# Patient Record
Sex: MALE | Race: WHITE | NOT HISPANIC OR LATINO
[De-identification: names, ages, dates, MRNs, and addresses within clinical notes are randomized per-mention and may not be internally consistent; named-entity substitution may affect disease eponyms.]

---

## 2017-06-22 ENCOUNTER — APPOINTMENT (OUTPATIENT)
Dept: SURGERY | Facility: CLINIC | Age: 72
End: 2017-06-22

## 2017-06-22 VITALS
SYSTOLIC BLOOD PRESSURE: 129 MMHG | HEART RATE: 69 BPM | DIASTOLIC BLOOD PRESSURE: 79 MMHG | BODY MASS INDEX: 26.55 KG/M2 | WEIGHT: 218 LBS | HEIGHT: 76 IN

## 2017-06-22 DIAGNOSIS — Z86.39 PERSONAL HISTORY OF OTHER ENDOCRINE, NUTRITIONAL AND METABOLIC DISEASE: ICD-10-CM

## 2017-06-22 DIAGNOSIS — Z86.79 PERSONAL HISTORY OF OTHER DISEASES OF THE CIRCULATORY SYSTEM: ICD-10-CM

## 2017-06-22 DIAGNOSIS — Z78.9 OTHER SPECIFIED HEALTH STATUS: ICD-10-CM

## 2017-06-22 DIAGNOSIS — Z80.1 FAMILY HISTORY OF MALIGNANT NEOPLASM OF TRACHEA, BRONCHUS AND LUNG: ICD-10-CM

## 2017-06-22 DIAGNOSIS — Z80.8 FAMILY HISTORY OF MALIGNANT NEOPLASM OF OTHER ORGANS OR SYSTEMS: ICD-10-CM

## 2017-08-15 ENCOUNTER — OUTPATIENT (OUTPATIENT)
Dept: OUTPATIENT SERVICES | Facility: HOSPITAL | Age: 72
LOS: 1 days | End: 2017-08-15
Payer: MEDICARE

## 2017-08-15 VITALS
DIASTOLIC BLOOD PRESSURE: 88 MMHG | OXYGEN SATURATION: 98 % | HEART RATE: 62 BPM | WEIGHT: 218.04 LBS | SYSTOLIC BLOOD PRESSURE: 138 MMHG | TEMPERATURE: 98 F | RESPIRATION RATE: 16 BRPM | HEIGHT: 76 IN

## 2017-08-15 DIAGNOSIS — C73 MALIGNANT NEOPLASM OF THYROID GLAND: ICD-10-CM

## 2017-08-15 DIAGNOSIS — Z98.890 OTHER SPECIFIED POSTPROCEDURAL STATES: Chronic | ICD-10-CM

## 2017-08-15 DIAGNOSIS — T41.45XA ADVERSE EFFECT OF UNSPECIFIED ANESTHETIC, INITIAL ENCOUNTER: ICD-10-CM

## 2017-08-15 DIAGNOSIS — I10 ESSENTIAL (PRIMARY) HYPERTENSION: ICD-10-CM

## 2017-08-15 DIAGNOSIS — J38.2 NODULES OF VOCAL CORDS: Chronic | ICD-10-CM

## 2017-08-15 PROCEDURE — 93010 ELECTROCARDIOGRAM REPORT: CPT

## 2017-08-15 NOTE — H&P PST ADULT - NEGATIVE CARDIOVASCULAR SYMPTOMS
no paroxysmal nocturnal dyspnea/no dyspnea on exertion/no peripheral edema/no claudication/no chest pain/no palpitations/no orthopnea

## 2017-08-15 NOTE — H&P PST ADULT - PROBLEM SELECTOR PLAN 1
scheduled left thyroid lobectomy, paratracheal node dissection on 8/21/17.   preop instruction, gi prophylaxis & surgical soap given  pt verbalized understanding

## 2017-08-15 NOTE — H&P PST ADULT - HISTORY OF PRESENT ILLNESS
73y/o male presents for preop eval for scheduled left thyroid lobectomy, paratracheal node dissection on 8/21/17.  Pt with h/o thyroid nodule dx approximately one year ago.  Recent imaging show slight increase in size, biopsy positive for malignancy.

## 2017-08-15 NOTE — H&P PST ADULT - PMH
Dyslipidemia    HTN (hypertension)    Malignant neoplasm of thyroid gland    Melanoma  nose & left cheek  2yrs ago

## 2017-08-18 RX ORDER — SODIUM CHLORIDE 9 MG/ML
3 INJECTION INTRAMUSCULAR; INTRAVENOUS; SUBCUTANEOUS ONCE
Qty: 0 | Refills: 0 | Status: DISCONTINUED | OUTPATIENT
Start: 2017-08-21 | End: 2017-09-05

## 2017-08-18 RX ORDER — SODIUM CHLORIDE 9 MG/ML
1000 INJECTION, SOLUTION INTRAVENOUS
Qty: 0 | Refills: 0 | Status: DISCONTINUED | OUTPATIENT
Start: 2017-08-21 | End: 2017-09-05

## 2017-08-21 ENCOUNTER — TRANSCRIPTION ENCOUNTER (OUTPATIENT)
Age: 72
End: 2017-08-21

## 2017-08-21 ENCOUNTER — RESULT REVIEW (OUTPATIENT)
Age: 72
End: 2017-08-21

## 2017-08-21 ENCOUNTER — OUTPATIENT (OUTPATIENT)
Dept: OUTPATIENT SERVICES | Facility: HOSPITAL | Age: 72
LOS: 1 days | Discharge: HOME | End: 2017-08-21
Payer: MEDICARE

## 2017-08-21 ENCOUNTER — APPOINTMENT (OUTPATIENT)
Dept: SURGERY | Facility: HOSPITAL | Age: 72
End: 2017-08-21

## 2017-08-21 VITALS
RESPIRATION RATE: 18 BRPM | HEART RATE: 74 BPM | TEMPERATURE: 98 F | SYSTOLIC BLOOD PRESSURE: 137 MMHG | WEIGHT: 218.04 LBS | OXYGEN SATURATION: 98 % | DIASTOLIC BLOOD PRESSURE: 89 MMHG | HEIGHT: 76 IN

## 2017-08-21 VITALS
RESPIRATION RATE: 16 BRPM | SYSTOLIC BLOOD PRESSURE: 153 MMHG | DIASTOLIC BLOOD PRESSURE: 84 MMHG | OXYGEN SATURATION: 95 % | HEART RATE: 72 BPM | TEMPERATURE: 98 F

## 2017-08-21 DIAGNOSIS — C73 MALIGNANT NEOPLASM OF THYROID GLAND: ICD-10-CM

## 2017-08-21 DIAGNOSIS — Z98.890 OTHER SPECIFIED POSTPROCEDURAL STATES: Chronic | ICD-10-CM

## 2017-08-21 DIAGNOSIS — J38.2 NODULES OF VOCAL CORDS: Chronic | ICD-10-CM

## 2017-08-21 PROCEDURE — 13132 CMPLX RPR F/C/C/M/N/AX/G/H/F: CPT | Mod: 59

## 2017-08-21 PROCEDURE — 88305 TISSUE EXAM BY PATHOLOGIST: CPT | Mod: 26

## 2017-08-21 PROCEDURE — 88307 TISSUE EXAM BY PATHOLOGIST: CPT | Mod: 26

## 2017-08-21 PROCEDURE — 60252 REMOVAL OF THYROID: CPT

## 2017-08-21 RX ORDER — ACETAMINOPHEN 500 MG
2 TABLET ORAL
Qty: 0 | Refills: 0 | DISCHARGE
Start: 2017-08-21

## 2017-08-21 RX ORDER — OXYCODONE AND ACETAMINOPHEN 5; 325 MG/1; MG/1
1 TABLET ORAL EVERY 4 HOURS
Qty: 0 | Refills: 0 | Status: DISCONTINUED | OUTPATIENT
Start: 2017-08-21 | End: 2017-08-21

## 2017-08-21 RX ORDER — ONDANSETRON 8 MG/1
4 TABLET, FILM COATED ORAL ONCE
Qty: 0 | Refills: 0 | Status: DISCONTINUED | OUTPATIENT
Start: 2017-08-21 | End: 2017-09-05

## 2017-08-21 RX ORDER — OXYCODONE HYDROCHLORIDE 5 MG/1
5 TABLET ORAL EVERY 4 HOURS
Qty: 0 | Refills: 0 | Status: DISCONTINUED | OUTPATIENT
Start: 2017-08-21 | End: 2017-08-21

## 2017-08-21 RX ORDER — ACETAMINOPHEN 500 MG
650 TABLET ORAL EVERY 6 HOURS
Qty: 0 | Refills: 0 | Status: DISCONTINUED | OUTPATIENT
Start: 2017-08-21 | End: 2017-09-05

## 2017-08-21 RX ORDER — METOCLOPRAMIDE HCL 10 MG
10 TABLET ORAL ONCE
Qty: 0 | Refills: 0 | Status: DISCONTINUED | OUTPATIENT
Start: 2017-08-21 | End: 2017-09-05

## 2017-08-21 RX ORDER — FENTANYL CITRATE 50 UG/ML
50 INJECTION INTRAVENOUS
Qty: 0 | Refills: 0 | Status: DISCONTINUED | OUTPATIENT
Start: 2017-08-21 | End: 2017-08-21

## 2017-08-21 RX ADMIN — SODIUM CHLORIDE 30 MILLILITER(S): 9 INJECTION, SOLUTION INTRAVENOUS at 10:53

## 2017-08-21 NOTE — ASU DISCHARGE PLAN (ADULT/PEDIATRIC). - NOTIFY
Unable to Urinate/Persistent Nausea and Vomiting/Swelling that continues/Numbness, tingling/Bleeding that does not stop

## 2017-08-21 NOTE — ASU DISCHARGE PLAN (ADULT/PEDIATRIC). - MEDICATION SUMMARY - MEDICATIONS TO TAKE
I will START or STAY ON the medications listed below when I get home from the hospital:    acetaminophen 325 mg oral tablet  -- 2 tab(s) by mouth every 6 hours, As needed, Moderate Pain (4 - 6)  -- Indication: For Malignant neoplasm of thyroid gland    Avapro 150 mg oral tablet  -- 1 tab(s) by mouth once a day  -- Indication: For Malignant neoplasm of thyroid gland    Lipitor 20 mg oral tablet  -- 1 tab(s) by mouth once a day  -- Indication: For Malignant neoplasm of thyroid gland

## 2017-08-21 NOTE — ASU DISCHARGE PLAN (ADULT/PEDIATRIC). - NURSING INSTRUCTIONS
Notify MD of any bleeding, swelling, drainage, or redness at incision site. Notify MD of any fever, chills, or inability to tolerate diet.     Cool and warm liquids that are not irritating to the throat should be given for the first day or two. Avoid hot liquids. Avoid citrus juices and milk. Advance at your own pace starting with soft foods and advancing to a regular diet. Avoid rough and scratchy foods and foods that are difficult to chew for approximately 5 days.    No tylenol until 630pm.

## 2017-08-22 ENCOUNTER — APPOINTMENT (OUTPATIENT)
Dept: SURGERY | Facility: CLINIC | Age: 72
End: 2017-08-22
Payer: MEDICARE

## 2017-08-22 PROCEDURE — 99024 POSTOP FOLLOW-UP VISIT: CPT

## 2017-08-24 LAB — SURGICAL PATHOLOGY STUDY: SIGNIFICANT CHANGE UP

## 2017-09-28 ENCOUNTER — APPOINTMENT (OUTPATIENT)
Dept: SURGERY | Facility: CLINIC | Age: 72
End: 2017-09-28
Payer: MEDICARE

## 2017-09-28 PROCEDURE — 99024 POSTOP FOLLOW-UP VISIT: CPT

## 2017-09-28 PROCEDURE — 36415 COLL VENOUS BLD VENIPUNCTURE: CPT

## 2017-09-29 LAB
T3 SERPL-MCNC: 114 NG/DL
T4 FREE SERPL-MCNC: 1.1 NG/DL
THYROGLOB AB SERPL-ACNC: 39.8 IU/ML
THYROPEROXIDASE AB SERPL IA-ACNC: <10 IU/ML
TSH SERPL-ACNC: 9.17 UIU/ML

## 2017-10-10 ENCOUNTER — OTHER (OUTPATIENT)
Age: 72
End: 2017-10-10

## 2018-02-27 ENCOUNTER — APPOINTMENT (OUTPATIENT)
Dept: SURGERY | Facility: CLINIC | Age: 73
End: 2018-02-27
Payer: MEDICARE

## 2018-02-27 PROCEDURE — 99213 OFFICE O/P EST LOW 20 MIN: CPT

## 2018-08-21 ENCOUNTER — APPOINTMENT (OUTPATIENT)
Dept: SURGERY | Facility: CLINIC | Age: 73
End: 2018-08-21
Payer: MEDICARE

## 2018-08-21 PROCEDURE — 99213 OFFICE O/P EST LOW 20 MIN: CPT

## 2018-08-22 PROBLEM — C73 MALIGNANT NEOPLASM OF THYROID GLAND: Chronic | Status: ACTIVE | Noted: 2017-08-15

## 2018-08-22 PROBLEM — E78.5 HYPERLIPIDEMIA, UNSPECIFIED: Chronic | Status: ACTIVE | Noted: 2017-08-15

## 2018-08-22 PROBLEM — C43.9 MALIGNANT MELANOMA OF SKIN, UNSPECIFIED: Chronic | Status: ACTIVE | Noted: 2017-08-15

## 2018-08-22 PROBLEM — I10 ESSENTIAL (PRIMARY) HYPERTENSION: Chronic | Status: ACTIVE | Noted: 2017-08-15

## 2019-02-19 NOTE — ASU PREOP CHECKLIST - HAIR REMOVAL
What Type Of Note Output Would You Prefer (Optional)?: Standard Output How Severe Is Your Skin Lesion?: mild Has Your Skin Lesion Been Treated?: not been treated Is This A New Presentation, Or A Follow-Up?: Skin Lesions hair removal not indicated

## 2019-03-12 ENCOUNTER — APPOINTMENT (OUTPATIENT)
Dept: SURGERY | Facility: CLINIC | Age: 74
End: 2019-03-12
Payer: MEDICARE

## 2019-03-12 PROCEDURE — 99213 OFFICE O/P EST LOW 20 MIN: CPT

## 2019-03-12 NOTE — HISTORY OF PRESENT ILLNESS
[de-identified] : 1 1/2 years  s/p thyroid lobectomy for malignancy. feels well on no Synthroid;   denies dysphagia, hoarseness or new lesions. no changes medically since last visit.  last sonogram [place].  TFT's reportedly stable.  working to lower Hgb A1c

## 2019-03-12 NOTE — PHYSICAL EXAM
[de-identified] : well healed scar [de-identified] : no palpable thyroid nodules [Laryngoscopy Performed] : laryngoscopy was performed, see procedure section for findings [Midline] : located in midline position [Normal] : orientation to person, place, and time: normal

## 2019-09-17 ENCOUNTER — APPOINTMENT (OUTPATIENT)
Dept: SURGERY | Facility: CLINIC | Age: 74
End: 2019-09-17

## 2020-06-16 ENCOUNTER — APPOINTMENT (OUTPATIENT)
Dept: GASTROENTEROLOGY | Facility: CLINIC | Age: 75
End: 2020-06-16
Payer: MEDICARE

## 2020-06-16 VITALS
WEIGHT: 197 LBS | TEMPERATURE: 97.7 F | OXYGEN SATURATION: 97 % | HEIGHT: 76 IN | SYSTOLIC BLOOD PRESSURE: 120 MMHG | BODY MASS INDEX: 23.99 KG/M2 | DIASTOLIC BLOOD PRESSURE: 70 MMHG | HEART RATE: 76 BPM

## 2020-06-16 DIAGNOSIS — Z12.11 ENCOUNTER FOR SCREENING FOR MALIGNANT NEOPLASM OF COLON: ICD-10-CM

## 2020-06-16 PROCEDURE — 99203 OFFICE O/P NEW LOW 30 MIN: CPT

## 2020-06-16 NOTE — HISTORY OF PRESENT ILLNESS
[FreeTextEntry1] : Patient came to the office today to arrange for colonoscopy. Has been more than 5 years since last examination. Patient looks and feels well and offers no complaints he denies rectal bleeding nausea vomiting fever chills etc. He has no cardiac or pulmonary complaints.

## 2020-06-16 NOTE — ASSESSMENT
[FreeTextEntry1] : Impression\par \par Patient came to the office today to arrange for colonoscopy. The risks benefits alternatives and limitations are discussed. Further suggestions will follow up with complete.

## 2020-07-02 DIAGNOSIS — Z01.818 ENCOUNTER FOR OTHER PREPROCEDURAL EXAMINATION: ICD-10-CM

## 2020-07-07 ENCOUNTER — APPOINTMENT (OUTPATIENT)
Dept: DISASTER EMERGENCY | Facility: CLINIC | Age: 75
End: 2020-07-07

## 2020-07-08 LAB — SARS-COV-2 N GENE NPH QL NAA+PROBE: NOT DETECTED

## 2020-07-09 ENCOUNTER — APPOINTMENT (OUTPATIENT)
Dept: GASTROENTEROLOGY | Facility: AMBULATORY MEDICAL SERVICES | Age: 75
End: 2020-07-09
Payer: MEDICARE

## 2020-07-09 PROCEDURE — 45380 COLONOSCOPY AND BIOPSY: CPT

## 2020-07-21 ENCOUNTER — APPOINTMENT (OUTPATIENT)
Dept: GASTROENTEROLOGY | Facility: CLINIC | Age: 75
End: 2020-07-21
Payer: MEDICARE

## 2020-07-21 VITALS
DIASTOLIC BLOOD PRESSURE: 68 MMHG | BODY MASS INDEX: 22.89 KG/M2 | SYSTOLIC BLOOD PRESSURE: 140 MMHG | TEMPERATURE: 98 F | HEIGHT: 76 IN | WEIGHT: 188 LBS | OXYGEN SATURATION: 98 % | HEART RATE: 85 BPM

## 2020-07-21 DIAGNOSIS — E11.29 TYPE 2 DIABETES MELLITUS WITH OTHER DIABETIC KIDNEY COMPLICATION: ICD-10-CM

## 2020-07-21 DIAGNOSIS — E11.59 TYPE 2 DIABETES MELLITUS WITH OTHER CIRCULATORY COMPLICATIONS: ICD-10-CM

## 2020-07-21 DIAGNOSIS — Z86.39 PERSONAL HISTORY OF OTHER ENDOCRINE, NUTRITIONAL AND METABOLIC DISEASE: ICD-10-CM

## 2020-07-21 DIAGNOSIS — Z00.00 ENCOUNTER FOR GENERAL ADULT MEDICAL EXAMINATION W/OUT ABNORMAL FINDINGS: ICD-10-CM

## 2020-07-21 PROCEDURE — 99214 OFFICE O/P EST MOD 30 MIN: CPT

## 2020-07-21 NOTE — HISTORY OF PRESENT ILLNESS
[FreeTextEntry1] : Patient came to the office today for followup after recent colonoscopy. Around the time of his colonoscopy patient starts developing irregular bowel movements with several soft formed movements per day. This is on usual for him. Patient had what looked like mild focal diverticulitis during a colonoscopy but no other serious pathology. Patient had a tiny polyp removed during the procedure. There was no evidence of colitis. The patient states that he has been having 3-4 soft mushy formed movements daily. There has been no fever chills melena bright red blood per rectum vomiting nausea or loss of appetite. Patient generally feels well. Patient has been taking metformin based diabetic medication which he has recently discontinued. He does admit to eating larger amounts of green vegetables and milk products.

## 2020-07-21 NOTE — ASSESSMENT
[FreeTextEntry1] : Impression and plan\par Patient came to the office today with change in bowel habits. I suspect it is on the basis of dietary items including salad and milk however I will obtain stool studies follow path by CT scanning given patient's recent diverticulitis et cetera. Patient was given a modified diet for the next 5 days to include meats and starches predominant. Patient will call back after stools the symptoms are submitted and I will advise accordingly about CT scanning.

## 2020-07-22 LAB — GI PCR PANEL, STOOL: NORMAL

## 2020-07-23 ENCOUNTER — APPOINTMENT (OUTPATIENT)
Dept: CT IMAGING | Facility: CLINIC | Age: 75
End: 2020-07-23
Payer: MEDICARE

## 2020-07-23 ENCOUNTER — OUTPATIENT (OUTPATIENT)
Dept: OUTPATIENT SERVICES | Facility: HOSPITAL | Age: 75
LOS: 1 days | End: 2020-07-23
Payer: MEDICARE

## 2020-07-23 DIAGNOSIS — Z12.11 ENCOUNTER FOR SCREENING FOR MALIGNANT NEOPLASM OF COLON: ICD-10-CM

## 2020-07-23 DIAGNOSIS — Z98.890 OTHER SPECIFIED POSTPROCEDURAL STATES: Chronic | ICD-10-CM

## 2020-07-23 DIAGNOSIS — J38.2 NODULES OF VOCAL CORDS: Chronic | ICD-10-CM

## 2020-07-23 PROCEDURE — 74177 CT ABD & PELVIS W/CONTRAST: CPT | Mod: 26

## 2020-07-23 PROCEDURE — 74177 CT ABD & PELVIS W/CONTRAST: CPT

## 2020-07-23 PROCEDURE — 82565 ASSAY OF CREATININE: CPT

## 2020-07-24 DIAGNOSIS — R93.5 ABNORMAL FINDINGS ON DIAGNOSTIC IMAGING OF OTHER ABDOMINAL REGIONS, INCLUDING RETROPERITONEUM: ICD-10-CM

## 2020-07-27 LAB
C DIFF TOXIN B QL PCR REFLEX: NORMAL
GDH ANTIGEN: NOT DETECTED
TOXIN A AND B: NOT DETECTED

## 2020-12-23 PROBLEM — Z12.11 ENCOUNTER FOR SCREENING COLONOSCOPY: Status: RESOLVED | Noted: 2020-06-16 | Resolved: 2020-12-23

## 2021-01-01 ENCOUNTER — APPOINTMENT (OUTPATIENT)
Dept: INFUSION THERAPY | Facility: HOSPITAL | Age: 76
End: 2021-01-01

## 2021-01-01 ENCOUNTER — NON-APPOINTMENT (OUTPATIENT)
Age: 76
End: 2021-01-01

## 2021-01-01 ENCOUNTER — LABORATORY RESULT (OUTPATIENT)
Age: 76
End: 2021-01-01

## 2021-01-01 ENCOUNTER — RESULT REVIEW (OUTPATIENT)
Age: 76
End: 2021-01-01

## 2021-01-01 ENCOUNTER — APPOINTMENT (OUTPATIENT)
Dept: HEMATOLOGY ONCOLOGY | Facility: CLINIC | Age: 76
End: 2021-01-01

## 2021-01-01 ENCOUNTER — APPOINTMENT (OUTPATIENT)
Dept: HEMATOLOGY ONCOLOGY | Facility: CLINIC | Age: 76
End: 2021-01-01
Payer: MEDICARE

## 2021-01-01 ENCOUNTER — TRANSCRIPTION ENCOUNTER (OUTPATIENT)
Age: 76
End: 2021-01-01

## 2021-01-01 ENCOUNTER — OUTPATIENT (OUTPATIENT)
Dept: OUTPATIENT SERVICES | Facility: HOSPITAL | Age: 76
LOS: 1 days | Discharge: HOME | End: 2021-01-01

## 2021-01-01 ENCOUNTER — OUTPATIENT (OUTPATIENT)
Dept: OUTPATIENT SERVICES | Facility: HOSPITAL | Age: 76
LOS: 1 days | Discharge: HOME | End: 2021-01-01
Payer: MEDICARE

## 2021-01-01 ENCOUNTER — APPOINTMENT (OUTPATIENT)
Dept: CT IMAGING | Facility: IMAGING CENTER | Age: 76
End: 2021-01-01
Payer: MEDICARE

## 2021-01-01 ENCOUNTER — APPOINTMENT (OUTPATIENT)
Dept: INFUSION THERAPY | Facility: HOSPITAL | Age: 76
End: 2021-01-01
Payer: MEDICARE

## 2021-01-01 ENCOUNTER — OUTPATIENT (OUTPATIENT)
Dept: OUTPATIENT SERVICES | Facility: HOSPITAL | Age: 76
LOS: 1 days | End: 2021-01-01
Payer: MEDICARE

## 2021-01-01 ENCOUNTER — APPOINTMENT (OUTPATIENT)
Dept: MULTI SPECIALTY CLINIC | Facility: CLINIC | Age: 76
End: 2021-01-01
Payer: MEDICARE

## 2021-01-01 VITALS
WEIGHT: 156.53 LBS | BODY MASS INDEX: 18.86 KG/M2 | SYSTOLIC BLOOD PRESSURE: 120 MMHG | OXYGEN SATURATION: 100 % | HEART RATE: 80 BPM | RESPIRATION RATE: 16 BRPM | DIASTOLIC BLOOD PRESSURE: 77 MMHG | TEMPERATURE: 97.1 F | HEIGHT: 76.38 IN

## 2021-01-01 DIAGNOSIS — C25.9 MALIGNANT NEOPLASM OF PANCREAS, UNSPECIFIED: ICD-10-CM

## 2021-01-01 DIAGNOSIS — J38.2 NODULES OF VOCAL CORDS: Chronic | ICD-10-CM

## 2021-01-01 DIAGNOSIS — R11.2 NAUSEA WITH VOMITING, UNSPECIFIED: ICD-10-CM

## 2021-01-01 DIAGNOSIS — Z85.820 PERSONAL HISTORY OF MALIGNANT MELANOMA OF SKIN: ICD-10-CM

## 2021-01-01 DIAGNOSIS — Z98.890 OTHER SPECIFIED POSTPROCEDURAL STATES: Chronic | ICD-10-CM

## 2021-01-01 DIAGNOSIS — Z80.3 FAMILY HISTORY OF MALIGNANT NEOPLASM OF BREAST: ICD-10-CM

## 2021-01-01 DIAGNOSIS — Z51.11 ENCOUNTER FOR ANTINEOPLASTIC CHEMOTHERAPY: ICD-10-CM

## 2021-01-01 DIAGNOSIS — Z51.89 ENCOUNTER FOR OTHER SPECIFIED AFTERCARE: ICD-10-CM

## 2021-01-01 DIAGNOSIS — Z85.850 PERSONAL HISTORY OF MALIGNANT NEOPLASM OF THYROID: ICD-10-CM

## 2021-01-01 DIAGNOSIS — Z87.891 PERSONAL HISTORY OF NICOTINE DEPENDENCE: ICD-10-CM

## 2021-01-01 LAB
ALBUMIN SERPL ELPH-MCNC: 4.1 G/DL
ALBUMIN SERPL ELPH-MCNC: 4.2 G/DL
ALBUMIN SERPL ELPH-MCNC: 4.3 G/DL
ALBUMIN SERPL ELPH-MCNC: 4.3 G/DL
ALP BLD-CCNC: 118 U/L
ALP BLD-CCNC: 133 U/L
ALP BLD-CCNC: 148 U/L
ALP BLD-CCNC: 175 U/L
ALT SERPL-CCNC: 26 U/L
ALT SERPL-CCNC: 29 U/L
ALT SERPL-CCNC: 44 U/L
ALT SERPL-CCNC: 46 U/L
ANION GAP SERPL CALC-SCNC: 11 MMOL/L
ANION GAP SERPL CALC-SCNC: 13 MMOL/L
ANION GAP SERPL CALC-SCNC: 14 MMOL/L
ANION GAP SERPL CALC-SCNC: 14 MMOL/L
AST SERPL-CCNC: 22 U/L
AST SERPL-CCNC: 24 U/L
AST SERPL-CCNC: 27 U/L
AST SERPL-CCNC: 40 U/L
BASOPHILS # BLD AUTO: 0.02 K/UL
BASOPHILS # BLD AUTO: 0.02 K/UL — SIGNIFICANT CHANGE UP (ref 0–0.2)
BASOPHILS # BLD AUTO: 0.03 K/UL
BASOPHILS # BLD AUTO: 0.03 K/UL — SIGNIFICANT CHANGE UP (ref 0–0.2)
BASOPHILS # BLD AUTO: 0.03 K/UL — SIGNIFICANT CHANGE UP (ref 0–0.2)
BASOPHILS NFR BLD AUTO: 0.4 % — SIGNIFICANT CHANGE UP (ref 0–2)
BASOPHILS NFR BLD AUTO: 0.5 %
BASOPHILS NFR BLD AUTO: 0.5 %
BASOPHILS NFR BLD AUTO: 0.7 % — SIGNIFICANT CHANGE UP (ref 0–2)
BASOPHILS NFR BLD AUTO: 0.8 %
BASOPHILS NFR BLD AUTO: 0.9 % — SIGNIFICANT CHANGE UP (ref 0–2)
BASOPHILS NFR BLD AUTO: 2.8 %
BILIRUB SERPL-MCNC: 0.2 MG/DL
BILIRUB SERPL-MCNC: 0.3 MG/DL
BILIRUB SERPL-MCNC: 0.4 MG/DL
BILIRUB SERPL-MCNC: 0.4 MG/DL
BUN SERPL-MCNC: 18 MG/DL
BUN SERPL-MCNC: 19 MG/DL
BUN SERPL-MCNC: 22 MG/DL
BUN SERPL-MCNC: 22 MG/DL
CALCIUM SERPL-MCNC: 9.3 MG/DL
CANCER AG19-9 SERPL-ACNC: 259 U/ML
CANCER AG19-9 SERPL-ACNC: 343 U/ML
CANCER AG19-9 SERPL-ACNC: 589 U/ML
CEA SERPL-MCNC: 4.5 NG/ML
CHLORIDE SERPL-SCNC: 102 MMOL/L
CHLORIDE SERPL-SCNC: 104 MMOL/L
CHLORIDE SERPL-SCNC: 106 MMOL/L
CHLORIDE SERPL-SCNC: 106 MMOL/L
CO2 SERPL-SCNC: 21 MMOL/L
CO2 SERPL-SCNC: 21 MMOL/L
CO2 SERPL-SCNC: 22 MMOL/L
CO2 SERPL-SCNC: 22 MMOL/L
CREAT SERPL-MCNC: 0.95 MG/DL
CREAT SERPL-MCNC: 1 MG/DL
CREAT SERPL-MCNC: 1.02 MG/DL
CREAT SERPL-MCNC: 1.13 MG/DL
EOSINOPHIL # BLD AUTO: 0.02 K/UL
EOSINOPHIL # BLD AUTO: 0.02 K/UL — SIGNIFICANT CHANGE UP (ref 0–0.5)
EOSINOPHIL # BLD AUTO: 0.05 K/UL
EOSINOPHIL # BLD AUTO: 0.06 K/UL — SIGNIFICANT CHANGE UP (ref 0–0.5)
EOSINOPHIL # BLD AUTO: 0.06 K/UL — SIGNIFICANT CHANGE UP (ref 0–0.5)
EOSINOPHIL # BLD AUTO: 0.12 K/UL
EOSINOPHIL # BLD AUTO: 0.15 K/UL
EOSINOPHIL NFR BLD AUTO: 0.8 % — SIGNIFICANT CHANGE UP (ref 0–6)
EOSINOPHIL NFR BLD AUTO: 0.9 % — SIGNIFICANT CHANGE UP (ref 0–6)
EOSINOPHIL NFR BLD AUTO: 1.3 %
EOSINOPHIL NFR BLD AUTO: 1.5 % — SIGNIFICANT CHANGE UP (ref 0–6)
EOSINOPHIL NFR BLD AUTO: 2 %
EOSINOPHIL NFR BLD AUTO: 2.5 %
EOSINOPHIL NFR BLD AUTO: 2.8 %
GLUCOSE SERPL-MCNC: 152 MG/DL
GLUCOSE SERPL-MCNC: 176 MG/DL
GLUCOSE SERPL-MCNC: 178 MG/DL
GLUCOSE SERPL-MCNC: 181 MG/DL
HAV IGM SER QL: NONREACTIVE
HBV CORE IGG+IGM SER QL: NONREACTIVE
HBV CORE IGM SER QL: NONREACTIVE
HBV SURFACE AG SER QL: NONREACTIVE
HCT VFR BLD CALC: 29.3 % — LOW (ref 39–50)
HCT VFR BLD CALC: 30.6 %
HCT VFR BLD CALC: 32.4 %
HCT VFR BLD CALC: 32.7 %
HCT VFR BLD CALC: 32.9 %
HCT VFR BLD CALC: 33 % — LOW (ref 39–50)
HCT VFR BLD CALC: 34.7 % — LOW (ref 39–50)
HCV AB SER QL: NONREACTIVE
HCV S/CO RATIO: 0.11 S/CO
HGB BLD-MCNC: 10.3 G/DL
HGB BLD-MCNC: 10.4 G/DL
HGB BLD-MCNC: 10.4 G/DL
HGB BLD-MCNC: 10.9 G/DL — LOW (ref 13–17)
HGB BLD-MCNC: 11.2 G/DL — LOW (ref 13–17)
HGB BLD-MCNC: 9.6 G/DL
HGB BLD-MCNC: 9.6 G/DL — LOW (ref 13–17)
IMM GRANULOCYTES NFR BLD AUTO: 0.5 %
IMM GRANULOCYTES NFR BLD AUTO: 0.5 %
IMM GRANULOCYTES NFR BLD AUTO: 0.5 % — SIGNIFICANT CHANGE UP (ref 0–1.5)
IMM GRANULOCYTES NFR BLD AUTO: 0.5 % — SIGNIFICANT CHANGE UP (ref 0–1.5)
IMM GRANULOCYTES NFR BLD AUTO: 0.8 % — SIGNIFICANT CHANGE UP (ref 0–1.5)
IMM GRANULOCYTES NFR BLD AUTO: 1.3 %
IMM GRANULOCYTES NFR BLD AUTO: 1.4 %
LYMPHOCYTES # BLD AUTO: 0.24 K/UL
LYMPHOCYTES # BLD AUTO: 0.26 K/UL — LOW (ref 1–3.3)
LYMPHOCYTES # BLD AUTO: 0.35 K/UL — LOW (ref 1–3.3)
LYMPHOCYTES # BLD AUTO: 0.41 K/UL — LOW (ref 1–3.3)
LYMPHOCYTES # BLD AUTO: 0.43 K/UL
LYMPHOCYTES # BLD AUTO: 0.5 K/UL
LYMPHOCYTES # BLD AUTO: 0.57 K/UL
LYMPHOCYTES # BLD AUTO: 12.3 % — LOW (ref 13–44)
LYMPHOCYTES # BLD AUTO: 5.7 % — LOW (ref 13–44)
LYMPHOCYTES # BLD AUTO: 8.7 % — LOW (ref 13–44)
LYMPHOCYTES NFR BLD AUTO: 11 %
LYMPHOCYTES NFR BLD AUTO: 33.8 %
LYMPHOCYTES NFR BLD AUTO: 8.4 %
LYMPHOCYTES NFR BLD AUTO: 9.5 %
MAN DIFF?: NORMAL
MCHC RBC-ENTMCNC: 28.7 PG
MCHC RBC-ENTMCNC: 29.4 PG
MCHC RBC-ENTMCNC: 29.5 PG
MCHC RBC-ENTMCNC: 29.5 PG — SIGNIFICANT CHANGE UP (ref 27–34)
MCHC RBC-ENTMCNC: 29.6 PG — SIGNIFICANT CHANGE UP (ref 27–34)
MCHC RBC-ENTMCNC: 29.7 PG — SIGNIFICANT CHANGE UP (ref 27–34)
MCHC RBC-ENTMCNC: 29.9 PG
MCHC RBC-ENTMCNC: 31.3 GM/DL
MCHC RBC-ENTMCNC: 31.4 GM/DL
MCHC RBC-ENTMCNC: 31.8 GM/DL
MCHC RBC-ENTMCNC: 32.1 GM/DL
MCHC RBC-ENTMCNC: 32.3 G/DL — SIGNIFICANT CHANGE UP (ref 32–36)
MCHC RBC-ENTMCNC: 32.8 G/DL — SIGNIFICANT CHANGE UP (ref 32–36)
MCHC RBC-ENTMCNC: 33 G/DL — SIGNIFICANT CHANGE UP (ref 32–36)
MCV RBC AUTO: 89.4 FL — SIGNIFICANT CHANGE UP (ref 80–100)
MCV RBC AUTO: 90.3 FL
MCV RBC AUTO: 90.7 FL — SIGNIFICANT CHANGE UP (ref 80–100)
MCV RBC AUTO: 91.6 FL — SIGNIFICANT CHANGE UP (ref 80–100)
MCV RBC AUTO: 92 FL
MCV RBC AUTO: 93.6 FL
MCV RBC AUTO: 95.4 FL
MONOCYTES # BLD AUTO: 0.05 K/UL
MONOCYTES # BLD AUTO: 0.13 K/UL — SIGNIFICANT CHANGE UP (ref 0–0.9)
MONOCYTES # BLD AUTO: 0.43 K/UL — SIGNIFICANT CHANGE UP (ref 0–0.9)
MONOCYTES # BLD AUTO: 0.57 K/UL — SIGNIFICANT CHANGE UP (ref 0–0.9)
MONOCYTES # BLD AUTO: 0.69 K/UL
MONOCYTES # BLD AUTO: 0.85 K/UL
MONOCYTES # BLD AUTO: 0.93 K/UL
MONOCYTES NFR BLD AUTO: 10.7 % — SIGNIFICANT CHANGE UP (ref 2–14)
MONOCYTES NFR BLD AUTO: 14.3 %
MONOCYTES NFR BLD AUTO: 15.6 %
MONOCYTES NFR BLD AUTO: 17.6 %
MONOCYTES NFR BLD AUTO: 6.2 % — SIGNIFICANT CHANGE UP (ref 2–14)
MONOCYTES NFR BLD AUTO: 7 %
MONOCYTES NFR BLD AUTO: 8 % — SIGNIFICANT CHANGE UP (ref 2–14)
NEUTROPHILS # BLD AUTO: 0.37 K/UL
NEUTROPHILS # BLD AUTO: 1.67 K/UL — LOW (ref 1.8–7.4)
NEUTROPHILS # BLD AUTO: 2.67 K/UL
NEUTROPHILS # BLD AUTO: 3.14 K/UL — SIGNIFICANT CHANGE UP (ref 1.8–7.4)
NEUTROPHILS # BLD AUTO: 4.29 K/UL
NEUTROPHILS # BLD AUTO: 4.38 K/UL
NEUTROPHILS # BLD AUTO: 6.01 K/UL — SIGNIFICANT CHANGE UP (ref 1.8–7.4)
NEUTROPHILS NFR BLD AUTO: 52.2 %
NEUTROPHILS NFR BLD AUTO: 68 %
NEUTROPHILS NFR BLD AUTO: 71.9 %
NEUTROPHILS NFR BLD AUTO: 73.8 %
NEUTROPHILS NFR BLD AUTO: 77.9 % — HIGH (ref 43–77)
NEUTROPHILS NFR BLD AUTO: 79.2 % — HIGH (ref 43–77)
NEUTROPHILS NFR BLD AUTO: 84.3 % — HIGH (ref 43–77)
NRBC # BLD: 0 /100 WBCS — SIGNIFICANT CHANGE UP (ref 0–0)
PLATELET # BLD AUTO: 133 K/UL
PLATELET # BLD AUTO: 133 K/UL — LOW (ref 150–400)
PLATELET # BLD AUTO: 184 K/UL
PLATELET # BLD AUTO: 196 K/UL — SIGNIFICANT CHANGE UP (ref 150–400)
PLATELET # BLD AUTO: 198 K/UL
PLATELET # BLD AUTO: 204 K/UL — SIGNIFICANT CHANGE UP (ref 150–400)
PLATELET # BLD AUTO: 351 K/UL
POTASSIUM SERPL-SCNC: 4.5 MMOL/L
POTASSIUM SERPL-SCNC: 4.6 MMOL/L
POTASSIUM SERPL-SCNC: 4.6 MMOL/L
POTASSIUM SERPL-SCNC: 5 MMOL/L
PROT SERPL-MCNC: 6.3 G/DL
PROT SERPL-MCNC: 6.5 G/DL
PROT SERPL-MCNC: 6.6 G/DL
PROT SERPL-MCNC: 6.7 G/DL
RBC # BLD: 3.23 M/UL — LOW (ref 4.2–5.8)
RBC # BLD: 3.27 M/UL
RBC # BLD: 3.45 M/UL
RBC # BLD: 3.52 M/UL
RBC # BLD: 3.62 M/UL
RBC # BLD: 3.69 M/UL — LOW (ref 4.2–5.8)
RBC # BLD: 3.79 M/UL — LOW (ref 4.2–5.8)
RBC # FLD: 12.9 %
RBC # FLD: 13.1 % — SIGNIFICANT CHANGE UP (ref 10.3–14.5)
RBC # FLD: 13.2 % — SIGNIFICANT CHANGE UP (ref 10.3–14.5)
RBC # FLD: 13.4 % — SIGNIFICANT CHANGE UP (ref 10.3–14.5)
RBC # FLD: 14.1 %
RBC # FLD: 14.9 %
RBC # FLD: 15.4 %
SODIUM SERPL-SCNC: 137 MMOL/L
SODIUM SERPL-SCNC: 139 MMOL/L
SODIUM SERPL-SCNC: 140 MMOL/L
SODIUM SERPL-SCNC: 140 MMOL/L
SURGICAL PATHOLOGY STUDY: SIGNIFICANT CHANGE UP
WBC # BLD: 2.11 K/UL — LOW (ref 3.8–10.5)
WBC # BLD: 4.03 K/UL — SIGNIFICANT CHANGE UP (ref 3.8–10.5)
WBC # BLD: 7.14 K/UL — SIGNIFICANT CHANGE UP (ref 3.8–10.5)
WBC # FLD AUTO: 0.71 K/UL
WBC # FLD AUTO: 2.11 K/UL — LOW (ref 3.8–10.5)
WBC # FLD AUTO: 3.92 K/UL
WBC # FLD AUTO: 4.03 K/UL — SIGNIFICANT CHANGE UP (ref 3.8–10.5)
WBC # FLD AUTO: 5.94 K/UL
WBC # FLD AUTO: 5.97 K/UL
WBC # FLD AUTO: 7.14 K/UL — SIGNIFICANT CHANGE UP (ref 3.8–10.5)

## 2021-01-01 PROCEDURE — 82565 ASSAY OF CREATININE: CPT

## 2021-01-01 PROCEDURE — 99214 OFFICE O/P EST MOD 30 MIN: CPT

## 2021-01-01 PROCEDURE — 99214 OFFICE O/P EST MOD 30 MIN: CPT | Mod: 95

## 2021-01-01 PROCEDURE — G1004: CPT

## 2021-01-01 PROCEDURE — 74177 CT ABD & PELVIS W/CONTRAST: CPT | Mod: 26,ME

## 2021-01-01 PROCEDURE — 71260 CT THORAX DX C+: CPT | Mod: ME

## 2021-01-01 PROCEDURE — 71260 CT THORAX DX C+: CPT | Mod: 26,ME

## 2021-01-01 PROCEDURE — 74177 CT ABD & PELVIS W/CONTRAST: CPT | Mod: ME

## 2021-01-01 PROCEDURE — 93010 ELECTROCARDIOGRAM REPORT: CPT

## 2021-01-01 PROCEDURE — 99205 OFFICE O/P NEW HI 60 MIN: CPT

## 2021-01-01 PROCEDURE — 99215 OFFICE O/P EST HI 40 MIN: CPT | Mod: 95

## 2021-01-01 RX ORDER — METFORMIN HYDROCHLORIDE 1000 MG/1
1000 TABLET, COATED ORAL TWICE DAILY
Refills: 0 | Status: ACTIVE | COMMUNITY

## 2021-01-01 RX ORDER — DICLOFENAC SODIUM 75 MG/1
75 TABLET, DELAYED RELEASE ORAL
Qty: 40 | Refills: 0 | Status: DISCONTINUED | COMMUNITY
Start: 2017-05-13 | End: 2021-01-01

## 2021-01-01 RX ORDER — LEVOFLOXACIN 500 MG/1
500 TABLET, FILM COATED ORAL
Qty: 10 | Refills: 0 | Status: DISCONTINUED | COMMUNITY
Start: 2017-03-04 | End: 2021-01-01

## 2021-01-01 RX ORDER — IRBESARTAN 150 MG/1
150 TABLET, FILM COATED ORAL
Refills: 0 | Status: DISCONTINUED | COMMUNITY
End: 2021-01-01

## 2021-01-01 RX ORDER — ONDANSETRON 8 MG/1
8 TABLET ORAL
Qty: 30 | Refills: 3 | Status: ACTIVE | COMMUNITY
Start: 2021-01-01 | End: 1900-01-01

## 2021-01-01 RX ORDER — PANCRELIPASE 36000; 180000; 114000 [USP'U]/1; [USP'U]/1; [USP'U]/1
36000-114000 CAPSULE, DELAYED RELEASE PELLETS ORAL
Qty: 1000 | Refills: 3 | Status: ACTIVE | COMMUNITY
Start: 1900-01-01 | End: 1900-01-01

## 2021-01-01 RX ORDER — ATORVASTATIN CALCIUM 80 MG/1
1 TABLET, FILM COATED ORAL
Qty: 0 | Refills: 0 | DISCHARGE

## 2021-01-01 RX ORDER — ATORVASTATIN CALCIUM 40 MG/1
40 TABLET, FILM COATED ORAL
Qty: 90 | Refills: 3 | Status: ACTIVE | COMMUNITY

## 2021-01-01 RX ORDER — SODIUM SULFATE, POTASSIUM SULFATE, MAGNESIUM SULFATE 17.5; 3.13; 1.6 G/ML; G/ML; G/ML
17.5-3.13-1.6 SOLUTION, CONCENTRATE ORAL
Qty: 1 | Refills: 0 | Status: DISCONTINUED | COMMUNITY
Start: 2020-06-16 | End: 2021-01-01

## 2021-01-01 RX ORDER — IRBESARTAN 75 MG/1
1 TABLET ORAL
Qty: 0 | Refills: 0 | DISCHARGE

## 2021-01-01 RX ORDER — LIDOCAINE AND PRILOCAINE 25; 25 MG/G; MG/G
2.5-2.5 CREAM TOPICAL
Qty: 1 | Refills: 3 | Status: ACTIVE | COMMUNITY
Start: 2021-01-01 | End: 1900-01-01

## 2021-01-01 RX ORDER — EMPAGLIFLOZIN, METFORMIN HYDROCHLORIDE 5; 1000 MG/1; MG/1
5-1000 TABLET, EXTENDED RELEASE ORAL
Refills: 0 | Status: DISCONTINUED | COMMUNITY
End: 2021-01-01

## 2021-01-01 RX ORDER — ATORVASTATIN CALCIUM 20 MG/1
20 TABLET, FILM COATED ORAL
Qty: 90 | Refills: 0 | Status: DISCONTINUED | COMMUNITY
Start: 2017-08-17 | End: 2021-01-01

## 2021-10-25 PROBLEM — Z80.3 FAMILY HISTORY OF MALIGNANT NEOPLASM OF BREAST: Status: ACTIVE | Noted: 2021-01-01

## 2021-10-26 PROBLEM — Z85.850 HISTORY OF MALIGNANT NEOPLASM OF THYROID: Status: RESOLVED | Noted: 2021-01-01 | Resolved: 2021-01-01

## 2021-10-26 PROBLEM — Z85.820 HISTORY OF MALIGNANT MELANOMA: Status: RESOLVED | Noted: 2021-01-01 | Resolved: 2021-01-01

## 2021-10-26 PROBLEM — Z87.891 FORMER SMOKER: Status: ACTIVE | Noted: 2021-01-01

## 2021-10-29 NOTE — END OF VISIT
[] : Resident [FreeTextEntry3] : 77 y/o man with suspected metastatic relapse of pancreatic cancer, [facility] path pending to confirm, plan to start gemcitabine + nab-paclitaxel. YESENIA germline mutation, data have not been as predictive as BRCA/PALB2 for suggesting response to platinum nevertheless will revisit platinum on failure of this standard 2L regimen.

## 2021-10-29 NOTE — REVIEW OF SYSTEMS
[Negative] : Constitutional [Fever] : no fever [Chills] : no chills [Night Sweats] : no night sweats [Fatigue] : no fatigue [Recent Change In Weight] : ~T no recent weight change [Vision Problems] : no vision problems [Dysphagia] : no dysphagia [Hoarseness] : no hoarseness [Shortness Of Breath] : no shortness of breath [Wheezing] : no wheezing [Cough] : no cough [SOB on Exertion] : no shortness of breath during exertion [Abdominal Pain] : no abdominal pain [Vomiting] : no vomiting [Constipation] : no constipation [Diarrhea] : no diarrhea [Dysuria] : no dysuria [Incontinence] : no incontinence [Dizziness] : no dizziness [Difficulty Walking] : no difficulty walking [Suicidal] : not suicidal [Depression] : no depression [Easy Bleeding] : no tendency for easy bleeding [Swollen Glands] : no swollen glands

## 2021-10-29 NOTE — REASON FOR VISIT
[Initial Consultation] : an initial consultation [FreeTextEntry2] : metastatic pancreatic adenocarcinoma

## 2021-10-29 NOTE — HISTORY OF PRESENT ILLNESS
[AJCC Stage: ____] : AJCC Stage: [unfilled] [de-identified] : Mr. 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 \par \par Chronological History of Cancer:\par Jul 2020  initially presented with abdominal discomfort, diarrhea, weight loss, new onset of DM\par 07/23/20  CT A/P suspected pancreas lesion\par 07/24/20  MRI Ab showed 1.2 cm mass at pancreas head. \par 08/03/20  underwent upfront surgery with Whipple procedure [facility] by Dr Calderon. pT2 pN2 LVI+ PNI+, margin negative\par 09/15/20 C1 adjuvant FOLFIRINOX\par 02/16/21 C12 (last cycle) FOLFIRINOX\par 03/03/21 CT CAP showing concern for relapse with mesenteric LN prominence. \par 03/20/21 began long-course RT with capecitabine, completed 5/6/21;\par 06/21/21 CT C/A/ again showed mesenteric lymphadenopathy, slightly increased LN prominence;  20\par Sep 2021  146;  repeat  2 weeks later 10/5/21 248\par 09/28/21 PET scan at [facility] (pending image loading) reported as "s/p whipple without suspicious activity in the surgical bed; hypermetatbolic RLQ lymph node/peritoneal implant most likely metastatic disease; interval improvement of small bowel distention and edema reflecting improvement of post radiation enteritis". 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 His daughter is also positive for YESENIA gene mutation, but his son does not have the mutation.  \par \par He admits that he has a good performance status, has been active, and enjoys playing golf 2-3/week. He is eating well with essentially stable weight (fluctuates). He is currently on medical marijuana BID and Creon (3 caps TID with meals, 1 with snacks); those help for appetite, and he has no further steatorrhea. He reports having mild intermittent abdominal pain/cramping lasting 30 minutes in Aug 2021, then symptoms went away 2-3 weeks later. He admits good energy level and good quality of sleeping. Mild residual finger numbness after previous FOLFIRINOX treatment has been improved.\par \par \par  [de-identified] : surgical path reported moderately differentiated PDAC, 2.5 cm, involving head of pancreas and invading pancreatic duct, CBD, muscularis propria of duodenum and peripancreatic adipose tissue. Lymphovascular and Perineural invasion present. Margins negative. 6 of 13 LNs + for metastatic carcinoma. Staging was described as pT2 pN2.  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

## 2021-10-29 NOTE — ASSESSMENT
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 PET 09/28/21 reported hypermetatbolic RLQ lymph node/peritoneal implant most likely metastatic disease s/p CT guided biopsy by IR at [facility] on 10/25/21.  trending up since 9/2021. Pt presents for second opinion of further treatment for suspected metastatic pancreatic adenocarcinoma. 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 Pt and his wife reported that recently pt was consulted at Health system who recommended a phase I trial with gemcitabine+nab-paclitxel + TTX-030 (CD39 antibody), and [facility] recommended FOLFIRINOX. Case was discussed today at [facility]'s pancreatic multidisciplinary clinic 10/26 where it was discussed there is no role of surgical intervention or radiation therapy in view of suspected metastasis to abdominal LN.\par \par We had an in depth discussion about the options available for therapy. He ultimately decided to pursue gemcitabine+nab-paclitaxel (GnP) since he wishes to continue with a standard option and his cancer has not yet been exposed to this therapy. I also suggested alternatives: olaparib, extrapolating from BRCA/PALB2 data although while noting that YESENIA mutations appear less predictive based on limited data (KnowYourTumor/BeyondBRCA studies), but he did not want to do this at this time because he felt this was not a standard option and did not want to lose ground on his tumor; and FOLFIRINOX, noting that I cannot explicitly declare failure to this regimen since his relapse appeared to become more prominent after stopping this combination, and that his tumor may well be more sensitive to platinum drugs extrapolating from favorable response to platinum drugs from BRCA mutations, although more recent data suggest that YESENIA mutations are prognostic rather than predictive (PMID 93970081).  Given the discussion, I will start him on GnP, and if/when progresses we can decide whether to restart a platinum and perhaps if he does not relapse to that switch to PARP maintenance following the BRCA paradigm.\par \par Plan:\par - labs today: CBC, CMP, Hepatitis panel and HepB core antibody; , CEA (Ordered by KEVON on 10/26/21)\par -EKG (ordered by KEVON10/26/21)\par - DAK consented for GnP today 10/26/21\par -Plan to start chemo next Tue. 11/02/21; pt has Mediport in R chest. \par --[place] 1000mg/m2; Nap-Pacli 125mg/m2 D1 D8 D15 of monthly cycle\par -next in-person clinical visit  with EMERSON 11/9/21\par -Jess will f/u LN biopsy pathology result (biopsy at [facility] on 10/25/21) and request Foundation testing on that.\par -PET image/report need be loaded to [employer] system.   \par \par Austin Simpson PGY4\par

## 2021-11-05 NOTE — ASSESSMENT
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 PET 09/28/21 reported hypermetatbolic RLQ lymph node/peritoneal implant most likely metastatic disease s/p CT guided biopsy by IR at [facility] on 10/25/21.  trending up since 9/2021. Pt presents for second opinion of further treatment for suspected metastatic pancreatic adenocarcinoma. 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 Pt and his wife reported that recently pt was consulted at [facility] who recommended a phase I trial with gemcitabine+nab-paclitxel + TTX-030 (CD39 antibody), and [facility] recommended FOLFIRINOX. Case was discussed today at [facility] pancreatic multidisciplinary clinic 10/26 where it was discussed there is no role of surgical intervention or radiation therapy in view of suspected metastasis to abdominal LN.\par \par We had an in depth discussion about the options available for therapy. He ultimately decided to pursue gemcitabine+nab-paclitaxel (GnP) since he wishes to continue with a standard option and his cancer has not yet been exposed to this therapy. I also suggested alternatives: olaparib, extrapolating from BRCA/PALB2 data although while noting that YESENIA mutations appear less predictive based on limited data (KnowYourTumor/BeyondBRCA studies), but he did not want to do this at this time because he felt this was not a standard option and did not want to lose ground on his tumor; and FOLFIRINOX, noting that I cannot explicitly declare failure to this regimen since his relapse appeared to become more prominent after stopping this combination, and that his tumor may well be more sensitive to platinum drugs extrapolating from favorable response to platinum drugs from BRCA mutations, although more recent data suggest that YESENIA mutations are prognostic rather than predictive (PMID [id number]). Given the discussion, I will start him on GnP, and if/when progresses we can decide whether to restart a platinum and perhaps if he does not relapse to that switch to PARP maintenance following the BRCA paradigm.\par \par Plan:\par - labs today: CBC, CMP, Hepatitis panel and HepB core antibody; , CEA (Ordered by KEVON on 10/26/21)\par -EKG (ordered by KEVON10/26/21)\par - DAK consented for GnP today 10/26/21\par -Plan to start chemo next Tue. 11/02/21; pt has Mediport in R chest. \par --[place] 1000mg/m2; Nap-Pacli 125mg/m2 D1 D8 D15 of monthly cycle\par -next in-person clinical visit with EMERSON 11/9/21\par -Jess will f/u LN biopsy pathology result (biopsy at [facility] on 10/25/21) and request Foundation testing on that.\par -PET image/report need be loaded to [employer] system. \par \par Austin Simpson PGY4\par \par I personally discussed this patient with the Resident at the time of the visit. I agree with the assessment and plan as written, unless noted below. \par 75 y/o man with suspected metastatic relapse of pancreatic cancer, [facility] path pending to confirm, plan to start gemcitabine + nab-paclitaxel. YESENIA germline mutation, data have not been as predictive as BRCA/PALB2 for suggesting response to platinum nevertheless will revisit platinum on failure of this standard 2L regimen. \par Attesting Faculty: See Attending Electronic Signature Below \par  \par Electronically signed by : LETY OAKLEY MD; Oct 29 2021 11:27AM EST (Author)\par \par

## 2021-11-05 NOTE — HISTORY OF PRESENT ILLNESS
[Abdominal Pain] : abdominal pain [New/worsening Diabetes] : new/worsening diabetes [After Surgery] : after surgery [Fully active, able to carry on all pre-disease performance without restriction] : Status 0 - Fully active, able to carry on all pre-disease performance without restriction [Pancreatic ductal adenocarcinoma] : Pancreatic ductal adenocarcinoma [Recurrence of Disease] : recurrence of disease [Lymph Nodes] : lymph nodes [Other: ____] : [unfilled] [Chemotherapy] : chemotherapy [Adjuvant recommendations] : adjuvant recommendations [de-identified] : This is a non-billable note*\par \par \par Mr. ALOK VAUGHN is a 76 year old male who presents for evaluation in our Pancreas Multidisciplinary Clinic for second opinion, pancreatic cancer. He initially presented with abdominal discomfort, diarrhea, weight loss, and new onset diabetes 7/2020.  \par CT A/P 7/23/20 suspicious for pancreas lesion.  MRI 7/24/20 notable for 1.2 cm head of pancreas mass. He underwent upfront surgery with Whipple procedure on 8/3/20 at [facility] by Dr Calderon. Surgical path noting PDAC, moderately differentiated, 2.5 cm, involving head of pancreas and invading pancreatic duct, CBD, muscularis propria of duodenum and peripancreatic adipose tissue. Lymphovascular and Perineural invasion present. Margins negative. 6 of 13 LNs + for metastatic carcinoma. pT2 pN2. \par He completed 12 cycles of adjuvant FOLFIRINOX 9/15/20 - 2/16/21. He received chemoradiation with Capecitabine from 3/20/21- 5/6/21. \par PMH: DM2, thyroid cancer, melanoma, HTN, HLD.  PSH: Whipple, Thyroidotomy. Former smoker (15 pack years). \par Family hx:  Mother lung ca, Father thyroid ca,  Sister breast ca \par Pt reports YESENIA gene + \par He has a good performance status, active/ plays golf. He is eating with essentially stable weight (fluctuates). He is currently on Creon 3 caps TID with meals, 1 with snacks and states it helps/ no further steatorrhea.  He reports mild intermittent abdominal pain/cramping lasting 30 minutes and then goes away. \par \par He presents for a 2nd opinion and to see if eligible for any clinical trials.  CT C/A/P on 6/21/21 noting hazy opacity and stranding in the region of the SMA and mesenteric root, similar to prior CT in 3/3/21; also subtle slightly increased lymph node prominence along the right root of mesentery measuring up to 1 cm. Multiple small nodular opacities in the b/l lungs are stable.  And mild increased dilation of jejunal loops. \par Of note, he is planned for IR bx at [facility] on 10/25/21. \par \par Ca 19-9 trends:  11/11/20- 24    3/5/21- 28 6/25/21- 20 9/21/21- 146   10/5/21- 248 [TextBox_4] : 8/2020 [TextBox_6] : pancreas mass  [TextBox_23] : 24 [TextBox_39] : OUTSIDE PATH* [TextBox_41] : adenocarcinoma  [TextBox_43] : 8/3/21 [] : no [Tolerated well] : tolerated well [Nutrition] : Nutrition [Social Work] : Social Work [TextBox_5] : 10/26/21 [TextBox_38] : 248 [FreeTextEntry5] : 10/26/21 [TextBox_74] : CT reviewed today 6/21/21 [FreeTextEntry6] : Bx at [facility] on 10/25/21\par Find our of YESENIA mutation is somatic or germline

## 2021-11-10 NOTE — HISTORY OF PRESENT ILLNESS
[de-identified] : 77 y/o with metastatic relapse of pancreatic ductal adenocarcinoma, YESENIA-germline mutant, from what was originally a localized pT2N2 LVI+/PNI+ pancreatic head adenocarcinoma resected Aug 2020; PMHx left thyroid papillary CA s/p left thyroidectomy/isthmusectomy/paratracheal node dissection in 2017, and melanoma, presenting for second opinion of further treatment including clinical trials in Pancreas Multidisciplinary Clinic. \par \par Chronological History of Cancer:\par Jul 2020  initially presented with abdominal discomfort, diarrhea, weight loss, new onset of DM\par 07/23/20  CT A/P suspected pancreas lesion\par 07/24/20  MRI Ab showed 1.2 cm mass at pancreas head. \par 08/03/20  underwent upfront surgery with Whipple procedure [facility] by Dr Calderon. pT2 pN2 LVI+ PNI+, margin negative\par 09/15/20 C1 adjuvant FOLFIRINOX\par 02/16/21 C12 (last cycle) FOLFIRINOX\par 03/03/21 CT CAP showing concern for relapse with mesenteric LN prominence. \par 03/20/21 began long-course RT with capecitabine, completed 5/6/21;\par 06/21/21 CT C/A/ again showed mesenteric lymphadenopathy, slightly increased LN prominence;  20\par Sep 2021  146;  repeat  2 weeks later 10/5/21 248\par 09/28/21 PET scan at [facility] (pending image loading) reported as "s/p whipple without suspicious activity in the surgical bed; hypermetatbolic RLQ lymph node/peritoneal implant most likely metastatic disease; interval improvement of small bowel distention and edema reflecting improvement of post radiation enteritis". 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 His daughter is also positive for YESENIA gene mutation, but his son does not have the mutation.  \par \par Today 11/9, he endorses:\par - intermittent abdominal cramping, improved in recent days\par - stool normal in caliber, consistency, frequency\par - no pain\par - weight stable, if anything up 1-2 lbs\par - energy level still good, would play golf if it weren't so cold out he says\par - asking for Creon Rx [de-identified] : surgical path reported moderately differentiated PDAC, 2.5 cm, involving head of pancreas and invading pancreatic duct, CBD, muscularis propria of duodenum and peripancreatic adipose tissue. Lymphovascular and Perineural invasion present. Margins negative. 6 of 13 LNs + for metastatic carcinoma. Staging was described as pT2 pN2.  [de-identified] : Ca 19-9: 11/11/20- 24 3/5/21- 28 6/25/21- 20 9/21/21- 146 10/5/21- 248.  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

## 2021-11-10 NOTE — REVIEW OF SYSTEMS
[Fever] : no fever [Chills] : no chills [Night Sweats] : no night sweats [Fatigue] : no fatigue [Recent Change In Weight] : ~T no recent weight change [Vision Problems] : no vision problems [Dysphagia] : no dysphagia [Hoarseness] : no hoarseness [Shortness Of Breath] : no shortness of breath [Wheezing] : no wheezing [Cough] : no cough [SOB on Exertion] : no shortness of breath during exertion [Abdominal Pain] : no abdominal pain [Vomiting] : no vomiting [Constipation] : no constipation [Diarrhea] : no diarrhea [Dysuria] : no dysuria [Incontinence] : no incontinence [Dizziness] : no dizziness [Difficulty Walking] : no difficulty walking [Suicidal] : not suicidal [Depression] : no depression [Easy Bleeding] : no tendency for easy bleeding [Swollen Glands] : no swollen glands

## 2021-11-10 NOTE — ASSESSMENT
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 \par \par He completed 12 cycles of FOLFIRINOX (09/2020-02/2021) and long course RT with capecitabine (3/2021-5/2021) and now has biopsy proven relapse in the peritoneum, not a substantial amount of burden, but we decided to start treatment to prevent complications that could arise from untreated peritoneal carcinomatosis.\par \par We had an in depth discussion about the options available for 2L therapy.  Alternatives include resuming FOLFIRINOX, noting that I cannot explicitly declare failure to this regimen since his relapse appeared to become more prominent after stopping this combination, and that his tumor may well be more sensitive to platinum drugs extrapolating from favorable response to platinum drugs from BRCA mutations. More recent data suggest that YESENIA mutations are prognostic rather than predictive (PMID 31543222), which may suggest that his tumor is more likely to respond to any treatment, not platinum specifically, as is the case for the PARP inhibitors. Another option is to use olaparib, extrapolating from BRCA/PALB2 data although while noting that YESENIA mutations appear less predictive based on limited data (KnowYourTumor/BeyondBRCA studies). Another option would be ipi+nivo off trial, as this is currently being studied in a trial context through [facility].\par \par He ultimately decided to pursue gemcitabine+nab-paclitaxel (GnP) on the basis that he wishes to continue with a standard option and his cancer has not yet been exposed to this therapy. He is tolerating the chemotherapy well without side effects although his neurophils are borderline low, which perhaps is somewhat expected given his already heavy burden of chemotherapy in the past. I suspect we may need to change to every other week dosing for him, which has been shown to be about as effective and less toxic than 3-weeks on dosing (PMIDC 70916358). \par  \par Plan:\par - continue GnP, today 11/9 is C1D8\par - may require dose or frequency reduction given borderline low ANC\par - Creon Rx refilled today 11/10 , monthly supply\par - Dronabinol for appetite stimuation he is taking, continue\par - Foundation testing is underway on the [facility] biopsy sample (Jess sent)\par - he will get bloodwork done via [facility] next Monday\par - will RTC by telehealth next week next Tuesday AM\par \par - GnP Regimen\par --[place] 1000mg/m2; Nap-Pacli 125mg/m2 D1 D8 D15 of monthly cycle\par -next in-person clinical visit  with EMERSON 11/9/21\par \par Waqas Cary MD PhD\par Medical Oncology Attending

## 2021-11-15 NOTE — REVIEW OF SYSTEMS
[Fever] : no fever [Chills] : no chills [Night Sweats] : no night sweats [Fatigue] : no fatigue [Recent Change In Weight] : ~T no recent weight change [Vision Problems] : no vision problems [Dysphagia] : no dysphagia [Hoarseness] : no hoarseness [Shortness Of Breath] : no shortness of breath [Wheezing] : no wheezing [Cough] : no cough [SOB on Exertion] : no shortness of breath during exertion [Abdominal Pain] : no abdominal pain [Vomiting] : no vomiting [Constipation] : no constipation [Diarrhea] : no diarrhea [Dysuria] : no dysuria [Incontinence] : no incontinence [Dizziness] : no dizziness [Difficulty Walking] : no difficulty walking [Suicidal] : not suicidal [Depression] : no depression [Easy Bleeding] : no tendency for easy bleeding [Swollen Glands] : no swollen glands [Negative] : Constitutional

## 2021-11-15 NOTE — ASSESSMENT
[FreeTextEntry1] : 77 y/o with metastatic relapse of pancreatic ductal adenocarcinoma, YESENIA-germline mutant, from what was originally a localized pT2N2 LVI+/PNI+ pancreatic head adenocarcinoma resected Aug 2020; PMHx left thyroid papillary CA s/p left thyroidectomy/isthmusectomy/paratracheal node dissection in 2017, and melanoma, presenting for second opinion of further treatment including clinical trials in [facility]. \par \par He completed 12 cycles of FOLFIRINOX (09/2020-02/2021) and long course RT with capecitabine (3/2021-5/2021) and now has biopsy proven relapse in the peritoneum, not a substantial amount of burden, but we decided to start treatment to prevent complications that could arise from untreated peritoneal carcinomatosis.\par \par We had an in depth discussion about the options available for 2L therapy.  Alternatives include resuming FOLFIRINOX, noting that I cannot explicitly declare failure to this regimen since his relapse appeared to become more prominent after stopping this combination, and that his tumor may well be more sensitive to platinum drugs extrapolating from favorable response to platinum drugs from BRCA mutations. More recent data suggest that YESENIA mutations are prognostic rather than predictive (PMID 24909800), which may suggest that his tumor is more likely to respond to any treatment, not platinum specifically, as is the case for the PARP inhibitors. Another option is to use olaparib, extrapolating from BRCA/PALB2 data although while noting that YESENIA mutations appear less predictive based on limited data (KnowYourTumor/BeyondBRCA studies). Another option would be ipi+nivo off trial, as this is currently being studied in a trial context through [facility].\par \par He ultimately decided to pursue gemcitabine+nab-paclitaxel (GnP) on the basis that he wishes to continue with a standard option and his cancer has not yet been exposed to this therapy. He is tolerating the chemotherapy well without side effects although his neurophils are borderline low, which perhaps is somewhat expected given his already heavy burden of chemotherapy in the past. I suspect we may need to change to every other week dosing for him, which has been shown to be about as effective and less toxic than 3-weeks on dosing (PMIDC [id number]). \par  \par Plan:\par - continue GnP, today 11/9 is C1D8\par - may require dose or frequency reduction given borderline low ANC\par - Creon Rx refilled today 11/10 , monthly supply\par - Dronabinol for appetite stimuation he is taking, continue\par - Foundation testing is underway on the [facility] biopsy sample (Jess sent)\par - he will get bloodwork done via [facility] next Monday\par - will RTC by telehealth next week next Tuesday AM\par \par - GnP Regimen\par --[place] 1000mg/m2; Nap-Pacli 125mg/m2 D1 D8 D15 of monthly cycle\par -next in-person clinical visit  with EMERSON 11/9/21\par \par Waqas Cary MD PhD\par Medical Oncology Attending

## 2021-11-15 NOTE — HISTORY OF PRESENT ILLNESS
[AJCC Stage: ____] : AJCC Stage: [unfilled] [de-identified] : 77 y/o with metastatic relapse of pancreatic ductal adenocarcinoma, YESENIA-germline mutant, from what was originally a localized pT2N2 LVI+/PNI+ pancreatic head adenocarcinoma resected Aug 2020; PMHx left thyroid papillary CA s/p left thyroidectomy/isthmusectomy/paratracheal node dissection in 2017, and melanoma, presenting for second opinion of further treatment including clinical trials in [facility]. \par \par Chronological History of Cancer:\par Jul 2020  initially presented with abdominal discomfort, diarrhea, weight loss, new onset of DM\par 07/23/20  CT A/P suspected pancreas lesion\par 07/24/20  MRI Ab showed 1.2 cm mass at pancreas head. \par 08/03/20  underwent upfront surgery with Whipple procedure [facility] by Dr Calderon. pT2 pN2 LVI+ PNI+, margin negative\par 09/15/20 C1 adjuvant FOLFIRINOX\par 02/16/21 C12 (last cycle) FOLFIRINOX\par 03/03/21 CT CAP showing concern for relapse with mesenteric LN prominence. \par 03/20/21 began long-course RT with capecitabine, completed 5/6/21;\par 06/21/21 CT C/A/ again showed mesenteric lymphadenopathy, slightly increased LN prominence;  20\par Sep 2021  146;  repeat  2 weeks later 10/5/21 248\par 09/28/21 PET scan at [facility] (pending image loading) reported as "s/p whipple without suspicious activity in the surgical bed; hypermetatbolic RLQ lymph node/peritoneal implant most likely metastatic disease; interval improvement of small bowel distention and edema reflecting improvement of post radiation enteritis". \par 10/25/21 RLQ peritoneal implant soft tissue mass biopsy by IR at [facility], path returned as adenocarcinoma\par 11/02/21 C1D1 GnP (gemcitabine + nab-paclitaxel)\par 11/09/21 C1D8 GnP\par \par To be noted, pt reports family hx: Mother lung CA, Father thyroid CA, Sister breast CA; Pt also reports YESENIA gene + (heterozygous pathogenic c.1027_1030del (p.[id number] lefs*2), [facility] 1/20/21). His daughter is also positive for YESENIA gene mutation, but his son does not have the mutation.  \par \par Today 11/9, he endorses:\par - intermittent abdominal cramping, improved in recent days\par - stool normal in caliber, consistency, frequency\par - no pain\par - weight stable, if anything up 1-2 lbs\par - energy level still good, would play golf if it weren't so cold out he says\par - asking for Creon Rx [de-identified] : surgical path reported moderately differentiated PDAC, 2.5 cm, involving head of pancreas and invading pancreatic duct, CBD, muscularis propria of duodenum and peripancreatic adipose tissue. Lymphovascular and Perineural invasion present. Margins negative. 6 of 13 LNs + for metastatic carcinoma. Staging was described as pT2 pN2.  [de-identified] : Ca 19-9: 11/11/20- 24 3/5/21- 28 6/25/21- 20 9/21/21- 146 10/5/21- 248.  [de-identified] : germline YESENIA gene + (heterozygous pathogenic c.1027_1030del (p.[id number] lefs*2), [facility] 1/20/21\par pMMR:\par [facility]: ordered 10/28/21

## 2021-11-15 NOTE — HISTORY OF PRESENT ILLNESS
[AJCC Stage: ____] : AJCC Stage: [unfilled] [de-identified] : 77 y/o with metastatic relapse of pancreatic ductal adenocarcinoma, YESENIA-germline mutant, from what was originally a localized pT2N2 LVI+/PNI+ pancreatic head adenocarcinoma resected Aug 2020; PMHx left thyroid papillary CA s/p left thyroidectomy/isthmusectomy/paratracheal node dissection in 2017, and melanoma, presenting for second opinion of further treatment including clinical trials in [facility]. \par \par Chronological History of Cancer:\par Jul 2020  initially presented with abdominal discomfort, diarrhea, weight loss, new onset of DM\par 07/23/20  CT A/P suspected pancreas lesion\par 07/24/20  MRI Ab showed 1.2 cm mass at pancreas head. \par 08/03/20  underwent upfront surgery with Whipple procedure [facility] by Dr Calderon. pT2 pN2 LVI+ PNI+, margin negative\par 09/15/20 C1 adjuvant FOLFIRINOX\par 02/16/21 C12 (last cycle) FOLFIRINOX\par 03/03/21 CT CAP showing concern for relapse with mesenteric LN prominence. \par 03/20/21 began long-course RT with capecitabine, completed 5/6/21;\par 06/21/21 CT C/A/ again showed mesenteric lymphadenopathy, slightly increased LN prominence;  20\par Sep 2021  146;  repeat  2 weeks later 10/5/21 248\par 09/28/21 PET scan at [facility] (pending image loading) reported as "s/p whipple without suspicious activity in the surgical bed; hypermetatbolic RLQ lymph node/peritoneal implant most likely metastatic disease; interval improvement of small bowel distention and edema reflecting improvement of post radiation enteritis". \par 10/25/21 RLQ peritoneal implant soft tissue mass biopsy by IR at [facility], path returned as adenocarcinoma\par 11/02/21 C1D1 GnP (gemcitabine + nab-paclitaxel)\par 11/09/21 C1D8 GnP\par \par To be noted, pt reports family hx: Mother lung CA, Father thyroid CA, Sister breast CA; Pt also reports YESENIA gene + (heterozygous pathogenic c.1027_1030del (p.[id number] lefs*2), [facility] 1/20/21). His daughter is also positive for YESENIA gene mutation, but his son does not have the mutation.  \par \par Today 11/9, he endorses:\par - intermittent abdominal cramping, improved in recent days\par - stool normal in caliber, consistency, frequency\par - no pain\par - weight stable, if anything up 1-2 lbs\par - energy level still good, would play golf if it weren't so cold out he says\par - asking for Creon Rx [de-identified] : surgical path reported moderately differentiated PDAC, 2.5 cm, involving head of pancreas and invading pancreatic duct, CBD, muscularis propria of duodenum and peripancreatic adipose tissue. Lymphovascular and Perineural invasion present. Margins negative. 6 of 13 LNs + for metastatic carcinoma. Staging was described as pT2 pN2.  [de-identified] : Ca 19-9: 11/11/20- 24 3/5/21- 28 6/25/21- 20 9/21/21- 146 10/5/21- 248.  [de-identified] : germline YESENIA gene + (heterozygous pathogenic c.1027_1030del (p.[id number] lefs*2), [facility] 1/20/21\par pMMR:\par [facility]: ordered 10/28/21

## 2021-11-15 NOTE — ASSESSMENT
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 \par \par He completed 12 cycles of FOLFIRINOX (09/2020-02/2021) and long course RT with capecitabine (3/2021-5/2021) and now has biopsy proven relapse in the peritoneum, not a substantial amount of burden, but we decided to start treatment to prevent complications that could arise from untreated peritoneal carcinomatosis.\par \par We had an in depth discussion about the options available for 2L therapy.  Alternatives include resuming FOLFIRINOX, noting that I cannot explicitly declare failure to this regimen since his relapse appeared to become more prominent after stopping this combination, and that his tumor may well be more sensitive to platinum drugs extrapolating from favorable response to platinum drugs from BRCA mutations. More recent data suggest that YESENIA mutations are prognostic rather than predictive (PMID [id number]), which may suggest that his tumor is more likely to respond to any treatment, not platinum specifically, as is the case for the PARP inhibitors. Another option is to use olaparib, extrapolating from BRCA/PALB2 data although while noting that YESENIA mutations appear less predictive based on limited data (KnowYourTumor/BeyondBRCA studies). Another option would be ipi+nivo off trial, as this is currently being studied in a trial context through [facility].\par \par He ultimately decided to pursue gemcitabine+nab-paclitaxel (GnP) on the basis that he wishes to continue with a standard option and his cancer has not yet been exposed to this therapy. He is tolerating the chemotherapy well without side effects although his neurophils are borderline low, which perhaps is somewhat expected given his already heavy burden of chemotherapy in the past. I suspect we may need to change to every other week dosing for him, which has been shown to be about as effective and less toxic than 3-weeks on dosing (PMIDC [id number]). \par  \par Plan:\par - continue GnP, today 11/9 is C1D8\par - may require dose or frequency reduction given borderline low ANC\par - Creon Rx refilled today 11/10 , monthly supply\par - Dronabinol for appetite stimuation he is taking, continue\par - Foundation testing is underway on the [facility] biopsy sample (Jess sent)\par - he will get bloodwork done via [facility] next Monday\par - will RTC by telehealth next week next Tuesday AM\par \par - GnP Regimen\par --[place] 1000mg/m2; Nap-Pacli 125mg/m2 D1 D8 D15 of monthly cycle\par -next in-person clinical visit  with EMERSON 11/9/21\par \par Waqas Cary MD PhD\par Medical Oncology Attending

## 2021-11-17 NOTE — END OF VISIT
[] : Fellow [FreeTextEntry3] : 77 y/o man w metastatic pancreatic cancer will postpone GnP due to neutropenia today and transition to qow dosing\par \par I personally have spent a total of 45 minutes of time on the date of this encounter reviewing test results, documenting findings, coordinating care, and directly consulting with the patient and/or designated family member.\par \par

## 2021-11-17 NOTE — REASON FOR VISIT
[Follow-Up Visit] : a follow-up [Home] : at home, [unfilled] , at the time of the visit. [Spouse] : spouse [Verbal consent obtained from patient] : the patient, [unfilled] [FreeTextEntry2] : Metastatic pancreatic adenocarcinoma

## 2021-11-17 NOTE — HISTORY OF PRESENT ILLNESS
[AJCC Stage: ____] : AJCC Stage: [unfilled] [de-identified] : 77 y/o with metastatic relapse of pancreatic ductal adenocarcinoma, YESENIA-germline mutant, from what was originally a localized pT2N2 LVI+/PNI+ pancreatic head adenocarcinoma resected Aug 2020; PMHx left thyroid papillary CA s/p left thyroidectomy/isthmusectomy/paratracheal node dissection in 2017, and melanoma, presenting for second opinion of further treatment including clinical trials in [facility]. \par \par Chronological History of Cancer:\par Jul 2020  initially presented with abdominal discomfort, diarrhea, weight loss, new onset of DM\par 07/23/20  CT A/P suspected pancreas lesion\par 07/24/20  MRI Ab showed 1.2 cm mass at pancreas head. \par 08/03/20  underwent upfront surgery with Whipple procedure [facility] by Dr Calderon. pT2 pN2 LVI+ PNI+, margin negative\par 09/15/20 C1 adjuvant FOLFIRINOX\par 02/16/21 C12 (last cycle) FOLFIRINOX\par 03/03/21 CT CAP showing concern for relapse with mesenteric LN prominence. \par 03/20/21 began long-course RT with capecitabine, completed 5/6/21;\par 06/21/21 CT C/A/ again showed mesenteric lymphadenopathy, slightly increased LN prominence;  20\par Sep 2021  146;  repeat  2 weeks later 10/5/21 248\par 09/28/21 PET scan at [facility] (pending image loading) reported as "s/p whipple without suspicious activity in the surgical bed; hypermetatbolic RLQ lymph node/peritoneal implant most likely metastatic disease; interval improvement of small bowel distention and edema reflecting improvement of post radiation enteritis". \par 10/25/21 RLQ peritoneal implant soft tissue mass biopsy by IR at [facility], path returned as adenocarcinoma\par 11/02/21 C1D1 GnP (gemcitabine + nab-paclitaxel)\par 11/09/21 C1D8 GnP\par 11/16/21 planned but cancelled C1D15 GnP due to neuropenia\par \par To be noted, pt reports family hx: Mother lung CA, Father thyroid CA, Sister breast CA; Pt also reports YESENIA gene + (heterozygous pathogenic c.1027_1030del (p.[id number] lefs*2), [facility] 1/20/21). His daughter is also positive for YESENIA gene mutation, but his son does not have the mutation.  \par  [de-identified] : surgical path reported moderately differentiated PDAC, 2.5 cm, involving head of pancreas and invading pancreatic duct, CBD, muscularis propria of duodenum and peripancreatic adipose tissue. Lymphovascular and Perineural invasion present. Margins negative. 6 of 13 LNs + for metastatic carcinoma. Staging was described as pT2 pN2.  [de-identified] : Ca 19-9: 11/11/20- 24 3/5/21- 28 6/25/21- 20 9/21/21- 146 10/5/21- 248.  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

## 2021-11-17 NOTE — REVIEW OF SYSTEMS
[Negative] : Allergic/Immunologic [Fever] : no fever [Chills] : no chills [Night Sweats] : no night sweats [Fatigue] : no fatigue [Recent Change In Weight] : ~T no recent weight change [Vision Problems] : no vision problems [Dysphagia] : no dysphagia [Hoarseness] : no hoarseness [Shortness Of Breath] : no shortness of breath [Wheezing] : no wheezing [Cough] : no cough [SOB on Exertion] : no shortness of breath during exertion [Abdominal Pain] : no abdominal pain [Vomiting] : no vomiting [Constipation] : no constipation [Diarrhea] : no diarrhea [Dysuria] : no dysuria [Incontinence] : no incontinence [Dizziness] : no dizziness [Difficulty Walking] : no difficulty walking [Suicidal] : not suicidal [Depression] : no depression [Easy Bleeding] : no tendency for easy bleeding [Swollen Glands] : no swollen glands

## 2021-11-17 NOTE — ASSESSMENT
[FreeTextEntry1] : 77 y/o with metastatic relapse of pancreatic ductal adenocarcinoma, YESENIA-germline mutant, from what was originally a localized pT2N2 LVI+/PNI+ pancreatic head adenocarcinoma resected Aug 2020; PMHx left thyroid papillary CA s/p left thyroidectomy/isthmusectomy/paratracheal node dissection in 2017, and melanoma, presenting for second opinion of further treatment including clinical trials in [facility]. \par \par He completed 12 cycles of FOLFIRINOX (09/2020-02/2021) and long course RT with capecitabine (3/2021-5/2021) and now has biopsy proven relapse in the peritoneum, not a substantial amount of burden, but we decided to start treatment to prevent complications that could arise from untreated peritoneal carcinomatosis.\par \par We had an in depth discussion about the options available for 2L therapy.  Alternatives include resuming FOLFIRINOX, noting that I cannot explicitly declare failure to this regimen since his relapse appeared to become more prominent after stopping this combination, and that his tumor may well be more sensitive to platinum drugs extrapolating from favorable response to platinum drugs from BRCA mutations. More recent data suggest that YESENIA mutations are prognostic rather than predictive (PMID [id number]), which may suggest that his tumor is more likely to respond to any treatment, not platinum specifically, as is the case for the PARP inhibitors. Another option is to use olaparib, extrapolating from BRCA/PALB2 data although while noting that YESENIA mutations appear less predictive based on limited data (KnowYourTumor/BeyondBRCA studies). Another option would be ipi+nivo off trial, as this is currently being studied in a trial context through [facility].\par \par He ultimately decided to pursue gemcitabine+nab-paclitaxel (GnP) on the basis that he wishes to continue with a standard option and his cancer has not yet been exposed to this therapy. Unfortunately, after just two days of chemo (C1D1 and D8), patient became severly neutropenic. Decision has been made to wait for count recovery and then change to every other week dosing for him, which has been shown to be about as effective and less toxic than 3-weeks on dosing (PMIDC [id number]). \par  \par Plan:\par - Hold chemo 11/16 given neutropenia\par - Foundation testing is underway on the [facility] biopsy sample (Jess sent)- needs to be followed (not yet reported as of 11/17)\par - He will get CBC done via [facility] next Monday (CBC sent by [facility])\par - will RTC next week- appointment followed by chemo. CMP and CA 19-9 to be drawn through chest port prior to chemo. \par \par - GnP Regimen\par --[place] 1000mg/m2; Nap-Pacli 125mg/m2 every other week starting 11/23 (has previously received C1D8 GnP 11/09/21 but shifting schedule as patient with profound myelosuppression). \par -next in-person clinical visit  with EMERSON 11/23/21 prior to chemo \par \par Case d/w Dr. Cary\par \par Kai Crystal, PGY 5\par \par \par \par

## 2021-11-23 NOTE — HISTORY OF PRESENT ILLNESS
[AJCC Stage: ____] : AJCC Stage: [unfilled] [de-identified] : 75 y/o with metastatic relapse of pancreatic ductal adenocarcinoma, YESENIA-germline mutant, from what was originally a localized pT2N2 LVI+/PNI+ pancreatic head adenocarcinoma resected Aug 2020; PMHx left thyroid papillary CA s/p left thyroidectomy/isthmusectomy/paratracheal node dissection in 2017, and melanoma, presenting for second opinion of further treatment including clinical trials in [facility]. \par \par Chronological History of Cancer:\par Jul 2020  initially presented with abdominal discomfort, diarrhea, weight loss, new onset of DM\par 07/23/20  CT A/P suspected pancreas lesion\par 07/24/20  MRI Ab showed 1.2 cm mass at pancreas head. \par 08/03/20  underwent upfront surgery with Whipple procedure [facility] by Dr Calderon. pT2 pN2 LVI+ PNI+, margin negative\par 09/15/20 C1 adjuvant FOLFIRINOX\par 02/16/21 C12 (last cycle) FOLFIRINOX\par 03/03/21 CT CAP showing concern for relapse with mesenteric LN prominence. \par 03/20/21 began long-course RT with capecitabine, completed 5/6/21;\par 06/21/21 CT C/A/ again showed mesenteric lymphadenopathy, slightly increased LN prominence;  20\par Sep 2021  146;  repeat  2 weeks later 10/5/21 248\par 09/28/21 PET scan at [facility] (pending image loading) reported as "s/p whipple without suspicious activity in the surgical bed; hypermetatbolic RLQ lymph node/peritoneal implant most likely metastatic disease; interval improvement of small bowel distention and edema reflecting improvement of post radiation enteritis". \par 10/25/21 RLQ peritoneal implant soft tissue mass biopsy by IR at 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 His daughter is also positive for YESENIA gene mutation, but his son does not have the mutation.  \par  [de-identified] : surgical path reported moderately differentiated PDAC, 2.5 cm, involving head of pancreas and invading pancreatic duct, CBD, muscularis propria of duodenum and peripancreatic adipose tissue. Lymphovascular and Perineural invasion present. Margins negative. 6 of 13 LNs + for metastatic carcinoma. Staging was described as pT2 pN2.  [de-identified] : Ca 19-9: 11/11/20- 24 3/5/21- 28 6/25/21- 20 9/21/21- 146 10/5/21- 248.  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

## 2021-11-23 NOTE — ASSESSMENT
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 \par \par He completed 12 cycles of FOLFIRINOX (09/2020-02/2021) and long course RT with capecitabine (3/2021-5/2021) and now has biopsy proven relapse in the peritoneum, not a substantial amount of burden, but we decided to start treatment to prevent complications that could arise from untreated peritoneal carcinomatosis.\par \par We had an in depth discussion about the options available for 2L therapy.  Alternatives include resuming FOLFIRINOX, noting that I cannot explicitly declare failure to this regimen since his relapse appeared to become more prominent after stopping this combination, and that his tumor may well be more sensitive to platinum drugs extrapolating from favorable response to platinum drugs from BRCA mutations. More recent data suggest that YESENIA mutations are prognostic rather than predictive (PMID 53818101), which may suggest that his tumor is more likely to respond to any treatment, not platinum specifically, as is the case for the PARP inhibitors. Another option is to use olaparib, extrapolating from BRCA/PALB2 data although while noting that YESENIA mutations appear less predictive based on limited data (KnowYourTumor/BeyondBRCA studies). Another option would be ipi+nivo off trial, as this is currently being studied in a trial context through [facility].\par \par He ultimately decided to pursue gemcitabine+nab-paclitaxel (GnP) on the basis that he wishes to continue with a standard option and his cancer has not yet been exposed to this therapy. Unfortunately, after just two days of chemo (C1D1 and D8), patient became severly neutropenic. Decision has been made to wait for count recovery and then change to every other week dosing for him, which has been shown to be about as effective and less toxic than 3-weeks on dosing (PMIDC 19569958). \par  \par Plan:\par - ANC 2.4k (WBC 3.4) but OK to treat, will add Onpro moving forward\par - did not receive Creon Rx so resent this to [facility] and to [facility] and will ensure he gets it through one of those routes\par - plan to rescan after 2-3 months of therapy\par - RTC 2 weeks for next visit and infusion\par \par - GnP Regimen\par --[place] 1000mg/m2; Nap-Pacli 125mg/m2 every other week starting 11/23 (has previously received C1D8 GnP 11/09/21 but shifting schedule as patient with profound myelosuppression). \par \par Waqas Cary MD PhD\par Medical Oncology Attending\par \par I personally have spent a total of 30 minutes of time on the date of this encounter reviewing test results, documenting findings, coordinating care, and directly consulting with the patient and/or designated family member.\par \par \par \par

## 2021-12-07 NOTE — REVIEW OF SYSTEMS
[Fever] : no fever [Chills] : no chills [Night Sweats] : no night sweats [Fatigue] : no fatigue [Recent Change In Weight] : ~T no recent weight change [Vision Problems] : no vision problems [Dysphagia] : no dysphagia [Hoarseness] : no hoarseness [Shortness Of Breath] : no shortness of breath [Wheezing] : no wheezing [Cough] : no cough [SOB on Exertion] : no shortness of breath during exertion [Abdominal Pain] : no abdominal pain [Vomiting] : no vomiting [Constipation] : no constipation [Diarrhea] : no diarrhea [Dysuria] : no dysuria [Incontinence] : no incontinence [Dizziness] : no dizziness [Difficulty Walking] : no difficulty walking [Suicidal] : not suicidal [Depression] : no depression [Easy Bleeding] : no tendency for easy bleeding [Swollen Glands] : no swollen glands [Negative] : Allergic/Immunologic

## 2021-12-07 NOTE — REASON FOR VISIT
[Follow-Up Visit] : a follow-up [FreeTextEntry2] : Metastatic pancreatic adenocarcinoma  [Home] : at home, [unfilled] , at the time of the visit. [Spouse] : spouse [Verbal consent obtained from patient] : the patient, [unfilled]

## 2021-12-07 NOTE — ASSESSMENT
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 \par \par He completed 12 cycles of FOLFIRINOX (09/2020-02/2021) and long course RT with capecitabine (3/2021-5/2021) and now has biopsy proven relapse in the peritoneum, not a substantial amount of burden, but we decided to start treatment to prevent complications that could arise from untreated peritoneal carcinomatosis.\par \par We had an in depth discussion about the options available for 2L therapy.  Alternatives include resuming FOLFIRINOX, noting that I cannot explicitly declare failure to this regimen since his relapse appeared to become more prominent after stopping this combination, and that his tumor may well be more sensitive to platinum drugs extrapolating from favorable response to platinum drugs from BRCA mutations. More recent data suggest that YESENIA mutations are prognostic rather than predictive (PMID 80365871), which may suggest that his tumor is more likely to respond to any treatment, not platinum specifically, as is the case for the PARP inhibitors. Another option is to use olaparib, extrapolating from BRCA/PALB2 data although while noting that YESENIA mutations appear less predictive based on limited data (KnowYourTumor/BeyondBRCA studies). Another option would be ipi+nivo off trial, as this is currently being studied in a trial context through [facility].\par \par He ultimately decided to pursue gemcitabine+nab-paclitaxel (GnP) on the basis that he wishes to continue with a standard option and his cancer has not yet been exposed to this therapy. Unfortunately, after just two days of chemo (C1D1 and D8), patient became severly neutropenic. Decision has been made to wait for count recovery and then change to every other week dosing for him, which has been shown to be about as effective and less toxic than 3-weeks on dosing (PMIDC [id number]). \par \par He has continued on every other week gemcitabine + nab-paclitaxel and is tolerating this therapy very well. Besides alopecia, he has very little in terms of side effects, and the every other week dosing is more tolerable to his bone marrow. Will continue therapy.\par  \par Plan:\par - continues on Creon\par - asking for refill of dronabinol to [facility]; I Rx'ed this today\par - plan to rescan after 2-3 months of therapy\par - RTC 2 weeks for next visit and infusion\par \par - GnP Regimen\par --[place] 1000mg/m2; Nap-Pacli 125mg/m2 every other week starting 11/23 (has previously received C1D8 GnP 11/09/21 but shifting schedule as patient with profound myelosuppression). \par \par Waqas Cary MD PhD\par Medical Oncology Attending\par \par I personally have spent a total of 30 minutes of time on the date of this encounter reviewing test results, documenting findings, coordinating care, and directly consulting with the patient and/or designated family member.

## 2021-12-07 NOTE — HISTORY OF PRESENT ILLNESS
[AJCC Stage: ____] : AJCC Stage: [unfilled] [de-identified] : 77 y/o with metastatic relapse of pancreatic ductal adenocarcinoma, YESENIA-germline mutant, from what was originally a localized pT2N2 LVI+/PNI+ pancreatic head adenocarcinoma resected Aug 2020; PMHx left thyroid papillary CA s/p left thyroidectomy/isthmusectomy/paratracheal node dissection in 2017, and melanoma, presenting for follow-up with medical oncology\par \par Chronological History of Cancer:\par Jul 2020  initially presented with abdominal discomfort, diarrhea, weight loss, new onset of DM\par 07/23/20  CT A/P suspected pancreas lesion\par 07/24/20  MRI Ab showed 1.2 cm mass at pancreas head. \par 08/03/20  underwent upfront surgery with Whipple procedure [facility] by Dr Calderon. pT2 pN2 LVI+ PNI+, margin negative\par 09/15/20 C1 adjuvant FOLFIRINOX\par 02/16/21 C12 (last cycle) FOLFIRINOX\par 03/03/21 CT CAP showing concern for relapse with mesenteric LN prominence. \par 03/20/21 began long-course RT with capecitabine, completed 5/6/21;\par 06/21/21 CT C/A/ again showed mesenteric lymphadenopathy, slightly increased LN prominence;  20\par Sep 2021  146;  repeat  2 weeks later 10/5/21 248\par 09/28/21 PET scan at [facility] (pending image loading) reported as "s/p whipple without suspicious activity in the surgical bed; hypermetatbolic RLQ lymph node/peritoneal implant most likely metastatic disease; interval improvement of small bowel distention and edema reflecting improvement of post radiation enteritis". \par 10/25/21 RLQ peritoneal implant soft tissue mass biopsy by IR at 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 His daughter is also positive for YESENIA gene mutation, but his son does not have the mutation.  \par  [de-identified] : surgical path reported moderately differentiated PDAC, 2.5 cm, involving head of pancreas and invading pancreatic duct, CBD, muscularis propria of duodenum and peripancreatic adipose tissue. Lymphovascular and Perineural invasion present. Margins negative. 6 of 13 LNs + for metastatic carcinoma. Staging was described as pT2 pN2.  [de-identified] : Ca 19-9: 11/11/20- 24 3/5/21- 28 6/25/21- 20 9/21/21- 146 10/5/21- 248.  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

## 2021-12-21 NOTE — HISTORY OF PRESENT ILLNESS
[AJCC Stage: ____] : AJCC Stage: [unfilled] [de-identified] : 77 y/o with metastatic relapse of pancreatic ductal adenocarcinoma, YESENIA-germline mutant, from what was originally a localized pT2N2 LVI+/PNI+ pancreatic head adenocarcinoma resected Aug 2020; PMHx left thyroid papillary CA s/p left thyroidectomy/isthmusectomy/paratracheal node dissection in 2017, and melanoma, presenting for follow-up with medical oncology\par \par Chronological History of Cancer:\par Jul 2020  initially presented with abdominal discomfort, diarrhea, weight loss, new onset of DM\par 07/23/20  CT A/P suspected pancreas lesion\par 07/24/20  MRI Ab showed 1.2 cm mass at pancreas head. \par 08/03/20  underwent upfront surgery with Whipple procedure [facility] by Dr Calderno. pT2 pN2 LVI+ PNI+, margin negative\par 09/15/20 C1 adjuvant FOLFIRINOX\par 02/16/21 C12 (last cycle) FOLFIRINOX\par 03/03/21 CT CAP showing concern for relapse with mesenteric LN prominence. \par 03/20/21 began long-course RT with capecitabine, completed 5/6/21;\par 06/21/21 CT C/A/ again showed mesenteric lymphadenopathy, slightly increased LN prominence;  20\par Sep 2021  146;  repeat  2 weeks later 10/5/21 248\par 09/28/21 PET scan at [facility] (pending image loading) reported as "s/p whipple without suspicious activity in the surgical bed; hypermetatbolic RLQ lymph node/peritoneal implant most likely metastatic disease; interval improvement of small bowel distention and edema reflecting improvement of post radiation enteritis". 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 His daughter is also positive for YESENIA gene mutation, but his son does not have the mutation.  \par  [de-identified] : surgical path reported moderately differentiated PDAC, 2.5 cm, involving head of pancreas and invading pancreatic duct, CBD, muscularis propria of duodenum and peripancreatic adipose tissue. Lymphovascular and Perineural invasion present. Margins negative. 6 of 13 LNs + for metastatic carcinoma. Staging was described as pT2 pN2.  [de-identified] : Ca 19-9: 11/11/20- 24 3/5/21- 28 6/25/21- 20 9/21/21- 146 10/5/21- 248.  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

## 2021-12-21 NOTE — PHYSICAL EXAM
[Fully active, able to carry on all pre-disease performance without restriction] : Status 0 - Fully active, able to carry on all pre-disease performance without restriction [Normal] : affect appropriate [de-identified] : No visible inc wob [de-identified] : No visible rashes or jaundice seen over video

## 2021-12-21 NOTE — ASSESSMENT
[FreeTextEntry1] : 77 y/o with metastatic relapse of pancreatic ductal adenocarcinoma, YESENIA-germline mutant, from what was originally a localized pT2N2 LVI+/PNI+ pancreatic head adenocarcinoma resected Aug 2020; PMHx left thyroid papillary CA s/p left thyroidectomy/isthmusectomy/paratracheal node dissection in 2017, and melanoma, presenting for second opinion of further treatment including clinical trials in [facility]. \par \par He completed 12 cycles of FOLFIRINOX (09/2020-02/2021) and long course RT with capecitabine (3/2021-5/2021) and now has biopsy proven relapse in the peritoneum, not a substantial amount of burden, but we decided to start treatment to prevent complications that could arise from untreated peritoneal carcinomatosis.\par \par We had an in depth discussion about the options available for 2L therapy.  Alternatives include resuming FOLFIRINOX, noting that I cannot explicitly declare failure to this regimen since his relapse appeared to become more prominent after stopping this combination, and that his tumor may well be more sensitive to platinum drugs extrapolating from favorable response to platinum drugs from BRCA mutations. More recent data suggest that YESENIA mutations are prognostic rather than predictive (PMID [id number]), which may suggest that his tumor is more likely to respond to any treatment, not platinum specifically, as is the case for the PARP inhibitors. Another option is to use olaparib, extrapolating from BRCA/PALB2 data although while noting that YESENIA mutations appear less predictive based on limited data (KnowYourTumor/BeyondBRCA studies). Another option would be ipi+nivo off trial, as this is currently being studied in a trial context through [facility].\par \par He ultimately decided to pursue gemcitabine+nab-paclitaxel (GnP) on the basis that he wishes to continue with a standard option and his cancer has not yet been exposed to this therapy. Unfortunately, after just two days of chemo (C1D1 and D8), patient became severly neutropenic. Decision has been made to wait for count recovery and then change to every other week dosing for him, which has been shown to be about as effective and less toxic than 3-weeks on dosing (PMIDC [id number]). \par \par He has continued on every other week gemcitabine + nab-paclitaxel and is tolerating this therapy very well. Besides alopecia, he has very little in terms of side effects, and the every other week dosing is more tolerable to his bone marrow. Will continue therapy.\par  \par Plan:\par - continues on Creon\par -Plan for C3D1 GnP today, 12/21/21\par - Will order restaging scans today (SP ordered CT TAP)\par - RTC 2 weeks for next visit and infusion and scan review\par \par - GnP Regimen\par --[place] 1000mg/m2; Nap-Pacli 125mg/m2 every other week starting 11/23 (has previously received C1D8 GnP 11/09/21 but shifting schedule as patient with profound myelosuppression). \par \par Case discussed with Dr. Cary\par \par Renetta Cunningham MD\par Heme/Onc Fellow, PGY-5

## 2021-12-21 NOTE — END OF VISIT
[] : Fellow [FreeTextEntry3] : I personally have spent a total of 30 minutes of time on the date of this encounter reviewing test results, documenting findings, coordinating care, and directly consulting with the patient and/or designated family member.\par I was present with the trainee during the key portions of the history and exam. I agree with the findings and plan as documented above.\par

## 2022-01-01 ENCOUNTER — RESULT REVIEW (OUTPATIENT)
Age: 77
End: 2022-01-01

## 2022-01-01 ENCOUNTER — APPOINTMENT (OUTPATIENT)
Dept: INFUSION THERAPY | Facility: HOSPITAL | Age: 77
End: 2022-01-01

## 2022-01-01 ENCOUNTER — APPOINTMENT (OUTPATIENT)
Dept: CT IMAGING | Facility: CLINIC | Age: 77
End: 2022-01-01

## 2022-01-01 ENCOUNTER — APPOINTMENT (OUTPATIENT)
Dept: HEMATOLOGY ONCOLOGY | Facility: CLINIC | Age: 77
End: 2022-01-01

## 2022-01-01 ENCOUNTER — OUTPATIENT (OUTPATIENT)
Dept: OUTPATIENT SERVICES | Facility: HOSPITAL | Age: 77
LOS: 1 days | End: 2022-01-01

## 2022-01-01 ENCOUNTER — APPOINTMENT (OUTPATIENT)
Dept: RADIOLOGY | Facility: CLINIC | Age: 77
End: 2022-01-01
Payer: MEDICARE

## 2022-01-01 ENCOUNTER — APPOINTMENT (OUTPATIENT)
Dept: HEMATOLOGY ONCOLOGY | Facility: CLINIC | Age: 77
End: 2022-01-01
Payer: MEDICARE

## 2022-01-01 ENCOUNTER — NON-APPOINTMENT (OUTPATIENT)
Age: 77
End: 2022-01-01

## 2022-01-01 ENCOUNTER — OUTPATIENT (OUTPATIENT)
Dept: OUTPATIENT SERVICES | Facility: HOSPITAL | Age: 77
LOS: 1 days | Discharge: HOME | End: 2022-01-01

## 2022-01-01 ENCOUNTER — TRANSCRIPTION ENCOUNTER (OUTPATIENT)
Age: 77
End: 2022-01-01

## 2022-01-01 ENCOUNTER — INPATIENT (INPATIENT)
Facility: HOSPITAL | Age: 77
LOS: 18 days | Discharge: HOME HEALTH | End: 2022-06-11
Attending: SPECIALIST | Admitting: SPECIALIST
Payer: MEDICARE

## 2022-01-01 ENCOUNTER — EMERGENCY (EMERGENCY)
Facility: HOSPITAL | Age: 77
LOS: 1 days | Discharge: HOME | End: 2022-01-01
Attending: STUDENT IN AN ORGANIZED HEALTH CARE EDUCATION/TRAINING PROGRAM | Admitting: STUDENT IN AN ORGANIZED HEALTH CARE EDUCATION/TRAINING PROGRAM

## 2022-01-01 ENCOUNTER — APPOINTMENT (OUTPATIENT)
Dept: SURGICAL ONCOLOGY | Facility: CLINIC | Age: 77
End: 2022-01-01

## 2022-01-01 ENCOUNTER — INPATIENT (INPATIENT)
Facility: HOSPITAL | Age: 77
LOS: 1 days | Discharge: HOME | End: 2022-06-26
Attending: SPECIALIST | Admitting: SPECIALIST
Payer: MEDICARE

## 2022-01-01 ENCOUNTER — APPOINTMENT (OUTPATIENT)
Dept: SURGICAL ONCOLOGY | Facility: CLINIC | Age: 77
End: 2022-01-01
Payer: MEDICARE

## 2022-01-01 ENCOUNTER — APPOINTMENT (OUTPATIENT)
Dept: CARDIOLOGY | Facility: CLINIC | Age: 77
End: 2022-01-01

## 2022-01-01 ENCOUNTER — APPOINTMENT (OUTPATIENT)
Dept: SURGICAL ONCOLOGY | Facility: HOSPITAL | Age: 77
End: 2022-01-01

## 2022-01-01 ENCOUNTER — MED ADMIN CHARGE (OUTPATIENT)
Age: 77
End: 2022-01-01

## 2022-01-01 ENCOUNTER — INPATIENT (INPATIENT)
Facility: HOSPITAL | Age: 77
LOS: 1 days | Discharge: HOME | DRG: 951 | End: 2022-08-31
Attending: HOSPITALIST | Admitting: INTERNAL MEDICINE
Payer: MEDICARE

## 2022-01-01 ENCOUNTER — INPATIENT (INPATIENT)
Facility: HOSPITAL | Age: 77
LOS: 1 days | Discharge: HOME | End: 2022-08-18
Attending: STUDENT IN AN ORGANIZED HEALTH CARE EDUCATION/TRAINING PROGRAM | Admitting: STUDENT IN AN ORGANIZED HEALTH CARE EDUCATION/TRAINING PROGRAM

## 2022-01-01 VITALS
WEIGHT: 143 LBS | HEART RATE: 108 BPM | HEIGHT: 76 IN | BODY MASS INDEX: 17.41 KG/M2 | DIASTOLIC BLOOD PRESSURE: 73 MMHG | SYSTOLIC BLOOD PRESSURE: 110 MMHG | OXYGEN SATURATION: 99 %

## 2022-01-01 VITALS
SYSTOLIC BLOOD PRESSURE: 102 MMHG | TEMPERATURE: 97 F | DIASTOLIC BLOOD PRESSURE: 67 MMHG | HEART RATE: 64 BPM | RESPIRATION RATE: 18 BRPM | OXYGEN SATURATION: 92 %

## 2022-01-01 VITALS
BODY MASS INDEX: 16.72 KG/M2 | OXYGEN SATURATION: 98 % | RESPIRATION RATE: 16 BRPM | WEIGHT: 137.35 LBS | HEART RATE: 96 BPM | TEMPERATURE: 97.5 F | DIASTOLIC BLOOD PRESSURE: 69 MMHG | SYSTOLIC BLOOD PRESSURE: 100 MMHG

## 2022-01-01 VITALS
TEMPERATURE: 97 F | OXYGEN SATURATION: 100 % | RESPIRATION RATE: 17 BRPM | SYSTOLIC BLOOD PRESSURE: 113 MMHG | DIASTOLIC BLOOD PRESSURE: 64 MMHG | HEART RATE: 79 BPM

## 2022-01-01 VITALS
OXYGEN SATURATION: 100 % | SYSTOLIC BLOOD PRESSURE: 127 MMHG | RESPIRATION RATE: 16 BRPM | HEART RATE: 85 BPM | DIASTOLIC BLOOD PRESSURE: 75 MMHG

## 2022-01-01 VITALS
RESPIRATION RATE: 16 BRPM | HEART RATE: 108 BPM | DIASTOLIC BLOOD PRESSURE: 57 MMHG | OXYGEN SATURATION: 96 % | SYSTOLIC BLOOD PRESSURE: 75 MMHG | TEMPERATURE: 97.9 F

## 2022-01-01 VITALS
OXYGEN SATURATION: 98 % | DIASTOLIC BLOOD PRESSURE: 50 MMHG | TEMPERATURE: 97 F | SYSTOLIC BLOOD PRESSURE: 113 MMHG | HEART RATE: 90 BPM | RESPIRATION RATE: 16 BRPM | HEIGHT: 76 IN

## 2022-01-01 VITALS
RESPIRATION RATE: 18 BRPM | HEART RATE: 90 BPM | TEMPERATURE: 98 F | HEIGHT: 76 IN | SYSTOLIC BLOOD PRESSURE: 128 MMHG | DIASTOLIC BLOOD PRESSURE: 74 MMHG | OXYGEN SATURATION: 98 %

## 2022-01-01 VITALS
OXYGEN SATURATION: 94 % | SYSTOLIC BLOOD PRESSURE: 95 MMHG | RESPIRATION RATE: 16 BRPM | WEIGHT: 169 LBS | HEART RATE: 101 BPM | DIASTOLIC BLOOD PRESSURE: 62 MMHG | HEIGHT: 76 IN | BODY MASS INDEX: 20.58 KG/M2 | TEMPERATURE: 97.6 F

## 2022-01-01 VITALS
OXYGEN SATURATION: 99 % | RESPIRATION RATE: 16 BRPM | DIASTOLIC BLOOD PRESSURE: 72 MMHG | HEIGHT: 76 IN | TEMPERATURE: 97.9 F | HEART RATE: 104 BPM | SYSTOLIC BLOOD PRESSURE: 103 MMHG

## 2022-01-01 VITALS
RESPIRATION RATE: 16 BRPM | TEMPERATURE: 97.7 F | DIASTOLIC BLOOD PRESSURE: 80 MMHG | BODY MASS INDEX: 20.41 KG/M2 | HEART RATE: 79 BPM | SYSTOLIC BLOOD PRESSURE: 165 MMHG | OXYGEN SATURATION: 100 % | WEIGHT: 169.32 LBS

## 2022-01-01 VITALS
TEMPERATURE: 97 F | SYSTOLIC BLOOD PRESSURE: 100 MMHG | OXYGEN SATURATION: 99 % | HEART RATE: 90 BPM | DIASTOLIC BLOOD PRESSURE: 67 MMHG | WEIGHT: 136.89 LBS | HEIGHT: 76 IN | RESPIRATION RATE: 16 BRPM | BODY MASS INDEX: 16.67 KG/M2

## 2022-01-01 VITALS
HEART RATE: 80 BPM | BODY MASS INDEX: 19.68 KG/M2 | WEIGHT: 163.32 LBS | DIASTOLIC BLOOD PRESSURE: 77 MMHG | TEMPERATURE: 97.6 F | OXYGEN SATURATION: 99 % | SYSTOLIC BLOOD PRESSURE: 130 MMHG | RESPIRATION RATE: 16 BRPM

## 2022-01-01 VITALS
OXYGEN SATURATION: 100 % | RESPIRATION RATE: 26 BRPM | DIASTOLIC BLOOD PRESSURE: 74 MMHG | HEART RATE: 97 BPM | SYSTOLIC BLOOD PRESSURE: 106 MMHG | TEMPERATURE: 98 F

## 2022-01-01 VITALS
TEMPERATURE: 98 F | DIASTOLIC BLOOD PRESSURE: 59 MMHG | HEART RATE: 92 BPM | HEIGHT: 76 IN | OXYGEN SATURATION: 100 % | SYSTOLIC BLOOD PRESSURE: 106 MMHG | RESPIRATION RATE: 18 BRPM

## 2022-01-01 VITALS
BODY MASS INDEX: 17.17 KG/M2 | OXYGEN SATURATION: 98 % | HEART RATE: 96 BPM | DIASTOLIC BLOOD PRESSURE: 75 MMHG | HEIGHT: 76 IN | RESPIRATION RATE: 16 BRPM | WEIGHT: 141 LBS | SYSTOLIC BLOOD PRESSURE: 116 MMHG

## 2022-01-01 VITALS
OXYGEN SATURATION: 100 % | DIASTOLIC BLOOD PRESSURE: 86 MMHG | RESPIRATION RATE: 18 BRPM | HEART RATE: 85 BPM | SYSTOLIC BLOOD PRESSURE: 129 MMHG

## 2022-01-01 VITALS
DIASTOLIC BLOOD PRESSURE: 59 MMHG | RESPIRATION RATE: 18 BRPM | OXYGEN SATURATION: 93 % | TEMPERATURE: 98 F | HEART RATE: 81 BPM | WEIGHT: 130.07 LBS | SYSTOLIC BLOOD PRESSURE: 80 MMHG

## 2022-01-01 VITALS
OXYGEN SATURATION: 98 % | WEIGHT: 142.2 LBS | RESPIRATION RATE: 16 BRPM | TEMPERATURE: 96.9 F | SYSTOLIC BLOOD PRESSURE: 103 MMHG | HEIGHT: 76.02 IN | HEART RATE: 98 BPM | DIASTOLIC BLOOD PRESSURE: 71 MMHG | BODY MASS INDEX: 17.32 KG/M2

## 2022-01-01 VITALS
OXYGEN SATURATION: 100 % | SYSTOLIC BLOOD PRESSURE: 134 MMHG | DIASTOLIC BLOOD PRESSURE: 70 MMHG | HEART RATE: 84 BPM | RESPIRATION RATE: 18 BRPM | TEMPERATURE: 99 F

## 2022-01-01 DIAGNOSIS — Z51.5 ENCOUNTER FOR PALLIATIVE CARE: ICD-10-CM

## 2022-01-01 DIAGNOSIS — I48.20 CHRONIC ATRIAL FIBRILLATION, UNSPECIFIED: ICD-10-CM

## 2022-01-01 DIAGNOSIS — C25.9 MALIGNANT NEOPLASM OF PANCREAS, UNSPECIFIED: ICD-10-CM

## 2022-01-01 DIAGNOSIS — R09.89 OTHER SPECIFIED SYMPTOMS AND SIGNS INVOLVING THE CIRCULATORY AND RESPIRATORY SYSTEMS: ICD-10-CM

## 2022-01-01 DIAGNOSIS — Z71.89 OTHER SPECIFIED COUNSELING: ICD-10-CM

## 2022-01-01 DIAGNOSIS — K56.609 UNSPECIFIED INTESTINAL OBSTRUCTION, UNSPECIFIED AS TO PARTIAL VERSUS COMPLETE OBSTRUCTION: ICD-10-CM

## 2022-01-01 DIAGNOSIS — Z98.890 OTHER SPECIFIED POSTPROCEDURAL STATES: Chronic | ICD-10-CM

## 2022-01-01 DIAGNOSIS — Z90.410 ACQUIRED TOTAL ABSENCE OF PANCREAS: Chronic | ICD-10-CM

## 2022-01-01 DIAGNOSIS — J38.2 NODULES OF VOCAL CORDS: Chronic | ICD-10-CM

## 2022-01-01 DIAGNOSIS — K21.9 GASTRO-ESOPHAGEAL REFLUX DISEASE WITHOUT ESOPHAGITIS: ICD-10-CM

## 2022-01-01 DIAGNOSIS — R63.4 ABNORMAL WEIGHT LOSS: ICD-10-CM

## 2022-01-01 DIAGNOSIS — Z51.11 ENCOUNTER FOR ANTINEOPLASTIC CHEMOTHERAPY: ICD-10-CM

## 2022-01-01 DIAGNOSIS — R10.9 UNSPECIFIED ABDOMINAL PAIN: ICD-10-CM

## 2022-01-01 DIAGNOSIS — R63.8 OTHER SYMPTOMS AND SIGNS CONCERNING FOOD AND FLUID INTAKE: ICD-10-CM

## 2022-01-01 DIAGNOSIS — E78.5 HYPERLIPIDEMIA, UNSPECIFIED: ICD-10-CM

## 2022-01-01 DIAGNOSIS — I48.92 UNSPECIFIED ATRIAL FLUTTER: ICD-10-CM

## 2022-01-01 DIAGNOSIS — Z51.89 ENCOUNTER FOR OTHER SPECIFIED AFTERCARE: ICD-10-CM

## 2022-01-01 DIAGNOSIS — I48.0 PAROXYSMAL ATRIAL FIBRILLATION: ICD-10-CM

## 2022-01-01 DIAGNOSIS — R63.0 ANOREXIA: ICD-10-CM

## 2022-01-01 DIAGNOSIS — D64.9 ANEMIA, UNSPECIFIED: ICD-10-CM

## 2022-01-01 DIAGNOSIS — I26.99 OTHER PULMONARY EMBOLISM W/OUT ACUTE COR PULMONALE: ICD-10-CM

## 2022-01-01 DIAGNOSIS — R14.0 ABDOMINAL DISTENSION (GASEOUS): ICD-10-CM

## 2022-01-01 DIAGNOSIS — K66.8 OTHER SPECIFIED DISORDERS OF PERITONEUM: ICD-10-CM

## 2022-01-01 DIAGNOSIS — R06.00 DYSPNEA, UNSPECIFIED: ICD-10-CM

## 2022-01-01 DIAGNOSIS — G89.3 NEOPLASM RELATED PAIN (ACUTE) (CHRONIC): ICD-10-CM

## 2022-01-01 DIAGNOSIS — E43 UNSPECIFIED SEVERE PROTEIN-CALORIE MALNUTRITION: ICD-10-CM

## 2022-01-01 DIAGNOSIS — R18.0 MALIGNANT ASCITES: ICD-10-CM

## 2022-01-01 DIAGNOSIS — R05.8 OTHER SPECIFIED COUGH: ICD-10-CM

## 2022-01-01 DIAGNOSIS — R11.2 NAUSEA WITH VOMITING, UNSPECIFIED: ICD-10-CM

## 2022-01-01 DIAGNOSIS — E89.0 POSTPROCEDURAL HYPOTHYROIDISM: Chronic | ICD-10-CM

## 2022-01-01 DIAGNOSIS — Z29.9 ENCOUNTER FOR PROPHYLACTIC MEASURES, UNSPECIFIED: ICD-10-CM

## 2022-01-01 DIAGNOSIS — E86.0 DEHYDRATION: ICD-10-CM

## 2022-01-01 DIAGNOSIS — E11.9 TYPE 2 DIABETES MELLITUS WITHOUT COMPLICATIONS: ICD-10-CM

## 2022-01-01 DIAGNOSIS — K21.9 GASTRO-ESOPHAGEAL REFLUX DISEASE W/OUT ESOPHAGITIS: ICD-10-CM

## 2022-01-01 DIAGNOSIS — C73 MALIGNANT NEOPLASM OF THYROID GLAND: ICD-10-CM

## 2022-01-01 DIAGNOSIS — G47.9 SLEEP DISORDER, UNSPECIFIED: ICD-10-CM

## 2022-01-01 DIAGNOSIS — R53.83 OTHER FATIGUE: ICD-10-CM

## 2022-01-01 DIAGNOSIS — I26.99 OTHER PULMONARY EMBOLISM WITHOUT ACUTE COR PULMONALE: ICD-10-CM

## 2022-01-01 LAB
A1C WITH ESTIMATED AVERAGE GLUCOSE RESULT: 5.8 % — HIGH (ref 4–5.6)
ACANTHOCYTES BLD QL SMEAR: SIGNIFICANT CHANGE UP
ALBUMIN FLD-MCNC: 1.5 G/DL — SIGNIFICANT CHANGE UP
ALBUMIN SERPL ELPH-MCNC: 3 G/DL — LOW (ref 3.3–5)
ALBUMIN SERPL ELPH-MCNC: 3.1 G/DL — LOW (ref 3.3–5)
ALBUMIN SERPL ELPH-MCNC: 3.2 G/DL — LOW (ref 3.3–5)
ALBUMIN SERPL ELPH-MCNC: 3.3 G/DL — SIGNIFICANT CHANGE UP (ref 3.3–5)
ALBUMIN SERPL ELPH-MCNC: 3.4 G/DL — SIGNIFICANT CHANGE UP (ref 3.3–5)
ALBUMIN SERPL ELPH-MCNC: 3.5 G/DL — SIGNIFICANT CHANGE UP (ref 3.3–5)
ALBUMIN SERPL ELPH-MCNC: 3.6 G/DL — SIGNIFICANT CHANGE UP (ref 3.3–5)
ALBUMIN SERPL ELPH-MCNC: 3.7 G/DL — SIGNIFICANT CHANGE UP (ref 3.3–5)
ALBUMIN SERPL ELPH-MCNC: 3.9 G/DL — SIGNIFICANT CHANGE UP (ref 3.3–5)
ALBUMIN SERPL ELPH-MCNC: 4 G/DL — SIGNIFICANT CHANGE UP (ref 3.3–5)
ALBUMIN SERPL ELPH-MCNC: 4.3 G/DL — SIGNIFICANT CHANGE UP (ref 3.3–5)
ALP SERPL-CCNC: 114 U/L — SIGNIFICANT CHANGE UP (ref 40–120)
ALP SERPL-CCNC: 131 U/L — HIGH (ref 40–120)
ALP SERPL-CCNC: 137 U/L — HIGH (ref 40–120)
ALP SERPL-CCNC: 138 U/L — HIGH (ref 40–120)
ALP SERPL-CCNC: 141 U/L — HIGH (ref 40–120)
ALP SERPL-CCNC: 147 U/L — HIGH (ref 40–120)
ALP SERPL-CCNC: 153 U/L — HIGH (ref 40–120)
ALP SERPL-CCNC: 155 U/L — HIGH (ref 40–120)
ALP SERPL-CCNC: 168 U/L — HIGH (ref 40–120)
ALP SERPL-CCNC: 172 U/L — HIGH (ref 40–120)
ALP SERPL-CCNC: 172 U/L — HIGH (ref 40–120)
ALP SERPL-CCNC: 181 U/L — HIGH (ref 40–120)
ALP SERPL-CCNC: 181 U/L — HIGH (ref 40–120)
ALP SERPL-CCNC: 185 U/L — HIGH (ref 40–120)
ALP SERPL-CCNC: 187 U/L — HIGH (ref 40–120)
ALP SERPL-CCNC: 350 U/L — HIGH (ref 40–120)
ALP SERPL-CCNC: 93 U/L — SIGNIFICANT CHANGE UP (ref 40–120)
ALP SERPL-CCNC: 98 U/L — SIGNIFICANT CHANGE UP (ref 40–120)
ALT FLD-CCNC: 12 U/L — SIGNIFICANT CHANGE UP (ref 4–41)
ALT FLD-CCNC: 13 U/L — SIGNIFICANT CHANGE UP (ref 4–41)
ALT FLD-CCNC: 14 U/L — SIGNIFICANT CHANGE UP (ref 10–45)
ALT FLD-CCNC: 15 U/L — SIGNIFICANT CHANGE UP (ref 4–41)
ALT FLD-CCNC: 15 U/L — SIGNIFICANT CHANGE UP (ref 4–41)
ALT FLD-CCNC: 16 U/L — SIGNIFICANT CHANGE UP (ref 4–41)
ALT FLD-CCNC: 16 U/L — SIGNIFICANT CHANGE UP (ref 4–41)
ALT FLD-CCNC: 17 U/L — SIGNIFICANT CHANGE UP (ref 10–45)
ALT FLD-CCNC: 18 U/L — SIGNIFICANT CHANGE UP (ref 10–45)
ALT FLD-CCNC: 18 U/L — SIGNIFICANT CHANGE UP (ref 10–45)
ALT FLD-CCNC: 18 U/L — SIGNIFICANT CHANGE UP (ref 4–41)
ALT FLD-CCNC: 20 U/L — SIGNIFICANT CHANGE UP (ref 4–41)
ALT FLD-CCNC: 22 U/L — SIGNIFICANT CHANGE UP (ref 10–45)
ALT FLD-CCNC: 22 U/L — SIGNIFICANT CHANGE UP (ref 4–41)
ALT FLD-CCNC: 23 U/L — SIGNIFICANT CHANGE UP (ref 4–41)
ALT FLD-CCNC: 27 U/L — SIGNIFICANT CHANGE UP (ref 10–45)
ALT FLD-CCNC: 33 U/L — SIGNIFICANT CHANGE UP (ref 10–45)
ALT FLD-CCNC: 35 U/L — SIGNIFICANT CHANGE UP (ref 4–41)
ANION GAP SERPL CALC-SCNC: 10 MMOL/L — SIGNIFICANT CHANGE UP (ref 7–14)
ANION GAP SERPL CALC-SCNC: 11 MMOL/L — SIGNIFICANT CHANGE UP (ref 7–14)
ANION GAP SERPL CALC-SCNC: 11 MMOL/L — SIGNIFICANT CHANGE UP (ref 7–14)
ANION GAP SERPL CALC-SCNC: 12 MMOL/L — SIGNIFICANT CHANGE UP (ref 7–14)
ANION GAP SERPL CALC-SCNC: 13 MMOL/L — SIGNIFICANT CHANGE UP (ref 5–17)
ANION GAP SERPL CALC-SCNC: 13 MMOL/L — SIGNIFICANT CHANGE UP (ref 7–14)
ANION GAP SERPL CALC-SCNC: 13 MMOL/L — SIGNIFICANT CHANGE UP (ref 7–14)
ANION GAP SERPL CALC-SCNC: 14 MMOL/L — SIGNIFICANT CHANGE UP (ref 5–17)
ANION GAP SERPL CALC-SCNC: 14 MMOL/L — SIGNIFICANT CHANGE UP (ref 7–14)
ANION GAP SERPL CALC-SCNC: 15 MMOL/L — SIGNIFICANT CHANGE UP (ref 5–17)
ANION GAP SERPL CALC-SCNC: 17 MMOL/L — SIGNIFICANT CHANGE UP (ref 5–17)
ANION GAP SERPL CALC-SCNC: 5 MMOL/L — LOW (ref 7–14)
ANION GAP SERPL CALC-SCNC: 7 MMOL/L — SIGNIFICANT CHANGE UP (ref 7–14)
ANION GAP SERPL CALC-SCNC: 8 MMOL/L — SIGNIFICANT CHANGE UP (ref 7–14)
ANION GAP SERPL CALC-SCNC: 8 MMOL/L — SIGNIFICANT CHANGE UP (ref 7–14)
ANION GAP SERPL CALC-SCNC: 9 MMOL/L — SIGNIFICANT CHANGE UP (ref 7–14)
ANION GAP SERPL CALC-SCNC: SIGNIFICANT CHANGE UP MMOL/L (ref 7–14)
ANISOCYTOSIS BLD QL: SIGNIFICANT CHANGE UP
ANISOCYTOSIS BLD QL: SLIGHT — SIGNIFICANT CHANGE UP
ANISOCYTOSIS BLD QL: SLIGHT — SIGNIFICANT CHANGE UP
APTT BLD: 31 SEC — SIGNIFICANT CHANGE UP (ref 27–36.3)
APTT BLD: 31.7 SEC — SIGNIFICANT CHANGE UP (ref 27–36.3)
APTT BLD: 33.3 SEC — SIGNIFICANT CHANGE UP (ref 27–36.3)
APTT BLD: 36.4 SEC — HIGH (ref 27–36.3)
AST SERPL-CCNC: 13 U/L — SIGNIFICANT CHANGE UP (ref 4–40)
AST SERPL-CCNC: 13 U/L — SIGNIFICANT CHANGE UP (ref 4–40)
AST SERPL-CCNC: 14 U/L — SIGNIFICANT CHANGE UP (ref 4–40)
AST SERPL-CCNC: 17 U/L — SIGNIFICANT CHANGE UP (ref 4–40)
AST SERPL-CCNC: 18 U/L — SIGNIFICANT CHANGE UP (ref 10–40)
AST SERPL-CCNC: 18 U/L — SIGNIFICANT CHANGE UP (ref 4–40)
AST SERPL-CCNC: 19 U/L — SIGNIFICANT CHANGE UP (ref 10–40)
AST SERPL-CCNC: 20 U/L — SIGNIFICANT CHANGE UP (ref 10–40)
AST SERPL-CCNC: 20 U/L — SIGNIFICANT CHANGE UP (ref 10–40)
AST SERPL-CCNC: 20 U/L — SIGNIFICANT CHANGE UP (ref 4–40)
AST SERPL-CCNC: 21 U/L — SIGNIFICANT CHANGE UP (ref 10–40)
AST SERPL-CCNC: 21 U/L — SIGNIFICANT CHANGE UP (ref 4–40)
AST SERPL-CCNC: 21 U/L — SIGNIFICANT CHANGE UP (ref 4–40)
AST SERPL-CCNC: 22 U/L — SIGNIFICANT CHANGE UP (ref 10–40)
AST SERPL-CCNC: 22 U/L — SIGNIFICANT CHANGE UP (ref 4–40)
AST SERPL-CCNC: 23 U/L — SIGNIFICANT CHANGE UP (ref 10–40)
AST SERPL-CCNC: 30 U/L — SIGNIFICANT CHANGE UP (ref 4–40)
AST SERPL-CCNC: 38 U/L — SIGNIFICANT CHANGE UP (ref 4–40)
B PERT IGG+IGM PNL SER: ABNORMAL
BASE EXCESS BLDV CALC-SCNC: -1.6 MMOL/L — SIGNIFICANT CHANGE UP (ref -2–3)
BASOPHILS # BLD AUTO: 0 K/UL — SIGNIFICANT CHANGE UP (ref 0–0.2)
BASOPHILS # BLD AUTO: 0 K/UL — SIGNIFICANT CHANGE UP (ref 0–0.2)
BASOPHILS # BLD AUTO: 0.01 K/UL — SIGNIFICANT CHANGE UP (ref 0–0.2)
BASOPHILS # BLD AUTO: 0.02 K/UL — SIGNIFICANT CHANGE UP (ref 0–0.2)
BASOPHILS # BLD AUTO: 0.03 K/UL — SIGNIFICANT CHANGE UP (ref 0–0.2)
BASOPHILS # BLD AUTO: 0.04 K/UL — SIGNIFICANT CHANGE UP (ref 0–0.2)
BASOPHILS # BLD AUTO: 0.05 K/UL — SIGNIFICANT CHANGE UP (ref 0–0.2)
BASOPHILS # BLD AUTO: 0.06 K/UL — SIGNIFICANT CHANGE UP (ref 0–0.2)
BASOPHILS # BLD AUTO: 0.06 K/UL — SIGNIFICANT CHANGE UP (ref 0–0.2)
BASOPHILS # BLD AUTO: 0.07 K/UL — SIGNIFICANT CHANGE UP (ref 0–0.2)
BASOPHILS # BLD AUTO: 0.07 K/UL — SIGNIFICANT CHANGE UP (ref 0–0.2)
BASOPHILS NFR BLD AUTO: 0 % — SIGNIFICANT CHANGE UP (ref 0–2)
BASOPHILS NFR BLD AUTO: 0 % — SIGNIFICANT CHANGE UP (ref 0–2)
BASOPHILS NFR BLD AUTO: 0.3 % — SIGNIFICANT CHANGE UP (ref 0–2)
BASOPHILS NFR BLD AUTO: 0.4 % — SIGNIFICANT CHANGE UP (ref 0–2)
BASOPHILS NFR BLD AUTO: 0.5 % — SIGNIFICANT CHANGE UP (ref 0–2)
BASOPHILS NFR BLD AUTO: 0.6 % — SIGNIFICANT CHANGE UP (ref 0–2)
BASOPHILS NFR BLD AUTO: 0.7 % — SIGNIFICANT CHANGE UP (ref 0–2)
BASOPHILS NFR BLD AUTO: 0.8 % — SIGNIFICANT CHANGE UP (ref 0–2)
BASOPHILS NFR BLD AUTO: 0.9 % — SIGNIFICANT CHANGE UP (ref 0–2)
BASOPHILS NFR BLD AUTO: 1.2 % — SIGNIFICANT CHANGE UP (ref 0–2)
BASOPHILS NFR BLD AUTO: 1.3 % — SIGNIFICANT CHANGE UP (ref 0–2)
BASOPHILS NFR BLD AUTO: 1.7 % — SIGNIFICANT CHANGE UP (ref 0–2)
BILIRUB SERPL-MCNC: 0.2 MG/DL — SIGNIFICANT CHANGE UP (ref 0.2–1.2)
BILIRUB SERPL-MCNC: 0.3 MG/DL — SIGNIFICANT CHANGE UP (ref 0.2–1.2)
BLD GP AB SCN SERPL QL: NEGATIVE — SIGNIFICANT CHANGE UP
BLOOD GAS VENOUS COMPREHENSIVE RESULT: SIGNIFICANT CHANGE UP
BLOOD GAS VENOUS COMPREHENSIVE RESULT: SIGNIFICANT CHANGE UP
BUN SERPL-MCNC: 14 MG/DL — SIGNIFICANT CHANGE UP (ref 7–23)
BUN SERPL-MCNC: 14 MG/DL — SIGNIFICANT CHANGE UP (ref 7–23)
BUN SERPL-MCNC: 16 MG/DL — SIGNIFICANT CHANGE UP (ref 7–23)
BUN SERPL-MCNC: 18 MG/DL — SIGNIFICANT CHANGE UP (ref 7–23)
BUN SERPL-MCNC: 18 MG/DL — SIGNIFICANT CHANGE UP (ref 7–23)
BUN SERPL-MCNC: 19 MG/DL — SIGNIFICANT CHANGE UP (ref 7–23)
BUN SERPL-MCNC: 20 MG/DL — SIGNIFICANT CHANGE UP (ref 7–23)
BUN SERPL-MCNC: 21 MG/DL — SIGNIFICANT CHANGE UP (ref 7–23)
BUN SERPL-MCNC: 21 MG/DL — SIGNIFICANT CHANGE UP (ref 7–23)
BUN SERPL-MCNC: 22 MG/DL — SIGNIFICANT CHANGE UP (ref 7–23)
BUN SERPL-MCNC: 23 MG/DL — SIGNIFICANT CHANGE UP (ref 7–23)
BUN SERPL-MCNC: 24 MG/DL — HIGH (ref 7–23)
BUN SERPL-MCNC: 24 MG/DL — HIGH (ref 7–23)
BUN SERPL-MCNC: 30 MG/DL — HIGH (ref 7–23)
BUN SERPL-MCNC: 31 MG/DL — HIGH (ref 7–23)
BUN SERPL-MCNC: 32 MG/DL — HIGH (ref 7–23)
BUN SERPL-MCNC: 34 MG/DL — HIGH (ref 7–23)
BUN SERPL-MCNC: 38 MG/DL — HIGH (ref 7–23)
BUN SERPL-MCNC: 38 MG/DL — HIGH (ref 7–23)
BUN SERPL-MCNC: 44 MG/DL — HIGH (ref 7–23)
BUN SERPL-MCNC: 46 MG/DL — HIGH (ref 7–23)
BUN SERPL-MCNC: 47 MG/DL — HIGH (ref 7–23)
BUN SERPL-MCNC: 47 MG/DL — HIGH (ref 7–23)
BUN SERPL-MCNC: 49 MG/DL — HIGH (ref 7–23)
BUN SERPL-MCNC: 50 MG/DL — HIGH (ref 7–23)
BUN SERPL-MCNC: 50 MG/DL — HIGH (ref 7–23)
BUN SERPL-MCNC: 59 MG/DL — HIGH (ref 7–23)
BUN SERPL-MCNC: 8 MG/DL — SIGNIFICANT CHANGE UP (ref 7–23)
CA-I BLD-SCNC: 1.04 MMOL/L — LOW (ref 1.15–1.29)
CA-I BLD-SCNC: 1.05 MMOL/L — LOW (ref 1.15–1.29)
CA-I BLD-SCNC: 1.11 MMOL/L — LOW (ref 1.15–1.29)
CA-I BLD-SCNC: 1.13 MMOL/L — LOW (ref 1.15–1.29)
CA-I BLD-SCNC: 1.18 MMOL/L — SIGNIFICANT CHANGE UP (ref 1.15–1.29)
CA-I BLD-SCNC: 1.19 MMOL/L — SIGNIFICANT CHANGE UP (ref 1.15–1.29)
CA-I BLD-SCNC: 1.19 MMOL/L — SIGNIFICANT CHANGE UP (ref 1.15–1.29)
CA-I BLD-SCNC: 1.23 MMOL/L — SIGNIFICANT CHANGE UP (ref 1.15–1.29)
CALCIUM SERPL-MCNC: 7.3 MG/DL — LOW (ref 8.4–10.5)
CALCIUM SERPL-MCNC: 7.5 MG/DL — LOW (ref 8.4–10.5)
CALCIUM SERPL-MCNC: 7.6 MG/DL — LOW (ref 8.4–10.5)
CALCIUM SERPL-MCNC: 7.7 MG/DL — LOW (ref 8.4–10.5)
CALCIUM SERPL-MCNC: 7.9 MG/DL — LOW (ref 8.4–10.5)
CALCIUM SERPL-MCNC: 7.9 MG/DL — LOW (ref 8.4–10.5)
CALCIUM SERPL-MCNC: 8 MG/DL — LOW (ref 8.4–10.5)
CALCIUM SERPL-MCNC: 8.1 MG/DL — LOW (ref 8.4–10.5)
CALCIUM SERPL-MCNC: 8.2 MG/DL — LOW (ref 8.4–10.5)
CALCIUM SERPL-MCNC: 8.2 MG/DL — LOW (ref 8.4–10.5)
CALCIUM SERPL-MCNC: 8.3 MG/DL — LOW (ref 8.4–10.5)
CALCIUM SERPL-MCNC: 8.3 MG/DL — LOW (ref 8.4–10.5)
CALCIUM SERPL-MCNC: 8.4 MG/DL — SIGNIFICANT CHANGE UP (ref 8.4–10.5)
CALCIUM SERPL-MCNC: 8.5 MG/DL — SIGNIFICANT CHANGE UP (ref 8.4–10.5)
CALCIUM SERPL-MCNC: 8.6 MG/DL — SIGNIFICANT CHANGE UP (ref 8.4–10.5)
CALCIUM SERPL-MCNC: 8.7 MG/DL — SIGNIFICANT CHANGE UP (ref 8.4–10.5)
CALCIUM SERPL-MCNC: 8.8 MG/DL — SIGNIFICANT CHANGE UP (ref 8.4–10.5)
CALCIUM SERPL-MCNC: 8.9 MG/DL — SIGNIFICANT CHANGE UP (ref 8.4–10.5)
CALCIUM SERPL-MCNC: 9.1 MG/DL — SIGNIFICANT CHANGE UP (ref 8.4–10.5)
CALCIUM SERPL-MCNC: 9.2 MG/DL — SIGNIFICANT CHANGE UP (ref 8.4–10.5)
CALCIUM SERPL-MCNC: 9.3 MG/DL — SIGNIFICANT CHANGE UP (ref 8.4–10.5)
CANCER AG19-9 SERPL-ACNC: 129 U/ML — HIGH
CANCER AG19-9 SERPL-ACNC: 144 U/ML — HIGH
CANCER AG19-9 SERPL-ACNC: 2359 U/ML — HIGH
CANCER AG19-9 SERPL-ACNC: 245 U/ML — HIGH
CANCER AG19-9 SERPL-ACNC: 7152 U/ML — HIGH
CANCER AG19-9 SERPL-ACNC: ABNORMAL U/ML
CANCER AG19-9 SERPL-ACNC: HIGH U/ML
CANCER AG19-9 SERPL-ACNC: HIGH U/ML
CEA SERPL-MCNC: 105 NG/ML — HIGH (ref 0–3.8)
CEA SERPL-MCNC: 129 NG/ML
CEA SERPL-MCNC: 22.2 NG/ML — HIGH (ref 0–3.8)
CEA SERPL-MCNC: 3.4 NG/ML — SIGNIFICANT CHANGE UP (ref 1–3.8)
CEA SERPL-MCNC: 65.3 NG/ML — HIGH (ref 0–3.8)
CEA SERPL-MCNC: 85.3 NG/ML — HIGH (ref 0–3.8)
CHLORIDE BLDV-SCNC: 102 MMOL/L — SIGNIFICANT CHANGE UP (ref 96–108)
CHLORIDE SERPL-SCNC: 100 MMOL/L — SIGNIFICANT CHANGE UP (ref 98–107)
CHLORIDE SERPL-SCNC: 101 MMOL/L — SIGNIFICANT CHANGE UP (ref 96–108)
CHLORIDE SERPL-SCNC: 101 MMOL/L — SIGNIFICANT CHANGE UP (ref 98–107)
CHLORIDE SERPL-SCNC: 102 MMOL/L — SIGNIFICANT CHANGE UP (ref 96–108)
CHLORIDE SERPL-SCNC: 103 MMOL/L — SIGNIFICANT CHANGE UP (ref 96–108)
CHLORIDE SERPL-SCNC: 103 MMOL/L — SIGNIFICANT CHANGE UP (ref 96–108)
CHLORIDE SERPL-SCNC: 103 MMOL/L — SIGNIFICANT CHANGE UP (ref 98–107)
CHLORIDE SERPL-SCNC: 103 MMOL/L — SIGNIFICANT CHANGE UP (ref 98–107)
CHLORIDE SERPL-SCNC: 104 MMOL/L — SIGNIFICANT CHANGE UP (ref 96–108)
CHLORIDE SERPL-SCNC: 104 MMOL/L — SIGNIFICANT CHANGE UP (ref 98–107)
CHLORIDE SERPL-SCNC: 104 MMOL/L — SIGNIFICANT CHANGE UP (ref 98–107)
CHLORIDE SERPL-SCNC: 105 MMOL/L — SIGNIFICANT CHANGE UP (ref 98–107)
CHLORIDE SERPL-SCNC: 106 MMOL/L — SIGNIFICANT CHANGE UP (ref 98–107)
CHLORIDE SERPL-SCNC: 107 MMOL/L — SIGNIFICANT CHANGE UP (ref 96–108)
CHLORIDE SERPL-SCNC: 108 MMOL/L — HIGH (ref 98–107)
CHLORIDE SERPL-SCNC: 110 MMOL/L — HIGH (ref 98–107)
CHLORIDE SERPL-SCNC: 92 MMOL/L — LOW (ref 98–107)
CHLORIDE SERPL-SCNC: 94 MMOL/L — LOW (ref 98–107)
CHLORIDE SERPL-SCNC: 95 MMOL/L — LOW (ref 98–107)
CHLORIDE SERPL-SCNC: 95 MMOL/L — LOW (ref 98–107)
CHLORIDE SERPL-SCNC: 96 MMOL/L — LOW (ref 98–107)
CHLORIDE SERPL-SCNC: 97 MMOL/L — LOW (ref 98–107)
CHLORIDE SERPL-SCNC: 98 MMOL/L — SIGNIFICANT CHANGE UP (ref 96–108)
CHLORIDE SERPL-SCNC: 98 MMOL/L — SIGNIFICANT CHANGE UP (ref 98–107)
CHLORIDE SERPL-SCNC: 99 MMOL/L — SIGNIFICANT CHANGE UP (ref 98–107)
CHLORIDE SERPL-SCNC: 99 MMOL/L — SIGNIFICANT CHANGE UP (ref 98–107)
CHLORIDE SERPL-SCNC: SIGNIFICANT CHANGE UP MMOL/L (ref 98–107)
CHLORIDE SERPL-SCNC: SIGNIFICANT CHANGE UP MMOL/L (ref 98–107)
CO2 BLDV-SCNC: 25.7 MMOL/L — SIGNIFICANT CHANGE UP (ref 22–26)
CO2 SERPL-SCNC: 19 MMOL/L — LOW (ref 22–31)
CO2 SERPL-SCNC: 20 MMOL/L — LOW (ref 22–31)
CO2 SERPL-SCNC: 20 MMOL/L — LOW (ref 22–31)
CO2 SERPL-SCNC: 21 MMOL/L — LOW (ref 22–31)
CO2 SERPL-SCNC: 22 MMOL/L — SIGNIFICANT CHANGE UP (ref 22–31)
CO2 SERPL-SCNC: 23 MMOL/L — SIGNIFICANT CHANGE UP (ref 22–31)
CO2 SERPL-SCNC: 24 MMOL/L — SIGNIFICANT CHANGE UP (ref 22–31)
CO2 SERPL-SCNC: 27 MMOL/L — SIGNIFICANT CHANGE UP (ref 22–31)
CO2 SERPL-SCNC: 28 MMOL/L — SIGNIFICANT CHANGE UP (ref 22–31)
CO2 SERPL-SCNC: 30 MMOL/L — SIGNIFICANT CHANGE UP (ref 22–31)
CO2 SERPL-SCNC: 31 MMOL/L — SIGNIFICANT CHANGE UP (ref 22–31)
CO2 SERPL-SCNC: 32 MMOL/L — HIGH (ref 22–31)
CO2 SERPL-SCNC: 32 MMOL/L — HIGH (ref 22–31)
CO2 SERPL-SCNC: 34 MMOL/L — HIGH (ref 22–31)
CO2 SERPL-SCNC: 36 MMOL/L — HIGH (ref 22–31)
COLOR FLD: YELLOW
CREAT SERPL-MCNC: 0.91 MG/DL — SIGNIFICANT CHANGE UP (ref 0.5–1.3)
CREAT SERPL-MCNC: 0.91 MG/DL — SIGNIFICANT CHANGE UP (ref 0.5–1.3)
CREAT SERPL-MCNC: 0.93 MG/DL — SIGNIFICANT CHANGE UP (ref 0.5–1.3)
CREAT SERPL-MCNC: 0.94 MG/DL — SIGNIFICANT CHANGE UP (ref 0.5–1.3)
CREAT SERPL-MCNC: 0.95 MG/DL — SIGNIFICANT CHANGE UP (ref 0.5–1.3)
CREAT SERPL-MCNC: 0.95 MG/DL — SIGNIFICANT CHANGE UP (ref 0.5–1.3)
CREAT SERPL-MCNC: 0.98 MG/DL — SIGNIFICANT CHANGE UP (ref 0.5–1.3)
CREAT SERPL-MCNC: 0.99 MG/DL — SIGNIFICANT CHANGE UP (ref 0.5–1.3)
CREAT SERPL-MCNC: 1 MG/DL — SIGNIFICANT CHANGE UP (ref 0.5–1.3)
CREAT SERPL-MCNC: 1.01 MG/DL — SIGNIFICANT CHANGE UP (ref 0.5–1.3)
CREAT SERPL-MCNC: 1.03 MG/DL — SIGNIFICANT CHANGE UP (ref 0.5–1.3)
CREAT SERPL-MCNC: 1.04 MG/DL — SIGNIFICANT CHANGE UP (ref 0.5–1.3)
CREAT SERPL-MCNC: 1.05 MG/DL — SIGNIFICANT CHANGE UP (ref 0.5–1.3)
CREAT SERPL-MCNC: 1.06 MG/DL — SIGNIFICANT CHANGE UP (ref 0.5–1.3)
CREAT SERPL-MCNC: 1.07 MG/DL — SIGNIFICANT CHANGE UP (ref 0.5–1.3)
CREAT SERPL-MCNC: 1.08 MG/DL — SIGNIFICANT CHANGE UP (ref 0.5–1.3)
CREAT SERPL-MCNC: 1.08 MG/DL — SIGNIFICANT CHANGE UP (ref 0.5–1.3)
CREAT SERPL-MCNC: 1.09 MG/DL — SIGNIFICANT CHANGE UP (ref 0.5–1.3)
CREAT SERPL-MCNC: 1.09 MG/DL — SIGNIFICANT CHANGE UP (ref 0.5–1.3)
CREAT SERPL-MCNC: 1.1 MG/DL — SIGNIFICANT CHANGE UP (ref 0.5–1.3)
CREAT SERPL-MCNC: 1.1 MG/DL — SIGNIFICANT CHANGE UP (ref 0.5–1.3)
CREAT SERPL-MCNC: 1.11 MG/DL — SIGNIFICANT CHANGE UP (ref 0.5–1.3)
CREAT SERPL-MCNC: 1.12 MG/DL — SIGNIFICANT CHANGE UP (ref 0.5–1.3)
CREAT SERPL-MCNC: 1.17 MG/DL — SIGNIFICANT CHANGE UP (ref 0.5–1.3)
CREAT SERPL-MCNC: 1.21 MG/DL — SIGNIFICANT CHANGE UP (ref 0.5–1.3)
CREAT SERPL-MCNC: 1.22 MG/DL — SIGNIFICANT CHANGE UP (ref 0.5–1.3)
CREAT SERPL-MCNC: 1.23 MG/DL — SIGNIFICANT CHANGE UP (ref 0.5–1.3)
CREAT SERPL-MCNC: 1.24 MG/DL — SIGNIFICANT CHANGE UP (ref 0.5–1.3)
CREAT SERPL-MCNC: 1.29 MG/DL — SIGNIFICANT CHANGE UP (ref 0.5–1.3)
CREAT SERPL-MCNC: 1.31 MG/DL — HIGH (ref 0.5–1.3)
CREAT SERPL-MCNC: 1.32 MG/DL — HIGH (ref 0.5–1.3)
CREAT SERPL-MCNC: 1.35 MG/DL — HIGH (ref 0.5–1.3)
CREAT SERPL-MCNC: 1.43 MG/DL — HIGH (ref 0.5–1.3)
CULTURE RESULTS: SIGNIFICANT CHANGE UP
DACRYOCYTES BLD QL SMEAR: SLIGHT — SIGNIFICANT CHANGE UP
EGFR: 51 ML/MIN/1.73M2 — LOW
EGFR: 55 ML/MIN/1.73M2 — LOW
EGFR: 57 ML/MIN/1.73M2 — LOW
EGFR: 57 ML/MIN/1.73M2 — LOW
EGFR: 58 ML/MIN/1.73M2 — LOW
EGFR: 61 ML/MIN/1.73M2 — SIGNIFICANT CHANGE UP
EGFR: 62 ML/MIN/1.73M2 — SIGNIFICANT CHANGE UP
EGFR: 62 ML/MIN/1.73M2 — SIGNIFICANT CHANGE UP
EGFR: 63 ML/MIN/1.73M2 — SIGNIFICANT CHANGE UP
EGFR: 65 ML/MIN/1.73M2 — SIGNIFICANT CHANGE UP
EGFR: 69 ML/MIN/1.73M2 — SIGNIFICANT CHANGE UP
EGFR: 70 ML/MIN/1.73M2 — SIGNIFICANT CHANGE UP
EGFR: 71 ML/MIN/1.73M2 — SIGNIFICANT CHANGE UP
EGFR: 71 ML/MIN/1.73M2 — SIGNIFICANT CHANGE UP
EGFR: 72 ML/MIN/1.73M2 — SIGNIFICANT CHANGE UP
EGFR: 73 ML/MIN/1.73M2 — SIGNIFICANT CHANGE UP
EGFR: 74 ML/MIN/1.73M2 — SIGNIFICANT CHANGE UP
EGFR: 75 ML/MIN/1.73M2 — SIGNIFICANT CHANGE UP
EGFR: 78 ML/MIN/1.73M2 — SIGNIFICANT CHANGE UP
EGFR: 78 ML/MIN/1.73M2 — SIGNIFICANT CHANGE UP
EGFR: 80 ML/MIN/1.73M2 — SIGNIFICANT CHANGE UP
EGFR: 80 ML/MIN/1.73M2 — SIGNIFICANT CHANGE UP
EGFR: 83 ML/MIN/1.73M2 — SIGNIFICANT CHANGE UP
EGFR: 83 ML/MIN/1.73M2 — SIGNIFICANT CHANGE UP
EGFR: 85 ML/MIN/1.73M2 — SIGNIFICANT CHANGE UP
EGFR: 88 ML/MIN/1.73M2 — SIGNIFICANT CHANGE UP
EGFR: 88 ML/MIN/1.73M2 — SIGNIFICANT CHANGE UP
ELLIPTOCYTES BLD QL SMEAR: SLIGHT — SIGNIFICANT CHANGE UP
ELLIPTOCYTES BLD QL SMEAR: SLIGHT — SIGNIFICANT CHANGE UP
EOSINOPHIL # BLD AUTO: 0 K/UL — SIGNIFICANT CHANGE UP (ref 0–0.5)
EOSINOPHIL # BLD AUTO: 0.01 K/UL — SIGNIFICANT CHANGE UP (ref 0–0.5)
EOSINOPHIL # BLD AUTO: 0.02 K/UL — SIGNIFICANT CHANGE UP (ref 0–0.5)
EOSINOPHIL # BLD AUTO: 0.02 K/UL — SIGNIFICANT CHANGE UP (ref 0–0.5)
EOSINOPHIL # BLD AUTO: 0.03 K/UL — SIGNIFICANT CHANGE UP (ref 0–0.5)
EOSINOPHIL # BLD AUTO: 0.05 K/UL — SIGNIFICANT CHANGE UP (ref 0–0.5)
EOSINOPHIL # BLD AUTO: 0.06 K/UL — SIGNIFICANT CHANGE UP (ref 0–0.5)
EOSINOPHIL # BLD AUTO: 0.06 K/UL — SIGNIFICANT CHANGE UP (ref 0–0.5)
EOSINOPHIL # BLD AUTO: 0.07 K/UL — SIGNIFICANT CHANGE UP (ref 0–0.5)
EOSINOPHIL # BLD AUTO: 0.08 K/UL — SIGNIFICANT CHANGE UP (ref 0–0.5)
EOSINOPHIL # BLD AUTO: 0.09 K/UL — SIGNIFICANT CHANGE UP (ref 0–0.5)
EOSINOPHIL # BLD AUTO: 0.15 K/UL — SIGNIFICANT CHANGE UP (ref 0–0.5)
EOSINOPHIL # BLD AUTO: 0.16 K/UL — SIGNIFICANT CHANGE UP (ref 0–0.5)
EOSINOPHIL # BLD AUTO: 0.18 K/UL — SIGNIFICANT CHANGE UP (ref 0–0.5)
EOSINOPHIL # BLD AUTO: 0.24 K/UL — SIGNIFICANT CHANGE UP (ref 0–0.5)
EOSINOPHIL # BLD AUTO: 0.4 K/UL — SIGNIFICANT CHANGE UP (ref 0–0.5)
EOSINOPHIL # BLD AUTO: 0.49 K/UL — SIGNIFICANT CHANGE UP (ref 0–0.5)
EOSINOPHIL # FLD: 1 % — SIGNIFICANT CHANGE UP
EOSINOPHIL NFR BLD AUTO: 0 % — SIGNIFICANT CHANGE UP (ref 0–6)
EOSINOPHIL NFR BLD AUTO: 0.2 % — SIGNIFICANT CHANGE UP (ref 0–6)
EOSINOPHIL NFR BLD AUTO: 0.4 % — SIGNIFICANT CHANGE UP (ref 0–6)
EOSINOPHIL NFR BLD AUTO: 0.6 % — SIGNIFICANT CHANGE UP (ref 0–6)
EOSINOPHIL NFR BLD AUTO: 0.7 % — SIGNIFICANT CHANGE UP (ref 0–6)
EOSINOPHIL NFR BLD AUTO: 0.8 % — SIGNIFICANT CHANGE UP (ref 0–6)
EOSINOPHIL NFR BLD AUTO: 1.1 % — SIGNIFICANT CHANGE UP (ref 0–6)
EOSINOPHIL NFR BLD AUTO: 1.2 % — SIGNIFICANT CHANGE UP (ref 0–6)
EOSINOPHIL NFR BLD AUTO: 1.2 % — SIGNIFICANT CHANGE UP (ref 0–6)
EOSINOPHIL NFR BLD AUTO: 1.4 % — SIGNIFICANT CHANGE UP (ref 0–6)
EOSINOPHIL NFR BLD AUTO: 1.5 % — SIGNIFICANT CHANGE UP (ref 0–6)
EOSINOPHIL NFR BLD AUTO: 1.6 % — SIGNIFICANT CHANGE UP (ref 0–6)
EOSINOPHIL NFR BLD AUTO: 1.6 % — SIGNIFICANT CHANGE UP (ref 0–6)
EOSINOPHIL NFR BLD AUTO: 2.1 % — SIGNIFICANT CHANGE UP (ref 0–6)
EOSINOPHIL NFR BLD AUTO: 3.6 % — SIGNIFICANT CHANGE UP (ref 0–6)
EOSINOPHIL NFR BLD AUTO: 6.7 % — HIGH (ref 0–6)
EOSINOPHIL NFR BLD AUTO: 7.1 % — HIGH (ref 0–6)
EOSINOPHIL NFR BLD AUTO: 8 % — HIGH (ref 0–6)
EOSINOPHIL NFR BLD AUTO: 9.1 % — HIGH (ref 0–6)
ESTIMATED AVERAGE GLUCOSE: 120 — SIGNIFICANT CHANGE UP
FLUAV AG NPH QL: SIGNIFICANT CHANGE UP
FLUBV AG NPH QL: SIGNIFICANT CHANGE UP
FLUID INTAKE SUBSTANCE CLASS: SIGNIFICANT CHANGE UP
GAS PNL BLDA: SIGNIFICANT CHANGE UP
GAS PNL BLDV: 133 MMOL/L — LOW (ref 136–145)
GLUCOSE BLDC GLUCOMTR-MCNC: 102 MG/DL — HIGH (ref 70–99)
GLUCOSE BLDC GLUCOMTR-MCNC: 109 MG/DL — HIGH (ref 70–99)
GLUCOSE BLDC GLUCOMTR-MCNC: 115 MG/DL — HIGH (ref 70–99)
GLUCOSE BLDC GLUCOMTR-MCNC: 116 MG/DL — HIGH (ref 70–99)
GLUCOSE BLDC GLUCOMTR-MCNC: 117 MG/DL — HIGH (ref 70–99)
GLUCOSE BLDC GLUCOMTR-MCNC: 120 MG/DL — HIGH (ref 70–99)
GLUCOSE BLDC GLUCOMTR-MCNC: 123 MG/DL — HIGH (ref 70–99)
GLUCOSE BLDC GLUCOMTR-MCNC: 129 MG/DL — HIGH (ref 70–99)
GLUCOSE BLDC GLUCOMTR-MCNC: 130 MG/DL — HIGH (ref 70–99)
GLUCOSE BLDC GLUCOMTR-MCNC: 132 MG/DL — HIGH (ref 70–99)
GLUCOSE BLDC GLUCOMTR-MCNC: 132 MG/DL — HIGH (ref 70–99)
GLUCOSE BLDC GLUCOMTR-MCNC: 134 MG/DL — HIGH (ref 70–99)
GLUCOSE BLDC GLUCOMTR-MCNC: 134 MG/DL — HIGH (ref 70–99)
GLUCOSE BLDC GLUCOMTR-MCNC: 136 MG/DL — HIGH (ref 70–99)
GLUCOSE BLDC GLUCOMTR-MCNC: 137 MG/DL — HIGH (ref 70–99)
GLUCOSE BLDC GLUCOMTR-MCNC: 138 MG/DL — HIGH (ref 70–99)
GLUCOSE BLDC GLUCOMTR-MCNC: 139 MG/DL — HIGH (ref 70–99)
GLUCOSE BLDC GLUCOMTR-MCNC: 140 MG/DL — HIGH (ref 70–99)
GLUCOSE BLDC GLUCOMTR-MCNC: 142 MG/DL — HIGH (ref 70–99)
GLUCOSE BLDC GLUCOMTR-MCNC: 142 MG/DL — HIGH (ref 70–99)
GLUCOSE BLDC GLUCOMTR-MCNC: 143 MG/DL — HIGH (ref 70–99)
GLUCOSE BLDC GLUCOMTR-MCNC: 144 MG/DL — HIGH (ref 70–99)
GLUCOSE BLDC GLUCOMTR-MCNC: 146 MG/DL — HIGH (ref 70–99)
GLUCOSE BLDC GLUCOMTR-MCNC: 147 MG/DL — HIGH (ref 70–99)
GLUCOSE BLDC GLUCOMTR-MCNC: 148 MG/DL — HIGH (ref 70–99)
GLUCOSE BLDC GLUCOMTR-MCNC: 149 MG/DL — HIGH (ref 70–99)
GLUCOSE BLDC GLUCOMTR-MCNC: 149 MG/DL — HIGH (ref 70–99)
GLUCOSE BLDC GLUCOMTR-MCNC: 150 MG/DL — HIGH (ref 70–99)
GLUCOSE BLDC GLUCOMTR-MCNC: 151 MG/DL — HIGH (ref 70–99)
GLUCOSE BLDC GLUCOMTR-MCNC: 151 MG/DL — HIGH (ref 70–99)
GLUCOSE BLDC GLUCOMTR-MCNC: 152 MG/DL — HIGH (ref 70–99)
GLUCOSE BLDC GLUCOMTR-MCNC: 152 MG/DL — HIGH (ref 70–99)
GLUCOSE BLDC GLUCOMTR-MCNC: 153 MG/DL — HIGH (ref 70–99)
GLUCOSE BLDC GLUCOMTR-MCNC: 157 MG/DL — HIGH (ref 70–99)
GLUCOSE BLDC GLUCOMTR-MCNC: 160 MG/DL — HIGH (ref 70–99)
GLUCOSE BLDC GLUCOMTR-MCNC: 163 MG/DL — HIGH (ref 70–99)
GLUCOSE BLDC GLUCOMTR-MCNC: 164 MG/DL — HIGH (ref 70–99)
GLUCOSE BLDC GLUCOMTR-MCNC: 165 MG/DL — HIGH (ref 70–99)
GLUCOSE BLDC GLUCOMTR-MCNC: 166 MG/DL — HIGH (ref 70–99)
GLUCOSE BLDC GLUCOMTR-MCNC: 167 MG/DL — HIGH (ref 70–99)
GLUCOSE BLDC GLUCOMTR-MCNC: 168 MG/DL — HIGH (ref 70–99)
GLUCOSE BLDC GLUCOMTR-MCNC: 168 MG/DL — HIGH (ref 70–99)
GLUCOSE BLDC GLUCOMTR-MCNC: 169 MG/DL — HIGH (ref 70–99)
GLUCOSE BLDC GLUCOMTR-MCNC: 170 MG/DL — HIGH (ref 70–99)
GLUCOSE BLDC GLUCOMTR-MCNC: 170 MG/DL — HIGH (ref 70–99)
GLUCOSE BLDC GLUCOMTR-MCNC: 172 MG/DL — HIGH (ref 70–99)
GLUCOSE BLDC GLUCOMTR-MCNC: 173 MG/DL — HIGH (ref 70–99)
GLUCOSE BLDC GLUCOMTR-MCNC: 173 MG/DL — HIGH (ref 70–99)
GLUCOSE BLDC GLUCOMTR-MCNC: 174 MG/DL — HIGH (ref 70–99)
GLUCOSE BLDC GLUCOMTR-MCNC: 175 MG/DL — HIGH (ref 70–99)
GLUCOSE BLDC GLUCOMTR-MCNC: 177 MG/DL — HIGH (ref 70–99)
GLUCOSE BLDC GLUCOMTR-MCNC: 179 MG/DL — HIGH (ref 70–99)
GLUCOSE BLDC GLUCOMTR-MCNC: 179 MG/DL — HIGH (ref 70–99)
GLUCOSE BLDC GLUCOMTR-MCNC: 180 MG/DL — HIGH (ref 70–99)
GLUCOSE BLDC GLUCOMTR-MCNC: 180 MG/DL — HIGH (ref 70–99)
GLUCOSE BLDC GLUCOMTR-MCNC: 183 MG/DL — HIGH (ref 70–99)
GLUCOSE BLDC GLUCOMTR-MCNC: 185 MG/DL — HIGH (ref 70–99)
GLUCOSE BLDC GLUCOMTR-MCNC: 187 MG/DL — HIGH (ref 70–99)
GLUCOSE BLDC GLUCOMTR-MCNC: 187 MG/DL — HIGH (ref 70–99)
GLUCOSE BLDC GLUCOMTR-MCNC: 190 MG/DL — HIGH (ref 70–99)
GLUCOSE BLDC GLUCOMTR-MCNC: 190 MG/DL — HIGH (ref 70–99)
GLUCOSE BLDC GLUCOMTR-MCNC: 191 MG/DL — HIGH (ref 70–99)
GLUCOSE BLDC GLUCOMTR-MCNC: 200 MG/DL — HIGH (ref 70–99)
GLUCOSE BLDC GLUCOMTR-MCNC: 201 MG/DL — HIGH (ref 70–99)
GLUCOSE BLDC GLUCOMTR-MCNC: 208 MG/DL — HIGH (ref 70–99)
GLUCOSE BLDC GLUCOMTR-MCNC: 209 MG/DL — HIGH (ref 70–99)
GLUCOSE BLDC GLUCOMTR-MCNC: 217 MG/DL — HIGH (ref 70–99)
GLUCOSE BLDC GLUCOMTR-MCNC: 230 MG/DL — HIGH (ref 70–99)
GLUCOSE BLDC GLUCOMTR-MCNC: 231 MG/DL — HIGH (ref 70–99)
GLUCOSE BLDC GLUCOMTR-MCNC: 235 MG/DL — HIGH (ref 70–99)
GLUCOSE BLDC GLUCOMTR-MCNC: 237 MG/DL — HIGH (ref 70–99)
GLUCOSE BLDC GLUCOMTR-MCNC: 239 MG/DL — HIGH (ref 70–99)
GLUCOSE BLDC GLUCOMTR-MCNC: 249 MG/DL — HIGH (ref 70–99)
GLUCOSE BLDC GLUCOMTR-MCNC: 259 MG/DL — HIGH (ref 70–99)
GLUCOSE BLDC GLUCOMTR-MCNC: 304 MG/DL — HIGH (ref 70–99)
GLUCOSE BLDC GLUCOMTR-MCNC: 92 MG/DL — SIGNIFICANT CHANGE UP (ref 70–99)
GLUCOSE BLDC GLUCOMTR-MCNC: 93 MG/DL — SIGNIFICANT CHANGE UP (ref 70–99)
GLUCOSE BLDC GLUCOMTR-MCNC: 94 MG/DL — SIGNIFICANT CHANGE UP (ref 70–99)
GLUCOSE BLDV-MCNC: 138 MG/DL — HIGH (ref 70–99)
GLUCOSE FLD-MCNC: 143 MG/DL — SIGNIFICANT CHANGE UP
GLUCOSE SERPL-MCNC: 106 MG/DL — HIGH (ref 70–99)
GLUCOSE SERPL-MCNC: 107 MG/DL — HIGH (ref 70–99)
GLUCOSE SERPL-MCNC: 109 MG/DL — HIGH (ref 70–99)
GLUCOSE SERPL-MCNC: 109 MG/DL — HIGH (ref 70–99)
GLUCOSE SERPL-MCNC: 120 MG/DL — HIGH (ref 70–99)
GLUCOSE SERPL-MCNC: 123 MG/DL — HIGH (ref 70–99)
GLUCOSE SERPL-MCNC: 128 MG/DL — HIGH (ref 70–99)
GLUCOSE SERPL-MCNC: 130 MG/DL — HIGH (ref 70–99)
GLUCOSE SERPL-MCNC: 131 MG/DL — HIGH (ref 70–99)
GLUCOSE SERPL-MCNC: 131 MG/DL — HIGH (ref 70–99)
GLUCOSE SERPL-MCNC: 135 MG/DL — HIGH (ref 70–99)
GLUCOSE SERPL-MCNC: 136 MG/DL — HIGH (ref 70–99)
GLUCOSE SERPL-MCNC: 137 MG/DL — HIGH (ref 70–99)
GLUCOSE SERPL-MCNC: 137 MG/DL — HIGH (ref 70–99)
GLUCOSE SERPL-MCNC: 141 MG/DL — HIGH (ref 70–99)
GLUCOSE SERPL-MCNC: 142 MG/DL — HIGH (ref 70–99)
GLUCOSE SERPL-MCNC: 149 MG/DL — HIGH (ref 70–99)
GLUCOSE SERPL-MCNC: 152 MG/DL — HIGH (ref 70–99)
GLUCOSE SERPL-MCNC: 161 MG/DL — HIGH (ref 70–99)
GLUCOSE SERPL-MCNC: 161 MG/DL — HIGH (ref 70–99)
GLUCOSE SERPL-MCNC: 162 MG/DL — HIGH (ref 70–99)
GLUCOSE SERPL-MCNC: 165 MG/DL — HIGH (ref 70–99)
GLUCOSE SERPL-MCNC: 171 MG/DL — HIGH (ref 70–99)
GLUCOSE SERPL-MCNC: 172 MG/DL — HIGH (ref 70–99)
GLUCOSE SERPL-MCNC: 177 MG/DL — HIGH (ref 70–99)
GLUCOSE SERPL-MCNC: 178 MG/DL — HIGH (ref 70–99)
GLUCOSE SERPL-MCNC: 211 MG/DL — HIGH (ref 70–99)
GLUCOSE SERPL-MCNC: 251 MG/DL — HIGH (ref 70–99)
GLUCOSE SERPL-MCNC: 92 MG/DL — SIGNIFICANT CHANGE UP (ref 70–99)
GLUCOSE SERPL-MCNC: SIGNIFICANT CHANGE UP MG/DL (ref 70–99)
GRAM STN FLD: SIGNIFICANT CHANGE UP
HCO3 BLDV-SCNC: 24 MMOL/L — SIGNIFICANT CHANGE UP (ref 22–29)
HCT VFR BLD CALC: 22.3 % — LOW (ref 39–50)
HCT VFR BLD CALC: 23.1 % — LOW (ref 39–50)
HCT VFR BLD CALC: 23.3 % — LOW (ref 39–50)
HCT VFR BLD CALC: 24 % — LOW (ref 39–50)
HCT VFR BLD CALC: 24 % — LOW (ref 39–50)
HCT VFR BLD CALC: 24.5 % — LOW (ref 39–50)
HCT VFR BLD CALC: 24.9 % — LOW (ref 39–50)
HCT VFR BLD CALC: 25.5 % — LOW (ref 39–50)
HCT VFR BLD CALC: 25.7 % — LOW (ref 39–50)
HCT VFR BLD CALC: 25.7 % — LOW (ref 39–50)
HCT VFR BLD CALC: 25.8 % — LOW (ref 39–50)
HCT VFR BLD CALC: 26.2 % — LOW (ref 39–50)
HCT VFR BLD CALC: 26.3 % — LOW (ref 39–50)
HCT VFR BLD CALC: 26.5 % — LOW (ref 39–50)
HCT VFR BLD CALC: 26.8 % — LOW (ref 39–50)
HCT VFR BLD CALC: 27 % — LOW (ref 39–50)
HCT VFR BLD CALC: 27.2 % — LOW (ref 39–50)
HCT VFR BLD CALC: 27.3 % — LOW (ref 39–50)
HCT VFR BLD CALC: 27.6 % — LOW (ref 39–50)
HCT VFR BLD CALC: 27.7 % — LOW (ref 39–50)
HCT VFR BLD CALC: 27.8 % — LOW (ref 39–50)
HCT VFR BLD CALC: 28 % — LOW (ref 39–50)
HCT VFR BLD CALC: 28.1 % — LOW (ref 39–50)
HCT VFR BLD CALC: 28.2 % — LOW (ref 39–50)
HCT VFR BLD CALC: 28.9 % — LOW (ref 39–50)
HCT VFR BLD CALC: 29.2 % — LOW (ref 39–50)
HCT VFR BLD CALC: 29.2 % — LOW (ref 39–50)
HCT VFR BLD CALC: 29.4 % — LOW (ref 39–50)
HCT VFR BLD CALC: 29.5 % — LOW (ref 39–50)
HCT VFR BLD CALC: 30.1 % — LOW (ref 39–50)
HCT VFR BLD CALC: 30.4 % — LOW (ref 39–50)
HCT VFR BLD CALC: 30.4 % — LOW (ref 39–50)
HCT VFR BLD CALC: 30.5 % — LOW (ref 39–50)
HCT VFR BLD CALC: 31 % — LOW (ref 39–50)
HCT VFR BLD CALC: 31 % — LOW (ref 39–50)
HCT VFR BLD CALC: 31.6 % — LOW (ref 39–50)
HCT VFR BLD CALC: 32.8 % — LOW (ref 39–50)
HCT VFR BLD CALC: 34.1 % — LOW (ref 39–50)
HCT VFR BLDA CALC: 30 % — LOW (ref 39–51)
HCV AB S/CO SERPL IA: 0.09 S/CO — SIGNIFICANT CHANGE UP (ref 0–0.99)
HCV AB SERPL-IMP: SIGNIFICANT CHANGE UP
HGB BLD CALC-MCNC: 10 G/DL — LOW (ref 13–17)
HGB BLD-MCNC: 10.1 G/DL — LOW (ref 13–17)
HGB BLD-MCNC: 10.6 G/DL — LOW (ref 13–17)
HGB BLD-MCNC: 7 G/DL — CRITICAL LOW (ref 13–17)
HGB BLD-MCNC: 7.3 G/DL — LOW (ref 13–17)
HGB BLD-MCNC: 7.4 G/DL — LOW (ref 13–17)
HGB BLD-MCNC: 7.5 G/DL — LOW (ref 13–17)
HGB BLD-MCNC: 7.5 G/DL — LOW (ref 13–17)
HGB BLD-MCNC: 7.6 G/DL — LOW (ref 13–17)
HGB BLD-MCNC: 7.8 G/DL — LOW (ref 13–17)
HGB BLD-MCNC: 7.9 G/DL — LOW (ref 13–17)
HGB BLD-MCNC: 7.9 G/DL — LOW (ref 13–17)
HGB BLD-MCNC: 8 G/DL — LOW (ref 13–17)
HGB BLD-MCNC: 8 G/DL — LOW (ref 13–17)
HGB BLD-MCNC: 8.1 G/DL — LOW (ref 13–17)
HGB BLD-MCNC: 8.2 G/DL — LOW (ref 13–17)
HGB BLD-MCNC: 8.4 G/DL — LOW (ref 13–17)
HGB BLD-MCNC: 8.5 G/DL — LOW (ref 13–17)
HGB BLD-MCNC: 8.6 G/DL — LOW (ref 13–17)
HGB BLD-MCNC: 8.8 G/DL — LOW (ref 13–17)
HGB BLD-MCNC: 8.9 G/DL — LOW (ref 13–17)
HGB BLD-MCNC: 9 G/DL — LOW (ref 13–17)
HGB BLD-MCNC: 9 G/DL — LOW (ref 13–17)
HGB BLD-MCNC: 9.1 G/DL — LOW (ref 13–17)
HGB BLD-MCNC: 9.2 G/DL — LOW (ref 13–17)
HGB BLD-MCNC: 9.4 G/DL — LOW (ref 13–17)
HGB BLD-MCNC: 9.5 G/DL — LOW (ref 13–17)
HGB BLD-MCNC: 9.6 G/DL — LOW (ref 13–17)
HGB BLD-MCNC: 9.7 G/DL — LOW (ref 13–17)
HGB BLD-MCNC: 9.7 G/DL — LOW (ref 13–17)
HGB BLD-MCNC: 9.8 G/DL — LOW (ref 13–17)
HGB BLD-MCNC: 9.9 G/DL — LOW (ref 13–17)
HGB BLD-MCNC: 9.9 G/DL — LOW (ref 13–17)
HYPOCHROMIA BLD QL: SIGNIFICANT CHANGE UP
IANC: 1.55 K/UL — LOW (ref 1.8–7.4)
IANC: 1.84 K/UL — SIGNIFICANT CHANGE UP (ref 1.8–7.4)
IANC: 1.91 K/UL — SIGNIFICANT CHANGE UP (ref 1.8–7.4)
IANC: 14.46 K/UL — HIGH (ref 1.8–7.4)
IANC: 2.36 K/UL — SIGNIFICANT CHANGE UP (ref 1.8–7.4)
IANC: 3.16 K/UL — SIGNIFICANT CHANGE UP (ref 1.8–7.4)
IANC: 3.82 K/UL — SIGNIFICANT CHANGE UP (ref 1.8–7.4)
IANC: 3.89 K/UL — SIGNIFICANT CHANGE UP (ref 1.8–7.4)
IANC: 4.72 K/UL — SIGNIFICANT CHANGE UP (ref 1.8–7.4)
IANC: 5.12 K/UL — SIGNIFICANT CHANGE UP (ref 1.8–7.4)
IANC: 5.6 K/UL — SIGNIFICANT CHANGE UP (ref 1.8–7.4)
IANC: 6.86 K/UL — SIGNIFICANT CHANGE UP (ref 1.8–7.4)
IANC: 7.2 K/UL — SIGNIFICANT CHANGE UP (ref 1.8–7.4)
IANC: 9.88 K/UL — HIGH (ref 1.8–7.4)
IMM GRANULOCYTES NFR BLD AUTO: 0.2 % — SIGNIFICANT CHANGE UP (ref 0–1.5)
IMM GRANULOCYTES NFR BLD AUTO: 0.3 % — SIGNIFICANT CHANGE UP (ref 0–1.5)
IMM GRANULOCYTES NFR BLD AUTO: 0.4 % — SIGNIFICANT CHANGE UP (ref 0–1.5)
IMM GRANULOCYTES NFR BLD AUTO: 0.5 % — SIGNIFICANT CHANGE UP (ref 0–1.5)
IMM GRANULOCYTES NFR BLD AUTO: 0.5 % — SIGNIFICANT CHANGE UP (ref 0–1.5)
IMM GRANULOCYTES NFR BLD AUTO: 1.1 % — SIGNIFICANT CHANGE UP (ref 0–1.5)
IMM GRANULOCYTES NFR BLD AUTO: 1.1 % — SIGNIFICANT CHANGE UP (ref 0–1.5)
IMM GRANULOCYTES NFR BLD AUTO: 1.7 % — HIGH (ref 0–1.5)
INFLUENZA A RESULT: NOT DETECTED
INFLUENZA B RESULT: NOT DETECTED
INR BLD: 1.04 RATIO — SIGNIFICANT CHANGE UP (ref 0.88–1.16)
INR BLD: 1.12 RATIO — SIGNIFICANT CHANGE UP (ref 0.88–1.16)
INR BLD: 1.52 RATIO — HIGH (ref 0.88–1.16)
INR BLD: 1.54 RATIO — HIGH (ref 0.88–1.16)
LACTATE BLDV-MCNC: 1 MMOL/L — SIGNIFICANT CHANGE UP (ref 0.5–2)
LACTATE BLDV-MCNC: 5 MMOL/L — CRITICAL HIGH (ref 0.5–2)
LACTATE SERPL-SCNC: 3.2 MMOL/L — HIGH (ref 0.5–2)
LACTATE SERPL-SCNC: 4.3 MMOL/L — CRITICAL HIGH (ref 0.5–2)
LDH SERPL L TO P-CCNC: 68 U/L — SIGNIFICANT CHANGE UP
LIDOCAIN IGE QN: 5 U/L — LOW (ref 7–60)
LIDOCAIN IGE QN: 5 U/L — LOW (ref 7–60)
LYMPHOCYTES # BLD AUTO: 0.16 K/UL — LOW (ref 1–3.3)
LYMPHOCYTES # BLD AUTO: 0.19 K/UL — LOW (ref 1–3.3)
LYMPHOCYTES # BLD AUTO: 0.26 K/UL — LOW (ref 1–3.3)
LYMPHOCYTES # BLD AUTO: 0.26 K/UL — LOW (ref 1–3.3)
LYMPHOCYTES # BLD AUTO: 0.27 K/UL — LOW (ref 1–3.3)
LYMPHOCYTES # BLD AUTO: 0.29 K/UL — LOW (ref 1–3.3)
LYMPHOCYTES # BLD AUTO: 0.29 K/UL — LOW (ref 1–3.3)
LYMPHOCYTES # BLD AUTO: 0.3 K/UL — LOW (ref 1–3.3)
LYMPHOCYTES # BLD AUTO: 0.33 K/UL — LOW (ref 1–3.3)
LYMPHOCYTES # BLD AUTO: 0.34 K/UL — LOW (ref 1–3.3)
LYMPHOCYTES # BLD AUTO: 0.34 K/UL — LOW (ref 1–3.3)
LYMPHOCYTES # BLD AUTO: 0.36 K/UL — LOW (ref 1–3.3)
LYMPHOCYTES # BLD AUTO: 0.36 K/UL — LOW (ref 1–3.3)
LYMPHOCYTES # BLD AUTO: 0.39 K/UL — LOW (ref 1–3.3)
LYMPHOCYTES # BLD AUTO: 0.41 K/UL — LOW (ref 1–3.3)
LYMPHOCYTES # BLD AUTO: 0.43 K/UL — LOW (ref 1–3.3)
LYMPHOCYTES # BLD AUTO: 0.45 K/UL — LOW (ref 1–3.3)
LYMPHOCYTES # BLD AUTO: 0.46 K/UL — LOW (ref 1–3.3)
LYMPHOCYTES # BLD AUTO: 0.54 K/UL — LOW (ref 1–3.3)
LYMPHOCYTES # BLD AUTO: 0.54 K/UL — LOW (ref 1–3.3)
LYMPHOCYTES # BLD AUTO: 0.55 K/UL — LOW (ref 1–3.3)
LYMPHOCYTES # BLD AUTO: 10.1 % — LOW (ref 13–44)
LYMPHOCYTES # BLD AUTO: 12.1 % — LOW (ref 13–44)
LYMPHOCYTES # BLD AUTO: 16 % — SIGNIFICANT CHANGE UP (ref 13–44)
LYMPHOCYTES # BLD AUTO: 16.8 % — SIGNIFICANT CHANGE UP (ref 13–44)
LYMPHOCYTES # BLD AUTO: 2.3 % — LOW (ref 13–44)
LYMPHOCYTES # BLD AUTO: 2.5 % — LOW (ref 13–44)
LYMPHOCYTES # BLD AUTO: 3.5 % — LOW (ref 13–44)
LYMPHOCYTES # BLD AUTO: 3.7 % — LOW (ref 13–44)
LYMPHOCYTES # BLD AUTO: 4.1 % — LOW (ref 13–44)
LYMPHOCYTES # BLD AUTO: 4.2 % — LOW (ref 13–44)
LYMPHOCYTES # BLD AUTO: 5.2 % — LOW (ref 13–44)
LYMPHOCYTES # BLD AUTO: 5.7 % — LOW (ref 13–44)
LYMPHOCYTES # BLD AUTO: 6 % — LOW (ref 13–44)
LYMPHOCYTES # BLD AUTO: 6.3 % — LOW (ref 13–44)
LYMPHOCYTES # BLD AUTO: 7.1 % — LOW (ref 13–44)
LYMPHOCYTES # BLD AUTO: 7.2 % — LOW (ref 13–44)
LYMPHOCYTES # BLD AUTO: 7.9 % — LOW (ref 13–44)
LYMPHOCYTES # BLD AUTO: 8.2 % — LOW (ref 13–44)
LYMPHOCYTES # BLD AUTO: 8.2 % — LOW (ref 13–44)
LYMPHOCYTES # BLD AUTO: 8.8 % — LOW (ref 13–44)
LYMPHOCYTES # BLD AUTO: 9 % — LOW (ref 13–44)
LYMPHOCYTES # BLD AUTO: 9.6 % — LOW (ref 13–44)
LYMPHOCYTES # BLD AUTO: 9.9 % — LOW (ref 13–44)
LYMPHOCYTES # FLD: 28 % — SIGNIFICANT CHANGE UP
MACROCYTES BLD QL: SLIGHT — SIGNIFICANT CHANGE UP
MAGNESIUM SERPL-MCNC: 1.4 MG/DL — LOW (ref 1.6–2.6)
MAGNESIUM SERPL-MCNC: 1.5 MG/DL — LOW (ref 1.6–2.6)
MAGNESIUM SERPL-MCNC: 1.6 MG/DL — SIGNIFICANT CHANGE UP (ref 1.6–2.6)
MAGNESIUM SERPL-MCNC: 1.6 MG/DL — SIGNIFICANT CHANGE UP (ref 1.6–2.6)
MAGNESIUM SERPL-MCNC: 1.7 MG/DL — SIGNIFICANT CHANGE UP (ref 1.6–2.6)
MAGNESIUM SERPL-MCNC: 1.8 MG/DL — SIGNIFICANT CHANGE UP (ref 1.6–2.6)
MAGNESIUM SERPL-MCNC: 1.8 MG/DL — SIGNIFICANT CHANGE UP (ref 1.6–2.6)
MAGNESIUM SERPL-MCNC: 1.9 MG/DL — SIGNIFICANT CHANGE UP (ref 1.6–2.6)
MAGNESIUM SERPL-MCNC: 2 MG/DL — SIGNIFICANT CHANGE UP (ref 1.6–2.6)
MAGNESIUM SERPL-MCNC: 2 MG/DL — SIGNIFICANT CHANGE UP (ref 1.6–2.6)
MAGNESIUM SERPL-MCNC: 2.1 MG/DL — SIGNIFICANT CHANGE UP (ref 1.6–2.6)
MAGNESIUM SERPL-MCNC: 2.3 MG/DL — SIGNIFICANT CHANGE UP (ref 1.6–2.6)
MAGNESIUM SERPL-MCNC: 2.4 MG/DL — SIGNIFICANT CHANGE UP (ref 1.6–2.6)
MAGNESIUM SERPL-MCNC: 2.6 MG/DL — SIGNIFICANT CHANGE UP (ref 1.6–2.6)
MAGNESIUM SERPL-MCNC: 3 MG/DL — HIGH (ref 1.6–2.6)
MAGNESIUM SERPL-MCNC: 3.1 MG/DL — HIGH (ref 1.6–2.6)
MAGNESIUM SERPL-MCNC: 3.1 MG/DL — HIGH (ref 1.6–2.6)
MCHC RBC-ENTMCNC: 26.4 PG — LOW (ref 27–34)
MCHC RBC-ENTMCNC: 26.7 PG — LOW (ref 27–34)
MCHC RBC-ENTMCNC: 27 PG — SIGNIFICANT CHANGE UP (ref 27–34)
MCHC RBC-ENTMCNC: 27.5 PG — SIGNIFICANT CHANGE UP (ref 27–34)
MCHC RBC-ENTMCNC: 27.6 PG — SIGNIFICANT CHANGE UP (ref 27–34)
MCHC RBC-ENTMCNC: 27.6 PG — SIGNIFICANT CHANGE UP (ref 27–34)
MCHC RBC-ENTMCNC: 27.9 GM/DL — LOW (ref 32–36)
MCHC RBC-ENTMCNC: 28 PG — SIGNIFICANT CHANGE UP (ref 27–34)
MCHC RBC-ENTMCNC: 28.3 PG — SIGNIFICANT CHANGE UP (ref 27–34)
MCHC RBC-ENTMCNC: 28.4 PG — SIGNIFICANT CHANGE UP (ref 27–34)
MCHC RBC-ENTMCNC: 28.5 PG — SIGNIFICANT CHANGE UP (ref 27–34)
MCHC RBC-ENTMCNC: 28.6 PG — SIGNIFICANT CHANGE UP (ref 27–34)
MCHC RBC-ENTMCNC: 28.7 PG — SIGNIFICANT CHANGE UP (ref 27–34)
MCHC RBC-ENTMCNC: 28.8 GM/DL — LOW (ref 32–36)
MCHC RBC-ENTMCNC: 28.9 GM/DL — LOW (ref 32–36)
MCHC RBC-ENTMCNC: 28.9 PG — SIGNIFICANT CHANGE UP (ref 27–34)
MCHC RBC-ENTMCNC: 29.1 PG — SIGNIFICANT CHANGE UP (ref 27–34)
MCHC RBC-ENTMCNC: 29.2 PG — SIGNIFICANT CHANGE UP (ref 27–34)
MCHC RBC-ENTMCNC: 29.3 PG — SIGNIFICANT CHANGE UP (ref 27–34)
MCHC RBC-ENTMCNC: 29.3 PG — SIGNIFICANT CHANGE UP (ref 27–34)
MCHC RBC-ENTMCNC: 29.4 PG — SIGNIFICANT CHANGE UP (ref 27–34)
MCHC RBC-ENTMCNC: 29.4 PG — SIGNIFICANT CHANGE UP (ref 27–34)
MCHC RBC-ENTMCNC: 29.5 PG — SIGNIFICANT CHANGE UP (ref 27–34)
MCHC RBC-ENTMCNC: 29.6 PG — SIGNIFICANT CHANGE UP (ref 27–34)
MCHC RBC-ENTMCNC: 29.6 PG — SIGNIFICANT CHANGE UP (ref 27–34)
MCHC RBC-ENTMCNC: 29.9 PG — SIGNIFICANT CHANGE UP (ref 27–34)
MCHC RBC-ENTMCNC: 29.9 PG — SIGNIFICANT CHANGE UP (ref 27–34)
MCHC RBC-ENTMCNC: 30 PG — SIGNIFICANT CHANGE UP (ref 27–34)
MCHC RBC-ENTMCNC: 30.1 PG — SIGNIFICANT CHANGE UP (ref 27–34)
MCHC RBC-ENTMCNC: 30.1 PG — SIGNIFICANT CHANGE UP (ref 27–34)
MCHC RBC-ENTMCNC: 30.2 G/DL — LOW (ref 32–36)
MCHC RBC-ENTMCNC: 30.2 PG — SIGNIFICANT CHANGE UP (ref 27–34)
MCHC RBC-ENTMCNC: 30.3 PG — SIGNIFICANT CHANGE UP (ref 27–34)
MCHC RBC-ENTMCNC: 30.5 PG — SIGNIFICANT CHANGE UP (ref 27–34)
MCHC RBC-ENTMCNC: 30.6 GM/DL — LOW (ref 32–36)
MCHC RBC-ENTMCNC: 30.6 GM/DL — LOW (ref 32–36)
MCHC RBC-ENTMCNC: 31 GM/DL — LOW (ref 32–36)
MCHC RBC-ENTMCNC: 31.1 GM/DL — LOW (ref 32–36)
MCHC RBC-ENTMCNC: 31.3 GM/DL — LOW (ref 32–36)
MCHC RBC-ENTMCNC: 31.4 GM/DL — LOW (ref 32–36)
MCHC RBC-ENTMCNC: 31.5 G/DL — LOW (ref 32–36)
MCHC RBC-ENTMCNC: 31.5 GM/DL — LOW (ref 32–36)
MCHC RBC-ENTMCNC: 31.6 GM/DL — LOW (ref 32–36)
MCHC RBC-ENTMCNC: 31.7 GM/DL — LOW (ref 32–36)
MCHC RBC-ENTMCNC: 31.8 G/DL — LOW (ref 32–36)
MCHC RBC-ENTMCNC: 31.9 G/DL — LOW (ref 32–36)
MCHC RBC-ENTMCNC: 31.9 G/DL — LOW (ref 32–36)
MCHC RBC-ENTMCNC: 31.9 GM/DL — LOW (ref 32–36)
MCHC RBC-ENTMCNC: 32 GM/DL — SIGNIFICANT CHANGE UP (ref 32–36)
MCHC RBC-ENTMCNC: 32.1 G/DL — SIGNIFICANT CHANGE UP (ref 32–36)
MCHC RBC-ENTMCNC: 32.1 GM/DL — SIGNIFICANT CHANGE UP (ref 32–36)
MCHC RBC-ENTMCNC: 32.1 GM/DL — SIGNIFICANT CHANGE UP (ref 32–36)
MCHC RBC-ENTMCNC: 32.2 GM/DL — SIGNIFICANT CHANGE UP (ref 32–36)
MCHC RBC-ENTMCNC: 32.4 G/DL — SIGNIFICANT CHANGE UP (ref 32–36)
MCHC RBC-ENTMCNC: 32.5 G/DL — SIGNIFICANT CHANGE UP (ref 32–36)
MCHC RBC-ENTMCNC: 32.5 GM/DL — SIGNIFICANT CHANGE UP (ref 32–36)
MCHC RBC-ENTMCNC: 32.6 GM/DL — SIGNIFICANT CHANGE UP (ref 32–36)
MCHC RBC-ENTMCNC: 32.6 GM/DL — SIGNIFICANT CHANGE UP (ref 32–36)
MCHC RBC-ENTMCNC: 33 G/DL — SIGNIFICANT CHANGE UP (ref 32–36)
MCHC RBC-ENTMCNC: 33 GM/DL — SIGNIFICANT CHANGE UP (ref 32–36)
MCV RBC AUTO: 101.1 FL — HIGH (ref 80–100)
MCV RBC AUTO: 101.6 FL — HIGH (ref 80–100)
MCV RBC AUTO: 107.7 FL — HIGH (ref 80–100)
MCV RBC AUTO: 83.6 FL — SIGNIFICANT CHANGE UP (ref 80–100)
MCV RBC AUTO: 84.2 FL — SIGNIFICANT CHANGE UP (ref 80–100)
MCV RBC AUTO: 84.4 FL — SIGNIFICANT CHANGE UP (ref 80–100)
MCV RBC AUTO: 85.1 FL — SIGNIFICANT CHANGE UP (ref 80–100)
MCV RBC AUTO: 85.6 FL — SIGNIFICANT CHANGE UP (ref 80–100)
MCV RBC AUTO: 86.3 FL — SIGNIFICANT CHANGE UP (ref 80–100)
MCV RBC AUTO: 86.3 FL — SIGNIFICANT CHANGE UP (ref 80–100)
MCV RBC AUTO: 88 FL — SIGNIFICANT CHANGE UP (ref 80–100)
MCV RBC AUTO: 88.4 FL — SIGNIFICANT CHANGE UP (ref 80–100)
MCV RBC AUTO: 88.9 FL — SIGNIFICANT CHANGE UP (ref 80–100)
MCV RBC AUTO: 88.9 FL — SIGNIFICANT CHANGE UP (ref 80–100)
MCV RBC AUTO: 89.1 FL — SIGNIFICANT CHANGE UP (ref 80–100)
MCV RBC AUTO: 89.5 FL — SIGNIFICANT CHANGE UP (ref 80–100)
MCV RBC AUTO: 90.6 FL — SIGNIFICANT CHANGE UP (ref 80–100)
MCV RBC AUTO: 90.9 FL — SIGNIFICANT CHANGE UP (ref 80–100)
MCV RBC AUTO: 91.1 FL — SIGNIFICANT CHANGE UP (ref 80–100)
MCV RBC AUTO: 92.1 FL — SIGNIFICANT CHANGE UP (ref 80–100)
MCV RBC AUTO: 92.3 FL — SIGNIFICANT CHANGE UP (ref 80–100)
MCV RBC AUTO: 92.5 FL — SIGNIFICANT CHANGE UP (ref 80–100)
MCV RBC AUTO: 92.7 FL — SIGNIFICANT CHANGE UP (ref 80–100)
MCV RBC AUTO: 93.2 FL — SIGNIFICANT CHANGE UP (ref 80–100)
MCV RBC AUTO: 93.3 FL — SIGNIFICANT CHANGE UP (ref 80–100)
MCV RBC AUTO: 93.7 FL — SIGNIFICANT CHANGE UP (ref 80–100)
MCV RBC AUTO: 93.9 FL — SIGNIFICANT CHANGE UP (ref 80–100)
MCV RBC AUTO: 94.2 FL — SIGNIFICANT CHANGE UP (ref 80–100)
MCV RBC AUTO: 94.4 FL — SIGNIFICANT CHANGE UP (ref 80–100)
MCV RBC AUTO: 94.5 FL — SIGNIFICANT CHANGE UP (ref 80–100)
MCV RBC AUTO: 94.6 FL — SIGNIFICANT CHANGE UP (ref 80–100)
MCV RBC AUTO: 95 FL — SIGNIFICANT CHANGE UP (ref 80–100)
MCV RBC AUTO: 95.6 FL — SIGNIFICANT CHANGE UP (ref 80–100)
MCV RBC AUTO: 95.7 FL — SIGNIFICANT CHANGE UP (ref 80–100)
MCV RBC AUTO: 95.9 FL — SIGNIFICANT CHANGE UP (ref 80–100)
MCV RBC AUTO: 96.8 FL — SIGNIFICANT CHANGE UP (ref 80–100)
MESOTHL CELL # FLD: 2 % — SIGNIFICANT CHANGE UP
MONOCYTES # BLD AUTO: 0.19 K/UL — SIGNIFICANT CHANGE UP (ref 0–0.9)
MONOCYTES # BLD AUTO: 0.22 K/UL — SIGNIFICANT CHANGE UP (ref 0–0.9)
MONOCYTES # BLD AUTO: 0.29 K/UL — SIGNIFICANT CHANGE UP (ref 0–0.9)
MONOCYTES # BLD AUTO: 0.33 K/UL — SIGNIFICANT CHANGE UP (ref 0–0.9)
MONOCYTES # BLD AUTO: 0.39 K/UL — SIGNIFICANT CHANGE UP (ref 0–0.9)
MONOCYTES # BLD AUTO: 0.39 K/UL — SIGNIFICANT CHANGE UP (ref 0–0.9)
MONOCYTES # BLD AUTO: 0.41 K/UL — SIGNIFICANT CHANGE UP (ref 0–0.9)
MONOCYTES # BLD AUTO: 0.44 K/UL — SIGNIFICANT CHANGE UP (ref 0–0.9)
MONOCYTES # BLD AUTO: 0.46 K/UL — SIGNIFICANT CHANGE UP (ref 0–0.9)
MONOCYTES # BLD AUTO: 0.48 K/UL — SIGNIFICANT CHANGE UP (ref 0–0.9)
MONOCYTES # BLD AUTO: 0.52 K/UL — SIGNIFICANT CHANGE UP (ref 0–0.9)
MONOCYTES # BLD AUTO: 0.54 K/UL — SIGNIFICANT CHANGE UP (ref 0–0.9)
MONOCYTES # BLD AUTO: 0.56 K/UL — SIGNIFICANT CHANGE UP (ref 0–0.9)
MONOCYTES # BLD AUTO: 0.67 K/UL — SIGNIFICANT CHANGE UP (ref 0–0.9)
MONOCYTES # BLD AUTO: 0.75 K/UL — SIGNIFICANT CHANGE UP (ref 0–0.9)
MONOCYTES # BLD AUTO: 0.76 K/UL — SIGNIFICANT CHANGE UP (ref 0–0.9)
MONOCYTES # BLD AUTO: 0.78 K/UL — SIGNIFICANT CHANGE UP (ref 0–0.9)
MONOCYTES # BLD AUTO: 0.81 K/UL — SIGNIFICANT CHANGE UP (ref 0–0.9)
MONOCYTES # BLD AUTO: 0.84 K/UL — SIGNIFICANT CHANGE UP (ref 0–0.9)
MONOCYTES # BLD AUTO: 0.89 K/UL — SIGNIFICANT CHANGE UP (ref 0–0.9)
MONOCYTES # BLD AUTO: 1.01 K/UL — HIGH (ref 0–0.9)
MONOCYTES # BLD AUTO: 1.14 K/UL — HIGH (ref 0–0.9)
MONOCYTES # BLD AUTO: 1.33 K/UL — HIGH (ref 0–0.9)
MONOCYTES NFR BLD AUTO: 10 % — SIGNIFICANT CHANGE UP (ref 2–14)
MONOCYTES NFR BLD AUTO: 10.8 % — SIGNIFICANT CHANGE UP (ref 2–14)
MONOCYTES NFR BLD AUTO: 11.6 % — SIGNIFICANT CHANGE UP (ref 2–14)
MONOCYTES NFR BLD AUTO: 11.7 % — SIGNIFICANT CHANGE UP (ref 2–14)
MONOCYTES NFR BLD AUTO: 12 % — SIGNIFICANT CHANGE UP (ref 2–14)
MONOCYTES NFR BLD AUTO: 12.1 % — SIGNIFICANT CHANGE UP (ref 2–14)
MONOCYTES NFR BLD AUTO: 12.6 % — SIGNIFICANT CHANGE UP (ref 2–14)
MONOCYTES NFR BLD AUTO: 12.7 % — SIGNIFICANT CHANGE UP (ref 2–14)
MONOCYTES NFR BLD AUTO: 12.9 % — SIGNIFICANT CHANGE UP (ref 2–14)
MONOCYTES NFR BLD AUTO: 13.4 % — SIGNIFICANT CHANGE UP (ref 2–14)
MONOCYTES NFR BLD AUTO: 14.5 % — HIGH (ref 2–14)
MONOCYTES NFR BLD AUTO: 14.8 % — HIGH (ref 2–14)
MONOCYTES NFR BLD AUTO: 23 % — HIGH (ref 2–14)
MONOCYTES NFR BLD AUTO: 5.3 % — SIGNIFICANT CHANGE UP (ref 2–14)
MONOCYTES NFR BLD AUTO: 6.4 % — SIGNIFICANT CHANGE UP (ref 2–14)
MONOCYTES NFR BLD AUTO: 7 % — SIGNIFICANT CHANGE UP (ref 2–14)
MONOCYTES NFR BLD AUTO: 8.2 % — SIGNIFICANT CHANGE UP (ref 2–14)
MONOCYTES NFR BLD AUTO: 8.7 % — SIGNIFICANT CHANGE UP (ref 2–14)
MONOCYTES NFR BLD AUTO: 9.5 % — SIGNIFICANT CHANGE UP (ref 2–14)
MONOCYTES NFR BLD AUTO: 9.7 % — SIGNIFICANT CHANGE UP (ref 2–14)
MONOS+MACROS # FLD: 61 % — SIGNIFICANT CHANGE UP
MRSA PCR RESULT.: SIGNIFICANT CHANGE UP
MRSA PCR RESULT.: SIGNIFICANT CHANGE UP
NEUTROPHILS # BLD AUTO: 1.49 K/UL — LOW (ref 1.8–7.4)
NEUTROPHILS # BLD AUTO: 1.53 K/UL — LOW (ref 1.8–7.4)
NEUTROPHILS # BLD AUTO: 1.55 K/UL — LOW (ref 1.8–7.4)
NEUTROPHILS # BLD AUTO: 1.91 K/UL — SIGNIFICANT CHANGE UP (ref 1.8–7.4)
NEUTROPHILS # BLD AUTO: 11.16 K/UL — HIGH (ref 1.8–7.4)
NEUTROPHILS # BLD AUTO: 13.8 K/UL — HIGH (ref 1.8–7.4)
NEUTROPHILS # BLD AUTO: 14.46 K/UL — HIGH (ref 1.8–7.4)
NEUTROPHILS # BLD AUTO: 2.09 K/UL — SIGNIFICANT CHANGE UP (ref 1.8–7.4)
NEUTROPHILS # BLD AUTO: 2.36 K/UL — SIGNIFICANT CHANGE UP (ref 1.8–7.4)
NEUTROPHILS # BLD AUTO: 2.46 K/UL — SIGNIFICANT CHANGE UP (ref 1.8–7.4)
NEUTROPHILS # BLD AUTO: 3.15 K/UL — SIGNIFICANT CHANGE UP (ref 1.8–7.4)
NEUTROPHILS # BLD AUTO: 3.16 K/UL — SIGNIFICANT CHANGE UP (ref 1.8–7.4)
NEUTROPHILS # BLD AUTO: 3.27 K/UL — SIGNIFICANT CHANGE UP (ref 1.8–7.4)
NEUTROPHILS # BLD AUTO: 3.6 K/UL — SIGNIFICANT CHANGE UP (ref 1.8–7.4)
NEUTROPHILS # BLD AUTO: 3.68 K/UL — SIGNIFICANT CHANGE UP (ref 1.8–7.4)
NEUTROPHILS # BLD AUTO: 3.82 K/UL — SIGNIFICANT CHANGE UP (ref 1.8–7.4)
NEUTROPHILS # BLD AUTO: 3.89 K/UL — SIGNIFICANT CHANGE UP (ref 1.8–7.4)
NEUTROPHILS # BLD AUTO: 4.72 K/UL — SIGNIFICANT CHANGE UP (ref 1.8–7.4)
NEUTROPHILS # BLD AUTO: 5.12 K/UL — SIGNIFICANT CHANGE UP (ref 1.8–7.4)
NEUTROPHILS # BLD AUTO: 5.6 K/UL — SIGNIFICANT CHANGE UP (ref 1.8–7.4)
NEUTROPHILS # BLD AUTO: 6.86 K/UL — SIGNIFICANT CHANGE UP (ref 1.8–7.4)
NEUTROPHILS # BLD AUTO: 7.2 K/UL — SIGNIFICANT CHANGE UP (ref 1.8–7.4)
NEUTROPHILS # BLD AUTO: 9.88 K/UL — HIGH (ref 1.8–7.4)
NEUTROPHILS NFR BLD AUTO: 61 % — SIGNIFICANT CHANGE UP (ref 43–77)
NEUTROPHILS NFR BLD AUTO: 66.3 % — SIGNIFICANT CHANGE UP (ref 43–77)
NEUTROPHILS NFR BLD AUTO: 66.4 % — SIGNIFICANT CHANGE UP (ref 43–77)
NEUTROPHILS NFR BLD AUTO: 68.7 % — SIGNIFICANT CHANGE UP (ref 43–77)
NEUTROPHILS NFR BLD AUTO: 75.1 % — SIGNIFICANT CHANGE UP (ref 43–77)
NEUTROPHILS NFR BLD AUTO: 76.2 % — SIGNIFICANT CHANGE UP (ref 43–77)
NEUTROPHILS NFR BLD AUTO: 76.7 % — SIGNIFICANT CHANGE UP (ref 43–77)
NEUTROPHILS NFR BLD AUTO: 76.8 % — SIGNIFICANT CHANGE UP (ref 43–77)
NEUTROPHILS NFR BLD AUTO: 76.8 % — SIGNIFICANT CHANGE UP (ref 43–77)
NEUTROPHILS NFR BLD AUTO: 77.8 % — HIGH (ref 43–77)
NEUTROPHILS NFR BLD AUTO: 78 % — HIGH (ref 43–77)
NEUTROPHILS NFR BLD AUTO: 78.7 % — HIGH (ref 43–77)
NEUTROPHILS NFR BLD AUTO: 78.9 % — HIGH (ref 43–77)
NEUTROPHILS NFR BLD AUTO: 79.6 % — HIGH (ref 43–77)
NEUTROPHILS NFR BLD AUTO: 79.7 % — HIGH (ref 43–77)
NEUTROPHILS NFR BLD AUTO: 80.1 % — HIGH (ref 43–77)
NEUTROPHILS NFR BLD AUTO: 80.1 % — HIGH (ref 43–77)
NEUTROPHILS NFR BLD AUTO: 81.7 % — HIGH (ref 43–77)
NEUTROPHILS NFR BLD AUTO: 84.2 % — HIGH (ref 43–77)
NEUTROPHILS NFR BLD AUTO: 84.9 % — HIGH (ref 43–77)
NEUTROPHILS NFR BLD AUTO: 87.6 % — HIGH (ref 43–77)
NEUTROPHILS NFR BLD AUTO: 88.4 % — HIGH (ref 43–77)
NEUTROPHILS NFR BLD AUTO: 91.3 % — HIGH (ref 43–77)
NEUTROPHILS-BODY FLUID: 8 % — SIGNIFICANT CHANGE UP
NRBC # BLD: 0 /100 WBCS — SIGNIFICANT CHANGE UP
NRBC # BLD: 0 /100 WBCS — SIGNIFICANT CHANGE UP (ref 0–0)
NRBC # BLD: 0 /100 — SIGNIFICANT CHANGE UP (ref 0–0)
NRBC # BLD: 1 /100 — HIGH (ref 0–0)
NRBC # BLD: SIGNIFICANT CHANGE UP /100 WBCS (ref 0–0)
NRBC # FLD: 0 K/UL — SIGNIFICANT CHANGE UP
NRBC # FLD: 0 K/UL — SIGNIFICANT CHANGE UP (ref 0–0)
NT-PROBNP SERPL-SCNC: 2465 PG/ML — HIGH
NT-PROBNP SERPL-SCNC: 887 PG/ML — HIGH
OVALOCYTES BLD QL SMEAR: SLIGHT — SIGNIFICANT CHANGE UP
PCO2 BLDV: 45 MMHG — SIGNIFICANT CHANGE UP (ref 42–55)
PH BLDV: 7.34 — SIGNIFICANT CHANGE UP (ref 7.32–7.43)
PHOSPHATE SERPL-MCNC: 2.4 MG/DL — LOW (ref 2.5–4.5)
PHOSPHATE SERPL-MCNC: 2.4 MG/DL — LOW (ref 2.5–4.5)
PHOSPHATE SERPL-MCNC: 2.5 MG/DL — SIGNIFICANT CHANGE UP (ref 2.5–4.5)
PHOSPHATE SERPL-MCNC: 2.6 MG/DL — SIGNIFICANT CHANGE UP (ref 2.5–4.5)
PHOSPHATE SERPL-MCNC: 2.8 MG/DL — SIGNIFICANT CHANGE UP (ref 2.5–4.5)
PHOSPHATE SERPL-MCNC: 3 MG/DL — SIGNIFICANT CHANGE UP (ref 2.5–4.5)
PHOSPHATE SERPL-MCNC: 3.1 MG/DL — SIGNIFICANT CHANGE UP (ref 2.5–4.5)
PHOSPHATE SERPL-MCNC: 3.2 MG/DL — SIGNIFICANT CHANGE UP (ref 2.5–4.5)
PHOSPHATE SERPL-MCNC: 3.2 MG/DL — SIGNIFICANT CHANGE UP (ref 2.5–4.5)
PHOSPHATE SERPL-MCNC: 3.3 MG/DL — SIGNIFICANT CHANGE UP (ref 2.5–4.5)
PHOSPHATE SERPL-MCNC: 3.6 MG/DL — SIGNIFICANT CHANGE UP (ref 2.5–4.5)
PHOSPHATE SERPL-MCNC: 3.8 MG/DL — SIGNIFICANT CHANGE UP (ref 2.5–4.5)
PHOSPHATE SERPL-MCNC: 4 MG/DL — SIGNIFICANT CHANGE UP (ref 2.5–4.5)
PHOSPHATE SERPL-MCNC: 4 MG/DL — SIGNIFICANT CHANGE UP (ref 2.5–4.5)
PHOSPHATE SERPL-MCNC: 4.2 MG/DL — SIGNIFICANT CHANGE UP (ref 2.5–4.5)
PHOSPHATE SERPL-MCNC: 4.3 MG/DL — SIGNIFICANT CHANGE UP (ref 2.5–4.5)
PHOSPHATE SERPL-MCNC: 4.4 MG/DL — SIGNIFICANT CHANGE UP (ref 2.5–4.5)
PHOSPHATE SERPL-MCNC: 4.5 MG/DL — SIGNIFICANT CHANGE UP (ref 2.5–4.5)
PHOSPHATE SERPL-MCNC: 8 MG/DL — HIGH (ref 2.5–4.5)
PLAT MORPH BLD: NORMAL — SIGNIFICANT CHANGE UP
PLATELET # BLD AUTO: 138 K/UL — LOW (ref 150–400)
PLATELET # BLD AUTO: 169 K/UL — SIGNIFICANT CHANGE UP (ref 150–400)
PLATELET # BLD AUTO: 186 K/UL — SIGNIFICANT CHANGE UP (ref 150–400)
PLATELET # BLD AUTO: 191 K/UL — SIGNIFICANT CHANGE UP (ref 150–400)
PLATELET # BLD AUTO: 192 K/UL — SIGNIFICANT CHANGE UP (ref 150–400)
PLATELET # BLD AUTO: 197 K/UL — SIGNIFICANT CHANGE UP (ref 150–400)
PLATELET # BLD AUTO: 202 K/UL — SIGNIFICANT CHANGE UP (ref 150–400)
PLATELET # BLD AUTO: 203 K/UL — SIGNIFICANT CHANGE UP (ref 150–400)
PLATELET # BLD AUTO: 219 K/UL — SIGNIFICANT CHANGE UP (ref 150–400)
PLATELET # BLD AUTO: 219 K/UL — SIGNIFICANT CHANGE UP (ref 150–400)
PLATELET # BLD AUTO: 222 K/UL — SIGNIFICANT CHANGE UP (ref 150–400)
PLATELET # BLD AUTO: 230 K/UL — SIGNIFICANT CHANGE UP (ref 150–400)
PLATELET # BLD AUTO: 231 K/UL — SIGNIFICANT CHANGE UP (ref 150–400)
PLATELET # BLD AUTO: 232 K/UL — SIGNIFICANT CHANGE UP (ref 150–400)
PLATELET # BLD AUTO: 234 K/UL — SIGNIFICANT CHANGE UP (ref 150–400)
PLATELET # BLD AUTO: 236 K/UL — SIGNIFICANT CHANGE UP (ref 150–400)
PLATELET # BLD AUTO: 254 K/UL — SIGNIFICANT CHANGE UP (ref 150–400)
PLATELET # BLD AUTO: 257 K/UL — SIGNIFICANT CHANGE UP (ref 150–400)
PLATELET # BLD AUTO: 259 K/UL — SIGNIFICANT CHANGE UP (ref 150–400)
PLATELET # BLD AUTO: 262 K/UL — SIGNIFICANT CHANGE UP (ref 150–400)
PLATELET # BLD AUTO: 264 K/UL — SIGNIFICANT CHANGE UP (ref 150–400)
PLATELET # BLD AUTO: 279 K/UL — SIGNIFICANT CHANGE UP (ref 150–400)
PLATELET # BLD AUTO: 280 K/UL — SIGNIFICANT CHANGE UP (ref 150–400)
PLATELET # BLD AUTO: 281 K/UL — SIGNIFICANT CHANGE UP (ref 150–400)
PLATELET # BLD AUTO: 284 K/UL — SIGNIFICANT CHANGE UP (ref 150–400)
PLATELET # BLD AUTO: 291 K/UL — SIGNIFICANT CHANGE UP (ref 150–400)
PLATELET # BLD AUTO: 292 K/UL — SIGNIFICANT CHANGE UP (ref 150–400)
PLATELET # BLD AUTO: 304 K/UL — SIGNIFICANT CHANGE UP (ref 150–400)
PLATELET # BLD AUTO: 304 K/UL — SIGNIFICANT CHANGE UP (ref 150–400)
PLATELET # BLD AUTO: 311 K/UL — SIGNIFICANT CHANGE UP (ref 150–400)
PLATELET # BLD AUTO: 351 K/UL — SIGNIFICANT CHANGE UP (ref 150–400)
PLATELET # BLD AUTO: 354 K/UL — SIGNIFICANT CHANGE UP (ref 150–400)
PLATELET # BLD AUTO: 363 K/UL — SIGNIFICANT CHANGE UP (ref 150–400)
PLATELET # BLD AUTO: 369 K/UL — SIGNIFICANT CHANGE UP (ref 150–400)
PLATELET # BLD AUTO: 374 K/UL — SIGNIFICANT CHANGE UP (ref 150–400)
PLATELET # BLD AUTO: 384 K/UL — SIGNIFICANT CHANGE UP (ref 150–400)
PLATELET # BLD AUTO: 409 K/UL — HIGH (ref 150–400)
PLATELET # BLD AUTO: 426 K/UL — HIGH (ref 150–400)
PLATELET COUNT - ESTIMATE: NORMAL — SIGNIFICANT CHANGE UP
PO2 BLDV: 38 MMHG — SIGNIFICANT CHANGE UP
POIKILOCYTOSIS BLD QL AUTO: SIGNIFICANT CHANGE UP
POIKILOCYTOSIS BLD QL AUTO: SIGNIFICANT CHANGE UP
POIKILOCYTOSIS BLD QL AUTO: SLIGHT — SIGNIFICANT CHANGE UP
POLYCHROMASIA BLD QL SMEAR: SLIGHT — SIGNIFICANT CHANGE UP
POLYCHROMASIA BLD QL SMEAR: SLIGHT — SIGNIFICANT CHANGE UP
POTASSIUM BLDV-SCNC: 4.4 MMOL/L — SIGNIFICANT CHANGE UP (ref 3.5–5.1)
POTASSIUM SERPL-MCNC: 3.3 MMOL/L — LOW (ref 3.5–5.3)
POTASSIUM SERPL-MCNC: 3.5 MMOL/L — SIGNIFICANT CHANGE UP (ref 3.5–5.3)
POTASSIUM SERPL-MCNC: 3.6 MMOL/L — SIGNIFICANT CHANGE UP (ref 3.5–5.3)
POTASSIUM SERPL-MCNC: 3.7 MMOL/L — SIGNIFICANT CHANGE UP (ref 3.5–5.3)
POTASSIUM SERPL-MCNC: 3.7 MMOL/L — SIGNIFICANT CHANGE UP (ref 3.5–5.3)
POTASSIUM SERPL-MCNC: 3.8 MMOL/L — SIGNIFICANT CHANGE UP (ref 3.5–5.3)
POTASSIUM SERPL-MCNC: 3.9 MMOL/L — SIGNIFICANT CHANGE UP (ref 3.5–5.3)
POTASSIUM SERPL-MCNC: 4 MMOL/L — SIGNIFICANT CHANGE UP (ref 3.5–5.3)
POTASSIUM SERPL-MCNC: 4.1 MMOL/L — SIGNIFICANT CHANGE UP (ref 3.5–5.3)
POTASSIUM SERPL-MCNC: 4.2 MMOL/L — SIGNIFICANT CHANGE UP (ref 3.5–5.3)
POTASSIUM SERPL-MCNC: 4.2 MMOL/L — SIGNIFICANT CHANGE UP (ref 3.5–5.3)
POTASSIUM SERPL-MCNC: 4.3 MMOL/L — SIGNIFICANT CHANGE UP (ref 3.5–5.3)
POTASSIUM SERPL-MCNC: 4.3 MMOL/L — SIGNIFICANT CHANGE UP (ref 3.5–5.3)
POTASSIUM SERPL-MCNC: 4.4 MMOL/L — SIGNIFICANT CHANGE UP (ref 3.5–5.3)
POTASSIUM SERPL-MCNC: 4.4 MMOL/L — SIGNIFICANT CHANGE UP (ref 3.5–5.3)
POTASSIUM SERPL-MCNC: 4.5 MMOL/L — SIGNIFICANT CHANGE UP (ref 3.5–5.3)
POTASSIUM SERPL-MCNC: 4.6 MMOL/L — SIGNIFICANT CHANGE UP (ref 3.5–5.3)
POTASSIUM SERPL-MCNC: 4.7 MMOL/L — SIGNIFICANT CHANGE UP (ref 3.5–5.3)
POTASSIUM SERPL-MCNC: 4.9 MMOL/L — SIGNIFICANT CHANGE UP (ref 3.5–5.3)
POTASSIUM SERPL-MCNC: 7.7 MMOL/L — CRITICAL HIGH (ref 3.5–5.3)
POTASSIUM SERPL-MCNC: SIGNIFICANT CHANGE UP MMOL/L (ref 3.5–5.3)
POTASSIUM SERPL-MCNC: SIGNIFICANT CHANGE UP MMOL/L (ref 3.5–5.3)
POTASSIUM SERPL-SCNC: 3.3 MMOL/L — LOW (ref 3.5–5.3)
POTASSIUM SERPL-SCNC: 3.5 MMOL/L — SIGNIFICANT CHANGE UP (ref 3.5–5.3)
POTASSIUM SERPL-SCNC: 3.6 MMOL/L — SIGNIFICANT CHANGE UP (ref 3.5–5.3)
POTASSIUM SERPL-SCNC: 3.7 MMOL/L — SIGNIFICANT CHANGE UP (ref 3.5–5.3)
POTASSIUM SERPL-SCNC: 3.7 MMOL/L — SIGNIFICANT CHANGE UP (ref 3.5–5.3)
POTASSIUM SERPL-SCNC: 3.8 MMOL/L — SIGNIFICANT CHANGE UP (ref 3.5–5.3)
POTASSIUM SERPL-SCNC: 3.9 MMOL/L — SIGNIFICANT CHANGE UP (ref 3.5–5.3)
POTASSIUM SERPL-SCNC: 4 MMOL/L — SIGNIFICANT CHANGE UP (ref 3.5–5.3)
POTASSIUM SERPL-SCNC: 4.1 MMOL/L — SIGNIFICANT CHANGE UP (ref 3.5–5.3)
POTASSIUM SERPL-SCNC: 4.2 MMOL/L — SIGNIFICANT CHANGE UP (ref 3.5–5.3)
POTASSIUM SERPL-SCNC: 4.2 MMOL/L — SIGNIFICANT CHANGE UP (ref 3.5–5.3)
POTASSIUM SERPL-SCNC: 4.3 MMOL/L — SIGNIFICANT CHANGE UP (ref 3.5–5.3)
POTASSIUM SERPL-SCNC: 4.3 MMOL/L — SIGNIFICANT CHANGE UP (ref 3.5–5.3)
POTASSIUM SERPL-SCNC: 4.4 MMOL/L — SIGNIFICANT CHANGE UP (ref 3.5–5.3)
POTASSIUM SERPL-SCNC: 4.4 MMOL/L — SIGNIFICANT CHANGE UP (ref 3.5–5.3)
POTASSIUM SERPL-SCNC: 4.5 MMOL/L — SIGNIFICANT CHANGE UP (ref 3.5–5.3)
POTASSIUM SERPL-SCNC: 4.6 MMOL/L — SIGNIFICANT CHANGE UP (ref 3.5–5.3)
POTASSIUM SERPL-SCNC: 4.7 MMOL/L — SIGNIFICANT CHANGE UP (ref 3.5–5.3)
POTASSIUM SERPL-SCNC: 4.9 MMOL/L — SIGNIFICANT CHANGE UP (ref 3.5–5.3)
POTASSIUM SERPL-SCNC: 7.7 MMOL/L — CRITICAL HIGH (ref 3.5–5.3)
POTASSIUM SERPL-SCNC: SIGNIFICANT CHANGE UP MMOL/L (ref 3.5–5.3)
POTASSIUM SERPL-SCNC: SIGNIFICANT CHANGE UP MMOL/L (ref 3.5–5.3)
PREALB SERPL-MCNC: 11 MG/DL — LOW (ref 20–40)
PROT FLD-MCNC: 2.6 G/DL — SIGNIFICANT CHANGE UP
PROT SERPL-MCNC: 5 G/DL — LOW (ref 6–8.3)
PROT SERPL-MCNC: 5.1 G/DL — LOW (ref 6–8.3)
PROT SERPL-MCNC: 5.3 G/DL — LOW (ref 6–8.3)
PROT SERPL-MCNC: 5.4 G/DL — LOW (ref 6–8.3)
PROT SERPL-MCNC: 5.6 G/DL — LOW (ref 6–8.3)
PROT SERPL-MCNC: 5.8 G/DL — LOW (ref 6–8.3)
PROT SERPL-MCNC: 5.9 G/DL — LOW (ref 6–8.3)
PROT SERPL-MCNC: 6 G/DL — SIGNIFICANT CHANGE UP (ref 6–8.3)
PROT SERPL-MCNC: 6.1 G/DL — SIGNIFICANT CHANGE UP (ref 6–8.3)
PROT SERPL-MCNC: 6.2 G/DL — SIGNIFICANT CHANGE UP (ref 6–8.3)
PROT SERPL-MCNC: 6.2 G/DL — SIGNIFICANT CHANGE UP (ref 6–8.3)
PROT SERPL-MCNC: 6.3 G/DL — SIGNIFICANT CHANGE UP (ref 6–8.3)
PROT SERPL-MCNC: 6.5 G/DL — SIGNIFICANT CHANGE UP (ref 6–8.3)
PROT SERPL-MCNC: 6.6 G/DL — SIGNIFICANT CHANGE UP (ref 6–8.3)
PROTHROM AB SERPL-ACNC: 12.1 SEC — SIGNIFICANT CHANGE UP (ref 10.5–13.4)
PROTHROM AB SERPL-ACNC: 13 SEC — SIGNIFICANT CHANGE UP (ref 10.5–13.4)
PROTHROM AB SERPL-ACNC: 17.7 SEC — HIGH (ref 10.5–13.4)
PROTHROM AB SERPL-ACNC: 18 SEC — HIGH (ref 10.5–13.4)
RBC # BLD: 2.38 M/UL — LOW (ref 4.2–5.8)
RBC # BLD: 2.48 M/UL — LOW (ref 4.2–5.8)
RBC # BLD: 2.53 M/UL — LOW (ref 4.2–5.8)
RBC # BLD: 2.53 M/UL — LOW (ref 4.2–5.8)
RBC # BLD: 2.59 M/UL — LOW (ref 4.2–5.8)
RBC # BLD: 2.68 M/UL — LOW (ref 4.2–5.8)
RBC # BLD: 2.7 M/UL — LOW (ref 4.2–5.8)
RBC # BLD: 2.7 M/UL — LOW (ref 4.2–5.8)
RBC # BLD: 2.73 M/UL — LOW (ref 4.2–5.8)
RBC # BLD: 2.73 M/UL — LOW (ref 4.2–5.8)
RBC # BLD: 2.76 M/UL — LOW (ref 4.2–5.8)
RBC # BLD: 2.83 M/UL — LOW (ref 4.2–5.8)
RBC # BLD: 2.84 M/UL — LOW (ref 4.2–5.8)
RBC # BLD: 2.87 M/UL — LOW (ref 4.2–5.8)
RBC # BLD: 2.92 M/UL — LOW (ref 4.2–5.8)
RBC # BLD: 2.96 M/UL — LOW (ref 4.2–5.8)
RBC # BLD: 3.03 M/UL — LOW (ref 4.2–5.8)
RBC # BLD: 3.05 M/UL — LOW (ref 4.2–5.8)
RBC # BLD: 3.08 M/UL — LOW (ref 4.2–5.8)
RBC # BLD: 3.08 M/UL — LOW (ref 4.2–5.8)
RBC # BLD: 3.13 M/UL — LOW (ref 4.2–5.8)
RBC # BLD: 3.14 M/UL — LOW (ref 4.2–5.8)
RBC # BLD: 3.15 M/UL — LOW (ref 4.2–5.8)
RBC # BLD: 3.18 M/UL — LOW (ref 4.2–5.8)
RBC # BLD: 3.2 M/UL — LOW (ref 4.2–5.8)
RBC # BLD: 3.21 M/UL — LOW (ref 4.2–5.8)
RBC # BLD: 3.23 M/UL — LOW (ref 4.2–5.8)
RBC # BLD: 3.23 M/UL — LOW (ref 4.2–5.8)
RBC # BLD: 3.29 M/UL — LOW (ref 4.2–5.8)
RBC # BLD: 3.34 M/UL — LOW (ref 4.2–5.8)
RBC # BLD: 3.38 M/UL — LOW (ref 4.2–5.8)
RBC # BLD: 3.42 M/UL — LOW (ref 4.2–5.8)
RBC # BLD: 3.42 M/UL — LOW (ref 4.2–5.8)
RBC # BLD: 3.59 M/UL — LOW (ref 4.2–5.8)
RBC # BLD: 3.71 M/UL — LOW (ref 4.2–5.8)
RBC # FLD: 13.2 % — SIGNIFICANT CHANGE UP (ref 10.3–14.5)
RBC # FLD: 13.3 % — SIGNIFICANT CHANGE UP (ref 10.3–14.5)
RBC # FLD: 13.4 % — SIGNIFICANT CHANGE UP (ref 10.3–14.5)
RBC # FLD: 13.5 % — SIGNIFICANT CHANGE UP (ref 10.3–14.5)
RBC # FLD: 13.7 % — SIGNIFICANT CHANGE UP (ref 10.3–14.5)
RBC # FLD: 13.8 % — SIGNIFICANT CHANGE UP (ref 10.3–14.5)
RBC # FLD: 14 % — SIGNIFICANT CHANGE UP (ref 10.3–14.5)
RBC # FLD: 14 % — SIGNIFICANT CHANGE UP (ref 10.3–14.5)
RBC # FLD: 14.2 % — SIGNIFICANT CHANGE UP (ref 10.3–14.5)
RBC # FLD: 14.2 % — SIGNIFICANT CHANGE UP (ref 10.3–14.5)
RBC # FLD: 14.3 % — SIGNIFICANT CHANGE UP (ref 10.3–14.5)
RBC # FLD: 14.3 % — SIGNIFICANT CHANGE UP (ref 10.3–14.5)
RBC # FLD: 14.6 % — HIGH (ref 10.3–14.5)
RBC # FLD: 14.6 % — HIGH (ref 10.3–14.5)
RBC # FLD: 14.7 % — HIGH (ref 10.3–14.5)
RBC # FLD: 14.8 % — HIGH (ref 10.3–14.5)
RBC # FLD: 14.8 % — HIGH (ref 10.3–14.5)
RBC # FLD: 15.9 % — HIGH (ref 10.3–14.5)
RBC # FLD: 16.4 % — HIGH (ref 10.3–14.5)
RBC # FLD: 17.2 % — HIGH (ref 10.3–14.5)
RBC BLD AUTO: ABNORMAL
RCV VOL RI: <2000 CELLS/UL — HIGH (ref 0–5)
RESP SYN VIRUS RESULT: NOT DETECTED
RH IG SCN BLD-IMP: POSITIVE — SIGNIFICANT CHANGE UP
RSV RNA NPH QL NAA+NON-PROBE: SIGNIFICANT CHANGE UP
S AUREUS DNA NOSE QL NAA+PROBE: SIGNIFICANT CHANGE UP
S AUREUS DNA NOSE QL NAA+PROBE: SIGNIFICANT CHANGE UP
SAO2 % BLDV: 57 % — SIGNIFICANT CHANGE UP
SARS-COV-2 RESULT: NOT DETECTED
SARS-COV-2 RNA SPEC QL NAA+PROBE: DETECTED
SARS-COV-2 RNA SPEC QL NAA+PROBE: SIGNIFICANT CHANGE UP
SCHISTOCYTES BLD QL AUTO: SLIGHT — SIGNIFICANT CHANGE UP
SODIUM SERPL-SCNC: 129 MMOL/L — LOW (ref 135–145)
SODIUM SERPL-SCNC: 132 MMOL/L — LOW (ref 135–145)
SODIUM SERPL-SCNC: 134 MMOL/L — LOW (ref 135–145)
SODIUM SERPL-SCNC: 135 MMOL/L — SIGNIFICANT CHANGE UP (ref 135–145)
SODIUM SERPL-SCNC: 136 MMOL/L — SIGNIFICANT CHANGE UP (ref 135–145)
SODIUM SERPL-SCNC: 137 MMOL/L — SIGNIFICANT CHANGE UP (ref 135–145)
SODIUM SERPL-SCNC: 138 MMOL/L — SIGNIFICANT CHANGE UP (ref 135–145)
SODIUM SERPL-SCNC: 139 MMOL/L — SIGNIFICANT CHANGE UP (ref 135–145)
SODIUM SERPL-SCNC: 139 MMOL/L — SIGNIFICANT CHANGE UP (ref 135–145)
SODIUM SERPL-SCNC: 140 MMOL/L — SIGNIFICANT CHANGE UP (ref 135–145)
SODIUM SERPL-SCNC: 140 MMOL/L — SIGNIFICANT CHANGE UP (ref 135–145)
SODIUM SERPL-SCNC: 141 MMOL/L — SIGNIFICANT CHANGE UP (ref 135–145)
SODIUM SERPL-SCNC: 141 MMOL/L — SIGNIFICANT CHANGE UP (ref 135–145)
SODIUM SERPL-SCNC: 143 MMOL/L — SIGNIFICANT CHANGE UP (ref 135–145)
SODIUM SERPL-SCNC: 144 MMOL/L — SIGNIFICANT CHANGE UP (ref 135–145)
SODIUM SERPL-SCNC: SIGNIFICANT CHANGE UP MMOL/L (ref 135–145)
SODIUM SERPL-SCNC: SIGNIFICANT CHANGE UP MMOL/L (ref 135–145)
SPECIMEN SOURCE: SIGNIFICANT CHANGE UP
SURGICAL PATHOLOGY STUDY: SIGNIFICANT CHANGE UP
T4 FREE SERPL-MCNC: 1.1 NG/DL — SIGNIFICANT CHANGE UP (ref 0.9–1.8)
T4 FREE+ TSH PNL SERPL: 12.2 UIU/ML — HIGH (ref 0.27–4.2)
TOTAL NUCLEATED CELL COUNT, BODY FLUID: 139 CELLS/UL — SIGNIFICANT CHANGE UP (ref 0–5)
TRIGL SERPL-MCNC: 99 MG/DL — SIGNIFICANT CHANGE UP
TROPONIN T, HIGH SENSITIVITY RESULT: 24 NG/L — SIGNIFICANT CHANGE UP
TUBE TYPE: SIGNIFICANT CHANGE UP
WBC # BLD: 1.95 K/UL — LOW (ref 3.8–10.5)
WBC # BLD: 11.17 K/UL — HIGH (ref 3.8–10.5)
WBC # BLD: 12.18 K/UL — HIGH (ref 3.8–10.5)
WBC # BLD: 13.15 K/UL — HIGH (ref 3.8–10.5)
WBC # BLD: 15.74 K/UL — HIGH (ref 3.8–10.5)
WBC # BLD: 15.83 K/UL — HIGH (ref 3.8–10.5)
WBC # BLD: 2.3 K/UL — LOW (ref 3.8–10.5)
WBC # BLD: 2.42 K/UL — LOW (ref 3.8–10.5)
WBC # BLD: 2.44 K/UL — LOW (ref 3.8–10.5)
WBC # BLD: 2.51 K/UL — LOW (ref 3.8–10.5)
WBC # BLD: 2.72 K/UL — LOW (ref 3.8–10.5)
WBC # BLD: 2.81 K/UL — LOW (ref 3.8–10.5)
WBC # BLD: 3.28 K/UL — LOW (ref 3.8–10.5)
WBC # BLD: 3.56 K/UL — LOW (ref 3.8–10.5)
WBC # BLD: 4.01 K/UL — SIGNIFICANT CHANGE UP (ref 3.8–10.5)
WBC # BLD: 4.11 K/UL — SIGNIFICANT CHANGE UP (ref 3.8–10.5)
WBC # BLD: 4.15 K/UL — SIGNIFICANT CHANGE UP (ref 3.8–10.5)
WBC # BLD: 4.58 K/UL — SIGNIFICANT CHANGE UP (ref 3.8–10.5)
WBC # BLD: 4.6 K/UL — SIGNIFICANT CHANGE UP (ref 3.8–10.5)
WBC # BLD: 4.62 K/UL — SIGNIFICANT CHANGE UP (ref 3.8–10.5)
WBC # BLD: 4.95 K/UL — SIGNIFICANT CHANGE UP (ref 3.8–10.5)
WBC # BLD: 4.98 K/UL — SIGNIFICANT CHANGE UP (ref 3.8–10.5)
WBC # BLD: 5 K/UL — SIGNIFICANT CHANGE UP (ref 3.8–10.5)
WBC # BLD: 5.36 K/UL — SIGNIFICANT CHANGE UP (ref 3.8–10.5)
WBC # BLD: 5.72 K/UL — SIGNIFICANT CHANGE UP (ref 3.8–10.5)
WBC # BLD: 5.82 K/UL — SIGNIFICANT CHANGE UP (ref 3.8–10.5)
WBC # BLD: 5.93 K/UL — SIGNIFICANT CHANGE UP (ref 3.8–10.5)
WBC # BLD: 5.96 K/UL — SIGNIFICANT CHANGE UP (ref 3.8–10.5)
WBC # BLD: 6.32 K/UL — SIGNIFICANT CHANGE UP (ref 3.8–10.5)
WBC # BLD: 6.5 K/UL — SIGNIFICANT CHANGE UP (ref 3.8–10.5)
WBC # BLD: 6.63 K/UL — SIGNIFICANT CHANGE UP (ref 3.8–10.5)
WBC # BLD: 6.82 K/UL — SIGNIFICANT CHANGE UP (ref 3.8–10.5)
WBC # BLD: 6.97 K/UL — SIGNIFICANT CHANGE UP (ref 3.8–10.5)
WBC # BLD: 6.99 K/UL — SIGNIFICANT CHANGE UP (ref 3.8–10.5)
WBC # BLD: 8.14 K/UL — SIGNIFICANT CHANGE UP (ref 3.8–10.5)
WBC # BLD: 8.32 K/UL — SIGNIFICANT CHANGE UP (ref 3.8–10.5)
WBC # BLD: 8.62 K/UL — SIGNIFICANT CHANGE UP (ref 3.8–10.5)
WBC # BLD: 8.99 K/UL — SIGNIFICANT CHANGE UP (ref 3.8–10.5)
WBC # FLD AUTO: 1.95 K/UL — LOW (ref 3.8–10.5)
WBC # FLD AUTO: 11.17 K/UL — HIGH (ref 3.8–10.5)
WBC # FLD AUTO: 12.18 K/UL — HIGH (ref 3.8–10.5)
WBC # FLD AUTO: 13.15 K/UL — HIGH (ref 3.8–10.5)
WBC # FLD AUTO: 15.74 K/UL — HIGH (ref 3.8–10.5)
WBC # FLD AUTO: 15.83 K/UL — HIGH (ref 3.8–10.5)
WBC # FLD AUTO: 2.3 K/UL — LOW (ref 3.8–10.5)
WBC # FLD AUTO: 2.42 K/UL — LOW (ref 3.8–10.5)
WBC # FLD AUTO: 2.44 K/UL — LOW (ref 3.8–10.5)
WBC # FLD AUTO: 2.51 K/UL — LOW (ref 3.8–10.5)
WBC # FLD AUTO: 2.72 K/UL — LOW (ref 3.8–10.5)
WBC # FLD AUTO: 2.81 K/UL — LOW (ref 3.8–10.5)
WBC # FLD AUTO: 3.28 K/UL — LOW (ref 3.8–10.5)
WBC # FLD AUTO: 3.56 K/UL — LOW (ref 3.8–10.5)
WBC # FLD AUTO: 4.01 K/UL — SIGNIFICANT CHANGE UP (ref 3.8–10.5)
WBC # FLD AUTO: 4.11 K/UL — SIGNIFICANT CHANGE UP (ref 3.8–10.5)
WBC # FLD AUTO: 4.15 K/UL — SIGNIFICANT CHANGE UP (ref 3.8–10.5)
WBC # FLD AUTO: 4.58 K/UL — SIGNIFICANT CHANGE UP (ref 3.8–10.5)
WBC # FLD AUTO: 4.6 K/UL — SIGNIFICANT CHANGE UP (ref 3.8–10.5)
WBC # FLD AUTO: 4.62 K/UL — SIGNIFICANT CHANGE UP (ref 3.8–10.5)
WBC # FLD AUTO: 4.95 K/UL — SIGNIFICANT CHANGE UP (ref 3.8–10.5)
WBC # FLD AUTO: 4.98 K/UL — SIGNIFICANT CHANGE UP (ref 3.8–10.5)
WBC # FLD AUTO: 5 K/UL — SIGNIFICANT CHANGE UP (ref 3.8–10.5)
WBC # FLD AUTO: 5.36 K/UL — SIGNIFICANT CHANGE UP (ref 3.8–10.5)
WBC # FLD AUTO: 5.72 K/UL — SIGNIFICANT CHANGE UP (ref 3.8–10.5)
WBC # FLD AUTO: 5.82 K/UL — SIGNIFICANT CHANGE UP (ref 3.8–10.5)
WBC # FLD AUTO: 5.93 K/UL — SIGNIFICANT CHANGE UP (ref 3.8–10.5)
WBC # FLD AUTO: 5.96 K/UL — SIGNIFICANT CHANGE UP (ref 3.8–10.5)
WBC # FLD AUTO: 6.32 K/UL — SIGNIFICANT CHANGE UP (ref 3.8–10.5)
WBC # FLD AUTO: 6.5 K/UL — SIGNIFICANT CHANGE UP (ref 3.8–10.5)
WBC # FLD AUTO: 6.63 K/UL — SIGNIFICANT CHANGE UP (ref 3.8–10.5)
WBC # FLD AUTO: 6.82 K/UL — SIGNIFICANT CHANGE UP (ref 3.8–10.5)
WBC # FLD AUTO: 6.97 K/UL — SIGNIFICANT CHANGE UP (ref 3.8–10.5)
WBC # FLD AUTO: 6.99 K/UL — SIGNIFICANT CHANGE UP (ref 3.8–10.5)
WBC # FLD AUTO: 8.14 K/UL — SIGNIFICANT CHANGE UP (ref 3.8–10.5)
WBC # FLD AUTO: 8.32 K/UL — SIGNIFICANT CHANGE UP (ref 3.8–10.5)
WBC # FLD AUTO: 8.62 K/UL — SIGNIFICANT CHANGE UP (ref 3.8–10.5)
WBC # FLD AUTO: 8.99 K/UL — SIGNIFICANT CHANGE UP (ref 3.8–10.5)

## 2022-01-01 PROCEDURE — 44120 REMOVAL OF SMALL INTESTINE: CPT

## 2022-01-01 PROCEDURE — 99213 OFFICE O/P EST LOW 20 MIN: CPT

## 2022-01-01 PROCEDURE — 99285 EMERGENCY DEPT VISIT HI MDM: CPT | Mod: 25

## 2022-01-01 PROCEDURE — 99215 OFFICE O/P EST HI 40 MIN: CPT

## 2022-01-01 PROCEDURE — 99223 1ST HOSP IP/OBS HIGH 75: CPT

## 2022-01-01 PROCEDURE — 99285 EMERGENCY DEPT VISIT HI MDM: CPT | Mod: CS,25,GC

## 2022-01-01 PROCEDURE — 71046 X-RAY EXAM CHEST 2 VIEWS: CPT | Mod: 26

## 2022-01-01 PROCEDURE — 93970 EXTREMITY STUDY: CPT | Mod: 26

## 2022-01-01 PROCEDURE — 99232 SBSQ HOSP IP/OBS MODERATE 35: CPT

## 2022-01-01 PROCEDURE — 96374 THER/PROPH/DIAG INJ IV PUSH: CPT

## 2022-01-01 PROCEDURE — 99233 SBSQ HOSP IP/OBS HIGH 50: CPT

## 2022-01-01 PROCEDURE — 96375 TX/PRO/DX INJ NEW DRUG ADDON: CPT

## 2022-01-01 PROCEDURE — 44050 REDUCE BOWEL OBSTRUCTION: CPT

## 2022-01-01 PROCEDURE — 99239 HOSP IP/OBS DSCHRG MGMT >30: CPT

## 2022-01-01 PROCEDURE — 99222 1ST HOSP IP/OBS MODERATE 55: CPT | Mod: GC

## 2022-01-01 PROCEDURE — 93010 ELECTROCARDIOGRAM REPORT: CPT

## 2022-01-01 PROCEDURE — 99442: CPT | Mod: 95

## 2022-01-01 PROCEDURE — 99024 POSTOP FOLLOW-UP VISIT: CPT

## 2022-01-01 PROCEDURE — 71045 X-RAY EXAM CHEST 1 VIEW: CPT | Mod: 26

## 2022-01-01 PROCEDURE — 74177 CT ABD & PELVIS W/CONTRAST: CPT | Mod: 26,MG

## 2022-01-01 PROCEDURE — 99232 SBSQ HOSP IP/OBS MODERATE 35: CPT | Mod: GC

## 2022-01-01 PROCEDURE — 88342 IMHCHEM/IMCYTCHM 1ST ANTB: CPT | Mod: 26

## 2022-01-01 PROCEDURE — 71260 CT THORAX DX C+: CPT

## 2022-01-01 PROCEDURE — 87635 SARS-COV-2 COVID-19 AMP PRB: CPT

## 2022-01-01 PROCEDURE — 71260 CT THORAX DX C+: CPT | Mod: 26

## 2022-01-01 PROCEDURE — 93000 ELECTROCARDIOGRAM COMPLETE: CPT

## 2022-01-01 PROCEDURE — 49083 ABD PARACENTESIS W/IMAGING: CPT | Mod: GC

## 2022-01-01 PROCEDURE — 99205 OFFICE O/P NEW HI 60 MIN: CPT

## 2022-01-01 PROCEDURE — 99214 OFFICE O/P EST MOD 30 MIN: CPT

## 2022-01-01 PROCEDURE — 44020 EXPLORE SMALL INTESTINE: CPT

## 2022-01-01 PROCEDURE — 74176 CT ABD & PELVIS W/O CONTRAST: CPT | Mod: 26

## 2022-01-01 PROCEDURE — 82962 GLUCOSE BLOOD TEST: CPT

## 2022-01-01 PROCEDURE — 99285 EMERGENCY DEPT VISIT HI MDM: CPT | Mod: GC

## 2022-01-01 PROCEDURE — 99222 1ST HOSP IP/OBS MODERATE 55: CPT | Mod: GC,25

## 2022-01-01 PROCEDURE — 74177 CT ABD & PELVIS W/CONTRAST: CPT

## 2022-01-01 PROCEDURE — G1004: CPT

## 2022-01-01 PROCEDURE — 74177 CT ABD & PELVIS W/CONTRAST: CPT | Mod: PD

## 2022-01-01 PROCEDURE — 43235 EGD DIAGNOSTIC BRUSH WASH: CPT | Mod: GC

## 2022-01-01 PROCEDURE — 99214 OFFICE O/P EST MOD 30 MIN: CPT | Mod: 95

## 2022-01-01 PROCEDURE — 99221 1ST HOSP IP/OBS SF/LOW 40: CPT

## 2022-01-01 PROCEDURE — 99358 PROLONG SERVICE W/O CONTACT: CPT | Mod: NC

## 2022-01-01 PROCEDURE — 99204 OFFICE O/P NEW MOD 45 MIN: CPT

## 2022-01-01 PROCEDURE — 74177 CT ABD & PELVIS W/CONTRAST: CPT | Mod: 26

## 2022-01-01 PROCEDURE — 49203: CPT

## 2022-01-01 PROCEDURE — ZZZZZ: CPT

## 2022-01-01 PROCEDURE — 36573 INSJ PICC RS&I 5 YR+: CPT

## 2022-01-01 PROCEDURE — 88305 TISSUE EXAM BY PATHOLOGIST: CPT | Mod: 26

## 2022-01-01 PROCEDURE — 99285 EMERGENCY DEPT VISIT HI MDM: CPT

## 2022-01-01 PROCEDURE — 43753 TX GASTRO INTUB W/ASP: CPT | Mod: GC

## 2022-01-01 PROCEDURE — 88341 IMHCHEM/IMCYTCHM EA ADD ANTB: CPT | Mod: 26

## 2022-01-01 PROCEDURE — 93306 TTE W/DOPPLER COMPLETE: CPT

## 2022-01-01 PROCEDURE — 99223 1ST HOSP IP/OBS HIGH 75: CPT | Mod: 25

## 2022-01-01 PROCEDURE — 99497 ADVNCD CARE PLAN 30 MIN: CPT | Mod: 25

## 2022-01-01 PROCEDURE — 71046 X-RAY EXAM CHEST 2 VIEWS: CPT

## 2022-01-01 PROCEDURE — 99497 ADVNCD CARE PLAN 30 MIN: CPT | Mod: GC,25

## 2022-01-01 PROCEDURE — 88307 TISSUE EXAM BY PATHOLOGIST: CPT | Mod: 26

## 2022-01-01 PROCEDURE — 99223 1ST HOSP IP/OBS HIGH 75: CPT | Mod: GC

## 2022-01-01 DEVICE — STAPLER COVIDIEN TA 90 BLUE: Type: IMPLANTABLE DEVICE | Status: FUNCTIONAL

## 2022-01-01 DEVICE — IMPLANTABLE DEVICE: Type: IMPLANTABLE DEVICE | Status: FUNCTIONAL

## 2022-01-01 DEVICE — DREAMWIRE .035IN ANGLD: Type: IMPLANTABLE DEVICE | Status: FUNCTIONAL

## 2022-01-01 DEVICE — HYDRA WIRE: Type: IMPLANTABLE DEVICE | Status: FUNCTIONAL

## 2022-01-01 DEVICE — STAPLER COVIDIEN TRI-STAPLE 60MM PURPLE RELOAD: Type: IMPLANTABLE DEVICE | Status: FUNCTIONAL

## 2022-01-01 DEVICE — STAPLER COVIDIEN GIA 80-3.0MM PURPLE: Type: IMPLANTABLE DEVICE | Status: FUNCTIONAL

## 2022-01-01 DEVICE — BLLN EXTRCTR PRO RX-S 15-18MM: Type: IMPLANTABLE DEVICE | Status: FUNCTIONAL

## 2022-01-01 RX ORDER — PANTOPRAZOLE SODIUM 20 MG/1
40 TABLET, DELAYED RELEASE ORAL DAILY
Refills: 0 | Status: DISCONTINUED | OUTPATIENT
Start: 2022-01-01 | End: 2022-01-01

## 2022-01-01 RX ORDER — OXYCODONE HYDROCHLORIDE 5 MG/1
5 TABLET ORAL EVERY 6 HOURS
Refills: 0 | Status: DISCONTINUED | OUTPATIENT
Start: 2022-01-01 | End: 2022-01-01

## 2022-01-01 RX ORDER — GLUCAGON INJECTION, SOLUTION 0.5 MG/.1ML
1 INJECTION, SOLUTION SUBCUTANEOUS ONCE
Refills: 0 | Status: DISCONTINUED | OUTPATIENT
Start: 2022-01-01 | End: 2022-01-01

## 2022-01-01 RX ORDER — SODIUM CHLORIDE 9 MG/ML
1000 INJECTION, SOLUTION INTRAVENOUS
Refills: 0 | Status: DISCONTINUED | OUTPATIENT
Start: 2022-01-01 | End: 2022-01-01

## 2022-01-01 RX ORDER — SUCRALFATE 1 G/1
1 TABLET ORAL 4 TIMES DAILY
Qty: 120 | Refills: 0 | Status: ACTIVE | COMMUNITY
Start: 2022-01-01 | End: 1900-01-01

## 2022-01-01 RX ORDER — SUCRALFATE 1 G/10ML
1 SUSPENSION ORAL 4 TIMES DAILY
Qty: 1200 | Refills: 0 | Status: DISCONTINUED | COMMUNITY
Start: 2022-01-01 | End: 2022-01-01

## 2022-01-01 RX ORDER — MORPHINE SULFATE 50 MG/1
4 CAPSULE, EXTENDED RELEASE ORAL ONCE
Refills: 0 | Status: DISCONTINUED | OUTPATIENT
Start: 2022-01-01 | End: 2022-01-01

## 2022-01-01 RX ORDER — ALBUMIN HUMAN 25 %
50 VIAL (ML) INTRAVENOUS ONCE
Refills: 0 | Status: COMPLETED | OUTPATIENT
Start: 2022-01-01 | End: 2022-01-01

## 2022-01-01 RX ORDER — FAMOTIDINE 10 MG/ML
1 INJECTION INTRAVENOUS
Qty: 0 | Refills: 0 | DISCHARGE

## 2022-01-01 RX ORDER — SODIUM CHLORIDE 9 MG/ML
750 INJECTION, SOLUTION INTRAVENOUS
Refills: 0 | Status: DISCONTINUED | OUTPATIENT
Start: 2022-01-01 | End: 2022-01-01

## 2022-01-01 RX ORDER — OXYCODONE HYDROCHLORIDE 5 MG/1
1 TABLET ORAL
Qty: 20 | Refills: 0
Start: 2022-01-01 | End: 2022-01-01

## 2022-01-01 RX ORDER — HYDROMORPHONE HYDROCHLORIDE 2 MG/ML
0.5 INJECTION INTRAMUSCULAR; INTRAVENOUS; SUBCUTANEOUS
Refills: 0 | Status: DISCONTINUED | OUTPATIENT
Start: 2022-01-01 | End: 2022-01-01

## 2022-01-01 RX ORDER — METFORMIN HYDROCHLORIDE 850 MG/1
1 TABLET ORAL
Qty: 0 | Refills: 0 | DISCHARGE

## 2022-01-01 RX ORDER — DEXAMETHASONE 0.5 MG/5ML
4 ELIXIR ORAL EVERY 12 HOURS
Refills: 0 | Status: DISCONTINUED | OUTPATIENT
Start: 2022-01-01 | End: 2022-01-01

## 2022-01-01 RX ORDER — ATORVASTATIN CALCIUM 80 MG/1
40 TABLET, FILM COATED ORAL AT BEDTIME
Refills: 0 | Status: DISCONTINUED | OUTPATIENT
Start: 2022-01-01 | End: 2022-01-01

## 2022-01-01 RX ORDER — PANTOPRAZOLE SODIUM 20 MG/1
40 TABLET, DELAYED RELEASE ORAL ONCE
Refills: 0 | Status: COMPLETED | OUTPATIENT
Start: 2022-01-01 | End: 2022-01-01

## 2022-01-01 RX ORDER — MORPHINE SULFATE 50 MG/1
2 CAPSULE, EXTENDED RELEASE ORAL
Qty: 0 | Refills: 0 | DISCHARGE

## 2022-01-01 RX ORDER — I.V. FAT EMULSION 20 G/100ML
10.4 EMULSION INTRAVENOUS
Qty: 25 | Refills: 0 | Status: DISCONTINUED | OUTPATIENT
Start: 2022-01-01 | End: 2022-01-01

## 2022-01-01 RX ORDER — OMEPRAZOLE 20 MG/1
20 CAPSULE, DELAYED RELEASE ORAL DAILY
Qty: 30 | Refills: 2 | Status: ACTIVE | COMMUNITY
Start: 2022-01-01 | End: 1900-01-01

## 2022-01-01 RX ORDER — FAMOTIDINE 20 MG/1
20 TABLET, FILM COATED ORAL TWICE DAILY
Qty: 60 | Refills: 2 | Status: ACTIVE | COMMUNITY
Start: 2022-01-01 | End: 1900-01-01

## 2022-01-01 RX ORDER — SODIUM,POTASSIUM PHOSPHATES 278-250MG
1 POWDER IN PACKET (EA) ORAL
Refills: 0 | Status: COMPLETED | OUTPATIENT
Start: 2022-01-01 | End: 2022-01-01

## 2022-01-01 RX ORDER — MORPHINE SULFATE 50 MG/1
2 CAPSULE, EXTENDED RELEASE ORAL
Refills: 0 | Status: DISCONTINUED | OUTPATIENT
Start: 2022-01-01 | End: 2022-01-01

## 2022-01-01 RX ORDER — SODIUM CHLORIDE 9 MG/ML
1000 INJECTION, SOLUTION INTRAVENOUS ONCE
Refills: 0 | Status: DISCONTINUED | OUTPATIENT
Start: 2022-01-01 | End: 2022-01-01

## 2022-01-01 RX ORDER — SODIUM CHLORIDE 9 MG/ML
1000 INJECTION, SOLUTION INTRAVENOUS ONCE
Refills: 0 | Status: COMPLETED | OUTPATIENT
Start: 2022-01-01 | End: 2022-01-01

## 2022-01-01 RX ORDER — I.V. FAT EMULSION 20 G/100ML
10.4 EMULSION INTRAVENOUS
Qty: 50 | Refills: 0 | Status: DISCONTINUED | OUTPATIENT
Start: 2022-01-01 | End: 2022-01-01

## 2022-01-01 RX ORDER — METOCLOPRAMIDE 10 MG/1
10 TABLET ORAL
Qty: 30 | Refills: 0 | Status: DISCONTINUED | COMMUNITY
Start: 2022-01-01

## 2022-01-01 RX ORDER — ACETAMINOPHEN 500 MG
1000 TABLET ORAL EVERY 6 HOURS
Refills: 0 | Status: COMPLETED | OUTPATIENT
Start: 2022-01-01 | End: 2022-01-01

## 2022-01-01 RX ORDER — POTASSIUM CHLORIDE 20 MEQ
10 PACKET (EA) ORAL
Refills: 0 | Status: COMPLETED | OUTPATIENT
Start: 2022-01-01 | End: 2022-01-01

## 2022-01-01 RX ORDER — MAGNESIUM SULFATE 500 MG/ML
2 VIAL (ML) INJECTION ONCE
Refills: 0 | Status: COMPLETED | OUTPATIENT
Start: 2022-01-01 | End: 2022-01-01

## 2022-01-01 RX ORDER — BACILLUS COAGULANS/INULIN 1B-250 MG
CAPSULE ORAL
Refills: 0 | Status: DISCONTINUED | COMMUNITY
End: 2022-01-01

## 2022-01-01 RX ORDER — ONDANSETRON 8 MG/1
4 TABLET, FILM COATED ORAL EVERY 8 HOURS
Refills: 0 | Status: DISCONTINUED | OUTPATIENT
Start: 2022-01-01 | End: 2022-01-01

## 2022-01-01 RX ORDER — LIPASE/PROTEASE/AMYLASE 16-48-48K
3 CAPSULE,DELAYED RELEASE (ENTERIC COATED) ORAL
Qty: 0 | Refills: 0 | DISCHARGE

## 2022-01-01 RX ORDER — PIPERACILLIN AND TAZOBACTAM 4; .5 G/20ML; G/20ML
3.38 INJECTION, POWDER, LYOPHILIZED, FOR SOLUTION INTRAVENOUS ONCE
Refills: 0 | Status: COMPLETED | OUTPATIENT
Start: 2022-01-01 | End: 2022-01-01

## 2022-01-01 RX ORDER — LIDOCAINE 40 MG/G
4 PATCH TOPICAL
Qty: 1 | Refills: 0 | Status: ACTIVE | COMMUNITY
Start: 2022-01-01 | End: 1900-01-01

## 2022-01-01 RX ORDER — ELECTROLYTES/DEXTROSE
SOLUTION, ORAL ORAL DAILY
Refills: 0 | Status: DISCONTINUED | COMMUNITY
End: 2022-01-01

## 2022-01-01 RX ORDER — DEXTROSE 50 % IN WATER 50 %
25 SYRINGE (ML) INTRAVENOUS ONCE
Refills: 0 | Status: DISCONTINUED | OUTPATIENT
Start: 2022-01-01 | End: 2022-01-01

## 2022-01-01 RX ORDER — HYDROMORPHONE HYDROCHLORIDE 2 MG/ML
30 INJECTION INTRAMUSCULAR; INTRAVENOUS; SUBCUTANEOUS
Refills: 0 | Status: DISCONTINUED | OUTPATIENT
Start: 2022-01-01 | End: 2022-01-01

## 2022-01-01 RX ORDER — LIPASE/PROTEASE/AMYLASE 16-48-48K
1 CAPSULE,DELAYED RELEASE (ENTERIC COATED) ORAL
Refills: 0 | Status: DISCONTINUED | OUTPATIENT
Start: 2022-01-01 | End: 2022-01-01

## 2022-01-01 RX ORDER — ELECTROLYTE SOLUTION,INJ
1 VIAL (ML) INTRAVENOUS
Refills: 0 | Status: DISCONTINUED | OUTPATIENT
Start: 2022-01-01 | End: 2022-01-01

## 2022-01-01 RX ORDER — CHLORHEXIDINE GLUCONATE 213 G/1000ML
1 SOLUTION TOPICAL DAILY
Refills: 0 | Status: DISCONTINUED | OUTPATIENT
Start: 2022-01-01 | End: 2022-01-01

## 2022-01-01 RX ORDER — NALOXONE HYDROCHLORIDE 4 MG/.1ML
0.1 SPRAY NASAL
Refills: 0 | Status: DISCONTINUED | OUTPATIENT
Start: 2022-01-01 | End: 2022-01-01

## 2022-01-01 RX ORDER — NALBUPHINE HYDROCHLORIDE 10 MG/ML
2.5 INJECTION, SOLUTION INTRAMUSCULAR; INTRAVENOUS; SUBCUTANEOUS EVERY 6 HOURS
Refills: 0 | Status: DISCONTINUED | OUTPATIENT
Start: 2022-01-01 | End: 2022-01-01

## 2022-01-01 RX ORDER — SUCRALFATE 1 G
1 TABLET ORAL
Qty: 0 | Refills: 0 | DISCHARGE

## 2022-01-01 RX ORDER — DEXTROSE MONOHYDRATE, SODIUM CHLORIDE, AND POTASSIUM CHLORIDE 50; .745; 4.5 G/1000ML; G/1000ML; G/1000ML
1000 INJECTION, SOLUTION INTRAVENOUS
Refills: 0 | Status: DISCONTINUED | OUTPATIENT
Start: 2022-01-01 | End: 2022-01-01

## 2022-01-01 RX ORDER — DRONABINOL 2.5 MG
1 CAPSULE ORAL
Qty: 0 | Refills: 0 | DISCHARGE

## 2022-01-01 RX ORDER — DEXTROSE 50 % IN WATER 50 %
15 SYRINGE (ML) INTRAVENOUS ONCE
Refills: 0 | Status: DISCONTINUED | OUTPATIENT
Start: 2022-01-01 | End: 2022-01-01

## 2022-01-01 RX ORDER — HYDROMORPHONE HYDROCHLORIDE 2 MG/ML
0.25 INJECTION INTRAMUSCULAR; INTRAVENOUS; SUBCUTANEOUS ONCE
Refills: 0 | Status: DISCONTINUED | OUTPATIENT
Start: 2022-01-01 | End: 2022-01-01

## 2022-01-01 RX ORDER — LIPASE/PROTEASE/AMYLASE 16-48-48K
3 CAPSULE,DELAYED RELEASE (ENTERIC COATED) ORAL ONCE
Refills: 0 | Status: DISCONTINUED | OUTPATIENT
Start: 2022-01-01 | End: 2022-01-01

## 2022-01-01 RX ORDER — MAGNESIUM SULFATE 500 MG/ML
1 VIAL (ML) INJECTION ONCE
Refills: 0 | Status: COMPLETED | OUTPATIENT
Start: 2022-01-01 | End: 2022-01-01

## 2022-01-01 RX ORDER — ACETAMINOPHEN 500 MG
2 TABLET ORAL
Qty: 0 | Refills: 0 | DISCHARGE
Start: 2022-01-01

## 2022-01-01 RX ORDER — SODIUM CHLORIDE 9 MG/ML
500 INJECTION, SOLUTION INTRAVENOUS ONCE
Refills: 0 | Status: COMPLETED | OUTPATIENT
Start: 2022-01-01 | End: 2022-01-01

## 2022-01-01 RX ORDER — GABAPENTIN 100 MG/1
100 CAPSULE ORAL
Qty: 30 | Refills: 0 | Status: ACTIVE | COMMUNITY
Start: 2022-01-01 | End: 1900-01-01

## 2022-01-01 RX ORDER — MORPHINE SULFATE 50 MG/1
1 CAPSULE, EXTENDED RELEASE ORAL
Refills: 0 | Status: DISCONTINUED | OUTPATIENT
Start: 2022-01-01 | End: 2022-01-01

## 2022-01-01 RX ORDER — ONDANSETRON 8 MG/1
4 TABLET, FILM COATED ORAL ONCE
Refills: 0 | Status: COMPLETED | OUTPATIENT
Start: 2022-01-01 | End: 2022-01-01

## 2022-01-01 RX ORDER — BILBERRY
10 CAPSULE ORAL
Refills: 0 | Status: DISCONTINUED | COMMUNITY
End: 2022-01-01

## 2022-01-01 RX ORDER — ACETAMINOPHEN 500 MG
650 TABLET ORAL EVERY 6 HOURS
Refills: 0 | Status: DISCONTINUED | OUTPATIENT
Start: 2022-01-01 | End: 2022-01-01

## 2022-01-01 RX ORDER — SODIUM CHLORIDE 9 MG/ML
1000 INJECTION INTRAMUSCULAR; INTRAVENOUS; SUBCUTANEOUS ONCE
Refills: 0 | Status: COMPLETED | OUTPATIENT
Start: 2022-01-01 | End: 2022-01-01

## 2022-01-01 RX ORDER — BENZOCAINE 10 %
1 GEL (GRAM) MUCOUS MEMBRANE ONCE
Refills: 0 | Status: COMPLETED | OUTPATIENT
Start: 2022-01-01 | End: 2022-01-01

## 2022-01-01 RX ORDER — SODIUM CHLORIDE 9 G/ML
0.9 INJECTION, SOLUTION INTRAVENOUS
Qty: 1 | Refills: 6 | Status: ACTIVE | COMMUNITY
Start: 2022-01-01 | End: 1900-01-01

## 2022-01-01 RX ORDER — APIXABAN 5 MG (74)
5 KIT ORAL
Qty: 70 | Refills: 0 | Status: ACTIVE | COMMUNITY
Start: 2022-01-01 | End: 1900-01-01

## 2022-01-01 RX ORDER — HYDROMORPHONE HYDROCHLORIDE 2 MG/ML
0.25 INJECTION INTRAMUSCULAR; INTRAVENOUS; SUBCUTANEOUS
Refills: 0 | Status: DISCONTINUED | OUTPATIENT
Start: 2022-01-01 | End: 2022-01-01

## 2022-01-01 RX ORDER — OXYCODONE 10 MG/1
10 TABLET ORAL EVERY 6 HOURS
Qty: 60 | Refills: 0 | Status: ACTIVE | COMMUNITY
Start: 2022-01-01 | End: 1900-01-01

## 2022-01-01 RX ORDER — LIPASE/PROTEASE/AMYLASE 16-48-48K
3 CAPSULE,DELAYED RELEASE (ENTERIC COATED) ORAL
Refills: 0 | Status: DISCONTINUED | OUTPATIENT
Start: 2022-01-01 | End: 2022-01-01

## 2022-01-01 RX ORDER — FAMOTIDINE 10 MG/ML
20 INJECTION INTRAVENOUS ONCE
Refills: 0 | Status: COMPLETED | OUTPATIENT
Start: 2022-01-01 | End: 2022-01-01

## 2022-01-01 RX ORDER — FAMOTIDINE 10 MG/ML
20 INJECTION INTRAVENOUS DAILY
Refills: 0 | Status: DISCONTINUED | OUTPATIENT
Start: 2022-01-01 | End: 2022-01-01

## 2022-01-01 RX ORDER — ACETAMINOPHEN 500 MG
1000 TABLET ORAL EVERY 6 HOURS
Refills: 0 | Status: DISCONTINUED | OUTPATIENT
Start: 2022-01-01 | End: 2022-01-01

## 2022-01-01 RX ORDER — ATORVASTATIN CALCIUM 80 MG/1
1 TABLET, FILM COATED ORAL
Qty: 0 | Refills: 0 | DISCHARGE

## 2022-01-01 RX ORDER — MORPHINE SULFATE 50 MG/1
2 CAPSULE, EXTENDED RELEASE ORAL ONCE
Refills: 0 | Status: DISCONTINUED | OUTPATIENT
Start: 2022-01-01 | End: 2022-01-01

## 2022-01-01 RX ORDER — DEXTROSE 50 % IN WATER 50 %
12.5 SYRINGE (ML) INTRAVENOUS ONCE
Refills: 0 | Status: DISCONTINUED | OUTPATIENT
Start: 2022-01-01 | End: 2022-01-01

## 2022-01-01 RX ORDER — OXYCODONE HYDROCHLORIDE 10 MG/1
10 TABLET, FILM COATED, EXTENDED RELEASE ORAL
Qty: 60 | Refills: 0 | Status: ACTIVE | COMMUNITY
Start: 2022-01-01 | End: 1900-01-01

## 2022-01-01 RX ORDER — LANOLIN ALCOHOL/MO/W.PET/CERES
3 CREAM (GRAM) TOPICAL AT BEDTIME
Refills: 0 | Status: DISCONTINUED | OUTPATIENT
Start: 2022-01-01 | End: 2022-01-01

## 2022-01-01 RX ORDER — MAGNESIUM SULFATE 500 MG/ML
2 VIAL (ML) INJECTION ONCE
Refills: 0 | Status: DISCONTINUED | OUTPATIENT
Start: 2022-01-01 | End: 2022-01-01

## 2022-01-01 RX ORDER — COVID-19 MOLECULAR TEST ASSAY
KIT MISCELLANEOUS
Qty: 8 | Refills: 0 | Status: DISCONTINUED | COMMUNITY
Start: 2022-01-01

## 2022-01-01 RX ORDER — METOCLOPRAMIDE HCL 10 MG
10 TABLET ORAL THREE TIMES A DAY
Refills: 0 | Status: DISCONTINUED | OUTPATIENT
Start: 2022-01-01 | End: 2022-01-01

## 2022-01-01 RX ORDER — SODIUM,POTASSIUM PHOSPHATES 278-250MG
2 POWDER IN PACKET (EA) ORAL ONCE
Refills: 0 | Status: COMPLETED | OUTPATIENT
Start: 2022-01-01 | End: 2022-01-01

## 2022-01-01 RX ORDER — INSULIN LISPRO 100/ML
VIAL (ML) SUBCUTANEOUS EVERY 6 HOURS
Refills: 0 | Status: DISCONTINUED | OUTPATIENT
Start: 2022-01-01 | End: 2022-01-01

## 2022-01-01 RX ORDER — LIDOCAINE 4 G/100G
20 CREAM TOPICAL ONCE
Refills: 0 | Status: COMPLETED | OUTPATIENT
Start: 2022-01-01 | End: 2022-01-01

## 2022-01-01 RX ORDER — APIXABAN 5 MG/1
5 TABLET, FILM COATED ORAL
Qty: 90 | Refills: 0 | Status: DISCONTINUED | COMMUNITY
Start: 2022-01-01 | End: 2022-01-01

## 2022-01-01 RX ORDER — LIPASE/PROTEASE/AMYLASE 16-48-48K
1 CAPSULE,DELAYED RELEASE (ENTERIC COATED) ORAL
Qty: 0 | Refills: 0 | DISCHARGE

## 2022-01-01 RX ORDER — APIXABAN 2.5 MG/1
5 TABLET, FILM COATED ORAL EVERY 12 HOURS
Refills: 0 | Status: DISCONTINUED | OUTPATIENT
Start: 2022-01-01 | End: 2022-01-01

## 2022-01-01 RX ORDER — METOCLOPRAMIDE HCL 10 MG
10 TABLET ORAL ONCE
Refills: 0 | Status: COMPLETED | OUTPATIENT
Start: 2022-01-01 | End: 2022-01-01

## 2022-01-01 RX ORDER — SODIUM CHLORIDE 9 MG/ML
500 INJECTION INTRAMUSCULAR; INTRAVENOUS; SUBCUTANEOUS ONCE
Refills: 0 | Status: COMPLETED | OUTPATIENT
Start: 2022-01-01 | End: 2022-01-01

## 2022-01-01 RX ORDER — APIXABAN 2.5 MG/1
1 TABLET, FILM COATED ORAL
Qty: 0 | Refills: 0 | DISCHARGE

## 2022-01-01 RX ORDER — FAMOTIDINE 10 MG/ML
20 INJECTION INTRAVENOUS ONCE
Refills: 0 | Status: DISCONTINUED | OUTPATIENT
Start: 2022-01-01 | End: 2022-01-01

## 2022-01-01 RX ORDER — PANTOPRAZOLE SODIUM 20 MG/1
40 TABLET, DELAYED RELEASE ORAL
Refills: 0 | Status: DISCONTINUED | OUTPATIENT
Start: 2022-01-01 | End: 2022-01-01

## 2022-01-01 RX ORDER — SODIUM CHLORIDE 9 MG/ML
750 INJECTION, SOLUTION INTRAVENOUS ONCE
Refills: 0 | Status: COMPLETED | OUTPATIENT
Start: 2022-01-01 | End: 2022-01-01

## 2022-01-01 RX ORDER — ENOXAPARIN SODIUM 100 MG/ML
40 INJECTION SUBCUTANEOUS EVERY 24 HOURS
Refills: 0 | Status: DISCONTINUED | OUTPATIENT
Start: 2022-01-01 | End: 2022-01-01

## 2022-01-01 RX ORDER — OLAPARIB 150 MG/1
150 TABLET, FILM COATED ORAL TWICE DAILY
Qty: 120 | Refills: 0 | Status: ACTIVE | COMMUNITY
Start: 2022-01-01 | End: 1900-01-01

## 2022-01-01 RX ORDER — PROCHLORPERAZINE MALEATE 10 MG/1
10 TABLET ORAL EVERY 6 HOURS
Qty: 56 | Refills: 3 | Status: ACTIVE | COMMUNITY
Start: 2021-01-01 | End: 1900-01-01

## 2022-01-01 RX ORDER — PANTOPRAZOLE 40 MG/1
40 TABLET, DELAYED RELEASE ORAL
Qty: 90 | Refills: 0 | Status: DISCONTINUED | COMMUNITY
Start: 2022-01-01

## 2022-01-01 RX ORDER — PERFLUTREN 6.52 MG/ML
6.52 INJECTION, SUSPENSION INTRAVENOUS
Qty: 2 | Refills: 0 | Status: COMPLETED | OUTPATIENT
Start: 2022-01-01

## 2022-01-01 RX ORDER — APIXABAN 5 MG/1
5 TABLET, FILM COATED ORAL
Qty: 90 | Refills: 0 | Status: ACTIVE | COMMUNITY
Start: 2022-01-01 | End: 1900-01-01

## 2022-01-01 RX ORDER — BENZOCAINE AND MENTHOL 5; 1 G/100ML; G/100ML
1 LIQUID ORAL THREE TIMES A DAY
Refills: 0 | Status: DISCONTINUED | OUTPATIENT
Start: 2022-01-01 | End: 2022-01-01

## 2022-01-01 RX ORDER — ASCORBIC ACID 500 MG
500 TABLET ORAL DAILY
Refills: 0 | Status: DISCONTINUED | COMMUNITY
End: 2022-01-01

## 2022-01-01 RX ORDER — DRONABINOL 2.5 MG
2.5 CAPSULE ORAL EVERY 12 HOURS
Refills: 0 | Status: DISCONTINUED | OUTPATIENT
Start: 2022-01-01 | End: 2022-01-01

## 2022-01-01 RX ORDER — ONDANSETRON 8 MG/1
4 TABLET, FILM COATED ORAL ONCE
Refills: 0 | Status: DISCONTINUED | OUTPATIENT
Start: 2022-01-01 | End: 2022-01-01

## 2022-01-01 RX ORDER — NALOXONE HYDROCHLORIDE 4 MG/.1ML
4 SPRAY NASAL AS DIRECTED
Qty: 1 | Refills: 0 | Status: ACTIVE | COMMUNITY
Start: 2022-01-01 | End: 1900-01-01

## 2022-01-01 RX ORDER — OXYCODONE HYDROCHLORIDE 5 MG/1
1 TABLET ORAL
Qty: 0 | Refills: 0 | DISCHARGE

## 2022-01-01 RX ORDER — PEMBROLIZUMAB 25 MG/ML
100 INJECTION, SOLUTION INTRAVENOUS
Qty: 1 | Refills: 18 | Status: ACTIVE | OUTPATIENT
Start: 2022-01-01

## 2022-01-01 RX ORDER — DRONABINOL 2.5 MG/1
2.5 CAPSULE ORAL
Qty: 60 | Refills: 0 | Status: ACTIVE | COMMUNITY
Start: 2021-01-01 | End: 1900-01-01

## 2022-01-01 RX ORDER — OXYCODONE HYDROCHLORIDE 5 MG/1
5 TABLET ORAL EVERY 4 HOURS
Refills: 0 | Status: DISCONTINUED | OUTPATIENT
Start: 2022-01-01 | End: 2022-01-01

## 2022-01-01 RX ORDER — HEPARIN SODIUM 5000 [USP'U]/ML
5000 INJECTION INTRAVENOUS; SUBCUTANEOUS EVERY 12 HOURS
Refills: 0 | Status: DISCONTINUED | OUTPATIENT
Start: 2022-01-01 | End: 2022-01-01

## 2022-01-01 RX ORDER — DIPHENHYDRAMINE HCL 50 MG
25 CAPSULE ORAL AT BEDTIME
Refills: 0 | Status: DISCONTINUED | OUTPATIENT
Start: 2022-01-01 | End: 2022-01-01

## 2022-01-01 RX ORDER — INSULIN LISPRO 100/ML
VIAL (ML) SUBCUTANEOUS
Refills: 0 | Status: DISCONTINUED | OUTPATIENT
Start: 2022-01-01 | End: 2022-01-01

## 2022-01-01 RX ORDER — ALBUMIN HUMAN 25 %
100 VIAL (ML) INTRAVENOUS ONCE
Refills: 0 | Status: COMPLETED | OUTPATIENT
Start: 2022-01-01 | End: 2022-01-01

## 2022-01-01 RX ORDER — POLYETHYLENE GLYCOL 3350 17 G/17G
17 POWDER, FOR SOLUTION ORAL DAILY
Refills: 0 | Status: DISCONTINUED | OUTPATIENT
Start: 2022-01-01 | End: 2022-01-01

## 2022-01-01 RX ORDER — POLYETHYLENE GLYCOL 3350 17 G/17G
17 POWDER, FOR SOLUTION ORAL
Qty: 0 | Refills: 0 | DISCHARGE
Start: 2022-01-01

## 2022-01-01 RX ADMIN — Medication 1: at 12:24

## 2022-01-01 RX ADMIN — MORPHINE SULFATE 2 MILLIGRAM(S): 50 CAPSULE, EXTENDED RELEASE ORAL at 01:42

## 2022-01-01 RX ADMIN — Medication 25 MILLIGRAM(S): at 22:51

## 2022-01-01 RX ADMIN — Medication 100 MILLIEQUIVALENT(S): at 16:00

## 2022-01-01 RX ADMIN — PANTOPRAZOLE SODIUM 40 MILLIGRAM(S): 20 TABLET, DELAYED RELEASE ORAL at 18:10

## 2022-01-01 RX ADMIN — CHLORHEXIDINE GLUCONATE 1 APPLICATION(S): 213 SOLUTION TOPICAL at 11:50

## 2022-01-01 RX ADMIN — Medication 1 EACH: at 18:05

## 2022-01-01 RX ADMIN — ONDANSETRON 4 MILLIGRAM(S): 8 TABLET, FILM COATED ORAL at 18:03

## 2022-01-01 RX ADMIN — CHLORHEXIDINE GLUCONATE 1 APPLICATION(S): 213 SOLUTION TOPICAL at 13:05

## 2022-01-01 RX ADMIN — OXYCODONE HYDROCHLORIDE 5 MILLIGRAM(S): 5 TABLET ORAL at 06:34

## 2022-01-01 RX ADMIN — Medication 25 MILLIGRAM(S): at 00:16

## 2022-01-01 RX ADMIN — CHLORHEXIDINE GLUCONATE 1 APPLICATION(S): 213 SOLUTION TOPICAL at 12:43

## 2022-01-01 RX ADMIN — Medication 400 MILLIGRAM(S): at 11:18

## 2022-01-01 RX ADMIN — Medication 50 MILLILITER(S): at 14:22

## 2022-01-01 RX ADMIN — Medication 1: at 12:42

## 2022-01-01 RX ADMIN — Medication 2: at 11:54

## 2022-01-01 RX ADMIN — Medication 1: at 23:14

## 2022-01-01 RX ADMIN — Medication 25 GRAM(S): at 10:16

## 2022-01-01 RX ADMIN — Medication 100 GRAM(S): at 14:27

## 2022-01-01 RX ADMIN — I.V. FAT EMULSION 10.4 ML/HR: 20 EMULSION INTRAVENOUS at 18:36

## 2022-01-01 RX ADMIN — I.V. FAT EMULSION 10.4 ML/HR: 20 EMULSION INTRAVENOUS at 18:26

## 2022-01-01 RX ADMIN — Medication 1: at 18:56

## 2022-01-01 RX ADMIN — Medication 1: at 13:32

## 2022-01-01 RX ADMIN — Medication 1 EACH: at 18:10

## 2022-01-01 RX ADMIN — Medication 10 MILLIGRAM(S): at 11:09

## 2022-01-01 RX ADMIN — MORPHINE SULFATE 2 MILLIGRAM(S): 50 CAPSULE, EXTENDED RELEASE ORAL at 01:12

## 2022-01-01 RX ADMIN — Medication 25 GRAM(S): at 09:04

## 2022-01-01 RX ADMIN — ENOXAPARIN SODIUM 40 MILLIGRAM(S): 100 INJECTION SUBCUTANEOUS at 05:01

## 2022-01-01 RX ADMIN — SODIUM CHLORIDE 1000 MILLILITER(S): 9 INJECTION INTRAMUSCULAR; INTRAVENOUS; SUBCUTANEOUS at 21:43

## 2022-01-01 RX ADMIN — SODIUM CHLORIDE 125 MILLILITER(S): 9 INJECTION, SOLUTION INTRAVENOUS at 02:27

## 2022-01-01 RX ADMIN — Medication 4 MILLIGRAM(S): at 06:53

## 2022-01-01 RX ADMIN — Medication 300 UNIT(S): at 00:03

## 2022-01-01 RX ADMIN — Medication 3 CAPSULE(S): at 06:58

## 2022-01-01 RX ADMIN — Medication 400 MILLIGRAM(S): at 16:15

## 2022-01-01 RX ADMIN — ENOXAPARIN SODIUM 40 MILLIGRAM(S): 100 INJECTION SUBCUTANEOUS at 06:52

## 2022-01-01 RX ADMIN — Medication 10 MILLIGRAM(S): at 23:21

## 2022-01-01 RX ADMIN — HEPARIN SODIUM 5000 UNIT(S): 5000 INJECTION INTRAVENOUS; SUBCUTANEOUS at 17:29

## 2022-01-01 RX ADMIN — ENOXAPARIN SODIUM 40 MILLIGRAM(S): 100 INJECTION SUBCUTANEOUS at 06:30

## 2022-01-01 RX ADMIN — Medication 3 CAPSULE(S): at 12:16

## 2022-01-01 RX ADMIN — Medication 400 MILLIGRAM(S): at 23:21

## 2022-01-01 RX ADMIN — Medication 42 EACH: at 22:57

## 2022-01-01 RX ADMIN — PANTOPRAZOLE SODIUM 40 MILLIGRAM(S): 20 TABLET, DELAYED RELEASE ORAL at 11:19

## 2022-01-01 RX ADMIN — HYDROMORPHONE HYDROCHLORIDE 0.5 MILLIGRAM(S): 2 INJECTION INTRAMUSCULAR; INTRAVENOUS; SUBCUTANEOUS at 12:55

## 2022-01-01 RX ADMIN — HYDROMORPHONE HYDROCHLORIDE 30 MILLILITER(S): 2 INJECTION INTRAMUSCULAR; INTRAVENOUS; SUBCUTANEOUS at 19:25

## 2022-01-01 RX ADMIN — CHLORHEXIDINE GLUCONATE 1 APPLICATION(S): 213 SOLUTION TOPICAL at 13:12

## 2022-01-01 RX ADMIN — CHLORHEXIDINE GLUCONATE 1 APPLICATION(S): 213 SOLUTION TOPICAL at 12:46

## 2022-01-01 RX ADMIN — Medication 1 EACH: at 17:23

## 2022-01-01 RX ADMIN — Medication 10 MILLIGRAM(S): at 22:39

## 2022-01-01 RX ADMIN — Medication 1 EACH: at 19:37

## 2022-01-01 RX ADMIN — Medication 2: at 06:26

## 2022-01-01 RX ADMIN — ONDANSETRON 4 MILLIGRAM(S): 8 TABLET, FILM COATED ORAL at 21:36

## 2022-01-01 RX ADMIN — Medication 25 MILLIGRAM(S): at 23:54

## 2022-01-01 RX ADMIN — Medication 100 MILLIEQUIVALENT(S): at 11:19

## 2022-01-01 RX ADMIN — CHLORHEXIDINE GLUCONATE 1 APPLICATION(S): 213 SOLUTION TOPICAL at 11:12

## 2022-01-01 RX ADMIN — SODIUM CHLORIDE 30 MILLILITER(S): 9 INJECTION, SOLUTION INTRAVENOUS at 18:10

## 2022-01-01 RX ADMIN — Medication 1 TABLET(S): at 06:58

## 2022-01-01 RX ADMIN — ENOXAPARIN SODIUM 40 MILLIGRAM(S): 100 INJECTION SUBCUTANEOUS at 06:22

## 2022-01-01 RX ADMIN — Medication 1000 MILLIGRAM(S): at 23:54

## 2022-01-01 RX ADMIN — ENOXAPARIN SODIUM 40 MILLIGRAM(S): 100 INJECTION SUBCUTANEOUS at 06:59

## 2022-01-01 RX ADMIN — Medication 10 MILLIGRAM(S): at 14:13

## 2022-01-01 RX ADMIN — SODIUM CHLORIDE 1000 MILLILITER(S): 9 INJECTION, SOLUTION INTRAVENOUS at 06:53

## 2022-01-01 RX ADMIN — FAMOTIDINE 20 MILLIGRAM(S): 10 INJECTION INTRAVENOUS at 13:41

## 2022-01-01 RX ADMIN — MORPHINE SULFATE 4 MILLIGRAM(S): 50 CAPSULE, EXTENDED RELEASE ORAL at 16:52

## 2022-01-01 RX ADMIN — SODIUM CHLORIDE 750 MILLILITER(S): 9 INJECTION, SOLUTION INTRAVENOUS at 18:47

## 2022-01-01 RX ADMIN — ENOXAPARIN SODIUM 40 MILLIGRAM(S): 100 INJECTION SUBCUTANEOUS at 05:04

## 2022-01-01 RX ADMIN — Medication 10 MILLIGRAM(S): at 22:17

## 2022-01-01 RX ADMIN — Medication 10 MILLIGRAM(S): at 05:03

## 2022-01-01 RX ADMIN — Medication 2: at 06:23

## 2022-01-01 RX ADMIN — Medication 1: at 00:18

## 2022-01-01 RX ADMIN — Medication 4 MILLIGRAM(S): at 17:29

## 2022-01-01 RX ADMIN — OXYCODONE HYDROCHLORIDE 5 MILLIGRAM(S): 5 TABLET ORAL at 04:03

## 2022-01-01 RX ADMIN — OXYCODONE HYDROCHLORIDE 5 MILLIGRAM(S): 5 TABLET ORAL at 09:47

## 2022-01-01 RX ADMIN — Medication 1 CAPSULE(S): at 09:55

## 2022-01-01 RX ADMIN — ONDANSETRON 4 MILLIGRAM(S): 8 TABLET, FILM COATED ORAL at 13:42

## 2022-01-01 RX ADMIN — Medication 2: at 12:47

## 2022-01-01 RX ADMIN — Medication 4 MILLIGRAM(S): at 06:19

## 2022-01-01 RX ADMIN — HYDROMORPHONE HYDROCHLORIDE 30 MILLILITER(S): 2 INJECTION INTRAMUSCULAR; INTRAVENOUS; SUBCUTANEOUS at 08:32

## 2022-01-01 RX ADMIN — HEPARIN SODIUM 5000 UNIT(S): 5000 INJECTION INTRAVENOUS; SUBCUTANEOUS at 06:22

## 2022-01-01 RX ADMIN — Medication 25 MILLIGRAM(S): at 22:57

## 2022-01-01 RX ADMIN — Medication 1: at 00:23

## 2022-01-01 RX ADMIN — Medication 1: at 06:03

## 2022-01-01 RX ADMIN — HYDROMORPHONE HYDROCHLORIDE 30 MILLILITER(S): 2 INJECTION INTRAMUSCULAR; INTRAVENOUS; SUBCUTANEOUS at 19:11

## 2022-01-01 RX ADMIN — Medication 1: at 00:11

## 2022-01-01 RX ADMIN — CHLORHEXIDINE GLUCONATE 1 APPLICATION(S): 213 SOLUTION TOPICAL at 12:50

## 2022-01-01 RX ADMIN — CHLORHEXIDINE GLUCONATE 1 APPLICATION(S): 213 SOLUTION TOPICAL at 16:38

## 2022-01-01 RX ADMIN — PANTOPRAZOLE SODIUM 40 MILLIGRAM(S): 20 TABLET, DELAYED RELEASE ORAL at 12:40

## 2022-01-01 RX ADMIN — MORPHINE SULFATE 2 MILLIGRAM(S): 50 CAPSULE, EXTENDED RELEASE ORAL at 08:34

## 2022-01-01 RX ADMIN — HYDROMORPHONE HYDROCHLORIDE 30 MILLILITER(S): 2 INJECTION INTRAMUSCULAR; INTRAVENOUS; SUBCUTANEOUS at 13:27

## 2022-01-01 RX ADMIN — I.V. FAT EMULSION 10.4 ML/HR: 20 EMULSION INTRAVENOUS at 18:37

## 2022-01-01 RX ADMIN — PANTOPRAZOLE SODIUM 40 MILLIGRAM(S): 20 TABLET, DELAYED RELEASE ORAL at 04:16

## 2022-01-01 RX ADMIN — MORPHINE SULFATE 4 MILLIGRAM(S): 50 CAPSULE, EXTENDED RELEASE ORAL at 16:22

## 2022-01-01 RX ADMIN — MORPHINE SULFATE 2 MILLIGRAM(S): 50 CAPSULE, EXTENDED RELEASE ORAL at 02:10

## 2022-01-01 RX ADMIN — CHLORHEXIDINE GLUCONATE 1 APPLICATION(S): 213 SOLUTION TOPICAL at 12:03

## 2022-01-01 RX ADMIN — Medication 1 EACH: at 18:26

## 2022-01-01 RX ADMIN — Medication 25 GRAM(S): at 05:43

## 2022-01-01 RX ADMIN — MORPHINE SULFATE 2 MILLIGRAM(S): 50 CAPSULE, EXTENDED RELEASE ORAL at 01:23

## 2022-01-01 RX ADMIN — Medication 25 MILLIGRAM(S): at 22:05

## 2022-01-01 RX ADMIN — Medication 1: at 13:10

## 2022-01-01 RX ADMIN — Medication 1: at 18:14

## 2022-01-01 RX ADMIN — Medication 1 EACH: at 18:14

## 2022-01-01 RX ADMIN — Medication 3 CAPSULE(S): at 13:08

## 2022-01-01 RX ADMIN — CHLORHEXIDINE GLUCONATE 1 APPLICATION(S): 213 SOLUTION TOPICAL at 12:24

## 2022-01-01 RX ADMIN — HYDROMORPHONE HYDROCHLORIDE 0.5 MILLIGRAM(S): 2 INJECTION INTRAMUSCULAR; INTRAVENOUS; SUBCUTANEOUS at 12:40

## 2022-01-01 RX ADMIN — OXYCODONE HYDROCHLORIDE 5 MILLIGRAM(S): 5 TABLET ORAL at 23:21

## 2022-01-01 RX ADMIN — HYDROMORPHONE HYDROCHLORIDE 0.5 MILLIGRAM(S): 2 INJECTION INTRAMUSCULAR; INTRAVENOUS; SUBCUTANEOUS at 13:35

## 2022-01-01 RX ADMIN — Medication 1 EACH: at 18:21

## 2022-01-01 RX ADMIN — Medication 25 GRAM(S): at 12:35

## 2022-01-01 RX ADMIN — OXYCODONE HYDROCHLORIDE 5 MILLIGRAM(S): 5 TABLET ORAL at 18:00

## 2022-01-01 RX ADMIN — HYDROMORPHONE HYDROCHLORIDE 30 MILLILITER(S): 2 INJECTION INTRAMUSCULAR; INTRAVENOUS; SUBCUTANEOUS at 16:59

## 2022-01-01 RX ADMIN — Medication 1000 MILLIGRAM(S): at 05:54

## 2022-01-01 RX ADMIN — ONDANSETRON 4 MILLIGRAM(S): 8 TABLET, FILM COATED ORAL at 01:22

## 2022-01-01 RX ADMIN — DEXTROSE MONOHYDRATE, SODIUM CHLORIDE, AND POTASSIUM CHLORIDE 125 MILLILITER(S): 50; .745; 4.5 INJECTION, SOLUTION INTRAVENOUS at 09:49

## 2022-01-01 RX ADMIN — HYDROMORPHONE HYDROCHLORIDE 30 MILLILITER(S): 2 INJECTION INTRAMUSCULAR; INTRAVENOUS; SUBCUTANEOUS at 17:54

## 2022-01-01 RX ADMIN — CHLORHEXIDINE GLUCONATE 1 APPLICATION(S): 213 SOLUTION TOPICAL at 11:20

## 2022-01-01 RX ADMIN — FAMOTIDINE 20 MILLIGRAM(S): 10 INJECTION INTRAVENOUS at 12:39

## 2022-01-01 RX ADMIN — Medication 50 MILLILITER(S): at 15:30

## 2022-01-01 RX ADMIN — OXYCODONE HYDROCHLORIDE 5 MILLIGRAM(S): 5 TABLET ORAL at 05:34

## 2022-01-01 RX ADMIN — Medication 1: at 05:31

## 2022-01-01 RX ADMIN — SODIUM CHLORIDE 100 MILLILITER(S): 9 INJECTION, SOLUTION INTRAVENOUS at 22:53

## 2022-01-01 RX ADMIN — SODIUM CHLORIDE 125 MILLILITER(S): 9 INJECTION, SOLUTION INTRAVENOUS at 16:04

## 2022-01-01 RX ADMIN — Medication 1 EACH: at 18:24

## 2022-01-01 RX ADMIN — Medication 1: at 01:25

## 2022-01-01 RX ADMIN — I.V. FAT EMULSION 10.4 ML/HR: 20 EMULSION INTRAVENOUS at 18:22

## 2022-01-01 RX ADMIN — Medication 400 MILLIGRAM(S): at 12:11

## 2022-01-01 RX ADMIN — ONDANSETRON 4 MILLIGRAM(S): 8 TABLET, FILM COATED ORAL at 16:12

## 2022-01-01 RX ADMIN — ENOXAPARIN SODIUM 40 MILLIGRAM(S): 100 INJECTION SUBCUTANEOUS at 12:35

## 2022-01-01 RX ADMIN — OXYCODONE HYDROCHLORIDE 5 MILLIGRAM(S): 5 TABLET ORAL at 10:47

## 2022-01-01 RX ADMIN — SODIUM CHLORIDE 1000 MILLILITER(S): 9 INJECTION, SOLUTION INTRAVENOUS at 19:16

## 2022-01-01 RX ADMIN — ENOXAPARIN SODIUM 40 MILLIGRAM(S): 100 INJECTION SUBCUTANEOUS at 06:28

## 2022-01-01 RX ADMIN — Medication 1000 MILLIGRAM(S): at 22:35

## 2022-01-01 RX ADMIN — Medication 10 MILLIGRAM(S): at 23:29

## 2022-01-01 RX ADMIN — Medication 2: at 12:12

## 2022-01-01 RX ADMIN — Medication 1: at 13:20

## 2022-01-01 RX ADMIN — Medication 2 TABLET(S): at 10:16

## 2022-01-01 RX ADMIN — Medication 1: at 00:35

## 2022-01-01 RX ADMIN — Medication 25 MILLIGRAM(S): at 22:15

## 2022-01-01 RX ADMIN — Medication 1: at 12:30

## 2022-01-01 RX ADMIN — Medication 10 MILLIGRAM(S): at 05:01

## 2022-01-01 RX ADMIN — Medication 1: at 05:43

## 2022-01-01 RX ADMIN — Medication 2: at 18:12

## 2022-01-01 RX ADMIN — I.V. FAT EMULSION 10.4 ML/HR: 20 EMULSION INTRAVENOUS at 18:16

## 2022-01-01 RX ADMIN — Medication 400 MILLIGRAM(S): at 12:41

## 2022-01-01 RX ADMIN — Medication 1: at 06:35

## 2022-01-01 RX ADMIN — DEXTROSE MONOHYDRATE, SODIUM CHLORIDE, AND POTASSIUM CHLORIDE 125 MILLILITER(S): 50; .745; 4.5 INJECTION, SOLUTION INTRAVENOUS at 04:30

## 2022-01-01 RX ADMIN — Medication 1: at 12:38

## 2022-01-01 RX ADMIN — Medication 2: at 06:22

## 2022-01-01 RX ADMIN — Medication 4: at 11:50

## 2022-01-01 RX ADMIN — Medication 1000 MILLIGRAM(S): at 04:43

## 2022-01-01 RX ADMIN — Medication 25 MILLIGRAM(S): at 00:59

## 2022-01-01 RX ADMIN — CHLORHEXIDINE GLUCONATE 1 APPLICATION(S): 213 SOLUTION TOPICAL at 22:04

## 2022-01-01 RX ADMIN — SODIUM CHLORIDE 1000 MILLILITER(S): 9 INJECTION, SOLUTION INTRAVENOUS at 04:48

## 2022-01-01 RX ADMIN — MORPHINE SULFATE 2 MILLIGRAM(S): 50 CAPSULE, EXTENDED RELEASE ORAL at 15:30

## 2022-01-01 RX ADMIN — ATORVASTATIN CALCIUM 40 MILLIGRAM(S): 80 TABLET, FILM COATED ORAL at 21:25

## 2022-01-01 RX ADMIN — Medication 25 GRAM(S): at 10:12

## 2022-01-01 RX ADMIN — SODIUM CHLORIDE 1000 MILLILITER(S): 9 INJECTION INTRAMUSCULAR; INTRAVENOUS; SUBCUTANEOUS at 17:18

## 2022-01-01 RX ADMIN — ENOXAPARIN SODIUM 40 MILLIGRAM(S): 100 INJECTION SUBCUTANEOUS at 06:48

## 2022-01-01 RX ADMIN — APIXABAN 5 MILLIGRAM(S): 2.5 TABLET, FILM COATED ORAL at 05:33

## 2022-01-01 RX ADMIN — Medication 1: at 00:36

## 2022-01-01 RX ADMIN — SODIUM CHLORIDE 1000 MILLILITER(S): 9 INJECTION, SOLUTION INTRAVENOUS at 11:18

## 2022-01-01 RX ADMIN — Medication 1: at 18:18

## 2022-01-01 RX ADMIN — I.V. FAT EMULSION 10.4 ML/HR: 20 EMULSION INTRAVENOUS at 18:52

## 2022-01-01 RX ADMIN — MORPHINE SULFATE 2 MILLIGRAM(S): 50 CAPSULE, EXTENDED RELEASE ORAL at 15:16

## 2022-01-01 RX ADMIN — Medication 10 MILLIGRAM(S): at 05:15

## 2022-01-01 RX ADMIN — Medication 1000 MILLIGRAM(S): at 16:31

## 2022-01-01 RX ADMIN — Medication 25 MILLIGRAM(S): at 01:11

## 2022-01-01 RX ADMIN — Medication 100 MILLIEQUIVALENT(S): at 14:00

## 2022-01-01 RX ADMIN — Medication 400 MILLIGRAM(S): at 04:08

## 2022-01-01 RX ADMIN — Medication 1: at 00:00

## 2022-01-01 RX ADMIN — Medication 1: at 05:37

## 2022-01-01 RX ADMIN — Medication 1: at 06:49

## 2022-01-01 RX ADMIN — Medication 1: at 06:53

## 2022-01-01 RX ADMIN — Medication 83 EACH: at 19:41

## 2022-01-01 RX ADMIN — SODIUM CHLORIDE 2000 MILLILITER(S): 9 INJECTION INTRAMUSCULAR; INTRAVENOUS; SUBCUTANEOUS at 13:42

## 2022-01-01 RX ADMIN — SODIUM CHLORIDE 1000 MILLILITER(S): 9 INJECTION, SOLUTION INTRAVENOUS at 18:22

## 2022-01-01 RX ADMIN — SODIUM CHLORIDE 100 MILLILITER(S): 9 INJECTION, SOLUTION INTRAVENOUS at 05:44

## 2022-01-01 RX ADMIN — Medication 10 MILLIGRAM(S): at 06:30

## 2022-01-01 RX ADMIN — Medication 1 CAPSULE(S): at 12:39

## 2022-01-01 RX ADMIN — Medication 1: at 13:04

## 2022-01-01 RX ADMIN — Medication 1 EACH: at 18:36

## 2022-01-01 RX ADMIN — SODIUM CHLORIDE 30 MILLILITER(S): 9 INJECTION, SOLUTION INTRAVENOUS at 14:57

## 2022-01-01 RX ADMIN — HYDROMORPHONE HYDROCHLORIDE 0.25 MILLIGRAM(S): 2 INJECTION INTRAMUSCULAR; INTRAVENOUS; SUBCUTANEOUS at 16:27

## 2022-01-01 RX ADMIN — HYDROMORPHONE HYDROCHLORIDE 0.25 MILLIGRAM(S): 2 INJECTION INTRAMUSCULAR; INTRAVENOUS; SUBCUTANEOUS at 21:28

## 2022-01-01 RX ADMIN — HYDROMORPHONE HYDROCHLORIDE 30 MILLILITER(S): 2 INJECTION INTRAMUSCULAR; INTRAVENOUS; SUBCUTANEOUS at 07:42

## 2022-01-01 RX ADMIN — Medication 3: at 23:10

## 2022-01-01 RX ADMIN — Medication 25 MILLIGRAM(S): at 23:14

## 2022-01-01 RX ADMIN — SODIUM CHLORIDE 1000 MILLILITER(S): 9 INJECTION, SOLUTION INTRAVENOUS at 12:30

## 2022-01-01 RX ADMIN — ENOXAPARIN SODIUM 40 MILLIGRAM(S): 100 INJECTION SUBCUTANEOUS at 05:50

## 2022-01-01 RX ADMIN — ONDANSETRON 4 MILLIGRAM(S): 8 TABLET, FILM COATED ORAL at 06:53

## 2022-01-01 RX ADMIN — Medication 1000 MILLIGRAM(S): at 13:35

## 2022-01-01 RX ADMIN — Medication 2.5 MILLIGRAM(S): at 07:21

## 2022-01-01 RX ADMIN — SODIUM CHLORIDE 30 MILLILITER(S): 9 INJECTION, SOLUTION INTRAVENOUS at 12:47

## 2022-01-01 RX ADMIN — Medication 1 EACH: at 17:10

## 2022-01-01 RX ADMIN — Medication 400 MILLIGRAM(S): at 18:11

## 2022-01-01 RX ADMIN — Medication 1 EACH: at 18:25

## 2022-01-01 RX ADMIN — HYDROMORPHONE HYDROCHLORIDE 30 MILLILITER(S): 2 INJECTION INTRAMUSCULAR; INTRAVENOUS; SUBCUTANEOUS at 19:32

## 2022-01-01 RX ADMIN — SODIUM CHLORIDE 500 MILLILITER(S): 9 INJECTION, SOLUTION INTRAVENOUS at 08:42

## 2022-01-01 RX ADMIN — Medication 25 MILLIGRAM(S): at 23:08

## 2022-01-01 RX ADMIN — Medication 25 MILLIGRAM(S): at 23:22

## 2022-01-01 RX ADMIN — Medication 1 CAPSULE(S): at 09:42

## 2022-01-01 RX ADMIN — Medication 1 EACH: at 17:38

## 2022-01-01 RX ADMIN — Medication 3 CAPSULE(S): at 08:43

## 2022-01-01 RX ADMIN — Medication 25 MILLIGRAM(S): at 23:16

## 2022-01-01 RX ADMIN — Medication 1 SPRAY(S): at 22:50

## 2022-01-01 RX ADMIN — Medication 25 MILLIGRAM(S): at 22:02

## 2022-01-01 RX ADMIN — CHLORHEXIDINE GLUCONATE 1 APPLICATION(S): 213 SOLUTION TOPICAL at 12:11

## 2022-01-01 RX ADMIN — Medication 25 GRAM(S): at 15:43

## 2022-01-01 RX ADMIN — Medication 1: at 23:30

## 2022-01-01 RX ADMIN — Medication 400 MILLIGRAM(S): at 05:16

## 2022-01-01 RX ADMIN — SODIUM CHLORIDE 1000 MILLILITER(S): 9 INJECTION INTRAMUSCULAR; INTRAVENOUS; SUBCUTANEOUS at 13:05

## 2022-01-01 RX ADMIN — OXYCODONE HYDROCHLORIDE 5 MILLIGRAM(S): 5 TABLET ORAL at 17:00

## 2022-01-01 RX ADMIN — ENOXAPARIN SODIUM 40 MILLIGRAM(S): 100 INJECTION SUBCUTANEOUS at 06:37

## 2022-01-01 RX ADMIN — MORPHINE SULFATE 2 MILLIGRAM(S): 50 CAPSULE, EXTENDED RELEASE ORAL at 06:20

## 2022-01-01 RX ADMIN — SODIUM CHLORIDE 1000 MILLILITER(S): 9 INJECTION INTRAMUSCULAR; INTRAVENOUS; SUBCUTANEOUS at 16:18

## 2022-01-01 RX ADMIN — PANTOPRAZOLE SODIUM 40 MILLIGRAM(S): 20 TABLET, DELAYED RELEASE ORAL at 11:37

## 2022-01-01 RX ADMIN — SODIUM CHLORIDE 125 MILLILITER(S): 9 INJECTION, SOLUTION INTRAVENOUS at 21:17

## 2022-01-01 RX ADMIN — Medication 400 MILLIGRAM(S): at 22:05

## 2022-01-01 RX ADMIN — PANTOPRAZOLE SODIUM 40 MILLIGRAM(S): 20 TABLET, DELAYED RELEASE ORAL at 12:41

## 2022-01-01 RX ADMIN — Medication 1: at 23:18

## 2022-01-01 RX ADMIN — Medication 2.5 MILLIGRAM(S): at 05:33

## 2022-01-01 RX ADMIN — HYDROMORPHONE HYDROCHLORIDE 0.5 MILLIGRAM(S): 2 INJECTION INTRAMUSCULAR; INTRAVENOUS; SUBCUTANEOUS at 13:19

## 2022-01-01 RX ADMIN — PIPERACILLIN AND TAZOBACTAM 200 GRAM(S): 4; .5 INJECTION, POWDER, LYOPHILIZED, FOR SOLUTION INTRAVENOUS at 20:57

## 2022-01-01 RX ADMIN — SODIUM CHLORIDE 1000 MILLILITER(S): 9 INJECTION, SOLUTION INTRAVENOUS at 15:08

## 2022-01-01 RX ADMIN — ENOXAPARIN SODIUM 40 MILLIGRAM(S): 100 INJECTION SUBCUTANEOUS at 05:40

## 2022-01-01 RX ADMIN — PANTOPRAZOLE SODIUM 40 MILLIGRAM(S): 20 TABLET, DELAYED RELEASE ORAL at 01:36

## 2022-01-01 RX ADMIN — Medication 1: at 18:02

## 2022-01-01 RX ADMIN — ENOXAPARIN SODIUM 40 MILLIGRAM(S): 100 INJECTION SUBCUTANEOUS at 06:36

## 2022-01-01 RX ADMIN — ENOXAPARIN SODIUM 40 MILLIGRAM(S): 100 INJECTION SUBCUTANEOUS at 11:56

## 2022-01-01 RX ADMIN — ENOXAPARIN SODIUM 40 MILLIGRAM(S): 100 INJECTION SUBCUTANEOUS at 06:46

## 2022-01-01 RX ADMIN — PANTOPRAZOLE SODIUM 40 MILLIGRAM(S): 20 TABLET, DELAYED RELEASE ORAL at 07:00

## 2022-01-01 RX ADMIN — Medication 1: at 18:24

## 2022-01-01 RX ADMIN — Medication 1: at 18:23

## 2022-01-01 RX ADMIN — DEXTROSE MONOHYDRATE, SODIUM CHLORIDE, AND POTASSIUM CHLORIDE 125 MILLILITER(S): 50; .745; 4.5 INJECTION, SOLUTION INTRAVENOUS at 17:00

## 2022-01-01 RX ADMIN — Medication 2: at 12:26

## 2022-01-01 RX ADMIN — OXYCODONE HYDROCHLORIDE 5 MILLIGRAM(S): 5 TABLET ORAL at 00:21

## 2022-01-01 RX ADMIN — HYDROMORPHONE HYDROCHLORIDE 30 MILLILITER(S): 2 INJECTION INTRAMUSCULAR; INTRAVENOUS; SUBCUTANEOUS at 07:13

## 2022-01-01 RX ADMIN — CHLORHEXIDINE GLUCONATE 1 APPLICATION(S): 213 SOLUTION TOPICAL at 13:31

## 2022-01-01 RX ADMIN — Medication 2.5 MILLIGRAM(S): at 17:00

## 2022-01-01 RX ADMIN — HYDROMORPHONE HYDROCHLORIDE 0.25 MILLIGRAM(S): 2 INJECTION INTRAMUSCULAR; INTRAVENOUS; SUBCUTANEOUS at 20:58

## 2022-01-01 RX ADMIN — ENOXAPARIN SODIUM 40 MILLIGRAM(S): 100 INJECTION SUBCUTANEOUS at 05:16

## 2022-01-01 RX ADMIN — Medication 25 GRAM(S): at 12:43

## 2022-01-01 RX ADMIN — MORPHINE SULFATE 4 MILLIGRAM(S): 50 CAPSULE, EXTENDED RELEASE ORAL at 21:27

## 2022-01-01 RX ADMIN — PANTOPRAZOLE SODIUM 40 MILLIGRAM(S): 20 TABLET, DELAYED RELEASE ORAL at 12:10

## 2022-01-01 RX ADMIN — FAMOTIDINE 20 MILLIGRAM(S): 10 INJECTION INTRAVENOUS at 15:58

## 2022-01-01 RX ADMIN — OXYCODONE HYDROCHLORIDE 5 MILLIGRAM(S): 5 TABLET ORAL at 03:03

## 2022-01-01 RX ADMIN — FAMOTIDINE 20 MILLIGRAM(S): 10 INJECTION INTRAVENOUS at 16:13

## 2022-01-01 RX ADMIN — Medication 2: at 18:44

## 2022-01-01 RX ADMIN — ENOXAPARIN SODIUM 40 MILLIGRAM(S): 100 INJECTION SUBCUTANEOUS at 06:03

## 2022-01-01 RX ADMIN — PANTOPRAZOLE SODIUM 40 MILLIGRAM(S): 20 TABLET, DELAYED RELEASE ORAL at 12:24

## 2022-01-01 RX ADMIN — Medication 2: at 18:23

## 2022-01-01 RX ADMIN — Medication 10 MILLIGRAM(S): at 14:38

## 2022-01-01 RX ADMIN — PERFLUTREN MG/ML: 6.52 INJECTION, SUSPENSION INTRAVENOUS at 00:00

## 2022-01-01 RX ADMIN — Medication 10 MILLIGRAM(S): at 13:40

## 2022-01-01 RX ADMIN — ENOXAPARIN SODIUM 40 MILLIGRAM(S): 100 INJECTION SUBCUTANEOUS at 04:30

## 2022-01-01 RX ADMIN — Medication 1: at 00:02

## 2022-01-01 RX ADMIN — Medication 1: at 06:26

## 2022-01-01 RX ADMIN — SODIUM CHLORIDE 1000 MILLILITER(S): 9 INJECTION, SOLUTION INTRAVENOUS at 06:44

## 2022-01-01 RX ADMIN — Medication 3 CAPSULE(S): at 18:13

## 2022-01-01 RX ADMIN — Medication 25 MILLIGRAM(S): at 22:35

## 2022-01-01 RX ADMIN — ENOXAPARIN SODIUM 40 MILLIGRAM(S): 100 INJECTION SUBCUTANEOUS at 05:26

## 2022-01-01 RX ADMIN — Medication 2: at 12:48

## 2022-01-01 RX ADMIN — ENOXAPARIN SODIUM 40 MILLIGRAM(S): 100 INJECTION SUBCUTANEOUS at 05:31

## 2022-01-01 RX ADMIN — MORPHINE SULFATE 2 MILLIGRAM(S): 50 CAPSULE, EXTENDED RELEASE ORAL at 06:53

## 2022-01-01 RX ADMIN — Medication 1 TABLET(S): at 18:22

## 2022-01-01 RX ADMIN — Medication 25 MILLIGRAM(S): at 22:30

## 2022-01-04 NOTE — HISTORY OF PRESENT ILLNESS
[AJCC Stage: ____] : AJCC Stage: [unfilled] [de-identified] : 75 y/o with metastatic relapse of pancreatic ductal adenocarcinoma, YESENIA-germline mutant, from what was originally a localized pT2N2 LVI+/PNI+ pancreatic head adenocarcinoma resected Aug 2020; PMHx left thyroid papillary CA s/p left thyroidectomy/isthmusectomy/paratracheal node dissection in 2017, and melanoma, presenting for follow-up with medical oncology\par \par Chronological History of Cancer:\par Jul 2020  initially presented with abdominal discomfort, diarrhea, weight loss, new onset of DM\par 07/23/20  CT A/P suspected pancreas lesion\par 07/24/20  MRI Ab showed 1.2 cm mass at pancreas head. \par 08/03/20  underwent upfront surgery with Whipple procedure [facility] by Dr Calderon. pT2 pN2 LVI+ PNI+, margin negative\par 09/15/20 C1 adjuvant FOLFIRINOX\par 02/16/21 C12 (last cycle) FOLFIRINOX\par 03/03/21 CT CAP showing concern for relapse with mesenteric LN prominence. \par 03/20/21 began long-course RT with capecitabine, completed 5/6/21;\par 06/21/21 CT C/A/ again showed mesenteric lymphadenopathy, slightly increased LN prominence;  20\par Sep 2021  146;  repeat  2 weeks later 10/5/21 248\par 09/28/21 PET scan at [facility] (pending image loading) reported as "s/p whipple without suspicious activity in the surgical bed; hypermetatbolic RLQ lymph node/peritoneal implant most likely metastatic disease; interval improvement of small bowel distention and edema reflecting improvement of post radiation enteritis". \par 10/25/21 RLQ peritoneal implant soft tissue mass biopsy by IR at [facility], path returned as adenocarcinoma\par 11/02/21 C1D1 GnP (gemcitabine + nab-paclitaxel)\par 11/09/21 C1D8 GnP\par 11/16/21 planned but cancelled C1D15 GnP due to neuropenia\par 11/23/21 C2D01 GnP (changed to every other week)\par 12/07/21 C2D15 GnP \par 12/21/21 C3D1 GnP\par 12/28/21 CT scan with likely stable disease (report is comparing with 6/2021 scan. Comparison should be made with the one with Sep or Oct. [facility] 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 His daughter is also positive for YESENIA gene mutation, but his son does not have the mutation.  \par  [de-identified] : surgical path reported moderately differentiated PDAC, 2.5 cm, involving head of pancreas and invading pancreatic duct, CBD, muscularis propria of duodenum and peripancreatic adipose tissue. Lymphovascular and Perineural invasion present. Margins negative. 6 of 13 LNs + for metastatic carcinoma. Staging was described as pT2 pN2.  [de-identified] : Ca 19-9: 11/11/20- 24 3/5/21- 28 6/25/21- 20 9/21/21- 146 10/5/21- 248.  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

## 2022-01-04 NOTE — PHYSICAL EXAM
[Fully active, able to carry on all pre-disease performance without restriction] : Status 0 - Fully active, able to carry on all pre-disease performance without restriction [Normal] : affect appropriate [de-identified] : No visible inc wob [de-identified] : No visible rashes or jaundice seen over video

## 2022-01-04 NOTE — ASSESSMENT
[FreeTextEntry1] : 77 y/o with metastatic relapse of pancreatic ductal adenocarcinoma, YESENIA-germline mutant, from what was originally a localized pT2N2 LVI+/PNI+ pancreatic head adenocarcinoma resected Aug 2020; PMHx left thyroid papillary CA s/p left thyroidectomy/isthmusectomy/paratracheal node dissection in 2017, and melanoma, presenting for second opinion of further treatment including clinical trials in [facility]. \par \par He completed 12 cycles of FOLFIRINOX (09/2020-02/2021) and long course RT with capecitabine (3/2021-5/2021) and now has biopsy proven relapse in the peritoneum, not a substantial amount of burden, but we decided to start treatment to prevent complications that could arise from untreated peritoneal carcinomatosis.\par \par We had an in depth discussion about the options available for 2L therapy.  Alternatives include resuming FOLFIRINOX, noting that I cannot explicitly declare failure to this regimen since his relapse appeared to become more prominent after stopping this combination, and that his tumor may well be more sensitive to platinum drugs extrapolating from favorable response to platinum drugs from BRCA mutations. More recent data suggest that YESENIA mutations are prognostic rather than predictive (PMID 09910876), which may suggest that his tumor is more likely to respond to any treatment, not platinum specifically, as is the case for the PARP inhibitors. Another option is to use olaparib, extrapolating from BRCA/PALB2 data although while noting that YESENIA mutations appear less predictive based on limited data (KnowYourTumor/BeyondBRCA studies). Another option would be ipi+nivo off trial, as this is currently being studied in a trial context through [facility].\par \par He ultimately decided to pursue gemcitabine+nab-paclitaxel (GnP) on the basis that he wishes to continue with a standard option and his cancer has not yet been exposed to this therapy. Unfortunately, after just two days of chemo (C1D1 and D8), patient became severly neutropenic. Decision has been made to wait for count recovery and then change to every other week dosing for him, which has been shown to be about as effective and less toxic than 3-weeks on dosing (PMIDC 10836476). \par \par He has continued on every other week gemcitabine + nab-paclitaxel and is tolerating this therapy very well. Besides alopecia, he has very little in terms of side effects, and the every other week dosing is more tolerable to his bone marrow. Will continue therapy.\par  \par Plan:\par - continues on Creon\par -Plan for C3D15 GnP today, 1/4/21\par - Recent CT scan suggests stable disease (comparison was done with 6/2021 scan but his scan should be compared the one with Sep or Oct scan)\par - Ca19-9 is gradually down trending\par - RTC 2 weeks for next visit and infusion\par - he is planning to move to [place], at least through the winter and perhaps more permanently, starting after next cycle\par \par - GnP Regimen\par --[place] 1000mg/m2; Nap-Pacli 125mg/m2 every other week starting 11/23 (has previously received C1D8 GnP 11/09/21 but shifting schedule as patient with profound myelosuppression). \par \par Suresh-ghislaine Avila, PGY-5, MD\par \par Case discussed with Dr. Cary\par

## 2022-01-19 PROBLEM — R05.8 DRY COUGH: Status: ACTIVE | Noted: 2022-01-01

## 2022-01-19 NOTE — PHYSICAL EXAM
[Fully active, able to carry on all pre-disease performance without restriction] : Status 0 - Fully active, able to carry on all pre-disease performance without restriction [Normal] : affect appropriate [de-identified] : No visible inc wob [de-identified] : No visible rashes or jaundice seen over video

## 2022-01-19 NOTE — HISTORY OF PRESENT ILLNESS
[AJCC Stage: ____] : AJCC Stage: [unfilled] [de-identified] : 77 y/o with metastatic relapse of pancreatic ductal adenocarcinoma, YESENIA-germline mutant, from what was originally a localized pT2N2 LVI+/PNI+ pancreatic head adenocarcinoma resected Aug 2020; PMHx left thyroid papillary CA s/p left thyroidectomy/isthmusectomy/paratracheal node dissection in 2017, and melanoma, presenting for follow-up with medical oncology\par \par Chronological History of Cancer:\par Jul 2020  initially presented with abdominal discomfort, diarrhea, weight loss, new onset of DM\par 07/23/20  CT A/P suspected pancreas lesion\par 07/24/20  MRI Ab showed 1.2 cm mass at pancreas head. \par 08/03/20  underwent upfront surgery with Whipple procedure [facility] by Dr Calderon. pT2 pN2 LVI+ PNI+, margin negative\par 09/15/20 C1 adjuvant FOLFIRINOX\par 02/16/21 C12 (last cycle) FOLFIRINOX\par 03/03/21 CT CAP showing concern for relapse with mesenteric LN prominence. \par 03/20/21 began long-course RT with capecitabine, completed 5/6/21;\par 06/21/21 CT C/A/ again showed mesenteric lymphadenopathy, slightly increased LN prominence;  20\par Sep 2021  146;  repeat  2 weeks later 10/5/21 248\par 09/28/21 PET scan at [facility] (pending image loading) reported as "s/p whipple without suspicious activity in the surgical bed; hypermetatbolic RLQ lymph node/peritoneal implant most likely metastatic disease; interval improvement of small bowel distention and edema reflecting improvement of post radiation enteritis". \par 10/25/21 RLQ peritoneal implant soft tissue mass biopsy by IR at [facility], path returned as adenocarcinoma\par 11/02/21 C1D1 GnP (gemcitabine + nab-paclitaxel)\par 11/09/21 C1D8 GnP\par 11/16/21 planned but cancelled C1D15 GnP due to neuropenia\par 11/23/21 C2D01 GnP (changed to every other week)\par 12/07/21 C2D15 GnP \par 12/21/21 C3D1 GnP\par 12/28/21 CT scan with likely stable disease (report is comparing with 6/2021 scan. Comparison should be made with the one with Sep or Oct. Will try to upload Sep or Oct scan to [facility]). Ca19-9 is gradually downtrending\par 01/04/22- C3 D15 GnP\par 01/19/22- C4 D1 Gemzar 1000 mg/ m2 and Nab Paclitaxil 125 mg/ m2 \par \par \par \par To be noted, pt reports family hx: Mother lung CA, Father thyroid CA, Sister breast CA; Pt also reports YESENIA gene + (heterozygous pathogenic c.1027_1030del (p.[id number] lefs*2), INVITAE 1/20/21). His daughter is also positive for YESENIA gene mutation, but his son does not have the mutation.  \par  [de-identified] : surgical path reported moderately differentiated PDAC, 2.5 cm, involving head of pancreas and invading pancreatic duct, CBD, muscularis propria of duodenum and peripancreatic adipose tissue. Lymphovascular and Perineural invasion present. Margins negative. 6 of 13 LNs + for metastatic carcinoma. Staging was described as pT2 pN2.  [de-identified] : Ca 19-9: 11/11/20- 24 3/5/21- 28 6/25/21- 20 9/21/21- 146 10/5/21- 248.  [de-identified] : germline YESENIA gene + (heterozygous pathogenic c.1027_1030del (p.Abk1127 lefs*2), INV[facility]AE 1/20/21\par pMMR:\par Foundation: KRAS G12V, G12D, MLL2 , SF3B1 K700E, TET2 [id number]; low tumor purity [de-identified] : 1/19/22:\par Cough- CXR negative\par RVP/ FLU/ COVID negative.\par Started taking Famotidine PO BID  Symptoms improved with Famotidine.- Cough likely from GERD \par Pt is planning to continue his treatment in [place]. He has an appointment with Oncologist at [place] - Plans to fly out to [place] on 1/22/22.\par Pt is aware that he will have to call the office and reestablish care at [facility] when he decides to return to [place] for treatmnets.\par \par \par \par \par

## 2022-01-19 NOTE — ASSESSMENT
[FreeTextEntry1] : 77 y/o with metastatic relapse of pancreatic ductal adenocarcinoma, YESENIA-germline mutant, from what was originally a localized pT2N2 LVI+/PNI+ pancreatic head adenocarcinoma resected Aug 2020; PMHx left thyroid papillary CA s/p left thyroidectomy/isthmusectomy/paratracheal node dissection in 2017, and melanoma, presenting for second opinion of further treatment including clinical trials in [facility]. \par \par He completed 12 cycles of FOLFIRINOX (09/2020-02/2021) and long course RT with capecitabine (3/2021-5/2021) and now has biopsy proven relapse in the peritoneum, not a substantial amount of burden, but we decided to start treatment to prevent complications that could arise from untreated peritoneal carcinomatosis.\par \par We had an in depth discussion about the options available for 2L therapy.  Alternatives include resuming FOLFIRINOX, noting that I cannot explicitly declare failure to this regimen since his relapse appeared to become more prominent after stopping this combination, and that his tumor may well be more sensitive to platinum drugs extrapolating from favorable response to platinum drugs from BRCA mutations. More recent data suggest that YESENIA mutations are prognostic rather than predictive (PMID 32231074), which may suggest that his tumor is more likely to respond to any treatment, not platinum specifically, as is the case for the PARP inhibitors. Another option is to use olaparib, extrapolating from BRCA/PALB2 data although while noting that YESENIA mutations appear less predictive based on limited data (KnowYourTumor/BeyondBRCA studies). Another option would be ipi+nivo off trial, as this is currently being studied in a trial context through [facility].\par \par He ultimately decided to pursue gemcitabine+nab-paclitaxel (GnP) on the basis that he wishes to continue with a standard option and his cancer has not yet been exposed to this therapy. Unfortunately, after just two days of chemo (C1D1 and D8), patient became severly neutropenic. Decision has been made to wait for count recovery and then change to every other week dosing for him, which has been shown to be about as effective and less toxic than 3-weeks on dosing (PMIDC [id number]). \par \par He has continued on every other week gemcitabine + nab-paclitaxel and is tolerating this therapy very well. Besides alopecia, he has very little in terms of side effects, and the every other week dosing is more tolerable to his bone marrow. Will continue therapy.\par  \par Plan:\par - continues on Creon\par -Plan for C4D1 GnP today, 1/19/22\par - Recent CT scan suggests stable disease (comparison was done with 6/2021 scan but his scan should be compared the one with Sep or Oct scan)\par - Ca19-9 is gradually down trending\par - he is planning to move to [place], at least through the winter and perhaps more permanently, starting after next cycle\par \par - GnP Regimen\par --[place] 1000mg/m2; Nap-Pacli 125mg/m2 every other week starting 11/23\par Pt feels relief on the cough with Pepcid-\par He plans to continue chemotherapy in [place] for next 2-3 months and eventually plan to return to [place] .\par Explianed that he will need to call and notify  eoffice of his return and that we would need the records from [place].\par Answered all questions and concerns\par D/w Dr Cary .\par \par

## 2022-04-05 NOTE — HISTORY OF PRESENT ILLNESS
[AJCC Stage: ____] : AJCC Stage: [unfilled] [de-identified] : 75 y/o with metastatic relapse of pancreatic ductal adenocarcinoma, YESENIA-germline mutant, from what was originally a localized pT2N2 LVI+/PNI+ pancreatic head adenocarcinoma resected Aug 2020; PMHx left thyroid papillary CA s/p left thyroidectomy/isthmusectomy/paratracheal node dissection in 2017, and melanoma, presenting for follow-up with medical oncology\par \par Chronological History of Cancer:\par Jul 2020  initially presented with abdominal discomfort, diarrhea, weight loss, new onset of DM\par 07/23/20  CT A/P suspected pancreas lesion\par 07/24/20  MRI Ab showed 1.2 cm mass at pancreas head. \par 08/03/20  underwent upfront surgery with Whipple procedure [facility] by Dr Calderon. pT2 pN2 LVI+ PNI+, margin negative\par 09/15/20 C1 adjuvant FOLFIRINOX\par 02/16/21 C12 (last cycle) FOLFIRINOX\par 03/03/21 CT CAP showing concern for relapse with mesenteric LN prominence. \par 03/20/21 began long-course RT with capecitabine, completed 5/6/21;\par 06/21/21 CT C/A/ again showed mesenteric lymphadenopathy, slightly increased LN prominence;  20\par Sep 2021  146;  repeat  2 weeks later 10/5/21 248\par 09/28/21 PET scan at [facility] (pending image loading) reported as "s/p whipple without suspicious activity in the surgical bed; hypermetatbolic RLQ lymph node/peritoneal implant most likely metastatic disease; interval improvement of small bowel distention and edema reflecting improvement of post radiation enteritis". \par 10/25/21 RLQ peritoneal implant soft tissue mass biopsy by IR at [facility], path returned as adenocarcinoma\par 11/02/21 C1D1 GnP (gemcitabine + nab-paclitaxel)\par 11/09/21 C1D8 GnP\par 11/16/21 planned but cancelled C1D15 GnP due to neuropenia\par 11/23/21 C2D01 GnP (changed to every other week)\par 12/07/21 C2D15 GnP \par 12/21/21 C3D1 GnP\par 12/28/21 CT scan with likely stable disease (report is comparing with 6/2021 scan. Comparison should be made with the one with Sep or Oct. Will try to upload Sep or Oct scan to [facility]). Ca19-9 is gradually downtrending\par 01/04/22- C3 D15 GnP\par 01/19/22- C4 D1 Gemzar 1000 mg/ m2 and Nab Paclitaxil 125 mg/ m2 \par \par Pt transferred care to [place].\par \par 02/01/22- Pt was started with [place]/ abraxane in [place] on 02/01/22\par 02/15/22-  gem/ Abraxane \par \par 02/25/22- CT scans- POst op changes from whipples with fat stranding in upper abdomen, Scattered upper abdominal lymph nodes not appearing suspicious, scattered pulmonary nodules .\par \par 03/01/22-  gem/ Abraxane \par 03/15/22 -gem/ Abraxane \par 03/28/22- gem/ Abraxane \par \par \par \par \par To be noted, pt reports family hx: Mother lung CA, Father thyroid CA, Sister breast CA; Pt also reports YESENIA gene + (heterozygous pathogenic c.1027_1030del (p.[id number] lefs*2), INVITAE 1/20/21). His daughter is also positive for YESENIA gene mutation, but his son does not have the mutation.  \par  [de-identified] : surgical path reported moderately differentiated PDAC, 2.5 cm, involving head of pancreas and invading pancreatic duct, CBD, muscularis propria of duodenum and peripancreatic adipose tissue. Lymphovascular and Perineural invasion present. Margins negative. 6 of 13 LNs + for metastatic carcinoma. Staging was described as pT2 pN2.  [de-identified] : germline YESENIA gene + (heterozygous pathogenic c.1027_1030del (p.Glf3919 lefs*2), INVITAE 1/20/21\par pMMR:\par Foundation: KRAS G12V, G12D, MLL2 , SF3B1 K700E, TET2 [id number]; low tumor purity [de-identified] : Ca 19-9: 11/11/20- 24 3/5/21- 28 6/25/21- 20 9/21/21- 146 10/5/21- 248.  [de-identified] : 04/05/22:\par Pt presents for follow up after his rounds of chemo in [place] with [place]/ Abraxane \par Last scan noted in the system was 2/25/22-\par Scans show stable disease \par Pt has been tolerating chemotherapy with minimal side effects- Fatigue.\par Denies N/V/D/ neuropathy \par Pt plans to return to [place] for rest of chemotherapy -\par Plans to come back to [place] in May 2022.\par His last tumor markers have been b/w 180-204- (ca 19.9) \par

## 2022-04-05 NOTE — ASSESSMENT
[FreeTextEntry1] : 77 y/o with metastatic relapse of pancreatic ductal adenocarcinoma, YESENIA-germline mutant, from what was originally a localized pT2N2 LVI+/PNI+ pancreatic head adenocarcinoma resected Aug 2020; PMHx left thyroid papillary CA s/p left thyroidectomy/isthmusectomy/paratracheal node dissection in 2017, and melanoma, presenting for second opinion of further treatment including clinical trials in [facility]. \par \par He completed 12 cycles of FOLFIRINOX (09/2020-02/2021) and long course RT with capecitabine (3/2021-5/2021) and now has biopsy proven relapse in the peritoneum, not a substantial amount of burden, but we decided to start treatment to prevent complications that could arise from untreated peritoneal carcinomatosis.\par \par We had an in depth discussion about the options available for 2L therapy.  Alternatives include resuming FOLFIRINOX, noting that I cannot explicitly declare failure to this regimen since his relapse appeared to become more prominent after stopping this combination, and that his tumor may well be more sensitive to platinum drugs extrapolating from favorable response to platinum drugs from BRCA mutations. More recent data suggest that YESENIA mutations are prognostic rather than predictive (PMID 91844051), which may suggest that his tumor is more likely to respond to any treatment, not platinum specifically, as is the case for the PARP inhibitors. Another option is to use olaparib, extrapolating from BRCA/PALB2 data although while noting that YESENIA mutations appear less predictive based on limited data (KnowYourTumor/BeyondBRCA studies). Another option would be ipi+nivo off trial, as this is currently being studied in a trial context through [facility].\par \par He ultimately decided to pursue gemcitabine+nab-paclitaxel (GnP) on the basis that he wishes to continue with a standard option and his cancer has not yet been exposed to this therapy. Unfortunately, after just two days of chemo (C1D1 and D8), patient became severly neutropenic. Decision has been made to wait for count recovery and then change to every other week dosing for him, which has been shown to be about as effective and less toxic than 3-weeks on dosing (PMIDC [id number]). \par \par He has continued on every other week gemcitabine + nab-paclitaxel and is tolerating this therapy very well. Besides alopecia, he has very little in terms of side effects, and the every other week dosing is more tolerable to his bone marrow. Will continue therapy. His  remains stable ~200 which may be reflective of extant disease or reflect anatomic disruption of his biliary tract from the surgery. His scan shows minimal to no evidence of remaining disease. He remains on active chemotherapy with [place]/ Abraxane in [place]. \par  \par Plan:\par - continues on Creon\par - Recent CT scan suggests stable disease  on 02/25/22\par - Ca19-9 is hovering b/w 180-210.\par - he is planning to return  to [place] and will return to [place] in May 2022; he will call us 1-2 weeks before returning for us to take over chemotherapy in [place] again\par - if his disease remains stable after 6-8 months of chemothearpy we could consider de-escalating to gem or chemo holiday especially if  and perhaps if Signatera is negative\par \par - GnP Regimen\par He plans to continue chemotherapy in [place] for next 2-3 months and eventually plan to return to [place] .\par Explained that he will need to call and notify the office of his return and that we would need the records from [place].\par Answered all questions and concerns\par D/w Dr Cary .\par \par

## 2022-04-05 NOTE — PHYSICAL EXAM
[Fully active, able to carry on all pre-disease performance without restriction] : Status 0 - Fully active, able to carry on all pre-disease performance without restriction [Normal] : affect appropriate [de-identified] : No visible inc wob [de-identified] : No visible rashes or jaundice seen over video

## 2022-04-05 NOTE — END OF VISIT
[FreeTextEntry3] : I personally have spent a total of 30 minutes of time on the date of this encounter reviewing test results, documenting findings, coordinating care, and directly consulting with the patient and/or designated family member.\par I was present with the trainee during the key portions of the history and exam. I agree with the findings and plan as documented above.\par

## 2022-05-23 NOTE — ED PROVIDER NOTE - ATTENDING CONTRIBUTION TO CARE
I (Sonny) agree with above, I performed a history and physical. Counseled [facility] medical staff, physician assistant, and/or medical student on medical decision making as documented. Medical decisions and treatment interventions were made in real time during the patient encounter. Additionally and/or with the following exceptions: The patient presented to the ED with vomiting and Abdominal pain consistent with likely small bowel obstruction in the setting of pancreatic CA status post whipple about 2 years ago. Patient was very uncomfortable appearing. CT imaging postive for small bowel obstruction. The patient was signed out to the oncoming attending pending the following: final ct results and surgery consultation with definite need for admission. The patient's condition was not amenable to outpatient treatment due either the lack of feasibility of outpatient care coordination, possibility for further decompensation with adverse outcome if discharge, or treatments and diagnostic  modalities only available during an inpatient hospitalization.

## 2022-05-23 NOTE — CONSULT NOTE ADULT - SUBJECTIVE AND OBJECTIVE BOX
HPI:  77yo gentleman with PMH of htn, hld, pancreatic ductal adenocarcinoma, YESENIA-germline mutant, s/p resection in 9/2020 with recurrence, L thyroid papillary CA s/p L thyroidectomy/isthmusectomy/paratracheal node dissection in 2017, melanoma, who presents to ED due to N/V.   Patient had recently visited [place]. While there, he had N/V, sometimes occurring hours after PO intake.  He has had about 25 lbs weight loss. He returned by airplane on 5/22/22, and notified the on-call service, and was advised to come to the ED. Patient denies abdominal pain, and states he is still passing gas and having bowel movements.    Onc Hx:   7/2020- found to have pancreatic head lesion  8/2020- Underwent upfront surgery with Whipple procedure in [facility] by Dr. Pemberton. pT2N2 LVI+, PNI+, margin negative  9/2020-2/2021- Had 12 cycles of FOLFIRINOX  3/2021- CT CAP concerning for relapse with mesenteric LN prominence  3/2021- Started long-course RT with capecitabine through 5/2021 6/2021- CTAP again found mesenteric lymphadenopathy, with incrased LN prominence  9/2021- PET scan performed  10/21- RLQ peritoneal soft tissue implant biopsy by IR at [facility] found adenocarcinoma  11/2021 -1/2022- Had 3 cycles gemzar and nab paclitaxel  2/2022-3/2022-Patient thereafter continued his care in [place] with [place] abraxane       PAST MEDICAL & SURGICAL HISTORY:  Melanomav nose &amp; left cheek  2yrs ago  HTN (hypertension)  Dyslipidemia  Malignant neoplasm of thyroid gland  Vocal cord nodule 1998  H/O arthroscopy of left knee meniscus  12/1998    FAMILY HISTORY:  Family history of breast cancer (Sibling)  Family history of thyroid cancer (Father)  Family history of lung cancer (Mother)      Social History:      REVIEW OF SYSTEMS  CONSTITUTIONAL: No fever, no chills, no fatigue, + generalized weakness  EYES: No eye pain, no vision changes  ENMT:  No difficulty hearing, no throat pain  RESPIRATORY: No cough,  No shortness of breath  CARDIOVASCULAR: No chest pain, no palpitations  GASTROINTESTINAL: no abdominal pain, +nausea, + vomiting  GENITOURINARY: No dysuria, no hematuria  NEUROLOGICAL: No numbness , no loss of strength  SKIN: No itching, no rashes,  HEME/LYMPH: No easy bruising, bleeding      >>> <<<>>> <<<  >>> <<<  Allergies  Allergies    No Known Allergies    Intolerances        Medications  MEDICATIONS  (STANDING):  dextrose 5% + sodium chloride 0.9%. 1000 milliLiter(s) (125 mL/Hr) IV Continuous <Continuous>    MEDICATIONS  (PRN):      PHYSICAL EXAM:  GENERAL: NAD, well-groomed  HEAD:  Atraumatic, Normocephalic  EYES: EOMI, PERRLA, conjunctiva and sclera clear  ENMT: No oropharyngeal exudates, Moist mucous membranes  NECK: Supple, no cervical lymphadenopathy  NERVOUS SYSTEM:  alert and conversant, moving all extremities spontaneously   CHEST/LUNG: Clear to auscultation bilaterally; no rhonchi  HEART: Regular rate and rhythm; No murmurs  ABDOMEN: Soft, + epigastric TTP , Nondistended  EXTREMITIES:  2+ radial Pulses, No cyanosis or edema  SKIN: warm, dry    LABS:                        8.5    6.99  )-----------( 262      ( 23 May 2022 13:11 )             26.5     05-23    134<L>  |  100  |  24<H>  ----------------------------<  137<H>  3.8   |  24  |  1.32<H>    Ca    8.5      23 May 2022 13:11  Mg     1.70     05-23    TPro  5.8<L>  /  Alb  3.5  /  TBili  0.3  /  DBili  x   /  AST  20  /  ALT  23  /  AlkPhos  181<H>  05-23          RADIOLOGY & ADDITIONAL STUDIES:  < from: CT Abdomen and Pelvis w/ IV Cont (05.23.22 @ 16:46) >  FINDINGS:  LOWER CHEST: Nodular and groundglass opacity at both lung bases,   concerning for infectious/inflammatory etiology. Aspiration is a   possibility.    LIVER: Within normal limits.  BILE DUCTS: Pneumobilia. Intrahepatic biliary ductal dilatation.  GALLBLADDER: Surgically removed  SPLEEN: Within normal limits.  PANCREAS: Status post pylorus sparing Whipple. Region of the   pancreaticojejunostomy and hepaticojejunostomy are unremarkable.  ADRENALS: Within normal limits.  KIDNEYS/URETERS: Bilateral renal cysts. No hydronephrosis.    BLADDER: Layering calcified debris/small stones in the bladder.  REPRODUCTIVE ORGANS: Enlarged prostate    BOWEL: Marked distention of the stomach and afferent small bowel with   fluid and gas. Transition point in the right upper quadrant in the region   of the duodenojejunostomy (2:47 and 601:39).    Diverticulosis, without acute diverticulitis.    Appendix is normal.  PERITONEUM: No ascites.  VESSELS: Within normal limits.  RETROPERITONEUM/LYMPH NODES: Redemonstrated mesenteric adenopathy.  ABDOMINAL WALL: Within normal limits.  BONES: Degenerative changes.    IMPRESSION:  Status post pylorus sparing Whipple with dilatation of the stomach and   afferent small bowel and transition point  in the region of the   duodenojejunostomy, consistent with small bowel obstruction    Nodular and groundglass opacity at both lung bases, concerning for   infectious/inflammatory etiology. Aspiration is a possibility.      --- End of Report ---    < end of copied text >

## 2022-05-23 NOTE — ED PROVIDER NOTE - NSICDXPASTMEDICALHX_GEN_ALL_CORE_FT
PAST MEDICAL HISTORY:  Dyslipidemia     HTN (hypertension)     Malignant neoplasm of thyroid gland     Melanoma nose & left cheek  2yrs ago

## 2022-05-23 NOTE — H&P ADULT - NSHPPHYSICALEXAM_GEN_ALL_CORE
PHYSICAL EXAM:  GENERAL: NAD  HEAD:  Atraumatic, Normocephalic  EYES: EOMI, PERRLA, conjunctiva and sclera clear  ENT: Moist mucous membranes  NECK: Supple  CHEST/LUNG: Unlabored respirations  HEART: Regular rate and rhythm  ABDOMEN: Soft, Nontender, minmslly distended. No rebound or guarding.   NERVOUS SYSTEM:  Alert & Oriented X3, speech clear   MSK: FROM all 4 extremities

## 2022-05-23 NOTE — ED ADULT NURSE NOTE - OBJECTIVE STATEMENT
77 y/o M arrives to E.D. rm 25 c/o N/V x several wks. Pt is currently on chemo for pancreatic ca., last chemo tx was 5/10. Pt a&ox4, ambulatory, neg SOB, denies CP, endorses N/V x several times per day. Right ACW port accessed, as per Dr. Dennis. Medicated as per eMAR. Call bell in reach.

## 2022-05-23 NOTE — H&P ADULT - ASSESSMENT
77yo gentleman with PMH of htn, hld, pancreatic ductal adenocarcinoma s/p pylorus sparing Whipple 9/2020 ([facility]) with recurrence now on chemo, L thyroid papillary CA s/p L thyroidectomy/isthmusectomy/paratracheal node dissection in 2017, melanoma, who presents to ED due to 3 weeks of progressively poor PO intake and increased frequency of nausea/emesis. Findings consistent with SBO at level of DJ anastomosis.     - No acute surgical intervention  - NPO/IVF   - NGT decompression   - GI consult for upper endoscopy   - Serial abdominal exams, exam before pain meds  - Trend Cr   - f/u Heme Onc   - Discussed with Dr. Manpreet Tejada PGY3   D Team Surgery   [id number]

## 2022-05-23 NOTE — H&P ADULT - HISTORY OF PRESENT ILLNESS
75yo gentleman with PMH of htn, hld, L thyroid papillary CA s/p L thyroidectomy/isthmusectomy/paratracheal node dissection in 2017, melanoma,  pancreatic ductal adenocarcinoma s/p pylorus sparing Whipple 9/2020 ([facility]) with recurrence now on chemo (last 2wks ago), who presents to ED due to 3 weeks of progressively poor PO intake and increased frequency of nausea/emesis. Pt states prior to onset of symptoms he was eating "a ton", however 3 weeks ago started to lose his appetite. He eventually developed worsening nausea, initially with decreased tolerance of solid foods and eventual intolerance to liquids as well. Pt states he has lost 25lbs since onset of symptoms. He states he is still passing flatus, last BM was this AM and normal for him.     In ED pt afebrile and HD stable. Labs with no leucocytosis, normal lactate. Cr 1.32 with most recent 0.9 on 1/2022. CT a/p shows dilated stomach and afferent limb with transition point in region of duodenojejunostomy. Chest findings suggest possible aspiration.

## 2022-05-23 NOTE — ED ADULT NURSE NOTE - NSIMPLEMENTINTERV_GEN_ALL_ED
Implemented All Fall with Harm Risk Interventions:  [place] to call system. Call bell, personal items and telephone within reach. Instruct patient to call for assistance. Room bathroom lighting operational. Non-slip footwear when patient is off stretcher. Physically safe environment: no spills, clutter or unnecessary equipment. Stretcher in lowest position, wheels locked, appropriate side rails in place. Provide visual cue, wrist band, yellow gown, etc. Monitor gait and stability. Monitor for mental status changes and reorient to person, place, and time. Review medications for side effects contributing to fall risk. Reinforce activity limits and safety measures with patient and family. Provide visual clues: red socks.

## 2022-05-23 NOTE — ED PROVIDER NOTE - NSICDXFAMILYHX_GEN_ALL_CORE_FT
FAMILY HISTORY:  Father  Still living? No  Family history of thyroid cancer, Age at diagnosis: Age Unknown    Mother  Still living? No  Family history of lung cancer, Age at diagnosis: Age Unknown    Sibling  Still living? Yes, Estimated age: Age Unknown  Family history of breast cancer, Age at diagnosis: Age Unknown

## 2022-05-23 NOTE — CONSULT NOTE ADULT - ASSESSMENT
77yo gentleman with PMH of htn, hld, pancreatic ductal adenocarcinoma, YESENIA-germline mutant, s/p resection in 9/2020 with recurrence, L thyroid papillary CA s/p L thyroidectomy/isthmusectomy/paratracheal node dissection in 2017, melanoma, who presents to ED due to N/V, weight loss, found to have SBO     #Pancreatic adenocarcinoma    - Hold systemic chemotherapy during this admission  - Suspect ERMIAS may be related to pre-renal etiology from volume depletion  - Check CEA, CA 19-9  - CT identified SBO at transition point in the region of the duodenojejunostomy. NG tube placed by ED team.  Lesion may have developed due to possible progression of disease. Consider GI vs surgical evaluation for lesion.     Please do not hesitate to page with questions.     Lauryn Roy MD PGY4   [phone]  Hematology-Oncology Fellow

## 2022-05-23 NOTE — H&P ADULT - NSHPLABSRESULTS_GEN_ALL_CORE
8.5    6.99  )-----------( 262      ( 23 May 2022 13:11 )             26.5       05-23    134<L>  |  100  |  24<H>  ----------------------------<  137<H>  3.8   |  24  |  1.32<H>    Ca    8.5      23 May 2022 13:11  Mg     1.70     05-23    TPro  5.8<L>  /  Alb  3.5  /  TBili  0.3  /  DBili  x   /  AST  20  /  ALT  23  /  AlkPhos  181<H>  05-23        RADIOLOGY:       ACC: [id number] EXAM:  CT ABDOMEN AND PELVIS IC                          PROCEDURE DATE:  05/23/2022          INTERPRETATION:  CLINICAL INFORMATION: Bilious vomiting. Decreased p.o.   intake. History of pancreatic cancer.    COMPARISON: 12/28/2021    CONTRAST/COMPLICATIONS:  IV Contrast: Omnipaque 350  60 cc administered   0 cc discarded  Oral Contrast: NONE  Complications: None reported at time of study completion    PROCEDURE:  CT of the Abdomen and Pelvis was performed.  Sagittal and coronal reformats were performed.    FINDINGS:  LOWER CHEST: Nodular and groundglass opacity at both lung bases,   concerning for infectious/inflammatory etiology. Aspiration is a   possibility.    LIVER: Within normal limits.  BILE DUCTS: Pneumobilia. Intrahepatic biliary ductal dilatation.  GALLBLADDER: Surgically removed  SPLEEN: Within normal limits.  PANCREAS: Status post pylorus sparing Whipple. Region of the   pancreaticojejunostomy and hepaticojejunostomy are unremarkable.  ADRENALS: Within normal limits.  KIDNEYS/URETERS: Bilateral renal cysts. No hydronephrosis.    BLADDER: Layering calcified debris/small stones in the bladder.  REPRODUCTIVE ORGANS: Enlarged prostate    BOWEL: Marked distention of the stomach and afferent small bowel with   fluid and gas. Transition point in the right upper quadrant in the region   of the duodenojejunostomy (2:47 and 601:39).    Diverticulosis, without acute diverticulitis.    Appendix is normal.  PERITONEUM: No ascites.  VESSELS: Within normal limits.  RETROPERITONEUM/LYMPH NODES: Redemonstrated mesenteric adenopathy.  ABDOMINAL WALL: Within normal limits.  BONES: Degenerative changes.    IMPRESSION:  Status post pylorus sparing Whipple with dilatation of the stomach and   afferent small bowel and transition point  in the region of the   duodenojejunostomy, consistent with small bowel obstruction    Nodular and groundglass opacity at both lung bases, concerning for   infectious/inflammatory etiology. Aspiration is a possibility.

## 2022-05-23 NOTE — ED PROVIDER NOTE - PROGRESS NOTE DETAILS
EDUARDO Mandel MD  pt reassessed, improved sx after meds. pending CT. d/w onc fellow and dr. esparza, who agree with plan, pending results. signed out to my care. signed out to my care. Surgery admission to Dr. Case

## 2022-05-23 NOTE — ED PROVIDER NOTE - OBJECTIVE STATEMENT
75 yo male with pmhx pancreatic ca s/p whipple 2020, htn, hld, presenting with 2 weeks decreased po, vomiting, and weight loss. patient gets chemo either in [place] or [facility], last chemo 2 weeks ago. Since then has been weaker, cannot tolerate PO, has been vomiting with any PO intake. Was told by Dr. Samuel to come to ED to be admitted.    Denies CP, SOB, fevers.

## 2022-05-23 NOTE — ED PROCEDURE NOTE - ATTENDING CONTRIBUTION TO CARE
MD DURAN:  I was present for the critical portions of this procedure and provided direct attending supervision.

## 2022-05-23 NOTE — H&P ADULT - NSICDXPASTSURGICALHX_GEN_ALL_CORE_FT
PAST SURGICAL HISTORY:  H/O arthroscopy of left knee meniscus  12/1998    History of Whipple procedure pylorus sparing whipple 2022 at [facility]    Vocal cord nodule 1998

## 2022-05-23 NOTE — ED PROVIDER NOTE - CLINICAL SUMMARY MEDICAL DECISION MAKING FREE TEXT BOX
77 yo male with pmhx pancreatic ca s/p whipple 2020, htn, hld, presenting with 2 weeks decreased po, vomiting, and weight loss. suggestive of ftt, but will eval for sbo vs worsening mass with labs, ct, will give zofran. last CT 5/4 wnl. will likely admit.

## 2022-05-23 NOTE — ED ADULT TRIAGE NOTE - CHIEF COMPLAINT QUOTE
states" I am vomiting since few days" unable to tolerate solids/ liquids. sent in by dr Samuel fr admission. h/o pancreatic ca and on chemo

## 2022-05-23 NOTE — ED PROVIDER NOTE - NSICDXPASTSURGICALHX_GEN_ALL_CORE_FT
PAST SURGICAL HISTORY:  H/O arthroscopy of left knee meniscus  12/1998    Vocal cord nodule 1998

## 2022-05-24 NOTE — CONSULT NOTE ADULT - ASSESSMENT
Deshawn Post MD  Gastroenterology Fellow  Pager: [phone]  Please call answering service [phone] / on-call GI fellow after 5pm and before 8am, and on weekends. 77yo gentleman with PMH of htn, hld, L thyroid papillary CA s/p L thyroidectomy/isthmusectomy/paratracheal node dissection in 2017, melanoma,  pancreatic ductal adenocarcinoma s/p pylorus sparing Whipple 9/2020 ([facility]) with recurrence now on chemo (last 2wks ago), who presents to ED due to 3 weeks of progressively poor PO intake and increased frequency of nausea/emesis.  CT a/p shows dilated stomach and afferent limb with transition point in region of duodenojejunostomy.     Impression:  # GOO and dilated afferent limb with transition point in region of duodenojejunostomy.  # Recurrent pancreatic adenocarcinoma s/p pylorus sparing Whipple 9/2020, chemo, radiation between 2020 - 2021, then found to have recurrence on PET 10/2021 started on chemo 03/2022    Recommendations:  - Discussed with Surgical Oncology - will plan for EGD with stent placement if possible      Deshawn Post MD  Gastroenterology Fellow  Pager: [phone]  Please call answering service [phone] / on-call GI fellow after 5pm and before 8am, and on weekends.

## 2022-05-24 NOTE — CHART NOTE - NSCHARTNOTEFT_GEN_A_CORE
BRIEF EGD FINDINGS:   Evidence of a pylorus sparing Whipple with duodenojejunostomy.   Multiple areas of stenosis secondary to extrinsic compression involving both the afferent and efferent limbs.   Due to multi-focal nature of involved bowel and stenosis secondary to extrinsic compression, enteral stenting not performed as unlikely to result in any significant clinical improvement.       RECOMMENDATIONS:   - Return patient to [facility] for ongoing care.   - Continue NPO status and NG tube to low intermittent suction.  - Aspiration precautions.  - Defer further management to primary/surgical oncology team. Findings discussed with surgical oncology attending    Please refer to Provation note in results section for detailed procedure note and official recommendations.

## 2022-05-24 NOTE — PROGRESS NOTE ADULT - SUBJECTIVE AND OBJECTIVE BOX
Vital Signs Last 24 Hrs  T(C): 36.4 (24 May 2022 03:00), Max: 36.7 (23 May 2022 20:47)  T(F): 97.5 (24 May 2022 03:00), Max: 98.1 (24 May 2022 00:43)  HR: 79 (24 May 2022 03:00) (75 - 90)  BP: 141/59 (24 May 2022 03:00) (110/72 - 141/59)  BP(mean): --  RR: 16 (24 May 2022 03:00) (16 - 17)  SpO2: 100% (24 May 2022 03:00) (98% - 100%)    I&O's Detail    23 May 2022 07:01  -  24 May 2022 06:52  --------------------------------------------------------  IN:    Lactated Ringers: 250 mL  Total IN: 250 mL    OUT:    Nasogastric/Oral tube (mL): 300 mL  Total OUT: 300 mL    Total NET: -50 mL                                8.5    6.99  )-----------( 262      ( 23 May 2022 13:11 )             26.5       05-23    134<L>  |  100  |  24<H>  ----------------------------<  137<H>  3.8   |  24  |  1.32<H>    Ca    8.5      23 May 2022 13:11  Mg     1.70     05-23    TPro  5.8<L>  /  Alb  3.5  /  TBili  0.3  /  DBili  x   /  AST  20  /  ALT  23  /  AlkPhos  181<H>  05-23    Abdomen soft          PLAN:  Continue N-G drainage  GI consult for upper endoscopy    Patient may need gastric bypass

## 2022-05-24 NOTE — DISCHARGE NOTE NURSING/CASE MANAGEMENT/SOCIAL WORK - WILL THE PATIENT ACCEPT THE PFIZER COVID-19 VACCINE IF ELIGIBLE AND IT IS AVAILABLE?
Detail Level: Detailed Add 43437 Cpt? (Important Note: In 2017 The Use Of 45805 Is Being Tracked By Cms To Determine Future Global Period Reimbursement For Global Periods): yes Wound Evaluated By: Dr. Sanchez Not applicable

## 2022-05-24 NOTE — CONSULT NOTE ADULT - ATTENDING COMMENTS
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 CT AP showed small bowel obstruction with concern of progression. Would recommend GI-evaluation for endoscopy but may required ex lap for diagnosis/intervention.  Waqas Cary MD PhD  Oncology Attending
Patient seen and examined with the GI fellow. I agree with the above assessment and plan. Thank you for allowing us to care for your patient.

## 2022-05-24 NOTE — DISCHARGE NOTE NURSING/CASE MANAGEMENT/SOCIAL WORK - NSSCTYPOFSERV_GEN_ALL_CORE
If accepted by home care agency, a visiting nurse will visit you the day after discharge. The nurse will call you in the morning to set up a visit time. If you do not hear from the nurse, please call the agency. Physical Therapy will follow.

## 2022-05-24 NOTE — ED ADULT NURSE REASSESSMENT NOTE - NS ED NURSE REASSESS COMMENT FT1
Pt A&Ox4, resting on stretcher. Respirations even and unlabored, sating 100% on RA, NSR on monitor. NG tube draining dakr brown fluid, 500cc of fluid drained thus far. Pt denies any chest pain, dyspnea, dizziness or discomfort. pt well appearing, NAD noted. Pending bed assignment.

## 2022-05-24 NOTE — PACU DISCHARGE NOTE - COMMENTS
T(C): 36.2 (05-24-22 @ 19:07), Max: 36.7 (05-23-22 @ 20:47)  HR: 74 (05-24-22 @ 20:07) (61 - 84)  BP: 148/69 (05-24-22 @ 20:07) (112/74 - 148/69)  RR: 18 (05-24-22 @ 20:07) (12 - 18)  SpO2: 99% (05-24-22 @ 20:07) (97% - 100%)    Vital signs stable, non-labored breathing with adequate oxygen saturation on room air. Pain and nausea are controlled. All questions answered. Stable for PACU discharge and TTF.

## 2022-05-24 NOTE — DISCHARGE NOTE NURSING/CASE MANAGEMENT/SOCIAL WORK - NSDPDISTO_GEN_ALL_CORE
Patient is AXOX4. Denied chest pain and shortness of breath. Vital signs remained stable. Pain was assessed and remained at an acceptable level with interventions. Incentive spirometer encouraged. Patient ambulated in hallway. Patient safety maintained through out shift. IV's are being removed and patient is being discharged./Home Patient is AXOX4. Denied chest pain and shortness of breath. Vital signs remained stable. Pain was assessed and remained at an acceptable level with interventions. Incentive spirometer encouraged. Patient ambulated in hallway. Patient safety maintained through out shift. IV's are being removed and patient is being discharged./Home with home care

## 2022-05-24 NOTE — PROGRESS NOTE ADULT - SUBJECTIVE AND OBJECTIVE BOX
Surgical Oncology    SUBJECTIVE: Pt seen and examined on rounds with team. No acute events overnight.        OBJECTIVE    PHYSICAL EXAM:   General: NAD, Lying in bed   Neuro: Awake and alert  Extremities:   GI/Abd: Soft, NT/ND,       VITALS  T(C): 36.7 (05-23-22 @ 20:47), Max: 36.7 (05-23-22 @ 20:47)  HR: 80 (05-23-22 @ 20:47) (75 - 90)  BP: 112/74 (05-23-22 @ 20:47) (110/72 - 113/50)  RR: 16 (05-23-22 @ 20:47) (16 - 17)  SpO2: 100% (05-23-22 @ 20:47) (98% - 100%)  CAPILLARY BLOOD GLUCOSE      POCT Blood Glucose.: 120 mg/dL (23 May 2022 21:00)      Is/Os      MEDICATIONS (STANDING): dextrose 5% + sodium chloride 0.9%. 1000 milliLiter(s) IV Continuous <Continuous>  dextrose 5%. 1000 milliLiter(s) IV Continuous <Continuous>  dextrose 5%. 1000 milliLiter(s) IV Continuous <Continuous>  dextrose 50% Injectable 25 Gram(s) IV Push once  dextrose 50% Injectable 12.5 Gram(s) IV Push once  dextrose 50% Injectable 25 Gram(s) IV Push once  glucagon  Injectable 1 milliGRAM(s) IntraMuscular once  insulin lispro (ADMELOG) corrective regimen sliding scale   SubCutaneous every 6 hours  lactated ringers. 1000 milliLiter(s) IV Continuous <Continuous>    MEDICATIONS (PRN):dextrose Oral Gel 15 Gram(s) Oral once PRN Blood Glucose LESS THAN 70 milliGRAM(s)/deciliter      LABS  CBC (05-23 @ 13:11)                              8.5<L>                         6.99    )----------------(  262        80.1<H>% Neutrophils, 5.2<L>% Lymphocytes, ANC: 5.60                                26.5<L>    BMP (05-23 @ 13:11)             134<L>  |  100     |  24<H> 		Ca++ --      Ca 8.5                ---------------------------------( 137<H>		Mg 1.70               3.8     |  24      |  1.32<H>			Ph --        LFTs (05-23 @ 13:11)      TPro 5.8<L> / Alb 3.5 / TBili 0.3 / DBili -- / AST 20 / ALT 23 / AlkPhos 181<H>          VBG (05-23 @ 13:11)     7.35 / 45 / 43 / 25 / -0.9 / 68.7%     Lactate: 1.2      IMAGING STUDIES

## 2022-05-24 NOTE — PROGRESS NOTE ADULT - ASSESSMENT
75yo gentleman with PMH of htn, hld, pancreatic ductal adenocarcinoma s/p pylorus sparing Whipple 9/2020 ([facility]) with recurrence now on chemo, L thyroid papillary CA s/p L thyroidectomy/isthmusectomy/paratracheal node dissection in 2017, melanoma, who presents to ED due to 3 weeks of progressively poor PO intake and increased frequency of nausea/emesis. Findings consistent with SBO at level of DJ anastomosis.     - No acute surgical intervention  - NPO/IVF   - NGT decompression   - GI consult for upper endoscopy   - Serial abdominal exams, exam before pain meds  - Trend Cr   - f/u Heme Onc         D Team Surgery   [id number]

## 2022-05-24 NOTE — CONSULT NOTE ADULT - SUBJECTIVE AND OBJECTIVE BOX
Allergies:  No Known Allergies        Hospital Medications:  dextrose 5%. 1000 milliLiter(s) IV Continuous <Continuous>  dextrose 5%. 1000 milliLiter(s) IV Continuous <Continuous>  dextrose 50% Injectable 25 Gram(s) IV Push once  dextrose 50% Injectable 12.5 Gram(s) IV Push once  dextrose 50% Injectable 25 Gram(s) IV Push once  dextrose Oral Gel 15 Gram(s) Oral once PRN  glucagon  Injectable 1 milliGRAM(s) IntraMuscular once  insulin lispro (ADMELOG) corrective regimen sliding scale   SubCutaneous every 6 hours  lactated ringers. 1000 milliLiter(s) IV Continuous <Continuous>  magnesium sulfate  IVPB 2 Gram(s) IV Intermittent once      PMHX/PSHX:  Basal cell carcinoma    Melanoma    HTN (hypertension)    Dyslipidemia    Malignant neoplasm of thyroid gland    Vocal cord nodule    H/O arthroscopy of left knee    History of Whipple procedure        Family history:  No pertinent family history in first degree relatives    Family history of breast cancer (Sibling)    Family history of thyroid cancer (Father)    Family history of lung cancer (Mother)        Social History: no smoking    ROS:   General:  No fevers, chills or night sweats  ENT:  No sore throat or dysphagia  CV:  No pain or palpitations  Resp:  No dyspnea, cough or  wheezing  GI:  as above  Skin:  No rash or edema  Neuro: no weakness   Hematologic: no bleeding  Musculoskeletal: no muscle pain or join pain  Psych: no agitation     : no dysuria      PHYSICAL EXAM:   GENERAL:  NAD, Appears stated age  HEENT:  NC/AT,  conjunctivae clear and pink, sclera -anicteric  CHEST:  CTA B/L, Normal effort  HEART:  RRR S1/S2,  ABDOMEN:  Soft, non-tender, non-distended,  no masses   EXTREMITIES:  No cyanosis or Edema  SKIN:  Warm & Dry. No rash or erythema  NEURO:  Alert, oriented, no focal deficit    Vital Signs:  Vital Signs Last 24 Hrs  T(C): 36.4 (24 May 2022 12:00), Max: 36.7 (23 May 2022 20:47)  T(F): 97.6 (24 May 2022 12:00), Max: 98.1 (24 May 2022 00:43)  HR: 67 (24 May 2022 12:00) (66 - 84)  BP: 123/66 (24 May 2022 12:00) (110/72 - 141/59)  BP(mean): --  RR: 18 (24 May 2022 12:00) (16 - 18)  SpO2: 98% (24 May 2022 12:00) (98% - 100%)  Daily     Daily     LABS:                        7.5    6.97  )-----------( 230      ( 24 May 2022 10:32 )             24.0     Mean Cell Volume: 96.8 fL (05-24-22 @ 10:32)    05-24    138  |  103  |  16  ----------------------------<  106<H>  3.6   |  27  |  1.10    Ca    8.1<L>      24 May 2022 10:32  Phos  3.2     05-24  Mg     1.50     05-24    TPro  5.8<L>  /  Alb  3.5  /  TBili  0.3  /  DBili  x   /  AST  20  /  ALT  23  /  AlkPhos  181<H>  05-23    LIVER FUNCTIONS - ( 23 May 2022 13:11 )  Alb: 3.5 g/dL / Pro: 5.8 g/dL / ALK PHOS: 181 U/L / ALT: 23 U/L / AST: 20 U/L / GGT: x               Amylase Serum--      Lipase serum5       Ammonia--                          7.5    6.97  )-----------( 230      ( 24 May 2022 10:32 )             24.0                         8.5    6.99  )-----------( 262      ( 23 May 2022 13:11 )             26.5     Imaging:    < from: CT Abdomen and Pelvis w/ IV Cont (05.23.22 @ 16:46) >    ACC: [id number] EXAM:  CT ABDOMEN AND PELVIS IC                          PROCEDURE DATE:  05/23/2022          INTERPRETATION:  CLINICAL INFORMATION: Bilious vomiting. Decreased p.o.   intake. History of pancreatic cancer.    COMPARISON: 12/28/2021    CONTRAST/COMPLICATIONS:  IV Contrast: Omnipaque 350  60 cc administered   0 cc discarded  Oral Contrast: NONE  Complications: None reported at time of study completion    PROCEDURE:  CT of the Abdomen and Pelvis was performed.  Sagittal and coronal reformats were performed.    FINDINGS:  LOWER CHEST: Nodular and groundglass opacity at both lung bases,   concerning for infectious/inflammatory etiology. Aspiration is a   possibility.    LIVER: Within normal limits.  BILE DUCTS: Pneumobilia. Intrahepatic biliary ductal dilatation.  GALLBLADDER: Surgically removed  SPLEEN: Within normal limits.  PANCREAS: Status post pylorus sparing Whipple. Region of the   pancreaticojejunostomy and hepaticojejunostomy are unremarkable.  ADRENALS: Within normal limits.  KIDNEYS/URETERS: Bilateral renal cysts. No hydronephrosis.    BLADDER: Layering calcified debris/small stones in the bladder.  REPRODUCTIVE ORGANS: Enlarged prostate    BOWEL: Marked distention of the stomach and afferent small bowel with   fluid and gas. Transition point in the right upper quadrant in the region   of the duodenojejunostomy (2:47 and 601:39).    Diverticulosis, without acute diverticulitis.    Appendix is normal.  PERITONEUM: No ascites.  VESSELS: Within normal limits.  RETROPERITONEUM/LYMPH NODES: Redemonstrated mesenteric adenopathy.  ABDOMINAL WALL: Within normal limits.  BONES: Degenerative changes.    IMPRESSION:  Status post pylorus sparing Whipple with dilatation of the stomach and   afferent small bowel and transition point  in the region of the   duodenojejunostomy, consistent with small bowel obstruction    Nodular and groundglass opacity at both lung bases, concerning for   infectious/inflammatory etiology. Aspiration is a possibility.      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 Pt states prior to onset of symptoms he was eating "a ton", however 3 weeks ago started to lose his appetite. He eventually developed worsening nausea, initially with decreased tolerance of solid foods and eventual intolerance to liquids as well. Pt states he has lost 25lbs since onset of symptoms. He states he is still passing flatus, last BM was this AM and normal for him.     In ED pt afebrile and HD stable. Labs with no leucocytosis, normal lactate. Cr 1.32 with most recent 0.9 on 1/2022. CT a/p shows dilated stomach and afferent limb with transition point in region of duodenojejunostomy.     Allergies:  No Known Allergies    Home Medications:  atorvastatin 40 mg oral tablet: 1 tab(s) orally once a day (21 Dec 2021 15:57)  Creon 36,000 units oral delayed release capsule: 1 cap(s) orally 3 times a day (21 Dec 2021 15:57)  metFORMIN 1000 mg oral tablet:  (21 Dec 2021 15:57)      Hospital Medications:  dextrose 5%. 1000 milliLiter(s) IV Continuous <Continuous>  dextrose 5%. 1000 milliLiter(s) IV Continuous <Continuous>  dextrose 50% Injectable 25 Gram(s) IV Push once  dextrose 50% Injectable 12.5 Gram(s) IV Push once  dextrose 50% Injectable 25 Gram(s) IV Push once  dextrose Oral Gel 15 Gram(s) Oral once PRN  glucagon  Injectable 1 milliGRAM(s) IntraMuscular once  insulin lispro (ADMELOG) corrective regimen sliding scale   SubCutaneous every 6 hours  lactated ringers. 1000 milliLiter(s) IV Continuous <Continuous>  magnesium sulfate  IVPB 2 Gram(s) IV Intermittent once      PMHX/PSHX:  Basal cell carcinoma    Melanoma    HTN (hypertension)    Dyslipidemia    Malignant neoplasm of thyroid gland    Vocal cord nodule    H/O arthroscopy of left knee    History of Whipple procedure        Family history:  No pertinent family history in first degree relatives    Family history of breast cancer (Sibling)    Family history of thyroid cancer (Father)    Family history of lung cancer (Mother)        Social History: no smoking    ROS:   General:  No fevers, chills or night sweats  ENT:  No sore throat or dysphagia  CV:  No pain or palpitations  Resp:  No dyspnea, cough or  wheezing  GI:  as above  Skin:  No rash or edema  Neuro: no weakness   Hematologic: no bleeding  Musculoskeletal: no muscle pain or join pain  Psych: no agitation     : no dysuria      PHYSICAL EXAM:   GENERAL:  NAD, Appears stated age  HEENT:  NC/AT,  conjunctivae clear and pink, sclera -anicteric  CHEST:  CTA B/L, Normal effort  HEART:  RRR S1/S2,  ABDOMEN:  Soft, non-tender, non-distended,  no masses   EXTREMITIES:  No cyanosis or Edema  SKIN:  Warm & Dry. No rash or erythema  NEURO:  Alert, oriented, no focal deficit    Vital Signs:  Vital Signs Last 24 Hrs  T(C): 36.4 (24 May 2022 12:00), Max: 36.7 (23 May 2022 20:47)  T(F): 97.6 (24 May 2022 12:00), Max: 98.1 (24 May 2022 00:43)  HR: 67 (24 May 2022 12:00) (66 - 84)  BP: 123/66 (24 May 2022 12:00) (110/72 - 141/59)  BP(mean): --  RR: 18 (24 May 2022 12:00) (16 - 18)  SpO2: 98% (24 May 2022 12:00) (98% - 100%)  Daily     Daily     LABS:                        7.5    6.97  )-----------( 230      ( 24 May 2022 10:32 )             24.0     Mean Cell Volume: 96.8 fL (05-24-22 @ 10:32)    05-24    138  |  103  |  16  ----------------------------<  106<H>  3.6   |  27  |  1.10    Ca    8.1<L>      24 May 2022 10:32  Phos  3.2     05-24  Mg     1.50     05-24    TPro  5.8<L>  /  Alb  3.5  /  TBili  0.3  /  DBili  x   /  AST  20  /  ALT  23  /  AlkPhos  181<H>  05-23    LIVER FUNCTIONS - ( 23 May 2022 13:11 )  Alb: 3.5 g/dL / Pro: 5.8 g/dL / ALK PHOS: 181 U/L / ALT: 23 U/L / AST: 20 U/L / GGT: x               Amylase Serum--      Lipase serum5       Ammonia--                          7.5    6.97  )-----------( 230      ( 24 May 2022 10:32 )             24.0                         8.5    6.99  )-----------( 262      ( 23 May 2022 13:11 )             26.5     Imaging:    < from: CT Abdomen and Pelvis w/ IV Cont (05.23.22 @ 16:46) >    ACC: [id number] EXAM:  CT ABDOMEN AND PELVIS IC                          PROCEDURE DATE:  05/23/2022          INTERPRETATION:  CLINICAL INFORMATION: Bilious vomiting. Decreased p.o.   intake. History of pancreatic cancer.    COMPARISON: 12/28/2021    CONTRAST/COMPLICATIONS:  IV Contrast: Omnipaque 350  60 cc administered   0 cc discarded  Oral Contrast: NONE  Complications: None reported at time of study completion    PROCEDURE:  CT of the Abdomen and Pelvis was performed.  Sagittal and coronal reformats were performed.    FINDINGS:  LOWER CHEST: Nodular and groundglass opacity at both lung bases,   concerning for infectious/inflammatory etiology. Aspiration is a   possibility.    LIVER: Within normal limits.  BILE DUCTS: Pneumobilia. Intrahepatic biliary ductal dilatation.  GALLBLADDER: Surgically removed  SPLEEN: Within normal limits.  PANCREAS: Status post pylorus sparing Whipple. Region of the   pancreaticojejunostomy and hepaticojejunostomy are unremarkable.  ADRENALS: Within normal limits.  KIDNEYS/URETERS: Bilateral renal cysts. No hydronephrosis.    BLADDER: Layering calcified debris/small stones in the bladder.  REPRODUCTIVE ORGANS: Enlarged prostate    BOWEL: Marked distention of the stomach and afferent small bowel with   fluid and gas. Transition point in the right upper quadrant in the region   of the duodenojejunostomy (2:47 and 601:39).    Diverticulosis, without acute diverticulitis.    Appendix is normal.  PERITONEUM: No ascites.  VESSELS: Within normal limits.  RETROPERITONEUM/LYMPH NODES: Redemonstrated mesenteric adenopathy.  ABDOMINAL WALL: Within normal limits.  BONES: Degenerative changes.    IMPRESSION:  Status post pylorus sparing Whipple with dilatation of the stomach and   afferent small bowel and transition point  in the region of the   duodenojejunostomy, consistent with small bowel obstruction    Nodular and groundglass opacity at both lung bases, concerning for   infectious/inflammatory etiology. Aspiration is a possibility.      --- End of Report ---    < end of copied text >

## 2022-05-24 NOTE — PATIENT PROFILE ADULT - FALL HARM RISK - UNIVERSAL INTERVENTIONS
Bed in lowest position, wheels locked, appropriate side rails in place/Call bell, personal items and telephone in reach/Instruct patient to call for assistance before getting out of bed or chair/Non-slip footwear when patient is out of bed/[place] to call system/Physically safe environment - no spills, clutter or unnecessary equipment/Purposeful Proactive Rounding/Room/bathroom lighting operational, light cord in reach

## 2022-05-25 NOTE — CONSULT NOTE ADULT - CONVERSATION DETAILS
Referral for complex decision making and symptom management in setting of advanced malignancy. Met with patient and his family at bedside, introduced role of palliative care. Patient shared that he has pancreatic cancer and is aware that he has obstruction requiring NGT for decompression. He is aware that TPN is being started today to provide nutrition. He is clear that he wants to continue with any workup or DMT that is being offered.     Discussed advanced directives. He states he has a living will and his wife is his HCP listed in the living will. The patient's wife did not want to continued further discussions regarding medical directives. However, the patient did share that his family is aware of his wishes that are stated in his living will.

## 2022-05-25 NOTE — CHART NOTE - NSCHARTNOTEFT_GEN_A_CORE
PRE-INTERVENTIONAL RADIOLOGY PROCEDURE NOTE      Patient Age: 76    Patient Gender: male    Procedure: PICC Line    Diagnosis/Indication: malnutrition/TPNa    Ordering Attending Physician: Dr. 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    Pertinent labs:                      8.1    8.32  )-----------( 304      ( 25 May 2022 07:00 )             25.7       05-25    139  |  100  |  14  ----------------------------<  141<H>  4.0   |  27  |  1.08    Ca    8.1<L>      25 May 2022 07:00  Phos  4.2     05-25  Mg     1.80     05-25    TPro  5.0<L>  /  Alb  3.0<L>  /  TBili  0.2  /  DBili  x   /  AST  14  /  ALT  15  /  AlkPhos  141<H>  05-25        **APPROVED BY SHUBHAM DENNIS**      Patient and Family Aware ? Yes

## 2022-05-25 NOTE — CONSULT NOTE ADULT - SUBJECTIVE AND OBJECTIVE BOX
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 Pt states prior to onset of symptoms he was eating "a ton", however 3 weeks ago started to lose his appetite. He eventually developed worsening nausea, initially with decreased tolerance of solid foods and eventual intolerance to liquids as well. Pt states he has lost 25lbs since onset of symptoms. He states he is still passing flatus, last BM was this AM and normal for him.     In ED pt afebrile and HD stable. Labs with no leucocytosis, normal lactate. Cr 1.32 with most recent 0.9 on 1/2022. CT a/p shows dilated stomach and afferent limb with transition point in region of duodenojejunostomy. Chest findings suggest possible aspiration.  (23 May 2022 20:36)    >5/25: Patient seen with wife and son at bedside. Introduced role of palliative care to patient and his family. He has no symptoms aside from NGT irritation, however declined further symptom management relief. Patient's goal is to receive DMT. He understands that he has an obstruction and that he will be on TPN. Family wants to speak to Dr. Cary (oncologist).      PERTINENT PM/SXH:   Basal cell carcinoma    Melanoma    HTN (hypertension)    Dyslipidemia    Malignant neoplasm of thyroid gland      Vocal cord nodule    H/O arthroscopy of left knee    History of Whipple procedure      FAMILY HISTORY:  Family history of breast cancer (Sibling)    Family history of thyroid cancer (Father)    Family history of lung cancer (Mother)      ITEMS NOT CHECKED ARE NOT PRESENT    SOCIAL HISTORY:   Significant other/partner[ x]  Children[ x]  Yazdanism/Spirituality:  Substance hx:  [ ]   Tobacco hx:  [ ]   Alcohol hx: [ ]   Home Opioid hx:  [ ] I-Stop Reference No:  This report was requested by: Nikki Lyons | Reference #: [id number]    Others' Prescriptions  Patient Name: Isma MartinezBirth Date: 1945  Address: [address] [place] [address] [place], [place] [address]: Male  Rx Written	Rx Dispensed	Drug	Quantity	Days Supply	Prescriber Name	Prescriber Autumn #	Payment Method	Dispenser  01/10/2022	01/19/2022	dronabinol 2.5 mg capsule	60	30	Ronda Laura	[id number]	Medicare	[facility]  12/07/2021	12/07/2021	dronabinol 2.5 mg capsule	60	30	Carlos Cary A (MD PHD)	[id number]	Medicare	[facility]  11/02/2021	11/03/2021	dronabinol 2.5 mg capsule	60	30	Carlos Cary A (MD PHD)	[id number]	Medicare	[id number]  09/24/2021	09/26/2021	dronabinol 2.5 mg capsule	60	30	Ish Yin	[id number]	Medicare	[facility]  Living Situation: [ ]Home  [ ]Long term care  [ ]Rehab [ ]Other    ADVANCE DIRECTIVES:    MOLST  [ ]  Living Will  [ x]   DECISION MAKER(s): patient awake, alert, answering questions appropriately.   [x ] Health Care Proxy(s)  [ ] Surrogate(s)  [ ] Guardian           Name(s): Micaela Martinez (spouse) Phone Number(s): [phone]    BASELINE (I)ADL(s) (prior to admission):  [place]: [x ]Total  [ ] Moderate [ ]Dependent    Allergies    No Known Allergies    Intolerances    MEDICATIONS  (STANDING):  chlorhexidine 2% Cloths 1 Application(s) Topical daily  dextrose 5% + sodium chloride 0.45% with potassium chloride 20 mEq/L 1000 milliLiter(s) (125 mL/Hr) IV Continuous <Continuous>  dextrose 50% Injectable 25 Gram(s) IV Push once  dextrose 50% Injectable 12.5 Gram(s) IV Push once  dextrose 50% Injectable 25 Gram(s) IV Push once  diphenhydrAMINE Injectable 25 milliGRAM(s) IV Push at bedtime  enoxaparin Injectable 40 milliGRAM(s) SubCutaneous every 24 hours  fat emulsion (Fish Oil and Plant Based) 20% Infusion 10.4 mL/Hr (10.4 mL/Hr) IV Continuous <Continuous>  glucagon  Injectable 1 milliGRAM(s) IntraMuscular once  insulin lispro (ADMELOG) corrective regimen sliding scale   SubCutaneous every 6 hours  magnesium sulfate  IVPB 2 Gram(s) IV Intermittent once  Parenteral Nutrition - Adult 1 Each (42 mL/Hr) TPN Continuous <Continuous>    MEDICATIONS  (PRN):  dextrose Oral Gel 15 Gram(s) Oral once PRN Blood Glucose LESS THAN 70 milliGRAM(s)/deciliter    PRESENT SYMPTOMS: [ ]Unable to obtain due to poor mentation   Source if other than patient:  [ ]Family   [ ]Team     Pain: [ ]yes [x ]no  QOL impact -   Location -                    Aggravating factors -  Quality -  Radiation -  Timing-  Severity (0-10 scale):  Minimal acceptable level (0-10 scale):     PAIN AD Score:     http://geriatrictoolkit.[email or web address]/painad.pdf (press ctrl +  left click to view)    Dyspnea:                           [ ]Mild [ ]Moderate [ ]Severe  Anxiety:                             [ ]Mild [ ]Moderate [ ]Severe  Fatigue:                             [ ]Mild [ ]Moderate [ ]Severe  Nausea:                             [ ]Mild [ ]Moderate [ ]Severe  Loss of appetite:              [ ]Mild [ ]Moderate [ ]Severe  Constipation:                    [ ]Mild [ ]Moderate [ ]Severe    Other Symptoms: +weight loss  [ x]All other review of systems negative     Palliative Performance Status Version 2:   60      %    http://npcrc.org/files/news/palliative_performance_scale_ppsv2.pdf  PHYSICAL EXAM:  Vital Signs Last 24 Hrs  T(C): 36.5 (25 May 2022 09:25), Max: 36.6 (25 May 2022 05:35)  T(F): 97.7 (25 May 2022 09:25), Max: 97.9 (25 May 2022 05:35)  HR: 76 (25 May 2022 09:25) (61 - 86)  BP: 117/62 (25 May 2022 09:25) (106/56 - 148/69)  BP(mean): --  RR: 18 (25 May 2022 09:25) (12 - 18)  SpO2: 99% (25 May 2022 09:25) (97% - 100%) I&O's Summary    24 May 2022 07:01  -  25 May 2022 07:00  --------------------------------------------------------  IN: 1375 mL / OUT: 2750 mL / NET: -1375 mL      GENERAL:  [x ]Alert  [x ]Oriented x3   [ ]Lethargic  [ x]Cachexia  [ ]Unarousable  [ x]Verbal  [ ]Non-Verbal  Behavioral:   [ ] Anxiety  [ ] Delirium [ ] Agitation [ x] Other  HEENT: NGT to suction   [ ]Normal   [ x]Dry mouth   [ ]ET Tube/Trach  [ ]Oral lesions  PULMONARY:   [ ]Clear [ ]Tachypnea  [ ]Audible excessive secretions   [ ]Rhonchi        [ ]Right [ ]Left [ ]Bilateral  [ ]Crackles        [ ]Right [ ]Left [ ]Bilateral  [ ]Wheezing     [ ]Right [ ]Left [ ]Bilatera  [ ]Diminished breath sounds [ ]right [ ]left [ ]bilateral  CARDIOVASCULAR:    [ ]Regular [ ]Irregular [ ]Tachy  [ ]Francis [ ]Murmur [ ]Other  GASTROINTESTINAL:  [ ]Soft  [ ]Distended   [ ]+BS  [ ]Non tender [ ]Tender  [ ]PEG [ ]OGT/ NGT  Last BM: ?   GENITOURINARY:  [ x]Normal [ ] Incontinent   [ ]Oliguria/Anuria   [ ]Botello  MUSCULOSKELETAL:   [ ]Normal   [x ]Weakness  [ ]Bed/Wheelchair bound [ ]Edema  NEUROLOGIC:   [x ]No focal deficits  [ ]Cognitive impairment  [ ]Dysphagia [ ]Dysarthria [ ]Paresis [ ]Other   SKIN:   [x ]Normal    [ ]Rash  [ ]Pressure ulcer(s)       Present on admission [ ]y [ ]n    CRITICAL CARE:  [ ] Shock Present  [ ]Septic [ ]Cardiogenic [ ]Neurologic [ ]Hypovolemic  [ ]  Vasopressors [ ]  Inotropes   [ ]Respiratory failure present [ ]Mechanical ventilation [ ]Non-invasive ventilatory support [ ]High flow  [ ]Acute  [ ]Chronic [ ]Hypoxic  [ ]Hypercarbic [ ]Other  [ ]Other organ failure     LABS:                        8.1    8.32  )-----------( 304      ( 25 May 2022 07:00 )             25.7   05-25    139  |  100  |  14  ----------------------------<  141<H>  4.0   |  27  |  1.08    Ca    8.1<L>      25 May 2022 07:00  Phos  4.2     05-25  Mg     1.80     05-25    TPro  5.0<L>  /  Alb  3.0<L>  /  TBili  0.2  /  DBili  x   /  AST  14  /  ALT  15  /  AlkPhos  141<H>  05-25        RADIOLOGY & ADDITIONAL STUDIES: < from: CT Abdomen and Pelvis w/ IV Cont (05.23.22 @ 16:46) >  IMPRESSION:  Status post pylorus sparing Whipple with dilatation of the stomach and   afferent small bowel and transition point  in the region of the   duodenojejunostomy, consistent with small bowel obstruction    Nodular and groundglass opacity at both lung bases, concerning for   infectious/inflammatory etiology. Aspiration is a possibility.      < end of copied text >      PROTEIN CALORIE MALNUTRITION PRESENT: [ ]mild [ ]moderate [ ]severe [ ]underweight [ ]morbid obesity  https://www.andeal.org/vault/2440/web/files/ONC/Table_Clinical%20Characteristics%20to%20Document%20Malnutrition-White%20JV%20et%20al%392815.pdf    Height (cm): 193 (05-24-22 @ 16:24), 193 (01-19-22 @ 15:30), 194 (10-29-21 @ 17:28)  Weight (kg): 65.8 (05-24-22 @ 16:24), [id number] (04-05-22 @ 11:00), 71 (10-29-21 @ 17:28)  BMI (kg/m2): 17.7 (05-24-22 @ 16:24), 20.6 (04-05-22 @ 11:00), 19.1 (01-19-22 @ 15:30)    [ ]PPSV2 < or = to 30% [ ]significant weight loss  [ ]poor nutritional intake  [ ]anasarca      [ ]Artificial Nutrition      REFERRALS:   [ ]Chaplaincy  [ ]Hospice  [ ]Child Life  [ ]Social Work  [ ]Case management [ ]Holistic Therapy

## 2022-05-25 NOTE — CONSULT NOTE ADULT - PROBLEM SELECTOR RECOMMENDATION 9
Patient denies n/v. Reports decreased PO intake and ~25lb weight loss in the past month.   > Nutritional recs appreciated.   > Patient aware he will be started on TPN

## 2022-05-25 NOTE — CONSULT NOTE ADULT - PROBLEM SELECTOR RECOMMENDATION 4
Patient has living will and states his wife, Micaela Martinez, [phone], is his HCP.   Wife at bedside did not feel comfortable continuing goc discussions. Patient is very clear that he wants to continue with any workup and any onc treatment that is being offered.

## 2022-05-25 NOTE — PROGRESS NOTE ADULT - ASSESSMENT
75yo gentleman with PMH of htn, hld, pancreatic ductal adenocarcinoma, YESENIA-germline mutant, s/p resection in 9/2020 with recurrence, L thyroid papillary CA s/p L thyroidectomy/isthmusectomy/paratracheal node dissection in 2017, melanoma, who presents to ED due to N/V, weight loss, found to have small bowel obstruction.     #Pancreatic adenocarcinoma    - Hold systemic chemotherapy during this admission  - CT identified SBO at transition point in the region of the duodenojejunostomy.   - 5/24/22- EGD performed which found evidence of a pylorus sparing Whipple with duodenojejunostomy.  Multiple areas of stenosis secondary to extrinsic compression involving both the afferent and efferent limbs. Due to multi-focal nature of involved bowel and stenosis secondary to extrinsic compression, enteral stenting not performed as unlikely to result in any significant clinical improvement.  - Per surgery team, patient is planned for TPN and optimization for possible bypass surgery  - Extensive discussion held today with the patient, his wife and son and bedside. Explained that obstruction is suspicious for progression of his malignancy. If patient's obstruction is relieved and he has clinical recovery, then he would be eligible for other line of outpatient chemotherapy. They expressed understanding.   - CEA was 3.4 WNL  - CA 19-9 ordered for AM.    Please do not hesitate to page with questions.     Lauryn Roy MD PGY4   [phone]  Hematology-Oncology Fellow

## 2022-05-25 NOTE — CONSULT NOTE ADULT - PROBLEM SELECTOR RECOMMENDATION 2
CTa/p (5/23): Status post pylorus sparing Whipple with dilatation of the stomach and afferent small bowel and transition point  in the region of the duodenojejunostomy, consistent with small bowel obstruction. Nodular and groundglass opacity at both lung bases, concerning for infectious/inflammatory etiology. Aspiration is a possibility.  > Patient with NGT to suction   > Can consider Malignant bowel protocol

## 2022-05-25 NOTE — PROGRESS NOTE ADULT - SUBJECTIVE AND OBJECTIVE BOX
Surgical Oncology    SUBJECTIVE: Pt seen and examined on rounds with team. GI unable to place stent following EGD      OBJECTIVE    PHYSICAL EXAM:   General: NAD, Lying in bed   Neuro: Awake and alert  Extremities:   GI/Abd: Soft, NT/ND,       VITALS  T(C): 36.2 (05-24-22 @ 21:05), Max: 36.7 (05-24-22 @ 00:43)  HR: 79 (05-24-22 @ 21:05) (61 - 84)  BP: 131/69 (05-24-22 @ 21:05) (113/72 - 148/69)  RR: 18 (05-24-22 @ 21:05) (12 - 18)  SpO2: 100% (05-24-22 @ 21:05) (97% - 100%)  CAPILLARY BLOOD GLUCOSE      POCT Blood Glucose.: 134 mg/dL (24 May 2022 23:55)  POCT Blood Glucose.: 102 mg/dL (24 May 2022 19:25)  POCT Blood Glucose.: 93 mg/dL (24 May 2022 16:41)  POCT Blood Glucose.: 116 mg/dL (24 May 2022 12:05)  POCT Blood Glucose.: 109 mg/dL (24 May 2022 08:42)  POCT Blood Glucose.: 123 mg/dL (24 May 2022 03:04)  POCT Blood Glucose.: 144 mg/dL (24 May 2022 00:32)      Is/Os    05-23 @ 07:01  -  05-24 @ 07:00  --------------------------------------------------------  IN:    Lactated Ringers: 250 mL  Total IN: 250 mL    OUT:    Nasogastric/Oral tube (mL): 450 mL  Total OUT: 450 mL    Total NET: -200 mL      05-24 @ 07:01  -  05-25 @ 00:24  --------------------------------------------------------  IN:    Lactated Ringers: 500 mL  Total IN: 500 mL    OUT:    Nasogastric/Oral tube (mL): 1100 mL    Oral Fluid: 0 mL    Voided (mL): 650 mL  Total OUT: 1750 mL    Total NET: -1250 mL          MEDICATIONS (STANDING): dextrose 50% Injectable 25 Gram(s) IV Push once  dextrose 50% Injectable 12.5 Gram(s) IV Push once  dextrose 50% Injectable 25 Gram(s) IV Push once  diphenhydrAMINE Injectable 25 milliGRAM(s) IV Push at bedtime  glucagon  Injectable 1 milliGRAM(s) IntraMuscular once  insulin lispro (ADMELOG) corrective regimen sliding scale   SubCutaneous every 6 hours  lactated ringers. 1000 milliLiter(s) IV Continuous <Continuous>    MEDICATIONS (PRN):dextrose Oral Gel 15 Gram(s) Oral once PRN Blood Glucose LESS THAN 70 milliGRAM(s)/deciliter      LABS  CBC (05-24 @ 10:32)                              7.5<L>                         6.97    )----------------(  230        --    % Neutrophils, --    % Lymphocytes, ANC: --                                  24.0<L>  CBC (05-23 @ 13:11)                              8.5<L>                         6.99    )----------------(  262        80.1<H>% Neutrophils, 5.2<L>% Lymphocytes, ANC: 5.60                                26.5<L>    BMP (05-24 @ 10:32)             138     |  103     |  16    		Ca++ --      Ca 8.1<L>             ---------------------------------( 106<H>		Mg 1.50<L>             3.6     |  27      |  1.10  			Ph 3.2     BMP (05-23 @ 13:11)             134<L>  |  100     |  24<H> 		Ca++ --      Ca 8.5                ---------------------------------( 137<H>		Mg 1.70               3.8     |  24      |  1.32<H>			Ph --        LFTs (05-23 @ 13:11)      TPro 5.8<L> / Alb 3.5 / TBili 0.3 / DBili -- / AST 20 / ALT 23 / AlkPhos 181<H>          VBG (05-23 @ 13:11)     7.35 / 45 / 43 / 25 / -0.9 / 68.7%     Lactate: 1.2      IMAGING STUDIES     Surgical Oncology    SUBJECTIVE: Pt seen and examined on rounds with team. GI unable to place stent following EGD. No apparent distress at bedside.       OBJECTIVE    PHYSICAL EXAM:   General: NAD, Lying in bed. NG tube to suction.   Neuro: Awake and alert  Extremities:   GI/Abd: Soft, NT/ND,       VITALS  T(C): 36.2 (05-24-22 @ 21:05), Max: 36.7 (05-24-22 @ 00:43)  HR: 79 (05-24-22 @ 21:05) (61 - 84)  BP: 131/69 (05-24-22 @ 21:05) (113/72 - 148/69)  RR: 18 (05-24-22 @ 21:05) (12 - 18)  SpO2: 100% (05-24-22 @ 21:05) (97% - 100%)  CAPILLARY BLOOD GLUCOSE      POCT Blood Glucose.: 134 mg/dL (24 May 2022 23:55)  POCT Blood Glucose.: 102 mg/dL (24 May 2022 19:25)  POCT Blood Glucose.: 93 mg/dL (24 May 2022 16:41)  POCT Blood Glucose.: 116 mg/dL (24 May 2022 12:05)  POCT Blood Glucose.: 109 mg/dL (24 May 2022 08:42)  POCT Blood Glucose.: 123 mg/dL (24 May 2022 03:04)  POCT Blood Glucose.: 144 mg/dL (24 May 2022 00:32)      Is/Os    05-23 @ 07:01  -  05-24 @ 07:00  --------------------------------------------------------  IN:    Lactated Ringers: 250 mL  Total IN: 250 mL    OUT:    Nasogastric/Oral tube (mL): 450 mL  Total OUT: 450 mL    Total NET: -200 mL      05-24 @ 07:01  -  05-25 @ 00:24  --------------------------------------------------------  IN:    Lactated Ringers: 500 mL  Total IN: 500 mL    OUT:    Nasogastric/Oral tube (mL): 1100 mL    Oral Fluid: 0 mL    Voided (mL): 650 mL  Total OUT: 1750 mL    Total NET: -1250 mL          MEDICATIONS (STANDING): dextrose 50% Injectable 25 Gram(s) IV Push once  dextrose 50% Injectable 12.5 Gram(s) IV Push once  dextrose 50% Injectable 25 Gram(s) IV Push once  diphenhydrAMINE Injectable 25 milliGRAM(s) IV Push at bedtime  glucagon  Injectable 1 milliGRAM(s) IntraMuscular once  insulin lispro (ADMELOG) corrective regimen sliding scale   SubCutaneous every 6 hours  lactated ringers. 1000 milliLiter(s) IV Continuous <Continuous>    MEDICATIONS (PRN):dextrose Oral Gel 15 Gram(s) Oral once PRN Blood Glucose LESS THAN 70 milliGRAM(s)/deciliter      LABS  CBC (05-24 @ 10:32)                              7.5<L>                         6.97    )----------------(  230        --    % Neutrophils, --    % Lymphocytes, ANC: --                                  24.0<L>  CBC (05-23 @ 13:11)                              8.5<L>                         6.99    )----------------(  262        80.1<H>% Neutrophils, 5.2<L>% Lymphocytes, ANC: 5.60                                26.5<L>    BMP (05-24 @ 10:32)             138     |  103     |  16    		Ca++ --      Ca 8.1<L>             ---------------------------------( 106<H>		Mg 1.50<L>             3.6     |  27      |  1.10  			Ph 3.2     BMP (05-23 @ 13:11)             134<L>  |  100     |  24<H> 		Ca++ --      Ca 8.5                ---------------------------------( 137<H>		Mg 1.70               3.8     |  24      |  1.32<H>			Ph --        LFTs (05-23 @ 13:11)      TPro 5.8<L> / Alb 3.5 / TBili 0.3 / DBili -- / AST 20 / ALT 23 / AlkPhos 181<H>          VBG (05-23 @ 13:11)     7.35 / 45 / 43 / 25 / -0.9 / 68.7%     Lactate: 1.2      IMAGING STUDIES

## 2022-05-25 NOTE — PROGRESS NOTE ADULT - SUBJECTIVE AND OBJECTIVE BOX
Hematology Oncology Follow-up    INTERVAL HPI/OVERNIGHT EVENTS:  Patient denies any N/V or abdominal pain. He is very concerned because the gastroenterologists were unable to intervene on his obstruction.     VITAL SIGNS:  T(F): 97.6 (05-25-22 @ 16:06)  HR: 60 (05-25-22 @ 16:06)  BP: 125/60 (05-25-22 @ 16:06)  RR: 18 (05-25-22 @ 16:06)  SpO2: 99% (05-25-22 @ 16:06)  Wt(kg): --    05-24-22 @ 07:01  -  05-25-22 @ 07:00  --------------------------------------------------------  IN: 1375 mL / OUT: 2750 mL / NET: -1375 mL      PHYSICAL EXAM:  GENERAL: NAD, underweight  HEAD:  Atraumatic, Normocephalic  EYES: EOMI, PERRLA, conjunctiva and sclera clear  ENMT: NGT in place draining biliary fluid  NECK: Supple, nontender  NERVOUS SYSTEM:  alert and conversant, moving all extremities spontaneously   CHEST/LUNG: Clear to auscultation bilaterally; no rhonchi  HEART: Regular rate and rhythm; No murmurs  ABDOMEN: Soft, Nontender, Nondistended  EXTREMITIES:  2+ radial Pulses, No cyanosis or edema  SKIN: warm, dry    Medications  MEDICATIONS  (STANDING):  chlorhexidine 2% Cloths 1 Application(s) Topical daily  dextrose 5% + sodium chloride 0.45% with potassium chloride 20 mEq/L 1000 milliLiter(s) (125 mL/Hr) IV Continuous <Continuous>  dextrose 50% Injectable 25 Gram(s) IV Push once  dextrose 50% Injectable 12.5 Gram(s) IV Push once  dextrose 50% Injectable 25 Gram(s) IV Push once  diphenhydrAMINE Injectable 25 milliGRAM(s) IV Push at bedtime  enoxaparin Injectable 40 milliGRAM(s) SubCutaneous every 24 hours  fat emulsion (Fish Oil and Plant Based) 20% Infusion 10.4 mL/Hr (10.4 mL/Hr) IV Continuous <Continuous>  glucagon  Injectable 1 milliGRAM(s) IntraMuscular once  insulin lispro (ADMELOG) corrective regimen sliding scale   SubCutaneous every 6 hours  Parenteral Nutrition - Adult 1 Each (42 mL/Hr) TPN Continuous <Continuous>    MEDICATIONS  (PRN):  dextrose Oral Gel 15 Gram(s) Oral once PRN Blood Glucose LESS THAN 70 milliGRAM(s)/deciliter      Allergies: No Known Allergies      LABS:                        8.1    8.32  )-----------( 304      ( 25 May 2022 07:00 )             25.7     05-25    139  |  100  |  14  ----------------------------<  141<H>  4.0   |  27  |  1.08    Ca    8.1<L>      25 May 2022 07:00  Phos  4.2     05-25  Mg     1.80     05-25    TPro  5.0<L>  /  Alb  3.0<L>  /  TBili  0.2  /  DBili  x   /  AST  14  /  ALT  15  /  AlkPhos  141<H>  05-25                   RADIOLOGY & ADDITIONAL TESTS:  Studies reviewed.

## 2022-05-25 NOTE — CONSULT NOTE ADULT - SUBJECTIVE AND OBJECTIVE BOX
Nutrition Support Consult Note    HPI:  75 y/o male with PMH of HTN, HLD, pancreatic ductal adenocarcinoma s/p pylorus sparing Whipple 9/2020 ([facility]) with recurrence now on chemo, 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 Findings consistent with SBO at level of duodenojejunostomy anastomosis. Patient states that he eventually developed worsening nausea, initially with decreased tolerance of solid foods and eventual intolerance to liquids as well. Pt states he has lost 25lbs since onset of symptoms. Nutrition support consult called for initiation of TPN in view of severe malnutrition, prolonged period of poor PO intake and nutritional optimization prior to planned surgical intervention next week. At this time , pt is resting comfortably and denies chest pain, shortness of breath, nausea or vomiting. Pt is NPO with NGT in place for decompression.     ROS: Except as noted above, all other systems reviewed and are negative     Allergies  No Known Allergies    PAST MEDICAL & SURGICAL HISTORY:  Melanoma nose and left cheek  2yrs ago  HTN (hypertension)  Dyslipidemia  Malignant neoplasm of thyroid gland  Vocal cord nodule 1998  H/O arthroscopy of left knee meniscus  12/1998  History of Whipple procedure pylorus sparing whipple 2022 at [facility]    FAMILY HISTORY:  Family history of breast cancer (Sibling)  Family history of thyroid cancer (Father)  Family history of lung cancer (Mother)    Vital Signs Last 24 Hrs  T(C): 36.5 (25 May 2022 09:25), Max: 36.6 (25 May 2022 05:35)  T(F): 97.7 (25 May 2022 09:25), Max: 97.9 (25 May 2022 05:35)  HR: 76 (25 May 2022 09:25) (61 - 86)  BP: 117/62 (25 May 2022 09:25) (106/56 - 148/69)  RR: 18 (25 May 2022 09:25) (12 - 18)  SpO2: 99% (25 May 2022 09:25) (97% - 100%)    MEDICATIONS  (STANDING):  chlorhexidine 2% Cloths 1 Application(s) Topical daily  dextrose 5% + sodium chloride 0.45% with potassium chloride 20 mEq/L 1000 milliLiter(s) (125 mL/Hr) IV Continuous <Continuous>  dextrose 50% Injectable 25 Gram(s) IV Push once  dextrose 50% Injectable 12.5 Gram(s) IV Push once  dextrose 50% Injectable 25 Gram(s) IV Push once  diphenhydrAMINE Injectable 25 milliGRAM(s) IV Push at bedtime  enoxaparin Injectable 40 milliGRAM(s) SubCutaneous every 24 hours  fat emulsion (Fish Oil and Plant Based) 20% Infusion 10.4 mL/Hr (10.4 mL/Hr) IV Continuous <Continuous>  glucagon  Injectable 1 milliGRAM(s) IntraMuscular once  insulin lispro (ADMELOG) corrective regimen sliding scale   SubCutaneous every 6 hours  magnesium sulfate  IVPB 2 Gram(s) IV Intermittent once  Parenteral Nutrition - Adult 1 Each (42 mL/Hr) TPN Continuous <Continuous>    MEDICATIONS  (PRN):  dextrose Oral Gel 15 Gram(s) Oral once PRN Blood Glucose LESS THAN 70 milliGRAM(s)/deciliter    I&O's Detail    24 May 2022 07:01  -  25 May 2022 07:00  --------------------------------------------------------  IN:    dextrose 5% + sodium chloride 0.45% w/ Additives: 375 mL    Lactated Ringers: 1000 mL  Total IN: 1375 mL    OUT:    Nasogastric/Oral tube (mL): 1800 mL    Oral Fluid: 0 mL    Voided (mL): 950 mL  Total OUT: 2750 mL    Total NET: -1375 mL    PHYSICAL EXAM:  General: NAD, resting comfortably in bed  Neuro: Awake and alert  GI/Abd: Soft, NT/ND, NGT in place   Extremities: warm, no edema     LABS:                        8.1    8.32  )-----------( 304      ( 25 May 2022 07:00 )             25.7     05-25    139  |  100  |  14  ----------------------------<  141<H>  4.0   |  27  |  1.08    Ca    8.1<L>      25 May 2022 07:00  Phos  4.2     05-25  Mg     1.80     05-25    TPro  5.0<L>  /  Alb  3.0<L>  /  TBili  0.2  /  DBili  x   /  AST  14  /  ALT  15  /  AlkPhos  141<H>  05-25    LIVER FUNCTIONS - ( 25 May 2022 07:00 )  Alb: 3.0 g/dL / Pro: 5.0 g/dL / ALK PHOS: 141 U/L / ALT: 15 U/L / AST: 14 U/L / GGT: x           POCT Blood Glucose.: 142 mg/dL (25 May 2022 06:08)    Current Weight: 65.8 kG  Height: 193 cm  Ideal Body Weight: 86.8 kG  BMI: 17.7   Current Diet: [ x ]NPO  Appetite: [  ]Poor [  ]Adequate [  ]Good    CLINICAL INDICATORS  Severe protein calorie malnutrition in acute illness/ injury; secondary to poor appetite, weight loss >5% in 1 month and/or >7.5% in 3 months, caloric Intake <50% of nutrition needs >= 5 days, temporal wasting, severe loss of muscle mass/atrophy and loss of body fat stores.     Metabolic Requirements:  The patient will require:  ________25_____ kilocalories / kg / day  Diagnosis:  [  ] Mild protein malnutrition  [  ] Moderate protein calorie malnutrition in acute illness/ injury  [ x ] Severe protein calorie malnutrition in acute illness/ injury  [  ] Moderate protein calorie malnutrition in chronic illness  [  ] Severe protein calorie malnutrition in chronic illness    Nutritional Requirements  Carbohydrates: 1 gram = 3.4 kcal   Lipids: 1 gram = 10 kcal  Proteins: 1 gram = 4 kcal     Plan:  [ x ] Initiate TPN formula after PICC placement today, will increase TPN volume and calories tomorrow   Carbohydrates:__230____grams, Amino Acid:___120___grams  SMOF Lipids:____50___ grams 3 x week, Total volume:___2000____mL.  1. Dedicated Central line must be placed for TPN.  2. Strict Intake and Output.  3. Weights three times a week  4. Monitor BMP, Mg+, Ionized Ca++, Phosphorus daily  5. Monitor Triglycerides monthly  6. Pre-albumin weekly.  7. Fingersticks to monitor glucose every 6 hours until stable then may be decreased to twice a day.    Nutrition Support [id number]

## 2022-05-25 NOTE — PROGRESS NOTE ADULT - ASSESSMENT
77yo gentleman with PMH of htn, hld, pancreatic ductal adenocarcinoma s/p pylorus sparing Whipple 9/2020 ([facility]) with recurrence now on chemo, L thyroid papillary CA s/p L thyroidectomy/isthmusectomy/paratracheal node dissection in 2017, melanoma, who presents to ED due to 3 weeks of progressively poor PO intake and increased frequency of nausea/emesis. Findings consistent with SBO at level of DJ anastomosis.     - No acute surgical intervention  - NPO/IVF   - NGT decompression   - GI consult; unable to stent  - Serial abdominal exams, exam before pain meds  - Trend Cr   - f/u Heme Onc         D Team Surgery   [id number]

## 2022-05-25 NOTE — CONSULT NOTE ADULT - ASSESSMENT
75yo gentleman with PMH of htn, hld, pancreatic ductal adenocarcinoma, YESENIA-germline mutant, s/p resection in 9/2020 with recurrence, L thyroid papillary CA s/p L thyroidectomy/isthmusectomy/paratracheal node dissection in 2017, melanoma, who presents to ED due to N/V, weight loss, found to have SBO.

## 2022-05-25 NOTE — PROGRESS NOTE ADULT - SUBJECTIVE AND OBJECTIVE BOX
Vital Signs Last 24 Hrs  T(C): 36.5 (25 May 2022 09:25), Max: 36.6 (25 May 2022 05:35)  T(F): 97.7 (25 May 2022 09:25), Max: 97.9 (25 May 2022 05:35)  HR: 76 (25 May 2022 09:25) (61 - 86)  BP: 117/62 (25 May 2022 09:25) (106/56 - 148/69)  BP(mean): --  RR: 18 (25 May 2022 09:25) (12 - 18)  SpO2: 99% (25 May 2022 09:25) (97% - 100%)    I&O's Detail    24 May 2022 07:01  -  25 May 2022 07:00  --------------------------------------------------------  IN:    dextrose 5% + sodium chloride 0.45% w/ Additives: 375 mL    Lactated Ringers: 1000 mL  Total IN: 1375 mL    OUT:    Nasogastric/Oral tube (mL): 1800 mL    Oral Fluid: 0 mL    Voided (mL): 950 mL  Total OUT: 2750 mL    Total NET: -1375 mL                                8.1    8.32  )-----------( 304      ( 25 May 2022 07:00 )             25.7       05-25    139  |  100  |  14  ----------------------------<  141<H>  4.0   |  27  |  1.08    Ca    8.1<L>      25 May 2022 07:00  Phos  4.2     05-25  Mg     1.80     05-25    TPro  5.0<L>  /  Alb  3.0<L>  /  TBili  0.2  /  DBili  x   /  AST  14  /  ALT  15  /  AlkPhos  141<H>  05-25    Upper endoscopy showed multiple areas of external obstruction    The possible option would be for a gasto-jejunostomy and probably a bypass from the afferent limb to the efferent limb          PLAN:    I had a long discussion with the patient, his wife and his son and explained that any surgery would be palliative and might not help with the obstruction    they would like to try so I will start TPN for a week and then plan on exploration next week

## 2022-05-25 NOTE — CONSULT NOTE ADULT - PROBLEM SELECTOR RECOMMENDATION 5
Thank you for allowing us to participate in your patient's care. Goals are clear. Please page [phone] for any q's or c's.     Nikki Lyons D.O.   Palliative Medicine

## 2022-05-25 NOTE — CONSULT NOTE ADULT - NS ATTEND AMEND GEN_ALL_CORE FT
I agree with the above history, physical examination, chief complaint/diagnosis, and plan, which I have reviewed and edited where appropriate.  I agree with notes/assessment and detailed interval history of health care providers on my service.  I have seen and examined the patient.  I reviewed the laboratory and available data and agree with the history, physical assessment and plan.  I reviewed and discussed with all consultants, house staff and PA's.  The Nutrition Support Team (NST) discusses on an ongoing basis with the primary team and all consultants, House staff and PA's to have a permanent risk benefit analyses on all decisions and coordinating care.  I was physically present for the key portions of the evaluation and management (E/M) service provided.  75 y/o male with PMH of HTN, HLD, pancreatic ductal adenocarcinoma s/p pylorus sparing Whipple consult TPN in view of severe CP malnutrition, prolonged period of poor PO intake and nutritional optimization prior to planned surgical intervention.  General: NAD,   Neuro: Awake and alert  Lung clear  GI/Abd: Soft, NT/ND, NGT in place   Extremities: warm  Severe protein calorie malnutrition in acute illness/ injury; secondary to poor appetite,  Metabolic Requirements:  The patient will require:  25kilocalories / kg / day  Diagnosis:  Severe protein calorie malnutrition in acute illness/ injury  Plan:  Initiate TPN

## 2022-05-26 NOTE — PROGRESS NOTE ADULT - SUBJECTIVE AND OBJECTIVE BOX
Surgical Oncology    SUBJECTIVE: Pt seen and examined on rounds with team. Recieved PICC, TPN started      OBJECTIVE    PHYSICAL EXAM:   General: NAD, Lying in bed   Neuro: Awake and alert  Extremities:   GI/Abd: Soft, NT/ND,       VITALS  T(C): 36.5 (05-25-22 @ 20:02), Max: 36.9 (05-25-22 @ 13:43)  HR: 70 (05-25-22 @ 20:02) (60 - 86)  BP: 134/68 (05-25-22 @ 20:02) (106/56 - 134/68)  RR: 17 (05-25-22 @ 20:02) (17 - 20)  SpO2: 100% (05-25-22 @ 20:02) (97% - 100%)  CAPILLARY BLOOD GLUCOSE      POCT Blood Glucose.: 172 mg/dL (25 May 2022 23:54)  POCT Blood Glucose.: 152 mg/dL (25 May 2022 17:36)  POCT Blood Glucose.: 165 mg/dL (25 May 2022 12:33)  POCT Blood Glucose.: 142 mg/dL (25 May 2022 06:08)      Is/Os    05-24 @ 07:01  -  05-25 @ 07:00  --------------------------------------------------------  IN:    dextrose 5% + sodium chloride 0.45% w/ Additives: 375 mL    Lactated Ringers: 1000 mL  Total IN: 1375 mL    OUT:    Nasogastric/Oral tube (mL): 1800 mL    Oral Fluid: 0 mL    Voided (mL): 950 mL  Total OUT: 2750 mL    Total NET: -1375 mL      05-25 @ 07:01  -  05-26 @ 00:20  --------------------------------------------------------  IN:    Fat Emulsion (Fish Oil &amp; Plant Based) 20% Infusion: 20.8 mL    TPN (Total Parenteral Nutrition): 84 mL  Total IN: 104.8 mL    OUT:    Nasogastric/Oral tube (mL): 50 mL  Total OUT: 50 mL    Total NET: 54.8 mL          MEDICATIONS (STANDING): dextrose 50% Injectable 25 Gram(s) IV Push once  dextrose 50% Injectable 12.5 Gram(s) IV Push once  dextrose 50% Injectable 25 Gram(s) IV Push once  diphenhydrAMINE Injectable 25 milliGRAM(s) IV Push at bedtime  enoxaparin Injectable 40 milliGRAM(s) SubCutaneous every 24 hours  fat emulsion (Fish Oil and Plant Based) 20% Infusion 10.4 mL/Hr IV Continuous <Continuous>  glucagon  Injectable 1 milliGRAM(s) IntraMuscular once  insulin lispro (ADMELOG) corrective regimen sliding scale   SubCutaneous every 6 hours  Parenteral Nutrition - Adult 1 Each TPN Continuous <Continuous>    MEDICATIONS (PRN):dextrose Oral Gel 15 Gram(s) Oral once PRN Blood Glucose LESS THAN 70 milliGRAM(s)/deciliter      LABS  CBC (05-25 @ 07:00)                              8.1<L>                         8.32    )----------------(  304        --    % Neutrophils, --    % Lymphocytes, ANC: --                                  25.7<L>    BMP (05-25 @ 07:00)             139     |  100     |  14    		Ca++ --      Ca 8.1<L>             ---------------------------------( 141<H>		Mg 1.80               4.0     |  27      |  1.08  			Ph 4.2       LFTs (05-25 @ 07:00)      TPro 5.0<L> / Alb 3.0<L> / TBili 0.2 / DBili -- / AST 14 / ALT 15 / AlkPhos 141<H>              IMAGING STUDIES     Surgical Oncology    SUBJECTIVE: Pt seen and examined on rounds with team. Recieved PICC, TPN started. No nausea or vomiting overnight. NGT draining with 550cc overnight.       OBJECTIVE    PHYSICAL EXAM:   General: NAD, Lying in bed. NGT in place  Neuro: Awake and alert  GI/Abd: Soft, NT/ND.        VITALS  T(C): 36.5 (05-25-22 @ 20:02), Max: 36.9 (05-25-22 @ 13:43)  HR: 70 (05-25-22 @ 20:02) (60 - 86)  BP: 134/68 (05-25-22 @ 20:02) (106/56 - 134/68)  RR: 17 (05-25-22 @ 20:02) (17 - 20)  SpO2: 100% (05-25-22 @ 20:02) (97% - 100%)  CAPILLARY BLOOD GLUCOSE      POCT Blood Glucose.: 172 mg/dL (25 May 2022 23:54)  POCT Blood Glucose.: 152 mg/dL (25 May 2022 17:36)  POCT Blood Glucose.: 165 mg/dL (25 May 2022 12:33)  POCT Blood Glucose.: 142 mg/dL (25 May 2022 06:08)      Is/Os    05-24 @ 07:01  -  05-25 @ 07:00  --------------------------------------------------------  IN:    dextrose 5% + sodium chloride 0.45% w/ Additives: 375 mL    Lactated Ringers: 1000 mL  Total IN: 1375 mL    OUT:    Nasogastric/Oral tube (mL): 1800 mL    Oral Fluid: 0 mL    Voided (mL): 950 mL  Total OUT: 2750 mL    Total NET: -1375 mL      05-25 @ 07:01  -  05-26 @ 00:20  --------------------------------------------------------  IN:    Fat Emulsion (Fish Oil &amp; Plant Based) 20% Infusion: 20.8 mL    TPN (Total Parenteral Nutrition): 84 mL  Total IN: 104.8 mL    OUT:    Nasogastric/Oral tube (mL): 50 mL  Total OUT: 50 mL    Total NET: 54.8 mL          MEDICATIONS (STANDING): dextrose 50% Injectable 25 Gram(s) IV Push once  dextrose 50% Injectable 12.5 Gram(s) IV Push once  dextrose 50% Injectable 25 Gram(s) IV Push once  diphenhydrAMINE Injectable 25 milliGRAM(s) IV Push at bedtime  enoxaparin Injectable 40 milliGRAM(s) SubCutaneous every 24 hours  fat emulsion (Fish Oil and Plant Based) 20% Infusion 10.4 mL/Hr IV Continuous <Continuous>  glucagon  Injectable 1 milliGRAM(s) IntraMuscular once  insulin lispro (ADMELOG) corrective regimen sliding scale   SubCutaneous every 6 hours  Parenteral Nutrition - Adult 1 Each TPN Continuous <Continuous>    MEDICATIONS (PRN):dextrose Oral Gel 15 Gram(s) Oral once PRN Blood Glucose LESS THAN 70 milliGRAM(s)/deciliter      LABS  CBC (05-25 @ 07:00)                              8.1<L>                         8.32    )----------------(  304        --    % Neutrophils, --    % Lymphocytes, ANC: --                                  25.7<L>    BMP (05-25 @ 07:00)             139     |  100     |  14    		Ca++ --      Ca 8.1<L>             ---------------------------------( 141<H>		Mg 1.80               4.0     |  27      |  1.08  			Ph 4.2       LFTs (05-25 @ 07:00)      TPro 5.0<L> / Alb 3.0<L> / TBili 0.2 / DBili -- / AST 14 / ALT 15 / AlkPhos 141<H>              IMAGING STUDIES

## 2022-05-26 NOTE — DIETITIAN INITIAL EVALUATION ADULT - NSICDXPASTSURGICALHX_GEN_ALL_CORE_FT
PAST SURGICAL HISTORY:  H/O arthroscopy of left knee meniscus  12/1998    History of Whipple procedure pylorus sparing whipple 2022 at [facility]    Vocal cord nodule 1998

## 2022-05-26 NOTE — PROGRESS NOTE ADULT - ASSESSMENT
77yo gentleman with PMH of htn, hld, pancreatic ductal adenocarcinoma s/p pylorus sparing Whipple 9/2020 (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 Findings consistent with SBO at level of DJ anastomosis.     - No acute surgical intervention  - NPO/IVF   - NGT decompression   - GI consult; unable to stent  - Serial abdominal exams, exam before pain meds  - Trend Cr   - f/u Heme Onc         D Team Surgery   [id number]

## 2022-05-26 NOTE — DIETITIAN INITIAL EVALUATION ADULT - NSFNSGIIOFT_GEN_A_CORE
05-25-22 @ 07:01  -  05-26-22 @ 07:00  --------------------------------------------------------  OUT:    Nasogastric/Oral tube (mL): 550 mL  Total OUT: 550 mL    Total NET: -46 mL      05-26-22 @ 07:01  -  05-26-22 @ 11:48  --------------------------------------------------------  OUT:    Nasogastric/Oral tube (mL): 150 mL  Total OUT: 150 mL    Total NET: -150 mL

## 2022-05-26 NOTE — DIETITIAN NUTRITION RISK NOTIFICATION - MALNUTRITION EVALUATION AS DEMONSTRATED BY (ADULTS > 20 YEARS OF AGE)
Weight loss.../Inadequate energy intake... Melolabial Transposition Flap Text: The defect edges were debeveled with a #15 scalpel blade.  Given the location of the defect and the proximity to free margins a melolabial flap was deemed most appropriate.  Using a sterile surgical marker, an appropriate melolabial transposition flap was drawn incorporating the defect.    The area thus outlined was incised deep to adipose tissue with a #15 scalpel blade.  The skin margins were undermined to an appropriate distance in all directions utilizing iris scissors.

## 2022-05-26 NOTE — DIETITIAN NUTRITION RISK NOTIFICATION - TREATMENT: THE FOLLOWING DIET HAS BEEN RECOMMENDED
Diet, NPO (05-23-22 @ 20:54) [Active]      fat emulsion (Fish Oil and Plant Based) 20% Infusion 10.4 mL/Hr (10.4 mL/Hr) IV Continuous <Continuous>, 05-25-22 @ 17:00, 17:00, Stop order after: 24 Hours  Parenteral Nutrition - Adult 1 Each (42 mL/Hr) TPN Continuous <Continuous>, 05-25-22 @ 17:00, 17:00, Stop order after: 1 Days  Parenteral Nutrition - Adult 1 Each (83 mL/Hr) TPN Continuous <Continuous>, 05-26-22 @ 17:00, , Stop order after: 1 Days

## 2022-05-26 NOTE — PROGRESS NOTE ADULT - SUBJECTIVE AND OBJECTIVE BOX
NUTRITION NOTE  [id number] VAUGHN  ===============================    Interval events - Patient was seen and examined at bedside, no acute events overnight. Patient denies chest pain, shortness of breath, nausea or vomiting at this time. PICC line placed and TPN was started on 22, pt is tolerating TPN without any issues. Pt remains NPO with NGT in place. TPN volume and calories increased today.     ROS: Except as noted above, all other systems reviewed and are negative     Allergies  No Known Allergies    PAST MEDICAL & SURGICAL HISTORY:  Melanoma nose and left cheek  2yrs ago  HTN (hypertension)  Dyslipidemia  Malignant neoplasm of thyroid gland  Vocal cord nodule   H/O arthroscopy of left knee meniscus  1998  History of Whipple procedure pylorus sparing whipple  at [facility]    FAMILY HISTORY:  Family history of breast cancer (Sibling)  Family history of thyroid cancer (Father)  Family history of lung cancer (Mother)    Vital Signs Last 24 Hrs  T(C): 36.7 (26 May 2022 04:58), Max: 36.9 (25 May 2022 13:43)  T(F): 98.1 (26 May 2022 04:58), Max: 98.4 (25 May 2022 13:43)  HR: 104 (26 May 2022 08:33) (60 - 104)  BP: 117/77 (26 May 2022 08:33) (117/77 - 134/68)  RR: 20 (26 May 2022 08:33) (17 - 20)  SpO2: 99% (26 May 2022 08:33) (99% - 100%)    MEDICATIONS  (STANDING):  chlorhexidine 2% Cloths 1 Application(s) Topical daily  dextrose 50% Injectable 25 Gram(s) IV Push once  dextrose 50% Injectable 12.5 Gram(s) IV Push once  dextrose 50% Injectable 25 Gram(s) IV Push once  diphenhydrAMINE Injectable 25 milliGRAM(s) IV Push at bedtime  enoxaparin Injectable 40 milliGRAM(s) SubCutaneous every 24 hours  fat emulsion (Fish Oil and Plant Based) 20% Infusion 10.4 mL/Hr (10.4 mL/Hr) IV Continuous <Continuous>  glucagon  Injectable 1 milliGRAM(s) IntraMuscular once  insulin lispro (ADMELOG) corrective regimen sliding scale   SubCutaneous every 6 hours  Parenteral Nutrition - Adult 1 Each (42 mL/Hr) TPN Continuous <Continuous>  Parenteral Nutrition - Adult 1 Each (83 mL/Hr) TPN Continuous <Continuous>    MEDICATIONS  (PRN):  dextrose Oral Gel 15 Gram(s) Oral once PRN Blood Glucose LESS THAN 70 milliGRAM(s)/deciliter    I&O's Detail    25 May 2022 07:01  -  26 May 2022 07:00  --------------------------------------------------------  IN:    Fat Emulsion (Fish Oil &amp; Plant Based) 20% Infusion: 124.8 mL    TPN (Total Parenteral Nutrition): 504 mL  Total IN: 628.8 mL    OUT:    Nasogastric/Oral tube (mL): 550 mL    Voided (mL): 600 mL  Total OUT: 1150 mL    Total NET: -521.2 mL      26 May 2022 07:01  -  26 May 2022 10:11  --------------------------------------------------------  IN:  Total IN: 0 mL    OUT:    Nasogastric/Oral tube (mL): 150 mL  Total OUT: 150 mL    Total NET: -150 mL    POCT Blood Glucose.: 170 mg/dL (26 May 2022 05:21)  POCT Blood Glucose.: 172 mg/dL (25 May 2022 23:54)  POCT Blood Glucose.: 152 mg/dL (25 May 2022 17:36)  POCT Blood Glucose.: 165 mg/dL (25 May 2022 12:33)    Daily Weight in k.4 (26 May 2022 04:58)    PHYSICAL EXAM:  General: NAD, resting comfortably in bed  Neuro: Awake and alert  GI/Abd: Soft, NT/ND, NGT in place   Extremities: warm, no edema   PICC Site: C/D/I    Diet: NPO and TPN (started on 22)    LABORATORY                        9.6    8.62  )-----------( 374      ( 26 May 2022 06:56 )             30.5       136  |  97<L>  |  14  ----------------------------<  172<H>  3.5   |  30  |  1.12    Ca    8.5      26 May 2022 06:56  Phos  2.4       Mg     2.00         TPro  5.1<L>  /  Alb  3.3  /  TBili  0.3  /  DBili  x   /  AST  13  /  ALT  13  /  AlkPhos  153<H>      LIVER FUNCTIONS - ( 26 May 2022 06:56 )  Alb: 3.3 g/dL / Pro: 5.1 g/dL / ALK PHOS: 153 U/L / ALT: 13 U/L / AST: 13 U/L / GGT: x            Chol -- LDL -- HDL -- Trig 99    ASSESSMENT/PLAN:  75 y/o male with PMH of HTN, HLD, pancreatic ductal adenocarcinoma s/p pylorus sparing Whipple 2020 ([facility]) with recurrence now on chemo, 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 Findings consistent with SBO at level of duodenojejunostomy anastomosis. Patient states that he eventually developed worsening nausea, initially with decreased tolerance of solid foods and eventual intolerance to liquids as well. Pt states he has lost 25lbs since onset of symptoms. Nutrition support consult called for initiation of TPN in view of severe malnutrition, prolonged period of poor PO intake and nutritional optimization prior to planned surgical intervention next week. Pt is NPO with NGT in place for decompression.     TPN infusion volume increased to 2L, TPN will provide 1316 kcal/day; 50 gm of SMOF lipids were given on  to run over 24 hours    labs reviewed- electrolytes adjusted in TPN bag     monitor fingersticks, obtain daily weights - -172 - added 10 units of insulin in TPN bag     continue parenteral nutrition at this time, will follow up with primary team on plan     1.  Severe protein calorie malnutrition being optimized with TPN: CHO [240] gm.  AA [125] gm. SMOF Lipids [0] gm.  2.  Hyperglycemia managed with: [0] units of regular insulin    3.  Check fluid balance daily.  Strict I/O  [ ] [ ]   4.  Daily BMP, Ionized Calcium, Magnesium and Phosphorous   5.  Triglycerides at initiation of TPN and monthly [ ] [ ]     Nutrition Support [id number]

## 2022-05-26 NOTE — DIETITIAN INITIAL EVALUATION ADULT - PERTINENT LABORATORY DATA
05-26    136  |  97<L>  |  14  ----------------------------<  172<H>  3.5   |  30  |  1.12    Ca    8.5      26 May 2022 06:56  Phos  2.4     05-26  Mg     2.00     05-26    TPro  5.1<L>  /  Alb  3.3  /  TBili  0.3  /  DBili  x   /  AST  13  /  ALT  13  /  AlkPhos  153<H>  05-26  POCT Blood Glucose.: 170 mg/dL (05-26-22 @ 05:21)  A1C with Estimated Average Glucose Result: 5.8 % (05-26-22 @ 06:56)

## 2022-05-26 NOTE — PROGRESS NOTE ADULT - SUBJECTIVE AND OBJECTIVE BOX
Vital Signs Last 24 Hrs  T(C): 36.7 (26 May 2022 15:58), Max: 36.7 (26 May 2022 04:58)  T(F): 98 (26 May 2022 15:58), Max: 98.1 (26 May 2022 04:58)  HR: 80 (26 May 2022 15:58) (70 - 104)  BP: 119/62 (26 May 2022 15:58) (106/55 - 134/68)  BP(mean): --  RR: 18 (26 May 2022 15:58) (17 - 20)  SpO2: 99% (26 May 2022 15:58) (98% - 100%)    I&O's Detail    25 May 2022 07:01  -  26 May 2022 07:00  --------------------------------------------------------  IN:    Fat Emulsion (Fish Oil &amp; Plant Based) 20% Infusion: 124.8 mL    TPN (Total Parenteral Nutrition): 504 mL  Total IN: 628.8 mL    OUT:    Nasogastric/Oral tube (mL): 550 mL    Voided (mL): 600 mL  Total OUT: 1150 mL    Total NET: -521.2 mL      26 May 2022 07:01  -  26 May 2022 19:54  --------------------------------------------------------  IN:    Fat Emulsion (Fish Oil &amp; Plant Based) 20% Infusion: 83.2 mL    TPN (Total Parenteral Nutrition): 502 mL  Total IN: 585.2 mL    OUT:    Nasogastric/Oral tube (mL): 1850 mL    Voided (mL): 350 mL  Total OUT: 2200 mL    Total NET: -1614.8 mL                                9.6    8.62  )-----------( 374      ( 26 May 2022 06:56 )             30.5       05-26    136  |  97<L>  |  14  ----------------------------<  172<H>  3.5   |  30  |  1.12    Ca    8.5      26 May 2022 06:56  Phos  2.4     05-26  Mg     2.00     05-26    TPro  5.1<L>  /  Alb  3.3  /  TBili  0.3  /  DBili  x   /  AST  13  /  ALT  13  /  AlkPhos  153<H>  05-26  TPN started          PLAN:  surgical exploration next week

## 2022-05-26 NOTE — PROGRESS NOTE ADULT - NUTRITIONAL ASSESSMENT
Severe protein calorie malnutrition in acute illness/ injury; secondary to poor appetite, weight loss >5% in 1 month and/or >7.5% in 3 months, caloric Intake <50% of nutrition needs >= 5 days, temporal wasting, severe loss of muscle mass/atrophy and loss of body fat stores.    Diet, NPO (05-23-22 @ 20:54) [Active]    fat emulsion (Fish Oil and Plant Based) 20% Infusion 10.4 mL/Hr (10.4 mL/Hr) IV Continuous <Continuous>, 05-25-22 @ 17:00, 17:00, Stop order after: 24 Hours  Parenteral Nutrition - Adult 1 Each (83 mL/Hr) TPN Continuous <Continuous>, 05-26-22 @ 17:00, , Stop order after: 1 Days    **Greater than 50% of the encounter was spent counseling and/coordination of care on parenteral nutrition. 25 minutes were spent face to face with the patient.

## 2022-05-26 NOTE — DIETITIAN INITIAL EVALUATION ADULT - OTHER INFO
77 y/o male with hx of Whipple procedure 2* pancreatic ca, now admitted with SBO pending surgical intervention with concern for weight loss due to suboptimal PO PTA. Met with pt and family at bedside. Pt reported having progressive difficulty tolerating solid foods, then liquids over the past month. This led to a stated 25 lb weight loss with now inability to tolerate any PO. TPN has been initiated 5/25, now infusing 2 L and being managed by Nutrition Support Team. Pt receiving 1316 kcals today with intralipids being given 3 x/week, providing an additional 500 kcals on those days. Pt tolerating PN; No GI distress with last BM 5/25. NGT to LWCS with 750 mL output 5/25. Pt presents at severe risk for malnutrition based on 16% weight loss with < 75% of estimated nutrition needs met over 1 month PTA. To better meet pt's estimated nutrition needs, suggest increasing CHO of PN to goal of 265 gms/day (3 kcals/61.4 kg/minute) which will offer pt 1880 kcals on days lipids at given. This will balance the 1380 kcals pt receives when lipids are not given to provide an average of ~1550 kcals/day. Concern for rising  - 172 mg/dl over the past 24 hrs with Hb A1C 5.8% - insulin being provided in PN bag. If FS continue to elevate - possible increase in AA of PN can be adjusted to better meet estimated calorie needs. Will continue to monitor with Nutrition Support Team. Request daily weights to assess trend and monitor appropriateness of PN. No edema nor pressure injuries identified at this time. RDN services to remain available as needed.

## 2022-05-26 NOTE — PROGRESS NOTE ADULT - NUTRITIONAL ASSESSMENT
This patient has been assessed with a concern for Malnutrition and has been determined to have a diagnosis/diagnoses of Severe protein-calorie malnutrition and Underweight (BMI < 19).    This patient is being managed with:   Parenteral Nutrition - Adult-  Entered: May 26 2022  5:00PM    Parenteral Nutrition - Adult-  Entered: May 25 2022  5:00PM    fat emulsion (Fish Oil and Plant Based) 20% Infusion-[Known as SMOFLIPID 20% Infusion]  50 Gram(s) in IV Solution 250 milliLiter(s) infuse at 10.4 mL/Hr  Dose Rate: 10.4 mL/Hr Infuse Over: 24 Hours; Stop After 24 Hours  Administration Instructions: Use 1.2 micron in-line filter  Entered: May 25 2022  5:00PM    Diet NPO-  Entered: May 23 2022  8:54PM

## 2022-05-27 NOTE — PROGRESS NOTE ADULT - SUBJECTIVE AND OBJECTIVE BOX
NUTRITION NOTE  [id number] VAUGHN  ===============================    Interval events - Patient was seen and examined at bedside, no acute events overnight. Patient denies chest pain, shortness of breath, nausea or vomiting at this time. PICC line placed and TPN was started on 22, pt is tolerating TPN without any issues. Pt remains NPO with NGT in place and remains on TPN at this time.     ROS: Except as noted above, all other systems reviewed and are negative     Allergies  No Known Allergies    PAST MEDICAL & SURGICAL HISTORY:  Melanoma nose and left cheek  2yrs ago  HTN (hypertension)  Dyslipidemia  Malignant neoplasm of thyroid gland  Vocal cord nodule   H/O arthroscopy of left knee meniscus  1998  History of Whipple procedure pylorus sparing whipple  at [facility]    FAMILY HISTORY:  Family history of breast cancer (Sibling)  Family history of thyroid cancer (Father)  Family history of lung cancer (Mother)    Vital Signs Last 24 Hrs  T(C): 37.2 (27 May 2022 08:07), Max: 37.3 (27 May 2022 00:29)  T(F): 99 (27 May 2022 08:07), Max: 99.1 (27 May 2022 00:29)  HR: 96 (27 May 2022 08:07) (80 - 99)  BP: 113/61 (27 May 2022 08:07) (106/55 - 135/69)  RR: 18 (27 May 2022 08:07) (18 - 18)  SpO2: 98% (27 May 2022 08:07) (98% - 100%)    MEDICATIONS  (STANDING):  chlorhexidine 2% Cloths 1 Application(s) Topical daily  dextrose 50% Injectable 25 Gram(s) IV Push once  dextrose 50% Injectable 12.5 Gram(s) IV Push once  dextrose 50% Injectable 25 Gram(s) IV Push once  diphenhydrAMINE Injectable 25 milliGRAM(s) IV Push at bedtime  enoxaparin Injectable 40 milliGRAM(s) SubCutaneous every 24 hours  fat emulsion (Fish Oil and Plant Based) 20% Infusion 10.4 mL/Hr (10.4 mL/Hr) IV Continuous <Continuous>  glucagon  Injectable 1 milliGRAM(s) IntraMuscular once  insulin lispro (ADMELOG) corrective regimen sliding scale   SubCutaneous every 6 hours  Parenteral Nutrition - Adult 1 Each (83 mL/Hr) TPN Continuous <Continuous>  Parenteral Nutrition - Adult 1 Each (83 mL/Hr) TPN Continuous <Continuous>    MEDICATIONS  (PRN):  dextrose Oral Gel 15 Gram(s) Oral once PRN Blood Glucose LESS THAN 70 milliGRAM(s)/deciliter    I&O's Detail    26 May 2022 07:01  -  27 May 2022 07:00  --------------------------------------------------------  IN:    Fat Emulsion (Fish Oil &amp; Plant Based) 20% Infusion: 83.2 mL    TPN (Total Parenteral Nutrition): 1332 mL  Total IN: 1415.2 mL    OUT:    Nasogastric/Oral tube (mL): 2350 mL    Oral Fluid: 0 mL    Voided (mL): 350 mL  Total OUT: 2700 mL    Total NET: -1284.8 mL      27 May 2022 07:01  -  27 May 2022 09:57  --------------------------------------------------------  IN:    TPN (Total Parenteral Nutrition): 249 mL  Total IN: 249 mL    OUT:  Total OUT: 0 mL    Total NET: 249 mL    POCT Blood Glucose.: 190 mg/dL (27 May 2022 06:32)  POCT Blood Glucose.: 173 mg/dL (26 May 2022 23:44)  POCT Blood Glucose.: 177 mg/dL (26 May 2022 17:18)  POCT Blood Glucose.: 185 mg/dL (26 May 2022 12:47)    Daily Weight in k.4 (26 May 2022 04:58)    PHYSICAL EXAM:  General: NAD, resting comfortably in bed  Neuro: Awake and alert  GI/Abd: Soft, NT/ND, NGT in place   Extremities: warm, no edema   PICC Site: C/D/I    Diet: NPO and TPN (started on 22)    LABORATORY                                 9.4    6.82  )-----------( 409      ( 27 May 2022 06:27 )             29.5       136  |  94<L>  |  22  ----------------------------<  177<H>  3.3<L>   |  32<H>  |  1.11    Ca    8.4      27 May 2022 06:27  Phos  3.3       Mg     1.90         TPro  5.4<L>  /  Alb  3.4  /  TBili  0.3  /  DBili  x   /  AST  13  /  ALT  12  /  AlkPhos  147<H>      LIVER FUNCTIONS - ( 27 May 2022 06:27 )  Alb: 3.4 g/dL / Pro: 5.4 g/dL / ALK PHOS: 147 U/L / ALT: 12 U/L / AST: 13 U/L / GGT: x            Chol -- LDL -- HDL -- Trig 99    COVID-19 PCR: Detected (26 May 2022 14:59)    ASSESSMENT/PLAN:  77 y/o male with PMH of HTN, HLD, pancreatic ductal adenocarcinoma s/p pylorus sparing Whipple 2020 ([facility]) with recurrence now on chemo, 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 Findings consistent with SBO at level of duodenojejunostomy anastomosis. Patient states that he eventually developed worsening nausea, initially with decreased tolerance of solid foods and eventual intolerance to liquids as well. Pt states he has lost 25lbs since onset of symptoms. Nutrition support consult called for initiation of TPN in view of severe malnutrition, prolonged period of poor PO intake and nutritional optimization prior to planned surgical intervention next week. Pt is NPO with NGT in place for decompression.     continue TPN with infusion volume of 2L, TPN will provide 1816 kcal/day; 50 gm of SMOF lipids ordered for today to run over 24 hours    labs reviewed- electrolytes adjusted in TPN bag     monitor fingersticks, obtain daily weights - -190 increased to 17 units of insulin in TPN bag     continue parenteral nutrition at this time, will follow up with primary team on plan     1.  Severe protein calorie malnutrition being optimized with TPN: CHO [240] gm.  AA [125] gm. SMOF Lipids [50] gm.  2.  Hyperglycemia managed with: [17] units of regular insulin    3.  Check fluid balance daily.  Strict I/O  [ ] [ ]   4.  Daily BMP, Ionized Calcium, Magnesium and Phosphorous   5.  Triglycerides at initiation of TPN and monthly [ ] [ ]     Nutrition Support [id number]

## 2022-05-27 NOTE — PROGRESS NOTE ADULT - NUTRITIONAL ASSESSMENT
Severe protein calorie malnutrition in acute illness/ injury; secondary to poor appetite, weight loss >5% in 1 month and/or >7.5% in 3 months, caloric Intake <50% of nutrition needs >= 5 days, temporal wasting, severe loss of muscle mass/atrophy and loss of body fat stores.    Diet, NPO (05-23-22 @ 20:54) [Active]    fat emulsion (Fish Oil and Plant Based) 20% Infusion 10.4 mL/Hr (10.4 mL/Hr) IV Continuous <Continuous>, 05-27-22 @ 17:00, 17:00  Parenteral Nutrition - Adult 1 Each (83 mL/Hr) TPN Continuous <Continuous>, 05-27-22 @ 17:00, , Stop order after: 1 Days    **Greater than 50% of the encounter was spent counseling and/coordination of care on parenteral nutrition. 25 minutes were spent face to face with the patient.

## 2022-05-27 NOTE — PROGRESS NOTE ADULT - SUBJECTIVE AND OBJECTIVE BOX
Surgical Oncology    SUBJECTIVE: Pt seen and examined on rounds with team. No acute events overnight.        OBJECTIVE    PHYSICAL EXAM:   General: NAD, Lying in bed   Neuro: Awake and alert  Extremities:   GI/Abd: Soft, NT/ND,       VITALS  T(C): 37.3 (05-27-22 @ 00:29), Max: 37.3 (05-27-22 @ 00:29)  HR: 92 (05-27-22 @ 00:29) (80 - 104)  BP: 117/76 (05-27-22 @ 00:29) (106/55 - 135/69)  RR: 18 (05-27-22 @ 00:29) (18 - 20)  SpO2: 98% (05-27-22 @ 00:29) (98% - 100%)  CAPILLARY BLOOD GLUCOSE      POCT Blood Glucose.: 173 mg/dL (26 May 2022 23:44)  POCT Blood Glucose.: 177 mg/dL (26 May 2022 17:18)  POCT Blood Glucose.: 185 mg/dL (26 May 2022 12:47)  POCT Blood Glucose.: 170 mg/dL (26 May 2022 05:21)      Is/Os    05-25 @ 07:01  -  05-26 @ 07:00  --------------------------------------------------------  IN:    Fat Emulsion (Fish Oil &amp; Plant Based) 20% Infusion: 124.8 mL    TPN (Total Parenteral Nutrition): 504 mL  Total IN: 628.8 mL    OUT:    Nasogastric/Oral tube (mL): 550 mL    Voided (mL): 600 mL  Total OUT: 1150 mL    Total NET: -521.2 mL      05-26 @ 07:01  -  05-27 @ 01:00  --------------------------------------------------------  IN:    Fat Emulsion (Fish Oil &amp; Plant Based) 20% Infusion: 83.2 mL    TPN (Total Parenteral Nutrition): 1000 mL  Total IN: 1083.2 mL    OUT:    Nasogastric/Oral tube (mL): 2350 mL    Oral Fluid: 0 mL    Voided (mL): 350 mL  Total OUT: 2700 mL    Total NET: -1616.8 mL          MEDICATIONS (STANDING): dextrose 50% Injectable 25 Gram(s) IV Push once  dextrose 50% Injectable 12.5 Gram(s) IV Push once  dextrose 50% Injectable 25 Gram(s) IV Push once  diphenhydrAMINE Injectable 25 milliGRAM(s) IV Push at bedtime  enoxaparin Injectable 40 milliGRAM(s) SubCutaneous every 24 hours  glucagon  Injectable 1 milliGRAM(s) IntraMuscular once  insulin lispro (ADMELOG) corrective regimen sliding scale   SubCutaneous every 6 hours  Parenteral Nutrition - Adult 1 Each TPN Continuous <Continuous>    MEDICATIONS (PRN):dextrose Oral Gel 15 Gram(s) Oral once PRN Blood Glucose LESS THAN 70 milliGRAM(s)/deciliter      LABS  CBC (05-26 @ 06:56)                              9.6<L>                         8.62    )----------------(  374        --    % Neutrophils, --    % Lymphocytes, ANC: --                                  30.5<L>    BMP (05-26 @ 06:56)             136     |  97<L>   |  14    		Ca++ --      Ca 8.5                ---------------------------------( 172<H>		Mg 2.00               3.5     |  30      |  1.12  			Ph 2.4<L>    LFTs (05-26 @ 06:56)      TPro 5.1<L> / Alb 3.3 / TBili 0.3 / DBili -- / AST 13 / ALT 13 / AlkPhos 153<H>              IMAGING STUDIES     Surgical Oncology    SUBJECTIVE: Pt seen and examined on rounds with team. Pt COVID positive      OBJECTIVE    PHYSICAL EXAM:   General: NAD, Lying in bed   Neuro: Awake and alert  Extremities:   GI/Abd: Soft, NT/ND,       VITALS  T(C): 37.3 (05-27-22 @ 00:29), Max: 37.3 (05-27-22 @ 00:29)  HR: 92 (05-27-22 @ 00:29) (80 - 104)  BP: 117/76 (05-27-22 @ 00:29) (106/55 - 135/69)  RR: 18 (05-27-22 @ 00:29) (18 - 20)  SpO2: 98% (05-27-22 @ 00:29) (98% - 100%)  CAPILLARY BLOOD GLUCOSE      POCT Blood Glucose.: 173 mg/dL (26 May 2022 23:44)  POCT Blood Glucose.: 177 mg/dL (26 May 2022 17:18)  POCT Blood Glucose.: 185 mg/dL (26 May 2022 12:47)  POCT Blood Glucose.: 170 mg/dL (26 May 2022 05:21)      Is/Os    05-25 @ 07:01  -  05-26 @ 07:00  --------------------------------------------------------  IN:    Fat Emulsion (Fish Oil &amp; Plant Based) 20% Infusion: 124.8 mL    TPN (Total Parenteral Nutrition): 504 mL  Total IN: 628.8 mL    OUT:    Nasogastric/Oral tube (mL): 550 mL    Voided (mL): 600 mL  Total OUT: 1150 mL    Total NET: -521.2 mL      05-26 @ 07:01  -  05-27 @ 01:00  --------------------------------------------------------  IN:    Fat Emulsion (Fish Oil &amp; Plant Based) 20% Infusion: 83.2 mL    TPN (Total Parenteral Nutrition): 1000 mL  Total IN: 1083.2 mL    OUT:    Nasogastric/Oral tube (mL): 2350 mL    Oral Fluid: 0 mL    Voided (mL): 350 mL  Total OUT: 2700 mL    Total NET: -1616.8 mL          MEDICATIONS (STANDING): dextrose 50% Injectable 25 Gram(s) IV Push once  dextrose 50% Injectable 12.5 Gram(s) IV Push once  dextrose 50% Injectable 25 Gram(s) IV Push once  diphenhydrAMINE Injectable 25 milliGRAM(s) IV Push at bedtime  enoxaparin Injectable 40 milliGRAM(s) SubCutaneous every 24 hours  glucagon  Injectable 1 milliGRAM(s) IntraMuscular once  insulin lispro (ADMELOG) corrective regimen sliding scale   SubCutaneous every 6 hours  Parenteral Nutrition - Adult 1 Each TPN Continuous <Continuous>    MEDICATIONS (PRN):dextrose Oral Gel 15 Gram(s) Oral once PRN Blood Glucose LESS THAN 70 milliGRAM(s)/deciliter      LABS  CBC (05-26 @ 06:56)                              9.6<L>                         8.62    )----------------(  374        --    % Neutrophils, --    % Lymphocytes, ANC: --                                  30.5<L>    BMP (05-26 @ 06:56)             136     |  97<L>   |  14    		Ca++ --      Ca 8.5                ---------------------------------( 172<H>		Mg 2.00               3.5     |  30      |  1.12  			Ph 2.4<L>    LFTs (05-26 @ 06:56)      TPro 5.1<L> / Alb 3.3 / TBili 0.3 / DBili -- / AST 13 / ALT 13 / AlkPhos 153<H>              IMAGING STUDIES     Surgical Oncology    SUBJECTIVE: Pt seen and examined on rounds with team, no acute events overnight. Pt COVID positive      OBJECTIVE    PHYSICAL EXAM:   General: NAD, Lying in bed. NG tube in place.   Neuro: Awake and alert  Extremities:   GI/Abd: Soft, NT/ND,       VITALS  T(C): 37.3 (05-27-22 @ 00:29), Max: 37.3 (05-27-22 @ 00:29)  HR: 92 (05-27-22 @ 00:29) (80 - 104)  BP: 117/76 (05-27-22 @ 00:29) (106/55 - 135/69)  RR: 18 (05-27-22 @ 00:29) (18 - 20)  SpO2: 98% (05-27-22 @ 00:29) (98% - 100%)  CAPILLARY BLOOD GLUCOSE      POCT Blood Glucose.: 173 mg/dL (26 May 2022 23:44)  POCT Blood Glucose.: 177 mg/dL (26 May 2022 17:18)  POCT Blood Glucose.: 185 mg/dL (26 May 2022 12:47)  POCT Blood Glucose.: 170 mg/dL (26 May 2022 05:21)      Is/Os    05-25 @ 07:01  -  05-26 @ 07:00  --------------------------------------------------------  IN:    Fat Emulsion (Fish Oil &amp; Plant Based) 20% Infusion: 124.8 mL    TPN (Total Parenteral Nutrition): 504 mL  Total IN: 628.8 mL    OUT:    Nasogastric/Oral tube (mL): 550 mL    Voided (mL): 600 mL  Total OUT: 1150 mL    Total NET: -521.2 mL      05-26 @ 07:01  -  05-27 @ 01:00  --------------------------------------------------------  IN:    Fat Emulsion (Fish Oil &amp; Plant Based) 20% Infusion: 83.2 mL    TPN (Total Parenteral Nutrition): 1000 mL  Total IN: 1083.2 mL    OUT:    Nasogastric/Oral tube (mL): 2350 mL    Oral Fluid: 0 mL    Voided (mL): 350 mL  Total OUT: 2700 mL    Total NET: -1616.8 mL          MEDICATIONS (STANDING): dextrose 50% Injectable 25 Gram(s) IV Push once  dextrose 50% Injectable 12.5 Gram(s) IV Push once  dextrose 50% Injectable 25 Gram(s) IV Push once  diphenhydrAMINE Injectable 25 milliGRAM(s) IV Push at bedtime  enoxaparin Injectable 40 milliGRAM(s) SubCutaneous every 24 hours  glucagon  Injectable 1 milliGRAM(s) IntraMuscular once  insulin lispro (ADMELOG) corrective regimen sliding scale   SubCutaneous every 6 hours  Parenteral Nutrition - Adult 1 Each TPN Continuous <Continuous>    MEDICATIONS (PRN):dextrose Oral Gel 15 Gram(s) Oral once PRN Blood Glucose LESS THAN 70 milliGRAM(s)/deciliter      LABS  CBC (05-26 @ 06:56)                              9.6<L>                         8.62    )----------------(  374        --    % Neutrophils, --    % Lymphocytes, ANC: --                                  30.5<L>    BMP (05-26 @ 06:56)             136     |  97<L>   |  14    		Ca++ --      Ca 8.5                ---------------------------------( 172<H>		Mg 2.00               3.5     |  30      |  1.12  			Ph 2.4<L>    LFTs (05-26 @ 06:56)      TPro 5.1<L> / Alb 3.3 / TBili 0.3 / DBili -- / AST 13 / ALT 13 / AlkPhos 153<H>              IMAGING STUDIES

## 2022-05-27 NOTE — PROGRESS NOTE ADULT - NUTRITIONAL ASSESSMENT
This patient has been assessed with a concern for Malnutrition and has been determined to have a diagnosis/diagnoses of Severe protein-calorie malnutrition and Underweight (BMI < 19).    This patient is being managed with:   Parenteral Nutrition - Adult-  Entered: May 26 2022  5:00PM    Diet NPO-  Entered: May 23 2022  8:54PM

## 2022-05-27 NOTE — PROGRESS NOTE ADULT - ASSESSMENT
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 Findings consistent with SBO at level of DJ anastomosis.     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 Findings consistent with SBO at level of DJ anastomosis.     - Operative planning  - NPO/IVF   - NGT decompression   - GI consult; unable to stent  - Serial abdominal exams, exam before pain meds  - Trend Cr           D Team Surgery   [id number]

## 2022-05-27 NOTE — PROGRESS NOTE ADULT - SUBJECTIVE AND OBJECTIVE BOX
Vital Signs Last 24 Hrs  T(C): 37.2 (27 May 2022 08:07), Max: 37.3 (27 May 2022 00:29)  T(F): 99 (27 May 2022 08:07), Max: 99.1 (27 May 2022 00:29)  HR: 96 (27 May 2022 08:07) (80 - 99)  BP: 113/61 (27 May 2022 08:07) (106/55 - 135/69)  BP(mean): --  RR: 18 (27 May 2022 08:07) (18 - 18)  SpO2: 98% (27 May 2022 08:07) (98% - 100%)    I&O's Detail    26 May 2022 07:01  -  27 May 2022 07:00  --------------------------------------------------------  IN:    Fat Emulsion (Fish Oil &amp; Plant Based) 20% Infusion: 83.2 mL    TPN (Total Parenteral Nutrition): 1332 mL  Total IN: 1415.2 mL    OUT:    Nasogastric/Oral tube (mL): 2350 mL    Oral Fluid: 0 mL    Voided (mL): 350 mL  Total OUT: 2700 mL    Total NET: -1284.8 mL      27 May 2022 07:01  -  27 May 2022 10:50  --------------------------------------------------------  IN:    TPN (Total Parenteral Nutrition): 249 mL  Total IN: 249 mL    OUT:  Total OUT: 0 mL    Total NET: 249 mL                                9.4    6.82  )-----------( 409      ( 27 May 2022 06:27 )             29.5       05-27    136  |  94<L>  |  22  ----------------------------<  177<H>  3.3<L>   |  32<H>  |  1.11    Ca    8.4      27 May 2022 06:27  Phos  3.3     05-27  Mg     1.90     05-27    TPro  5.4<L>  /  Alb  3.4  /  TBili  0.3  /  DBili  x   /  AST  13  /  ALT  12  /  AlkPhos  147<H>  05-27  patient passing some flatus but no BM          PLAN:  Continue TPN  Surgery next Friday

## 2022-05-27 NOTE — PROGRESS NOTE ADULT - NUTRITIONAL ASSESSMENT
This patient has been assessed with a concern for Malnutrition and has been determined to have a diagnosis/diagnoses of Severe protein-calorie malnutrition and Underweight (BMI < 19).    This patient is being managed with:   Parenteral Nutrition - Adult-  Entered: May 27 2022  5:00PM    fat emulsion (Fish Oil and Plant Based) 20% Infusion-[Known as SMOFLIPID 20% Infusion]  50 Gram(s) in IV Solution 250 milliLiter(s) infuse at 10.4 mL/Hr  Dose Rate: 10.4 mL/Hr Infuse Over: 24 Hours  Administration Instructions: Use 1.2 micron in-line filter  Entered: May 27 2022  5:00PM    Parenteral Nutrition - Adult-  Entered: May 26 2022  5:00PM    Diet NPO-  Entered: May 23 2022  8:54PM

## 2022-05-28 NOTE — PROGRESS NOTE ADULT - SUBJECTIVE AND OBJECTIVE BOX
Surgical Oncology    SUBJECTIVE: Pt seen and examined on rounds with team. No acute events overnight.        OBJECTIVE    PHYSICAL EXAM:   General: NAD, Lying in bed  HEENT; NGT in placce  Neuro: Awake and alert  Extremities:   GI/Abd: Soft, NT/ND,       VITALS  T(C): 37.1 (05-27-22 @ 20:25), Max: 37.2 (05-27-22 @ 08:07)  HR: 87 (05-27-22 @ 20:25) (84 - 99)  BP: 97/62 (05-27-22 @ 20:25) (97/62 - 113/61)  RR: 18 (05-27-22 @ 20:25) (18 - 20)  SpO2: 100% (05-27-22 @ 20:25) (98% - 100%)  CAPILLARY BLOOD GLUCOSE      POCT Blood Glucose.: 169 mg/dL (27 May 2022 17:42)  POCT Blood Glucose.: 185 mg/dL (27 May 2022 12:28)  POCT Blood Glucose.: 190 mg/dL (27 May 2022 06:32)      Is/Os    05-26 @ 07:01  -  05-27 @ 07:00  --------------------------------------------------------  IN:    Fat Emulsion (Fish Oil &amp; Plant Based) 20% Infusion: 83.2 mL    TPN (Total Parenteral Nutrition): 1332 mL  Total IN: 1415.2 mL    OUT:    Nasogastric/Oral tube (mL): 2350 mL    Oral Fluid: 0 mL    Voided (mL): 350 mL  Total OUT: 2700 mL    Total NET: -1284.8 mL      05-27 @ 07:01  -  05-28 @ 00:40  --------------------------------------------------------  IN:    Fat Emulsion (Fish Oil &amp; Plant Based) 20% Infusion: 20.8 mL    TPN (Total Parenteral Nutrition): 996 mL  Total IN: 1016.8 mL    OUT:    Nasogastric/Oral tube (mL): 1150 mL    Voided (mL): 350 mL  Total OUT: 1500 mL    Total NET: -483.2 mL          MEDICATIONS (STANDING): dextrose 50% Injectable 25 Gram(s) IV Push once  dextrose 50% Injectable 12.5 Gram(s) IV Push once  dextrose 50% Injectable 25 Gram(s) IV Push once  diphenhydrAMINE Injectable 25 milliGRAM(s) IV Push at bedtime  enoxaparin Injectable 40 milliGRAM(s) SubCutaneous every 24 hours  fat emulsion (Fish Oil and Plant Based) 20% Infusion 10.4 mL/Hr IV Continuous <Continuous>  glucagon  Injectable 1 milliGRAM(s) IntraMuscular once  insulin lispro (ADMELOG) corrective regimen sliding scale   SubCutaneous every 6 hours  Parenteral Nutrition - Adult 1 Each TPN Continuous <Continuous>    MEDICATIONS (PRN):dextrose Oral Gel 15 Gram(s) Oral once PRN Blood Glucose LESS THAN 70 milliGRAM(s)/deciliter      LABS  CBC (05-27 @ 06:27)                              9.4<L>                         6.82    )----------------(  409<H>     --    % Neutrophils, --    % Lymphocytes, ANC: --                                  29.5<L>    BMP (05-27 @ 06:27)             136     |  94<L>   |  22    		Ca++ --      Ca 8.4                ---------------------------------( 177<H>		Mg 1.90               3.3<L>  |  32<H>   |  1.11  			Ph 3.3       LFTs (05-27 @ 06:27)      TPro 5.4<L> / Alb 3.4 / TBili 0.3 / DBili -- / AST 13 / ALT 12 / AlkPhos 147<H>              IMAGING STUDIES     Surgical Oncology    SUBJECTIVE: Pt seen and examined on rounds with team. No acute events overnight. Encouraged to walk around      OBJECTIVE    PHYSICAL EXAM:   General: NAD, Lying in bed  HEENT; NGT in place  Neuro: Awake and alert  GI/Abd: Soft, NT/ND,       VITALS  T(C): 37.1 (05-27-22 @ 20:25), Max: 37.2 (05-27-22 @ 08:07)  HR: 87 (05-27-22 @ 20:25) (84 - 99)  BP: 97/62 (05-27-22 @ 20:25) (97/62 - 113/61)  RR: 18 (05-27-22 @ 20:25) (18 - 20)  SpO2: 100% (05-27-22 @ 20:25) (98% - 100%)  CAPILLARY BLOOD GLUCOSE      POCT Blood Glucose.: 169 mg/dL (27 May 2022 17:42)  POCT Blood Glucose.: 185 mg/dL (27 May 2022 12:28)  POCT Blood Glucose.: 190 mg/dL (27 May 2022 06:32)      Is/Os    05-26 @ 07:01  -  05-27 @ 07:00  --------------------------------------------------------  IN:    Fat Emulsion (Fish Oil &amp; Plant Based) 20% Infusion: 83.2 mL    TPN (Total Parenteral Nutrition): 1332 mL  Total IN: 1415.2 mL    OUT:    Nasogastric/Oral tube (mL): 2350 mL    Oral Fluid: 0 mL    Voided (mL): 350 mL  Total OUT: 2700 mL    Total NET: -1284.8 mL      05-27 @ 07:01  -  05-28 @ 00:40  --------------------------------------------------------  IN:    Fat Emulsion (Fish Oil &amp; Plant Based) 20% Infusion: 20.8 mL    TPN (Total Parenteral Nutrition): 996 mL  Total IN: 1016.8 mL    OUT:    Nasogastric/Oral tube (mL): 1150 mL    Voided (mL): 350 mL  Total OUT: 1500 mL    Total NET: -483.2 mL          MEDICATIONS (STANDING): dextrose 50% Injectable 25 Gram(s) IV Push once  dextrose 50% Injectable 12.5 Gram(s) IV Push once  dextrose 50% Injectable 25 Gram(s) IV Push once  diphenhydrAMINE Injectable 25 milliGRAM(s) IV Push at bedtime  enoxaparin Injectable 40 milliGRAM(s) SubCutaneous every 24 hours  fat emulsion (Fish Oil and Plant Based) 20% Infusion 10.4 mL/Hr IV Continuous <Continuous>  glucagon  Injectable 1 milliGRAM(s) IntraMuscular once  insulin lispro (ADMELOG) corrective regimen sliding scale   SubCutaneous every 6 hours  Parenteral Nutrition - Adult 1 Each TPN Continuous <Continuous>    MEDICATIONS (PRN):dextrose Oral Gel 15 Gram(s) Oral once PRN Blood Glucose LESS THAN 70 milliGRAM(s)/deciliter      LABS  CBC (05-27 @ 06:27)                              9.4<L>                         6.82    )----------------(  409<H>     --    % Neutrophils, --    % Lymphocytes, ANC: --                                  29.5<L>    BMP (05-27 @ 06:27)             136     |  94<L>   |  22    		Ca++ --      Ca 8.4                ---------------------------------( 177<H>		Mg 1.90               3.3<L>  |  32<H>   |  1.11  			Ph 3.3       LFTs (05-27 @ 06:27)      TPro 5.4<L> / Alb 3.4 / TBili 0.3 / DBili -- / AST 13 / ALT 12 / AlkPhos 147<H>              IMAGING STUDIES

## 2022-05-28 NOTE — PROGRESS NOTE ADULT - ASSESSMENT
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 Findings consistent with SBO at level of DJ anastomosis.     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 Findings consistent with SBO at level of DJ anastomosis.     - Operative planning next week  - NPO/IVF , TPN  - NGT decompression   - GI consult; unable to stent  - Serial abdominal exams, exam before pain meds  - Trend Cr       D Team Surgery   [id number]    Seen at bedside with Dr. Case.

## 2022-05-28 NOTE — PROGRESS NOTE ADULT - NUTRITIONAL ASSESSMENT
Wound Ostomy Continence Nurse  Consult Note       NAME:  M[facility] RECORD NUMBER:  [id number]  AGE: 78 y.o. GENDER: male  : 1945  TODAY'S DATE:  2022    Subjective   Reason for [id number] Nurse Evaluation and Assessment: Multiple dry/scabbed traumatic abrasions       Keiry Aguilar is a 78 y.o. male referred by:   [] Physician  [x] Nursing  [] Other:     Wound Identification:  Wound Type: traumatic and abrasions  Contributing Factors: shear force and LLE leg brace, poor sitting foot wear    Wound History: Patient states he has several scabbed areas from bumping his LEs on various different objects, such as shopping cart and leg brace. Patient also had a poorly fitted pair of shoes, which he has discontinued using, have cause a few areas of dry traumatic wounds. Current Wound Care Treatment: All areas appear dry, superficial, and there are no s/sx of infection. In interventions needed at this time. Patient Goal of Care:  [x] Wound Healing  [] Odor Control  [] Palliative Care  [] Pain Control   [] Other:         PAST MEDICAL HISTORY        Diagnosis Date    CAD (coronary artery disease)     Dr. Kenan Boykin, Dr. [place] CKD (chronic kidney disease)     Hypertension     Kidney stones     Left foot drop     Neuropathy, diabetic (HCC)     mild    Osteoarthritis     hip, knee    S/P CABG (coronary artery bypass graft)     Type II or unspecified type diabetes mellitus without mention of complication, not stated as uncontrolled        PAST SURGICAL HISTORY    Past Surgical History:   Procedure Laterality Date    BACK SURGERY N/A     BICEPS TENDON REPAIR  1997    CARDIAC CATHETERIZATION  2013    CORONARY ARTERY BYPASS GRAFT  2013    DR. PERALES    KNEE SURGERY  2006    left replacement    NECK SURGERY  2020    ROTATOR CUFF REPAIR  1996    TONSILLECTOMY  1966       FAMILY HISTORY    Family History   Problem Relation Age of Onset    Cancer Mother Severe protein calorie malnutrition in acute illness/ injury; secondary to poor appetite, weight loss >5% in 1 month and/or >7.5% in 3 months, caloric Intake <50% of nutrition needs >= 5 days, temporal wasting, severe loss of muscle mass/atrophy and loss of body fat stores.    Diet, NPO (05-23-22 @ 20:54) [Active]    fat emulsion (Fish Oil and Plant Based) 20% Infusion 10.4 mL/Hr (10.4 mL/Hr) IV Continuous <Continuous>, 05-27-22 @ 17:00, 17:00  Parenteral Nutrition - Adult 1 Each (83 mL/Hr) TPN Continuous <Continuous>, 05-28-22 @ 17:00, , Stop order after: 1 Days    **Greater than 50% of the encounter was spent counseling and/coordination of care on parenteral nutrition. 25 minutes were spent face to face with the patient. intestinal, skin    Heart Disease Father     Stroke Father        SOCIAL HISTORY    Social History     Tobacco Use    Smoking status: Never Smoker    Smokeless tobacco: Never Used   Substance Use Topics    Alcohol use: No    Drug use: Not on file       ALLERGIES    Allergies   Allergen Reactions    Dye [Iodides]        MEDICATIONS    No current facility-administered medications on file prior to encounter. Current Outpatient Medications on File Prior to Encounter   Medication Sig Dispense Refill    atorvastatin (LIPITOR) 40 MG tablet Take 1 tablet by mouth nightly 90 tablet 3    furosemide (LASIX) 40 MG tablet Take 1 tablet by mouth daily 90 tablet 3    glimepiride (AMARYL) 4 MG tablet Take 1 tablet by mouth 2 times daily 180 tablet 3    insulin glargine (LANTUS SOLOSTAR) 100 UNIT/ML injection pen Inject 30 Units into the skin 2 times daily 10 pen 5    losartan (COZAAR) 25 MG tablet Take 1 tablet by mouth daily 90 tablet 3    metoprolol tartrate (LOPRESSOR) 25 MG tablet Take 1 tablet by mouth 2 times daily 180 tablet 3    tamsulosin (FLOMAX) 0.4 MG capsule Take 1 capsule by mouth daily 90 capsule 3    famotidine (PEPCID) 20 MG tablet Take 1 tablet by mouth daily 180 tablet 3    nitroGLYCERIN (NITROSTAT) 0.4 MG SL tablet Place 1 tablet under the tongue See Admin Instructions 25 tablet 1    Handicap Placard MISC by Does not apply route Exp 5 years 1 each 0    aspirin 81 MG tablet Take 81 mg by mouth daily.          Objective    BP (!) 166/88   Pulse 80   Temp 97.5 °F (36.4 °C) (Oral)   Resp 18   Ht 6' 4\" (1.93 m)   Wt (!) 322 lb (146.1 kg)   SpO2 94%   BMI 39.20 kg/m²     LABS:  WBC:    Lab Results   Component Value Date    WBC 5.8 04/04/2022     H/H:    Lab Results   Component Value Date    HGB 12.0 04/04/2022    HCT 37.7 04/04/2022     PTT:    Lab Results   Component Value Date    APTT 26.6 11/24/2015   [APTT}  PT/INR:    Lab Results   Component Value Date    PROTIME 15.3 04/04/2022 INR 1.2 04/04/2022     HgBA1c:    Lab Results   Component Value Date    LABA1C 7.7 11/19/2021       Assessment   Jan Risk Score: Jan Scale Score: 20    Patient Active Problem List   Diagnosis    Hypertension    Diabetes mellitus ([facility] [address]    Kidney stone    S/P CABG x 4    JAZZMINE (obstructive sleep apnea)    Morbid obesity ([facility]    Iron deficiency anemia    CAD (coronary artery disease)    BPH (benign prostatic hyperplasia)    Hyperlipidemia    Stage 3 chronic kidney disease    Type 2 diabetes mellitus with stage 3 chronic kidney disease, with long-term current use of insulin ([facility])    Left foot drop    PVD (peripheral vascular disease) ([facility])    Leg swelling     All areas of abrasions to bilateral LE and toes are dry with superficial scabbing which is beginning to resolve/flake away, especially to the dorsal aspect of toes. No s/sx of infection noted. Patient has a podiatrist he follows regularly and plans to make an appointment after discharge for footwear recommendations. No other needs at this time. Plan   Plan of Care: Wound 04/01/22 Pretibial Proximal;Right 12 dark scabbed areas-Dressing/Treatment: Open to air  Wound 04/01/22 Knee Anterior; Left scab left knee due to shopping cart #1-Dressing/Treatment:  (mepilex)    Specialty Bed Required : N/A   [] Low Air Loss   [] Pressure Redistribution  [] Fluid Immersion  [] Bariatric  [] Other:     Current Diet: Diet NPO Exceptions are: Sips of Water with Meds  ADULT DIET;  Regular; Low Fat/Low Chol/High Fiber/LESLY  Dietician consult:  N/A    Discharge Plan:  Placement for patient upon discharge: home with support    Patient appropriate for Outpatient [address]: N/A    Referrals:  []   [] [address] [employer] [facility]  [] Supplies  [] Other    Patient/Caregiver Teaching:  Level of patient/caregiver understanding able to:   [] Indicates understanding       [] Needs reinforcement  [] Unsuccessful      [] Verbal Understanding  [] Demonstrated understanding       [] No evidence of learning  [] Refused teaching         [x] N/A       Electronically signed by DEMARCO Clark, RN, Alina Andujar on 4/4/2022 at 2:56 PM

## 2022-05-28 NOTE — PROGRESS NOTE ADULT - NUTRITIONAL ASSESSMENT
This patient has been assessed with a concern for Malnutrition and has been determined to have a diagnosis/diagnoses of Severe protein-calorie malnutrition and Underweight (BMI < 19).    This patient is being managed with:   Parenteral Nutrition - Adult-  Entered: May 27 2022  5:00PM    fat emulsion (Fish Oil and Plant Based) 20% Infusion-[Known as SMOFLIPID 20% Infusion]  50 Gram(s) in IV Solution 250 milliLiter(s) infuse at 10.4 mL/Hr  Dose Rate: 10.4 mL/Hr Infuse Over: 24 Hours  Administration Instructions: Use 1.2 micron in-line filter  Entered: May 27 2022  5:00PM    Diet NPO-  Entered: May 23 2022  8:54PM

## 2022-05-28 NOTE — PROGRESS NOTE ADULT - SUBJECTIVE AND OBJECTIVE BOX
NUTRITION NOTE  [id number] VAUGHN  ===============================    Interval events - Patient was seen and examined at bedside, no acute events overnight. Patient denies chest pain, shortness of breath, nausea or vomiting at this time. PICC line placed and TPN was started on 22, pt is tolerating TPN without any issues. Pt remains NPO with NGT in place and remains on TPN at this time.     ROS: Except as noted above, all other systems reviewed and are negative     Allergies  No Known Allergies    PAST MEDICAL & SURGICAL HISTORY:  Melanoma nose and left cheek  2yrs ago  HTN (hypertension)  Dyslipidemia  Malignant neoplasm of thyroid gland  Vocal cord nodule   H/O arthroscopy of left knee meniscus  1998  History of Whipple procedure pylorus sparing whipple  at [facility]    FAMILY HISTORY:  Family history of breast cancer (Sibling)  Family history of thyroid cancer (Father)  Family history of lung cancer (Mother)    Vital Signs Last 24 Hrs  T(C): 37.2 (28 May 2022 06:15), Max: 37.3 (28 May 2022 00:57)  T(F): 98.9 (28 May 2022 06:15), Max: 99.2 (28 May 2022 00:57)  HR: 76 (28 May 2022 06:15) (76 - 89)  BP: 101/56 (28 May 2022 06:15) (97/62 - 115/61)  RR: 16 (28 May 2022 06:15) (16 - 20)  SpO2: 98% (28 May 2022 06:15) (98% - 100%)    MEDICATIONS  (STANDING):  chlorhexidine 2% Cloths 1 Application(s) Topical daily  dextrose 50% Injectable 25 Gram(s) IV Push once  dextrose 50% Injectable 12.5 Gram(s) IV Push once  dextrose 50% Injectable 25 Gram(s) IV Push once  diphenhydrAMINE Injectable 25 milliGRAM(s) IV Push at bedtime  enoxaparin Injectable 40 milliGRAM(s) SubCutaneous every 24 hours  fat emulsion (Fish Oil and Plant Based) 20% Infusion 10.4 mL/Hr (10.4 mL/Hr) IV Continuous <Continuous>  glucagon  Injectable 1 milliGRAM(s) IntraMuscular once  insulin lispro (ADMELOG) corrective regimen sliding scale   SubCutaneous every 6 hours  lactated ringers Bolus 1000 milliLiter(s) IV Bolus once  Parenteral Nutrition - Adult 1 Each (83 mL/Hr) TPN Continuous <Continuous>  Parenteral Nutrition - Adult 1 Each (83 mL/Hr) TPN Continuous <Continuous>  potassium chloride  10 mEq/100 mL IVPB 10 milliEquivalent(s) IV Intermittent every 1 hour    MEDICATIONS  (PRN):  dextrose Oral Gel 15 Gram(s) Oral once PRN Blood Glucose LESS THAN 70 milliGRAM(s)/deciliter    I&O's Detail    27 May 2022 07:01  -  28 May 2022 07:00  --------------------------------------------------------  IN:    Fat Emulsion (Fish Oil &amp; Plant Based) 20% Infusion: 135.2 mL    TPN (Total Parenteral Nutrition): 1909 mL  Total IN: 2044.2 mL    OUT:    Nasogastric/Oral tube (mL): 1800 mL    Oral Fluid: 0 mL    Voided (mL): 750 mL  Total OUT: 2550 mL    Total NET: -505.8 mL    POCT Blood Glucose.: 166 mg/dL (28 May 2022 06:49)  POCT Blood Glucose.: 183 mg/dL (28 May 2022 00:48)  POCT Blood Glucose.: 169 mg/dL (27 May 2022 17:42)  POCT Blood Glucose.: 185 mg/dL (27 May 2022 12:28)    Daily Weight in k.4 (26 May 2022 04:58)    PHYSICAL EXAM:  General: NAD, resting comfortably in bed  Neuro: Awake and alert  GI/Abd: Soft, NT/ND, NGT in place   Extremities: warm, no edema   PICC Site: C/D/I    Diet: NPO and TPN (started on 22)    LABORATORY                                          9.5    8.99  )-----------( 363      ( 28 May 2022 06:50 )             30.4   05-28    136  |  94<L>  |  30<H>  ----------------------------<  165<H>  3.7   |  31  |  1.04    Ca    8.6      28 May 2022 06:50  Phos  3.6       Mg     2.10         TPro  5.4<L>  /  Alb  3.4  /  TBili  0.3  /  DBili  x   /  AST  13  /  ALT  12  /  AlkPhos  147<H>      LIVER FUNCTIONS - ( 27 May 2022 06:27 )  Alb: 3.4 g/dL / Pro: 5.4 g/dL / ALK PHOS: 147 U/L / ALT: 12 U/L / AST: 13 U/L / GGT: x            Chol -- LDL -- HDL -- Trig 99    COVID-19 PCR: Detected (26 May 2022 14:59)    ASSESSMENT/PLAN:  75 y/o male with PMH of HTN, HLD, pancreatic ductal adenocarcinoma s/p pylorus sparing Whipple 2020 ([facility]) with recurrence now on chemo, 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 Findings consistent with SBO at level of duodenojejunostomy anastomosis. Patient states that he eventually developed worsening nausea, initially with decreased tolerance of solid foods and eventual intolerance to liquids as well. Pt states he has lost 25lbs since onset of symptoms. Nutrition support consult called for initiation of TPN in view of severe malnutrition, prolonged period of poor PO intake and nutritional optimization prior to planned surgical intervention next week. Pt is NPO with NGT in place for decompression.     continue TPN with infusion volume of 2L, TPN will provide 1316 kcal/day; 50 gm of SMOF lipids were given on  to run over 24 hours    labs reviewed- electrolytes adjusted in TPN bag     monitor fingersticks, obtain daily weights - -185 increased to 21 units of insulin in TPN bag     continue parenteral nutrition at this time, will follow up with primary team on plan - OR planning for next week    1.  Severe protein calorie malnutrition being optimized with TPN: CHO [240] gm.  AA [125] gm. SMOF Lipids [0] gm.  2.  Hyperglycemia managed with: [21] units of regular insulin    3.  Check fluid balance daily.  Strict I/O  [ ] [ ]   4.  Daily BMP, Ionized Calcium, Magnesium and Phosphorous   5.  Triglycerides at initiation of TPN and monthly [ ] [ ]     Nutrition Support [id number]

## 2022-05-29 NOTE — PROGRESS NOTE ADULT - NUTRITIONAL ASSESSMENT
This patient has been assessed with a concern for Malnutrition and has been determined to have a diagnosis/diagnoses of Severe protein-calorie malnutrition and Underweight (BMI < 19).    This patient is being managed with:   Parenteral Nutrition - Adult-  Entered: May 28 2022  5:00PM    fat emulsion (Fish Oil and Plant Based) 20% Infusion-[Known as SMOFLIPID 20% Infusion]  50 Gram(s) in IV Solution 250 milliLiter(s) infuse at 10.4 mL/Hr  Dose Rate: 10.4 mL/Hr Infuse Over: 24 Hours  Administration Instructions: Use 1.2 micron in-line filter  Entered: May 27 2022  5:00PM    Diet NPO-  Entered: May 23 2022  8:54PM

## 2022-05-29 NOTE — PROGRESS NOTE ADULT - NUTRITIONAL ASSESSMENT
Severe protein calorie malnutrition in acute illness/ injury; secondary to poor appetite, weight loss >5% in 1 month and/or >7.5% in 3 months, caloric Intake <50% of nutrition needs >= 5 days, temporal wasting, severe loss of muscle mass/atrophy and loss of body fat stores.    Diet, NPO (05-23-22 @ 20:54) [Active]    Parenteral Nutrition - Adult 1 Each (83 mL/Hr) TPN Continuous <Continuous>, 05-29-22 @ 17:00, , Stop order after: 1 Days    **Greater than 50% of the encounter was spent counseling and/coordination of care on parenteral nutrition. 25 minutes were spent face to face with the patient.

## 2022-05-29 NOTE — PROGRESS NOTE ADULT - SUBJECTIVE AND OBJECTIVE BOX
NUTRITION NOTE  [id number] VAUGHN  ===============================    Interval events - Patient was seen and examined at bedside, no acute events overnight. Patient denies chest pain, shortness of breath, nausea or vomiting at this time. PICC line placed and TPN was started on 22, pt is tolerating TPN without any issues. Pt remains NPO with NGT in place and remains on TPN at this time.     ROS: Except as noted above, all other systems reviewed and are negative     Allergies  No Known Allergies    PAST MEDICAL & SURGICAL HISTORY:  Melanoma nose and left cheek  2yrs ago  HTN (hypertension)  Dyslipidemia  Malignant neoplasm of thyroid gland  Vocal cord nodule   H/O arthroscopy of left knee meniscus  1998  History of Whipple procedure pylorus sparing whipple  at [facility]    FAMILY HISTORY:  Family history of breast cancer (Sibling)  Family history of thyroid cancer (Father)  Family history of lung cancer (Mother)    Vital Signs Last 24 Hrs  T(C): 37 (29 May 2022 05:23), Max: 37.4 (28 May 2022 21:44)  T(F): 98.6 (29 May 2022 05:23), Max: 99.3 (28 May 2022 21:44)  HR: 88 (29 May 2022 05:23) (73 - 88)  BP: 110/66 (29 May 2022 05:23) (95/57 - 110/66)  RR: 16 (29 May 2022 05:23) (16 - 18)  SpO2: 99% (29 May 2022 05:23) (98% - 100%)    MEDICATIONS  (STANDING):  benzocaine 15 mG/menthol 3.6 mG Lozenge 1 Lozenge Oral three times a day  chlorhexidine 2% Cloths 1 Application(s) Topical daily  dextrose 50% Injectable 25 Gram(s) IV Push once  dextrose 50% Injectable 12.5 Gram(s) IV Push once  dextrose 50% Injectable 25 Gram(s) IV Push once  diphenhydrAMINE Injectable 25 milliGRAM(s) IV Push at bedtime  enoxaparin Injectable 40 milliGRAM(s) SubCutaneous every 24 hours  glucagon  Injectable 1 milliGRAM(s) IntraMuscular once  insulin lispro (ADMELOG) corrective regimen sliding scale   SubCutaneous every 6 hours  Parenteral Nutrition - Adult 1 Each (83 mL/Hr) TPN Continuous <Continuous>  Parenteral Nutrition - Adult 1 Each (83 mL/Hr) TPN Continuous <Continuous>    MEDICATIONS  (PRN):  dextrose Oral Gel 15 Gram(s) Oral once PRN Blood Glucose LESS THAN 70 milliGRAM(s)/deciliter    I&O's Detail    28 May 2022 07:01  -  29 May 2022 07:00  --------------------------------------------------------  IN:    Fat Emulsion (Fish Oil &amp; Plant Based) 20% Infusion: 114.4 mL    IV PiggyBack: 200 mL    Lactated Ringers Bolus: 1000 mL    TPN (Total Parenteral Nutrition): 1411 mL  Total IN: 2725.4 mL    OUT:    Nasogastric/Oral tube (mL): 2600 mL    Voided (mL): 1375 mL  Total OUT: 3975 mL    Total NET: -1249.6 mL    POCT Blood Glucose.: 175 mg/dL (29 May 2022 05:43)  POCT Blood Glucose.: 169 mg/dL (28 May 2022 23:46)  POCT Blood Glucose.: 173 mg/dL (28 May 2022 18:04)  POCT Blood Glucose.: 163 mg/dL (28 May 2022 12:45)    Daily Weight in k.4 (26 May 2022 04:58)    PHYSICAL EXAM:  General: NAD, resting comfortably in bed  Neuro: Awake and alert  GI/Abd: Soft, NT/ND, NGT in place   Extremities: warm, no edema   PICC Site: C/D/I    Diet: NPO and TPN (started on 22)    LABORATORY                                                   8.2    5.93  )-----------( 354      ( 29 May 2022 06:52 )             29.4   05-    129<L>  |  92<L>  |  31<H>  ----------------------------<  TNP  7.7<HH>   |  28  |  1.10    Ca    7.5<L>      29 May 2022 06:52  Phos  8.0       Mg     3.10         TPro  5.4<L>  /  Alb  3.4  /  TBili  0.3  /  DBili  x   /  AST  13  /  ALT  12  /  AlkPhos  147<H>      LIVER FUNCTIONS - ( 27 May 2022 06:27 )  Alb: 3.4 g/dL / Pro: 5.4 g/dL / ALK PHOS: 147 U/L / ALT: 12 U/L / AST: 13 U/L / GGT: x            Chol -- LDL -- HDL -- Trig 99    COVID-19 PCR: Detected (26 May 2022 14:59)    ASSESSMENT/PLAN:  75 y/o male with PMH of HTN, HLD, pancreatic ductal adenocarcinoma s/p pylorus sparing Whipple 2020 ([facility]) with recurrence now on chemo, 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 Findings consistent with SBO at level of duodenojejunostomy anastomosis. Patient states that he eventually developed worsening nausea, initially with decreased tolerance of solid foods and eventual intolerance to liquids as well. Pt states he has lost 25lbs since onset of symptoms. Nutrition support consult called for initiation of TPN in view of severe malnutrition, prolonged period of poor PO intake and nutritional optimization prior to planned surgical intervention next week. Pt is NPO with NGT in place for decompression.     continue TPN with infusion volume of 2L, TPN will provide 1316 kcal/day; 50 gm of SMOF lipids were given on  to run over 24 hours    AM labs were drawn from PICC while TPN was infusing, labs not correct - continue same TPN formula today     monitor fingersticks, obtain daily weights - continue with 21 units of insulin in TPN bag     continue parenteral nutrition at this time, will follow up with primary team on plan - OR planning for next week    1.  Severe protein calorie malnutrition being optimized with TPN: CHO [240] gm.  AA [125] gm. SMOF Lipids [0] gm.  2.  Hyperglycemia managed with: [21] units of regular insulin    3.  Check fluid balance daily.  Strict I/O  [ ] [ ]   4.  Daily BMP, Ionized Calcium, Magnesium and Phosphorous   5.  Triglycerides at initiation of TPN and monthly [ ] [ ]     Nutrition Support [id number]

## 2022-05-29 NOTE — PROGRESS NOTE ADULT - ASSESSMENT
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 Findings consistent with SBO at level of DJ anastomosis.     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 Findings consistent with SBO at level of DJ anastomosis.     - Operative planning next week  - NPO/IVF , TPN  - NGT decompression   - GI consult; unable to stent  - Serial abdominal exams, exam before pain meds  - Trend Cr       D Team Surgery   [id number]    Seen at bedside with Dr. Case.

## 2022-05-29 NOTE — PROGRESS NOTE ADULT - SUBJECTIVE AND OBJECTIVE BOX
Surgery Progress Note    Subjective:     Patient seen and examined at bedside. VSS, AF. Full TPN feeds continue. Awaiting surgery next ween. No acute events overnight. OOB.     OBJECTIVE:     T(C): 37.2 (05-28-22 @ 23:56), Max: 37.4 (05-28-22 @ 21:44)  HR: 18 (05-28-22 @ 23:56) (18 - 87)  BP: 106/63 (05-28-22 @ 23:56) (95/57 - 107/68)  RR: 17 (05-28-22 @ 23:56) (16 - 18)  SpO2: 100% (05-28-22 @ 23:56) (98% - 100%)  Wt(kg): --    I&O's Detail    27 May 2022 07:01  -  28 May 2022 07:00  --------------------------------------------------------  IN:    Fat Emulsion (Fish Oil &amp; Plant Based) 20% Infusion: 135.2 mL    TPN (Total Parenteral Nutrition): 1909 mL  Total IN: 2044.2 mL    OUT:    Nasogastric/Oral tube (mL): 1800 mL    Oral Fluid: 0 mL    Voided (mL): 750 mL  Total OUT: 2550 mL    Total NET: -505.8 mL      28 May 2022 07:01  -  29 May 2022 03:37  --------------------------------------------------------  IN:    Fat Emulsion (Fish Oil &amp; Plant Based) 20% Infusion: 114.4 mL    IV PiggyBack: 200 mL    Lactated Ringers Bolus: 1000 mL    TPN (Total Parenteral Nutrition): 1411 mL  Total IN: 2725.4 mL    OUT:    Nasogastric/Oral tube (mL): 1900 mL    Voided (mL): 1125 mL  Total OUT: 3025 mL    Total NET: -299.6 mL          PHYSICAL EXAM:    GENERAL: NAD, lying in bed comfortably  HEAD:  Atraumatic, Normocephalic  ENT: Moist mucous membranes  CHEST/LUNG: Unlabored respirations  ABDOMEN: Soft, Nontender, Nondistended.   NERVOUS SYSTEM:  Alert & Oriented X3, speech clear.   SKIN: No rashes or lesions    MEDICATIONS  (STANDING):  benzocaine 15 mG/menthol 3.6 mG Lozenge 1 Lozenge Oral three times a day  chlorhexidine 2% Cloths 1 Application(s) Topical daily  dextrose 50% Injectable 25 Gram(s) IV Push once  dextrose 50% Injectable 12.5 Gram(s) IV Push once  dextrose 50% Injectable 25 Gram(s) IV Push once  diphenhydrAMINE Injectable 25 milliGRAM(s) IV Push at bedtime  enoxaparin Injectable 40 milliGRAM(s) SubCutaneous every 24 hours  fat emulsion (Fish Oil and Plant Based) 20% Infusion 10.4 mL/Hr (10.4 mL/Hr) IV Continuous <Continuous>  glucagon  Injectable 1 milliGRAM(s) IntraMuscular once  insulin lispro (ADMELOG) corrective regimen sliding scale   SubCutaneous every 6 hours  Parenteral Nutrition - Adult 1 Each (83 mL/Hr) TPN Continuous <Continuous>    MEDICATIONS  (PRN):  dextrose Oral Gel 15 Gram(s) Oral once PRN Blood Glucose LESS THAN 70 milliGRAM(s)/deciliter      LABS:                          9.5    8.99  )-----------( 363      ( 28 May 2022 06:50 )             30.4     05-28    136  |  94<L>  |  30<H>  ----------------------------<  165<H>  3.7   |  31  |  1.04    Ca    8.6      28 May 2022 06:50  Phos  3.6     05-28  Mg     2.10     05-28    TPro  5.4<L>  /  Alb  3.4  /  TBili  0.3  /  DBili  x   /  AST  13  /  ALT  12  /  AlkPhos  147<H>  05-27               Surgery Progress Note    Subjective:     Patient seen and examined at bedside. VSS, AF. Full TPN feeds continue. Awaiting surgery next week. No acute events overnight. OOB.     OBJECTIVE:     T(C): 37.2 (05-28-22 @ 23:56), Max: 37.4 (05-28-22 @ 21:44)  HR: 18 (05-28-22 @ 23:56) (18 - 87)  BP: 106/63 (05-28-22 @ 23:56) (95/57 - 107/68)  RR: 17 (05-28-22 @ 23:56) (16 - 18)  SpO2: 100% (05-28-22 @ 23:56) (98% - 100%)  Wt(kg): --    I&O's Detail    27 May 2022 07:01  -  28 May 2022 07:00  --------------------------------------------------------  IN:    Fat Emulsion (Fish Oil &amp; Plant Based) 20% Infusion: 135.2 mL    TPN (Total Parenteral Nutrition): 1909 mL  Total IN: 2044.2 mL    OUT:    Nasogastric/Oral tube (mL): 1800 mL    Oral Fluid: 0 mL    Voided (mL): 750 mL  Total OUT: 2550 mL    Total NET: -505.8 mL      28 May 2022 07:01  -  29 May 2022 03:37  --------------------------------------------------------  IN:    Fat Emulsion (Fish Oil &amp; Plant Based) 20% Infusion: 114.4 mL    IV PiggyBack: 200 mL    Lactated Ringers Bolus: 1000 mL    TPN (Total Parenteral Nutrition): 1411 mL  Total IN: 2725.4 mL    OUT:    Nasogastric/Oral tube (mL): 1900 mL    Voided (mL): 1125 mL  Total OUT: 3025 mL    Total NET: -299.6 mL          PHYSICAL EXAM:    GENERAL: NAD, lying in bed comfortably  HEAD:  Atraumatic, Normocephalic. NGT in place  Abdomen: Soft, non distended. Non tender. Evidence of healed surgical incision    MEDICATIONS  (STANDING):  benzocaine 15 mG/menthol 3.6 mG Lozenge 1 Lozenge Oral three times a day  chlorhexidine 2% Cloths 1 Application(s) Topical daily  dextrose 50% Injectable 25 Gram(s) IV Push once  dextrose 50% Injectable 12.5 Gram(s) IV Push once  dextrose 50% Injectable 25 Gram(s) IV Push once  diphenhydrAMINE Injectable 25 milliGRAM(s) IV Push at bedtime  enoxaparin Injectable 40 milliGRAM(s) SubCutaneous every 24 hours  fat emulsion (Fish Oil and Plant Based) 20% Infusion 10.4 mL/Hr (10.4 mL/Hr) IV Continuous <Continuous>  glucagon  Injectable 1 milliGRAM(s) IntraMuscular once  insulin lispro (ADMELOG) corrective regimen sliding scale   SubCutaneous every 6 hours  Parenteral Nutrition - Adult 1 Each (83 mL/Hr) TPN Continuous <Continuous>    MEDICATIONS  (PRN):  dextrose Oral Gel 15 Gram(s) Oral once PRN Blood Glucose LESS THAN 70 milliGRAM(s)/deciliter      LABS:                          9.5    8.99  )-----------( 363      ( 28 May 2022 06:50 )             30.4     05-28    136  |  94<L>  |  30<H>  ----------------------------<  165<H>  3.7   |  31  |  1.04    Ca    8.6      28 May 2022 06:50  Phos  3.6     05-28  Mg     2.10     05-28    TPro  5.4<L>  /  Alb  3.4  /  TBili  0.3  /  DBili  x   /  AST  13  /  ALT  12  /  AlkPhos  147<H>  05-27               Surgery Progress Note    Subjective:     Patient seen and examined at bedside. VSS, AF. Full TPN feeds continue. Awaiting surgery next week. No acute events overnight. OOB. Patient with no overnight complaints and resting comfortably.     OBJECTIVE:     T(C): 37.2 (05-28-22 @ 23:56), Max: 37.4 (05-28-22 @ 21:44)  HR: 18 (05-28-22 @ 23:56) (18 - 87)  BP: 106/63 (05-28-22 @ 23:56) (95/57 - 107/68)  RR: 17 (05-28-22 @ 23:56) (16 - 18)  SpO2: 100% (05-28-22 @ 23:56) (98% - 100%)  Wt(kg): --    I&O's Detail    27 May 2022 07:01  -  28 May 2022 07:00  --------------------------------------------------------  IN:    Fat Emulsion (Fish Oil &amp; Plant Based) 20% Infusion: 135.2 mL    TPN (Total Parenteral Nutrition): 1909 mL  Total IN: 2044.2 mL    OUT:    Nasogastric/Oral tube (mL): 1800 mL    Oral Fluid: 0 mL    Voided (mL): 750 mL  Total OUT: 2550 mL    Total NET: -505.8 mL      28 May 2022 07:01  -  29 May 2022 03:37  --------------------------------------------------------  IN:    Fat Emulsion (Fish Oil &amp; Plant Based) 20% Infusion: 114.4 mL    IV PiggyBack: 200 mL    Lactated Ringers Bolus: 1000 mL    TPN (Total Parenteral Nutrition): 1411 mL  Total IN: 2725.4 mL    OUT:    Nasogastric/Oral tube (mL): 1900 mL    Voided (mL): 1125 mL  Total OUT: 3025 mL    Total NET: -299.6 mL          PHYSICAL EXAM:    GENERAL: NAD, lying in bed comfortably  HEAD:  Atraumatic, Normocephalic. NGT in place  Abdomen: Soft, non distended. Non tender. Evidence of healed surgical incision    MEDICATIONS  (STANDING):  benzocaine 15 mG/menthol 3.6 mG Lozenge 1 Lozenge Oral three times a day  chlorhexidine 2% Cloths 1 Application(s) Topical daily  dextrose 50% Injectable 25 Gram(s) IV Push once  dextrose 50% Injectable 12.5 Gram(s) IV Push once  dextrose 50% Injectable 25 Gram(s) IV Push once  diphenhydrAMINE Injectable 25 milliGRAM(s) IV Push at bedtime  enoxaparin Injectable 40 milliGRAM(s) SubCutaneous every 24 hours  fat emulsion (Fish Oil and Plant Based) 20% Infusion 10.4 mL/Hr (10.4 mL/Hr) IV Continuous <Continuous>  glucagon  Injectable 1 milliGRAM(s) IntraMuscular once  insulin lispro (ADMELOG) corrective regimen sliding scale   SubCutaneous every 6 hours  Parenteral Nutrition - Adult 1 Each (83 mL/Hr) TPN Continuous <Continuous>    MEDICATIONS  (PRN):  dextrose Oral Gel 15 Gram(s) Oral once PRN Blood Glucose LESS THAN 70 milliGRAM(s)/deciliter      LABS:                          9.5    8.99  )-----------( 363      ( 28 May 2022 06:50 )             30.4     05-28    136  |  94<L>  |  30<H>  ----------------------------<  165<H>  3.7   |  31  |  1.04    Ca    8.6      28 May 2022 06:50  Phos  3.6     05-28  Mg     2.10     05-28    TPro  5.4<L>  /  Alb  3.4  /  TBili  0.3  /  DBili  x   /  AST  13  /  ALT  12  /  AlkPhos  147<H>  05-27

## 2022-05-30 NOTE — PROGRESS NOTE ADULT - NUTRITIONAL ASSESSMENT
Severe protein calorie malnutrition in acute illness/ injury; secondary to poor appetite, weight loss >5% in 1 month and/or >7.5% in 3 months, caloric Intake <50% of nutrition needs >= 5 days, temporal wasting, severe loss of muscle mass/atrophy and loss of body fat stores.    Diet, NPO:   With Ice Chips/Sips of Water (05-30-22 @ 06:22) [Active]    fat emulsion (Fish Oil and Plant Based) 20% Infusion 10.4 mL/Hr (10.4 mL/Hr) IV Continuous <Continuous>, 05-30-22 @ 17:00, 17:00, Stop order after: 24 Hours  Parenteral Nutrition - Adult 1 Each (83 mL/Hr) TPN Continuous <Continuous>, 05-30-22 @ 17:00, , Stop order after: 1 Days    **Greater than 50% of the encounter was spent counseling and/coordination of care on parenteral nutrition. 25 minutes were spent face to face with the patient.

## 2022-05-30 NOTE — PROGRESS NOTE ADULT - SUBJECTIVE AND OBJECTIVE BOX
Plastic Surgery    SUBJECTIVE: Pt seen and examined on rounds with team. NAEON.      VITALS  T(C): 36.9 (05-29-22 @ 23:53), Max: 37.3 (05-29-22 @ 21:03)  HR: 86 (05-29-22 @ 23:53) (86 - 95)  BP: 113/67 (05-29-22 @ 23:53) (98/70 - 113/67)  RR: 18 (05-29-22 @ 23:53) (16 - 18)  SpO2: 97% (05-29-22 @ 23:53) (92% - 100%)  CAPILLARY BLOOD GLUCOSE      POCT Blood Glucose.: 174 mg/dL (29 May 2022 23:08)  POCT Blood Glucose.: 168 mg/dL (29 May 2022 18:34)  POCT Blood Glucose.: 139 mg/dL (29 May 2022 11:25)  POCT Blood Glucose.: 175 mg/dL (29 May 2022 05:43)      Is/Os    05-28 @ 07:01  -  05-29 @ 07:00  --------------------------------------------------------  IN:    Fat Emulsion (Fish Oil &amp; Plant Based) 20% Infusion: 114.4 mL    IV PiggyBack: 200 mL    Lactated Ringers Bolus: 1000 mL    TPN (Total Parenteral Nutrition): 1411 mL  Total IN: 2725.4 mL    OUT:    Nasogastric/Oral tube (mL): 2600 mL    Voided (mL): 1375 mL  Total OUT: 3975 mL    Total NET: -1249.6 mL      05-29 @ 07:01  -  05-30 @ 01:17  --------------------------------------------------------  IN:    TPN (Total Parenteral Nutrition): 913 mL  Total IN: 913 mL    OUT:    Nasogastric/Oral tube (mL): 2100 mL    Stool (mL): 0 mL    Voided (mL): 300 mL  Total OUT: 2400 mL    Total NET: -1487 mL          PHYSICAL EXAM:    GENERAL: NAD, lying in bed comfortably  HEAD:  Atraumatic, Normocephalic. NGT in place  Abdomen: Soft, non distended. Non tender. Evidence of healed surgical incision      MEDICATIONS (STANDING): dextrose 50% Injectable 25 Gram(s) IV Push once  dextrose 50% Injectable 12.5 Gram(s) IV Push once  dextrose 50% Injectable 25 Gram(s) IV Push once  diphenhydrAMINE Injectable 25 milliGRAM(s) IV Push at bedtime  enoxaparin Injectable 40 milliGRAM(s) SubCutaneous every 24 hours  glucagon  Injectable 1 milliGRAM(s) IntraMuscular once  insulin lispro (ADMELOG) corrective regimen sliding scale   SubCutaneous every 6 hours  Parenteral Nutrition - Adult 1 Each TPN Continuous <Continuous>    MEDICATIONS (PRN):dextrose Oral Gel 15 Gram(s) Oral once PRN Blood Glucose LESS THAN 70 milliGRAM(s)/deciliter      LABS  CBC (05-29 @ 06:52)                              8.2<L>                         5.93    )----------------(  354        79.7<H>% Neutrophils, 5.7<L>% Lymphocytes, ANC: 4.72                                29.4<L>    BMP (05-29 @ 11:20)             138     |  96<L>   |  34<H> 		Ca++ --      Ca 8.5                ---------------------------------( 137<H>		Mg 2.10               4.4     |  32<H>   |  1.05  			Ph 3.8     BMP (05-29 @ 06:52)             129<L>  |  92<L>   |  31<H> 		Ca++ 1.04<L>  Ca 7.5<L>             ---------------------------------( TNP   		Mg 3.10<H>             7.7<HH>  |  28      |  1.10  			Ph 8.0<H>    LFTs (05-29 @ 11:20)      TPro 5.8<L> / Alb 3.1<L> / TBili 0.3 / DBili -- / AST 38 / ALT 35 / AlkPhos 185<H>              IMAGING STUDIES     Plastic Surgery    SUBJECTIVE: Pt seen and examined on rounds with team. NAEON.  Pain under control. NPO with NGT.    VITALS  T(C): 36.9 (05-29-22 @ 23:53), Max: 37.3 (05-29-22 @ 21:03)  HR: 86 (05-29-22 @ 23:53) (86 - 95)  BP: 113/67 (05-29-22 @ 23:53) (98/70 - 113/67)  RR: 18 (05-29-22 @ 23:53) (16 - 18)  SpO2: 97% (05-29-22 @ 23:53) (92% - 100%)  CAPILLARY BLOOD GLUCOSE      POCT Blood Glucose.: 174 mg/dL (29 May 2022 23:08)  POCT Blood Glucose.: 168 mg/dL (29 May 2022 18:34)  POCT Blood Glucose.: 139 mg/dL (29 May 2022 11:25)  POCT Blood Glucose.: 175 mg/dL (29 May 2022 05:43)      Is/Os    05-28 @ 07:01  -  05-29 @ 07:00  --------------------------------------------------------  IN:    Fat Emulsion (Fish Oil &amp; Plant Based) 20% Infusion: 114.4 mL    IV PiggyBack: 200 mL    Lactated Ringers Bolus: 1000 mL    TPN (Total Parenteral Nutrition): 1411 mL  Total IN: 2725.4 mL    OUT:    Nasogastric/Oral tube (mL): 2600 mL    Voided (mL): 1375 mL  Total OUT: 3975 mL    Total NET: -1249.6 mL      05-29 @ 07:01  -  05-30 @ 01:17  --------------------------------------------------------  IN:    TPN (Total Parenteral Nutrition): 913 mL  Total IN: 913 mL    OUT:    Nasogastric/Oral tube (mL): 2100 mL    Stool (mL): 0 mL    Voided (mL): 300 mL  Total OUT: 2400 mL    Total NET: -1487 mL          PHYSICAL EXAM:    GENERAL: NAD, lying in bed comfortably  HEAD:  Atraumatic, Normocephalic. NGT in place  Abdomen: Soft, non distended. Non tender. Evidence of healed surgical incision      MEDICATIONS (STANDING): dextrose 50% Injectable 25 Gram(s) IV Push once  dextrose 50% Injectable 12.5 Gram(s) IV Push once  dextrose 50% Injectable 25 Gram(s) IV Push once  diphenhydrAMINE Injectable 25 milliGRAM(s) IV Push at bedtime  enoxaparin Injectable 40 milliGRAM(s) SubCutaneous every 24 hours  glucagon  Injectable 1 milliGRAM(s) IntraMuscular once  insulin lispro (ADMELOG) corrective regimen sliding scale   SubCutaneous every 6 hours  Parenteral Nutrition - Adult 1 Each TPN Continuous <Continuous>    MEDICATIONS (PRN):dextrose Oral Gel 15 Gram(s) Oral once PRN Blood Glucose LESS THAN 70 milliGRAM(s)/deciliter      LABS  CBC (05-29 @ 06:52)                              8.2<L>                         5.93    )----------------(  354        79.7<H>% Neutrophils, 5.7<L>% Lymphocytes, ANC: 4.72                                29.4<L>    BMP (05-29 @ 11:20)             138     |  96<L>   |  34<H> 		Ca++ --      Ca 8.5                ---------------------------------( 137<H>		Mg 2.10               4.4     |  32<H>   |  1.05  			Ph 3.8     BMP (05-29 @ 06:52)             129<L>  |  92<L>   |  31<H> 		Ca++ 1.04<L>  Ca 7.5<L>             ---------------------------------( TNP   		Mg 3.10<H>             7.7<HH>  |  28      |  1.10  			Ph 8.0<H>    LFTs (05-29 @ 11:20)      TPro 5.8<L> / Alb 3.1<L> / TBili 0.3 / DBili -- / AST 38 / ALT 35 / AlkPhos 185<H>              IMAGING STUDIES

## 2022-05-30 NOTE — PROGRESS NOTE ADULT - ASSESSMENT
75yo gentleman with PMH of htn, hld, pancreatic ductal adenocarcinoma s/p pylorus sparing Whipple 9/2020 (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 Findings consistent with SBO at level of DJ anastomosis.     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 Findings consistent with SBO at level of DJ anastomosis.     - Operative planning this week  - NPO/IVF , TPN  - NGT decompression   - GI consult; unable to stent  - Serial abdominal exams, exam before pain meds  - Trend Cr   - saw patient with Dr. Case.      D Team Surgery   [id number]

## 2022-05-30 NOTE — PROGRESS NOTE ADULT - SUBJECTIVE AND OBJECTIVE BOX
NUTRITION NOTE  [id number] VAUGHN  ===============================    Interval events - Patient was seen and examined at bedside, no acute events overnight. Patient denies chest pain, shortness of breath, nausea or vomiting at this time. PICC line placed and TPN was started on 22, pt is tolerating TPN without any issues. Pt remains NPO with NGT in place and remains on TPN at this time.     ROS: Except as noted above, all other systems reviewed and are negative     Allergies  No Known Allergies    PAST MEDICAL & SURGICAL HISTORY:  Melanoma nose and left cheek  2yrs ago  HTN (hypertension)  Dyslipidemia  Malignant neoplasm of thyroid gland  Vocal cord nodule   H/O arthroscopy of left knee meniscus  1998  History of Whipple procedure pylorus sparing whipple  at [facility]    FAMILY HISTORY:  Family history of breast cancer (Sibling)  Family history of thyroid cancer (Father)  Family history of lung cancer (Mother)    Vital Signs Last 24 Hrs  T(C): 36.9 (30 May 2022 05:35), Max: 37.3 (29 May 2022 21:03)  T(F): 98.5 (30 May 2022 05:35), Max: 99.2 (29 May 2022 21:03)  HR: 85 (30 May 2022 05:35) (85 - 95)  BP: 102/60 (30 May 2022 05:35) (98/70 - 113/67)  RR: 18 (30 May 2022 05:35) (18 - 18)  SpO2: 99% (30 May 2022 05:35) (92% - 100%)    MEDICATIONS  (STANDING):  benzocaine 15 mG/menthol 3.6 mG Lozenge 1 Lozenge Oral three times a day  chlorhexidine 2% Cloths 1 Application(s) Topical daily  dextrose 50% Injectable 25 Gram(s) IV Push once  dextrose 50% Injectable 12.5 Gram(s) IV Push once  dextrose 50% Injectable 25 Gram(s) IV Push once  diphenhydrAMINE Injectable 25 milliGRAM(s) IV Push at bedtime  enoxaparin Injectable 40 milliGRAM(s) SubCutaneous every 24 hours  fat emulsion (Fish Oil and Plant Based) 20% Infusion 10.4 mL/Hr (10.4 mL/Hr) IV Continuous <Continuous>  glucagon  Injectable 1 milliGRAM(s) IntraMuscular once  insulin lispro (ADMELOG) corrective regimen sliding scale   SubCutaneous every 6 hours  Parenteral Nutrition - Adult 1 Each (83 mL/Hr) TPN Continuous <Continuous>  Parenteral Nutrition - Adult 1 Each (83 mL/Hr) TPN Continuous <Continuous>    MEDICATIONS  (PRN):  dextrose Oral Gel 15 Gram(s) Oral once PRN Blood Glucose LESS THAN 70 milliGRAM(s)/deciliter    I&O's Detail    29 May 2022 07:01  -  30 May 2022 07:00  --------------------------------------------------------  IN:    TPN (Total Parenteral Nutrition): 1411 mL  Total IN: 1411 mL    OUT:    Nasogastric/Oral tube (mL): 2100 mL    Stool (mL): 0 mL    Voided (mL): 500 mL  Total OUT: 2600 mL    Total NET: -1189 mL    Daily Weight in k.4 (26 May 2022 04:58)    Drug Dosing Weight  Height (cm): 193 (24 May 2022 16:24)  Weight (kg): 61.4 (26 May 2022 08:43)  BMI (kg/m2): 16.5 (26 May 2022 08:43)  BSA (m2): 1.88 (26 May 2022 08:43)    PHYSICAL EXAM:  General: NAD, resting comfortably in bed  Neuro: Awake and alert  GI/Abd: Soft, NT/ND, NGT in place   Extremities: warm, no edema   PICC Site: C/D/I    Diet: NPO and TPN (started on 22)    LABORATORY                                            8.2    5.93  )-----------( 354      ( 29 May 2022 06:52 )             29.4   05-30    138  |  95<L>  |  46<H>  ----------------------------<  162<H>  4.1   |  34<H>  |  1.24    Ca    8.9      30 May 2022 06:58  Phos  4.3     05-30  Mg     2.40     05-30    TPro  5.8<L>  /  Alb  3.1<L>  /  TBili  0.3  /  DBili  x   /  AST  38  /  ALT  35  /  AlkPhos  185<H>      LIVER FUNCTIONS - ( 29 May 2022 11:20 )  Alb: 3.1 g/dL / Pro: 5.8 g/dL / ALK PHOS: 185 U/L / ALT: 35 U/L / AST: 38 U/L / GGT: x            Chol -- LDL -- HDL -- Trig 99    COVID-19 PCR: Detected (26 May 2022 14:59)    ASSESSMENT/PLAN:  77 y/o male with PMH of HTN, HLD, pancreatic ductal adenocarcinoma s/p pylorus sparing Whipple 2020 ([facility]) with recurrence now on chemo, 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 Findings consistent with SBO at level of duodenojejunostomy anastomosis. Patient states that he eventually developed worsening nausea, initially with decreased tolerance of solid foods and eventual intolerance to liquids as well. Pt states he has lost 25lbs since onset of symptoms. Nutrition support consult called for initiation of TPN in view of severe malnutrition, prolonged period of poor PO intake and nutritional optimization prior to planned surgical intervention next week. Pt is NPO with NGT in place for decompression.     continue TPN with infusion volume of 2L, TPN will provide 1816 kcal/day; 50 gm of SMOF lipids ordered for today to run over 24 hours    labs reviewed - increased NaCl in TPN bag     monitor fingersticks, obtain daily weights - continue with 21 units of insulin in TPN bag     continue parenteral nutrition at this time, will follow up with primary team on plan - OR planning for this week    1.  Severe protein calorie malnutrition being optimized with TPN: CHO [240] gm.  AA [125] gm. SMOF Lipids [50] gm.  2.  Hyperglycemia managed with: [21] units of regular insulin    3.  Check fluid balance daily.  Strict I/O  [ ] [ ]   4.  Daily BMP, Ionized Calcium, Magnesium and Phosphorous   5.  Triglycerides at initiation of TPN and monthly [ ] [ ]     Nutrition Support [id number]

## 2022-05-31 NOTE — PROGRESS NOTE ADULT - SUBJECTIVE AND OBJECTIVE BOX
Vital Signs Last 24 Hrs  T(C): 37.1 (30 May 2022 23:18), Max: 37.2 (30 May 2022 18:17)  T(F): 98.7 (30 May 2022 23:18), Max: 99 (30 May 2022 18:17)  HR: 90 (30 May 2022 23:18) (86 - 90)  BP: 122/80 (30 May 2022 23:18) (101/69 - 122/80)  BP(mean): --  RR: 18 (30 May 2022 23:18) (17 - 18)  SpO2: 98% (30 May 2022 23:18) (95% - 100%)    I&O's Detail    30 May 2022 07:01  -  31 May 2022 07:00  --------------------------------------------------------  IN:    Fat Emulsion (Fish Oil &amp; Plant Based) 20% Infusion: 124.8 mL    IV PiggyBack: 1000 mL    TPN (Total Parenteral Nutrition): 1494 mL  Total IN: 2618.8 mL    OUT:    Nasogastric/Oral tube (mL): 2550 mL    Stool (mL): 0 mL    Voided (mL): 350 mL  Total OUT: 2900 mL    Total NET: -281.2 mL                                9.8    8.14  )-----------( 384      ( 31 May 2022 06:34 )             31.6       05-31    138  |  94<L>  |  59<H>  ----------------------------<  251<H>  4.0   |  34<H>  |  1.31<H>    Ca    8.9      31 May 2022 06:34  Phos  4.5     05-31  Mg     2.60     05-31    TPro  5.8<L>  /  Alb  3.1<L>  /  TBili  0.3  /  DBili  x   /  AST  38  /  ALT  35  /  AlkPhos  185<H>  05-29    TPNcontinues          PLAN:  Covid restesting tomorrow  Surgery on Friday

## 2022-05-31 NOTE — PROGRESS NOTE ADULT - SUBJECTIVE AND OBJECTIVE BOX
NUTRITION NOTE  [id number] VAUGHN  ===============================    Interval events - Patient was seen and examined at bedside, no acute events overnight. Patient denies chest pain, shortness of breath, nausea or vomiting at this time. PICC line placed and TPN was started on 22, pt is tolerating TPN without any issues. Pt remains NPO with NGT in place and remains on TPN at this time.     ROS: Except as noted above, all other systems reviewed and are negative     Allergies  No Known Allergies    PAST MEDICAL & SURGICAL HISTORY:  Melanoma nose and left cheek  2yrs ago  HTN (hypertension)  Dyslipidemia  Malignant neoplasm of thyroid gland  Vocal cord nodule   H/O arthroscopy of left knee meniscus  1998  History of Whipple procedure pylorus sparing whipple  at [facility]    FAMILY HISTORY:  Family history of breast cancer (Sibling)  Family history of thyroid cancer (Father)  Family history of lung cancer (Mother)    Vital Signs Last 24 Hrs  T(C): 37.1 (30 May 2022 23:18), Max: 37.2 (30 May 2022 18:17)  T(F): 98.7 (30 May 2022 23:18), Max: 99 (30 May 2022 18:17)  HR: 90 (30 May 2022 23:18) (86 - 90)  BP: 122/80 (30 May 2022 23:18) (101/69 - 122/80)  RR: 18 (30 May 2022 23:18) (17 - 18)  SpO2: 98% (30 May 2022 23:18) (95% - 100%)    MEDICATIONS  (STANDING):  benzocaine 15 mG/menthol 3.6 mG Lozenge 1 Lozenge Oral three times a day  chlorhexidine 2% Cloths 1 Application(s) Topical daily  dextrose 50% Injectable 25 Gram(s) IV Push once  dextrose 50% Injectable 12.5 Gram(s) IV Push once  dextrose 50% Injectable 25 Gram(s) IV Push once  diphenhydrAMINE Injectable 25 milliGRAM(s) IV Push at bedtime  enoxaparin Injectable 40 milliGRAM(s) SubCutaneous every 24 hours  fat emulsion (Fish Oil and Plant Based) 20% Infusion 10.4 mL/Hr (10.4 mL/Hr) IV Continuous <Continuous>  glucagon  Injectable 1 milliGRAM(s) IntraMuscular once  insulin lispro (ADMELOG) corrective regimen sliding scale   SubCutaneous every 6 hours  Parenteral Nutrition - Adult 1 Each (83 mL/Hr) TPN Continuous <Continuous>  Parenteral Nutrition - Adult 1 Each (83 mL/Hr) TPN Continuous <Continuous>    MEDICATIONS  (PRN):  dextrose Oral Gel 15 Gram(s) Oral once PRN Blood Glucose LESS THAN 70 milliGRAM(s)/deciliter    I&O's Detail    30 May 2022 07:01  -  31 May 2022 07:00  --------------------------------------------------------  IN:    Fat Emulsion (Fish Oil &amp; Plant Based) 20% Infusion: 124.8 mL    IV PiggyBack: 1000 mL    TPN (Total Parenteral Nutrition): 1494 mL  Total IN: 2618.8 mL    OUT:    Nasogastric/Oral tube (mL): 2550 mL    Stool (mL): 0 mL    Voided (mL): 350 mL  Total OUT: 2900 mL    Total NET: -281.2 mL    POCT Blood Glucose.: 239 mg/dL (31 May 2022 06:19)  POCT Blood Glucose.: 259 mg/dL (30 May 2022 23:02)  POCT Blood Glucose.: 235 mg/dL (30 May 2022 18:21)  POCT Blood Glucose.: 230 mg/dL (30 May 2022 11:52)  POCT Blood Glucose.: 237 mg/dL (30 May 2022 11:51)    Daily Weight in k.4 (26 May 2022 04:58)    Drug Dosing Weight  Height (cm): 193 (24 May 2022 16:24)  Weight (kg): 61.4 (26 May 2022 08:43)  BMI (kg/m2): 16.5 (26 May 2022 08:43)  BSA (m2): 1.88 (26 May 2022 08:43)    PHYSICAL EXAM:  General: NAD, resting comfortably in bed  Neuro: Awake and alert  GI/Abd: Soft, NT/ND, NGT in place   Extremities: warm, no edema   PICC Site: C/D/I    Diet: NPO and TPN (started on 22)    LABORATORY                                                     9.8    8.14  )-----------( 384      ( 31 May 2022 06:34 )             31.6   05-    138  |  94<L>  |  59<H>  ----------------------------<  251<H>  4.0   |  34<H>  |  1.31<H>    Ca    8.9      31 May 2022 06:34  Phos  4.5     05-  Mg     2.60     05    TPro  5.8<L>  /  Alb  3.1<L>  /  TBili  0.3  /  DBili  x   /  AST  38  /  ALT  35  /  AlkPhos  185<H>      LIVER FUNCTIONS - ( 29 May 2022 11:20 )  Alb: 3.1 g/dL / Pro: 5.8 g/dL / ALK PHOS: 185 U/L / ALT: 35 U/L / AST: 38 U/L / GGT: x            Chol -- LDL -- HDL -- Trig 99    COVID-19 PCR: Detected (26 May 2022 14:59)    ASSESSMENT/PLAN:  75 y/o male with PMH of HTN, HLD, pancreatic ductal adenocarcinoma s/p pylorus sparing Whipple 2020 ([facility]) with recurrence now on chemo, 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 Findings consistent with SBO at level of duodenojejunostomy anastomosis. Patient states that he eventually developed worsening nausea, initially with decreased tolerance of solid foods and eventual intolerance to liquids as well. Pt states he has lost 25lbs since onset of symptoms. Nutrition support consult called for initiation of TPN in view of severe malnutrition, prolonged period of poor PO intake and nutritional optimization prior to planned surgical intervention this week. Pt is NPO with NGT in place for decompression.     continue TPN with infusion volume of 2L, TPN will provide 1205 kcal/day; 50 gm of SMOF lipids given on  to run over 24 hours    labs reviewed - electrolytes adjusted in TPN bag, Cr noted to be elevated 1.31 and elevated FS - decreased protein and dextrose calories      monitor fingersticks, obtain daily weights - -259, increased to 30 units of insulin in TPN bag     continue parenteral nutrition at this time, will follow up with primary team on plan - OR planning for this week    1.  Severe protein calorie malnutrition being optimized with TPN: CHO [225] gm.  AA [110] gm. SMOF Lipids [0] gm.  2.  Hyperglycemia managed with: [30] units of regular insulin    3.  Check fluid balance daily.  Strict I/O  [ ] [ ]   4.  Daily BMP, Ionized Calcium, Magnesium and Phosphorous   5.  Triglycerides at initiation of TPN and monthly [ ] [ ]     Nutrition Support [id number]

## 2022-05-31 NOTE — PROGRESS NOTE ADULT - NUTRITIONAL ASSESSMENT
From: Alyson Roy  To: Nena Gan  Sent: 4/8/2022 3:53 PM CDT  Subject: Update vaccines    Can you add the shingles vaccine to my profile and remove the reminder. See attached from [facility].   Shingles vac: 11/29/2021 and 2/0/2022   This patient has been assessed with a concern for Malnutrition and has been determined to have a diagnosis/diagnoses of Severe protein-calorie malnutrition and Underweight (BMI < 19).    This patient is being managed with:   Parenteral Nutrition - Adult-  Entered: May 30 2022  5:00PM    fat emulsion (Fish Oil and Plant Based) 20% Infusion-[Known as SMOFLIPID 20% Infusion]  50 Gram(s) in IV Solution 250 milliLiter(s) infuse at 10.4 mL/Hr  Dose Rate: 10.4 mL/Hr Infuse Over: 24 Hours; Stop After 24 Hours  Administration Instructions: Use 1.2 micron in-line filter  Entered: May 30 2022  5:00PM    Diet NPO-  With Ice Chips/Sips of Water  Entered: May 30 2022  6:22AM

## 2022-05-31 NOTE — PROGRESS NOTE ADULT - NUTRITIONAL ASSESSMENT
Severe protein calorie malnutrition in acute illness/ injury; secondary to poor appetite, weight loss >5% in 1 month and/or >7.5% in 3 months, caloric Intake <50% of nutrition needs >= 5 days, temporal wasting, severe loss of muscle mass/atrophy and loss of body fat stores.    Diet, NPO:   With Ice Chips/Sips of Water (05-30-22 @ 06:22) [Active]    Parenteral Nutrition - Adult 1 Each (83 mL/Hr) TPN Continuous <Continuous>, 05-31-22 @ 17:00, , Stop order after: 1 Days    **Greater than 50% of the encounter was spent counseling and/coordination of care on parenteral nutrition. 25 minutes were spent face to face with the patient.

## 2022-05-31 NOTE — PROGRESS NOTE ADULT - ASSESSMENT
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 Findings consistent with SBO at level of DJ anastomosis.     - Operative planning this week  - NPO/IVF , TPN  - NGT decompression   - GI consult; unable to stent  - Serial abdominal exams, exam before pain meds  - Trend Cr   - saw patient with Dr. Case.      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 Findings consistent with SBO at level of DJ anastomosis.     PLAN  - Operative planning tentative, Fri for ex lap, GJ  - NPO/IVF, TPN  - NGT decompression - 2.5L output  - GI consult; unable to stent  - Serial abdominal exams, exam before pain meds  - Trend Cr     D Team Surgery   [id number]

## 2022-05-31 NOTE — PROGRESS NOTE ADULT - SUBJECTIVE AND OBJECTIVE BOX
Subjective:   Patient seen at bedside this AM. Reports feeling well, without complaints. Denies chest pain, SOB. Tolerating diet without N/V.     24h Events:   - Overnight, no acute events    Objective:  Vital Signs  T(C): 37.1 (05-30 @ 23:18), Max: 37.2 (05-30 @ 18:17)  HR: 90 (05-30 @ 23:18) (85 - 90)  BP: 122/80 (05-30 @ 23:18) (101/69 - 122/80)  RR: 18 (05-30 @ 23:18) (17 - 18)  SpO2: 98% (05-30 @ 23:18) (95% - 100%)  05-29-22 @ 07:01  -  05-30-22 @ 07:00  --------------------------------------------------------  IN:  Total IN: 0 mL    OUT:    Nasogastric/Oral tube (mL): 2100 mL    Stool (mL): 0 mL    Voided (mL): 500 mL  Total OUT: 2600 mL    Total NET: -2600 mL      05-30-22 @ 07:01  -  05-31-22 @ 00:21  --------------------------------------------------------  IN:  Total IN: 0 mL    OUT:    Nasogastric/Oral tube (mL): 2450 mL    Voided (mL): 250 mL  Total OUT: 2700 mL    Total NET: -2700 mL          PHYSICAL EXAM:    GENERAL: NAD, lying in bed comfortably  HEAD:  Atraumatic, Normocephalic. NGT in place  Abdomen: Soft, non distended. Non tender. Evidence of healed surgical incision      Labs:                        10.1   6.50  )-----------( 426      ( 30 May 2022 06:58 )             31.0   05-30    138  |  95<L>  |  46<H>  ----------------------------<  162<H>  4.1   |  34<H>  |  1.24    Ca    8.9      30 May 2022 06:58  Phos  4.3     05-30  Mg     2.40     05-30    TPro  5.8<L>  /  Alb  3.1<L>  /  TBili  0.3  /  DBili  x   /  AST  38  /  ALT  35  /  AlkPhos  185<H>  05-29    CAPILLARY BLOOD GLUCOSE      POCT Blood Glucose.: 235 mg/dL (30 May 2022 18:21)  POCT Blood Glucose.: 230 mg/dL (30 May 2022 11:52)  POCT Blood Glucose.: 237 mg/dL (30 May 2022 11:51)  POCT Blood Glucose.: 168 mg/dL (30 May 2022 05:29)      Medications:   MEDICATIONS  (STANDING):  benzocaine 15 mG/menthol 3.6 mG Lozenge 1 Lozenge Oral three times a day  chlorhexidine 2% Cloths 1 Application(s) Topical daily  dextrose 50% Injectable 25 Gram(s) IV Push once  dextrose 50% Injectable 12.5 Gram(s) IV Push once  dextrose 50% Injectable 25 Gram(s) IV Push once  diphenhydrAMINE Injectable 25 milliGRAM(s) IV Push at bedtime  enoxaparin Injectable 40 milliGRAM(s) SubCutaneous every 24 hours  fat emulsion (Fish Oil and Plant Based) 20% Infusion 10.4 mL/Hr (10.4 mL/Hr) IV Continuous <Continuous>  glucagon  Injectable 1 milliGRAM(s) IntraMuscular once  insulin lispro (ADMELOG) corrective regimen sliding scale   SubCutaneous every 6 hours  Parenteral Nutrition - Adult 1 Each (83 mL/Hr) TPN Continuous <Continuous>    MEDICATIONS  (PRN):  dextrose Oral Gel 15 Gram(s) Oral once PRN Blood Glucose LESS THAN 70 milliGRAM(s)/deciliter      Imaging:     Subjective:   Patient seen at bedside this AM. Reports feeling well, without complaints. Denies chest pain, SOB. Tolerating diet without N/V.     24h Events:  - Overnight, no acute events    Objective:  Vital Signs  T(C): 37.1 (05-30 @ 23:18), Max: 37.2 (05-30 @ 18:17)  HR: 90 (05-30 @ 23:18) (85 - 90)  BP: 122/80 (05-30 @ 23:18) (101/69 - 122/80)  RR: 18 (05-30 @ 23:18) (17 - 18)  SpO2: 98% (05-30 @ 23:18) (95% - 100%)  05-29-22 @ 07:01  -  05-30-22 @ 07:00  --------------------------------------------------------  IN:  Total IN: 0 mL    OUT:    Nasogastric/Oral tube (mL): 2100 mL    Stool (mL): 0 mL    Voided (mL): 500 mL  Total OUT: 2600 mL    Total NET: -2600 mL      05-30-22 @ 07:01  -  05-31-22 @ 00:21  --------------------------------------------------------  IN:  Total IN: 0 mL    OUT:    Nasogastric/Oral tube (mL): 2450 mL    Voided (mL): 250 mL  Total OUT: 2700 mL    Total NET: -2700 mL    PHYSICAL EXAM  GENERAL: NAD, lying in bed comfortably  HEAD:  Atraumatic, Normocephalic. NGT in place  Abdomen: Soft, non distended. Non tender. well healing surgical incision    Labs:                        10.1   6.50  )-----------( 426      ( 30 May 2022 06:58 )             31.0   05-30    138  |  95<L>  |  46<H>  ----------------------------<  162<H>  4.1   |  34<H>  |  1.24    Ca    8.9      30 May 2022 06:58  Phos  4.3     05-30  Mg     2.40     05-30    TPro  5.8<L>  /  Alb  3.1<L>  /  TBili  0.3  /  DBili  x   /  AST  38  /  ALT  35  /  AlkPhos  185<H>  05-29    CAPILLARY BLOOD GLUCOSE      POCT Blood Glucose.: 235 mg/dL (30 May 2022 18:21)  POCT Blood Glucose.: 230 mg/dL (30 May 2022 11:52)  POCT Blood Glucose.: 237 mg/dL (30 May 2022 11:51)  POCT Blood Glucose.: 168 mg/dL (30 May 2022 05:29)      Medications:   MEDICATIONS  (STANDING):  benzocaine 15 mG/menthol 3.6 mG Lozenge 1 Lozenge Oral three times a day  chlorhexidine 2% Cloths 1 Application(s) Topical daily  dextrose 50% Injectable 25 Gram(s) IV Push once  dextrose 50% Injectable 12.5 Gram(s) IV Push once  dextrose 50% Injectable 25 Gram(s) IV Push once  diphenhydrAMINE Injectable 25 milliGRAM(s) IV Push at bedtime  enoxaparin Injectable 40 milliGRAM(s) SubCutaneous every 24 hours  fat emulsion (Fish Oil and Plant Based) 20% Infusion 10.4 mL/Hr (10.4 mL/Hr) IV Continuous <Continuous>  glucagon  Injectable 1 milliGRAM(s) IntraMuscular once  insulin lispro (ADMELOG) corrective regimen sliding scale   SubCutaneous every 6 hours  Parenteral Nutrition - Adult 1 Each (83 mL/Hr) TPN Continuous <Continuous>    MEDICATIONS  (PRN):  dextrose Oral Gel 15 Gram(s) Oral once PRN Blood Glucose LESS THAN 70 milliGRAM(s)/deciliter

## 2022-05-31 NOTE — CHART NOTE - NSCHARTNOTEFT_GEN_A_CORE
Patient assessed by RDN on 5/26, now for malnutrition follow up. Spoke with pt and obtained subjective information from extensive chart review.     Current Diet : Diet, NPO:   With Ice Chips/Sips of Water (05-30-22 @ 06:22)    Parenteral Nutrition: TPN infusing 2 L providing 1205 kcals    CHO  225 gms  = 765 kcals  AA  110 gms  =  440 kcals  SMOF lipids  0 gms - Lipids given on M,W,F    Current Weight: No current weights available  Height (cm): 193  Dosing Weight (kg): 61.4   BMI (kg/m2): 16.5   IBW (kg): 85.7    Nutrition Interval Events: Pt continues with PN as ordered and remains NPO. Plan for surgery this week. NGT to LWCS with 2450 mL output 5/30. No GI distress at present; last BM 5/27. Crea/BUN elevated with FS persistently high - 230 - 259 mg/dl over the past 24 hrs with Admelog ISS coverage. CHO and AA decreased in PN, given these abnormal labs. TPN to continue at this time as per Nutrition Support Team management. Request current weight to assess trend. Pt remains at severe risk for malnutrition based on clinically significant weight loss with suboptimal oral intake PTA. RDN services to remain available as needed.     __________________ Pertinent Medications__________________   MEDICATIONS  (STANDING):  benzocaine 15 mG/menthol 3.6 mG Lozenge 1 Lozenge Oral three times a day  chlorhexidine 2% Cloths 1 Application(s) Topical daily  dextrose 50% Injectable 25 Gram(s) IV Push once  dextrose 50% Injectable 12.5 Gram(s) IV Push once  dextrose 50% Injectable 25 Gram(s) IV Push once  diphenhydrAMINE Injectable 25 milliGRAM(s) IV Push at bedtime  enoxaparin Injectable 40 milliGRAM(s) SubCutaneous every 24 hours  fat emulsion (Fish Oil and Plant Based) 20% Infusion 10.4 mL/Hr (10.4 mL/Hr) IV Continuous <Continuous>  glucagon  Injectable 1 milliGRAM(s) IntraMuscular once  insulin lispro (ADMELOG) corrective regimen sliding scale   SubCutaneous every 6 hours  Parenteral Nutrition - Adult 1 Each (83 mL/Hr) TPN Continuous <Continuous>  Parenteral Nutrition - Adult 1 Each (83 mL/Hr) TPN Continuous <Continuous>    MEDICATIONS  (PRN):  dextrose Oral Gel 15 Gram(s) Oral once PRN Blood Glucose LESS THAN 70 milliGRAM(s)/deciliter      __________________ Pertinent Labs__________________   05-31 Na138 mmol/L Glu 251 mg/dL<H> K+ 4.0 mmol/L Cr  1.31 mg/dL<H> BUN 59 mg/dL<H> 05-31 Phos 4.5 mg/dL 05-29 Alb 3.1 g/dL<L> 05-26 PAB 11 mg/dL<L> 05-26 Chol --    LDL --    HDL --    Trig 99 mg/dL        Skin: Intact    Estimated Needs:     1535 - 1657 kcals/day (25-27 kcals/kg of dosing weight)  111 - 128 gms protein/day (1.3-1.5 gms protein/kg of IBW)      Nutrition Diagnosis: Severe malnutrition  [x] ongoing    Goal(s):  1. Patient to meet > 75% estimated energy needs    Recommendations:   1. Continue nutrition plan of care as ordered.    Monitoring and Evaluation:   1. Monitor weights, labs, BMs, skin integrity, PN tolerance and edema.  2. RD services to remain available. Request obtaining new weight to assess trend and determine adequacy of PN.

## 2022-06-01 NOTE — PROGRESS NOTE ADULT - NUTRITIONAL ASSESSMENT
Severe protein calorie malnutrition in acute illness/ injury; secondary to poor appetite, weight loss >5% in 1 month and/or >7.5% in 3 months, caloric Intake <50% of nutrition needs >= 5 days, temporal wasting, severe loss of muscle mass/atrophy and loss of body fat stores.    Diet, NPO:   With Ice Chips/Sips of Water (05-30-22 @ 06:22) [Active]    fat emulsion (Fish Oil and Plant Based) 20% Infusion 10.4 mL/Hr (10.4 mL/Hr) IV Continuous <Continuous>, 06-01-22 @ 17:00, 17:00, Stop order after: 24 Hours  Parenteral Nutrition - Adult 1 Each (83 mL/Hr) TPN Continuous <Continuous>, 06-01-22 @ 17:00, , Stop order after: 1 Days    **Greater than 50% of the encounter was spent counseling and/coordination of care on parenteral nutrition. 25 minutes were spent face to face with the patient.

## 2022-06-01 NOTE — PROGRESS NOTE ADULT - SUBJECTIVE AND OBJECTIVE BOX
Hematology Oncology Follow-up    INTERVAL HPI/OVERNIGHT EVENTS:  The patient denies any respiratory complaints. He denies abdominal pain. He has been tolerating TPN without difficulty.    VITAL SIGNS:  T(F): 98 (06-01-22 @ 12:41)  HR: 73 (06-01-22 @ 12:41)  BP: 115/67 (06-01-22 @ 12:41)  RR: 17 (06-01-22 @ 12:41)  SpO2: 95% (06-01-22 @ 12:41)  Wt(kg): --    05-31-22 @ 07:01  -  06-01-22 @ 07:00  --------------------------------------------------------  IN: 2027.4 mL / OUT: 4575 mL / NET: -2547.6 mL    06-01-22 @ 07:01  -  06-01-22 @ 16:55  --------------------------------------------------------  IN: 664 mL / OUT: 725 mL / NET: -61 mL      PHYSICAL EXAM:  GENERAL: NAD, well-groomed  HEAD:  Atraumatic, Normocephalic  EYES: EOMI, PERRLA, conjunctiva and sclera clear  ENMT: NG tube in place draining bilious fluid, no nasal discharge  NECK: Supple, no cervical lymphadenopathy  NERVOUS SYSTEM:  alert and conversant, moves extremities spontaneously  CHEST/LUNG: breathing comfortably on room air  HEART: Regular rate and rhythm; No murmurs  ABDOMEN: Soft, Nontender  SKIN: dry, no jaundice    Medications  MEDICATIONS  (STANDING):  chlorhexidine 2% Cloths 1 Application(s) Topical daily  dextrose 50% Injectable 25 Gram(s) IV Push once  dextrose 50% Injectable 12.5 Gram(s) IV Push once  diphenhydrAMINE Injectable 25 milliGRAM(s) IV Push at bedtime  enoxaparin Injectable 40 milliGRAM(s) SubCutaneous every 24 hours  fat emulsion (Fish Oil and Plant Based) 20% Infusion 10.4 mL/Hr (10.4 mL/Hr) IV Continuous <Continuous>  insulin lispro (ADMELOG) corrective regimen sliding scale   SubCutaneous every 6 hours  lactated ringers Bolus 1000 milliLiter(s) IV Bolus once  Parenteral Nutrition - Adult 1 Each (83 mL/Hr) TPN Continuous <Continuous>  Parenteral Nutrition - Adult 1 Each (83 mL/Hr) TPN Continuous <Continuous>    MEDICATIONS  (PRN):      Allergies: No Known Allergies      LABS:                        7.4    4.58  )-----------( 291      ( 01 Jun 2022 08:15 )             25.7     06-01    TNP  |  TNP  |  TNP  ----------------------------<  TNP  TNP   |  TNP  |  TNP    Ca    TNP      01 Jun 2022 08:15  Phos  4.5     05-31  Mg     TNP     06-01                     RADIOLOGY & ADDITIONAL TESTS:  Studies reviewed.

## 2022-06-01 NOTE — PROGRESS NOTE ADULT - SUBJECTIVE AND OBJECTIVE BOX
Subjective:   Patient seen at bedside this AM. Reports feeling well, without complaints. Denies chest pain, SOB. Tolerating diet without N/V.     24h Events:   - Overnight, no acute events    Objective:  Vital Signs  T(C): 36.4 (06-01 @ 00:45), Max: 36.9 (05-31 @ 18:11)  HR: 84 (06-01 @ 00:45) (83 - 95)  BP: 120/73 (06-01 @ 00:45) (115/61 - 120/73)  RR: 18 (06-01 @ 00:45) (18 - 18)  SpO2: 100% (06-01 @ 00:45) (99% - 100%)  05-30-22 @ 07:01  -  05-31-22 @ 07:00  --------------------------------------------------------  IN:  Total IN: 0 mL    OUT:    Nasogastric/Oral tube (mL): 2550 mL    Stool (mL): 0 mL    Voided (mL): 350 mL  Total OUT: 2900 mL    Total NET: -2900 mL      05-31-22 @ 07:01  -  06-01-22 @ 01:12  --------------------------------------------------------  IN:  Total IN: 0 mL    OUT:    Nasogastric/Oral tube (mL): 2900 mL    Voided (mL): 425 mL  Total OUT: 3325 mL    Total NET: -3325 mL      PHYSICAL EXAM  GENERAL: NAD, lying in bed comfortably  HEAD:  Atraumatic, Normocephalic. NGT in place  Abdomen: Soft, non distended. Non tender. well healing surgical incision    Labs:                        9.8    8.14  )-----------( 384      ( 31 May 2022 06:34 )             31.6   05-31    138  |  94<L>  |  59<H>  ----------------------------<  251<H>  4.0   |  34<H>  |  1.31<H>    Ca    8.9      31 May 2022 06:34  Phos  4.5     05-31  Mg     2.60     05-31      CAPILLARY BLOOD GLUCOSE      POCT Blood Glucose.: 200 mg/dL (01 Jun 2022 00:30)  POCT Blood Glucose.: 249 mg/dL (31 May 2022 18:08)  POCT Blood Glucose.: 304 mg/dL (31 May 2022 11:46)  POCT Blood Glucose.: 239 mg/dL (31 May 2022 06:19)      Medications:   MEDICATIONS  (STANDING):  chlorhexidine 2% Cloths 1 Application(s) Topical daily  dextrose 50% Injectable 25 Gram(s) IV Push once  dextrose 50% Injectable 12.5 Gram(s) IV Push once  diphenhydrAMINE Injectable 25 milliGRAM(s) IV Push at bedtime  enoxaparin Injectable 40 milliGRAM(s) SubCutaneous every 24 hours  insulin lispro (ADMELOG) corrective regimen sliding scale   SubCutaneous every 6 hours  lactated ringers Bolus 1000 milliLiter(s) IV Bolus once  Parenteral Nutrition - Adult 1 Each (83 mL/Hr) TPN Continuous <Continuous>    MEDICATIONS  (PRN):      Imaging:     Subjective:   Patient seen at bedside this AM. Reports feeling well, without complaints. Denies chest pain, SOB.  NPO. repletion for NGT tube 0.5:1 from today every shift.    24h Events:   - Overnight, no acute events    Objective:  Vital Signs  T(C): 36.4 (06-01 @ 00:45), Max: 36.9 (05-31 @ 18:11)  HR: 84 (06-01 @ 00:45) (83 - 95)  BP: 120/73 (06-01 @ 00:45) (115/61 - 120/73)  RR: 18 (06-01 @ 00:45) (18 - 18)  SpO2: 100% (06-01 @ 00:45) (99% - 100%)  05-30-22 @ 07:01  -  05-31-22 @ 07:00  --------------------------------------------------------  IN:  Total IN: 0 mL    OUT:    Nasogastric/Oral tube (mL): 2550 mL    Stool (mL): 0 mL    Voided (mL): 350 mL  Total OUT: 2900 mL    Total NET: -2900 mL      05-31-22 @ 07:01  -  06-01-22 @ 01:12  --------------------------------------------------------  IN:  Total IN: 0 mL    OUT:    Nasogastric/Oral tube (mL): 2900 mL    Voided (mL): 425 mL  Total OUT: 3325 mL    Total NET: -3325 mL      PHYSICAL EXAM  GENERAL: NAD, lying in bed comfortably  HEAD:  Atraumatic, Normocephalic. NGT in place  Abdomen: Soft, non distended. Non tender. well healing surgical incision    Labs:                        9.8    8.14  )-----------( 384      ( 31 May 2022 06:34 )             31.6   05-31    138  |  94<L>  |  59<H>  ----------------------------<  251<H>  4.0   |  34<H>  |  1.31<H>    Ca    8.9      31 May 2022 06:34  Phos  4.5     05-31  Mg     2.60     05-31      CAPILLARY BLOOD GLUCOSE      POCT Blood Glucose.: 200 mg/dL (01 Jun 2022 00:30)  POCT Blood Glucose.: 249 mg/dL (31 May 2022 18:08)  POCT Blood Glucose.: 304 mg/dL (31 May 2022 11:46)  POCT Blood Glucose.: 239 mg/dL (31 May 2022 06:19)      Medications:   MEDICATIONS  (STANDING):  chlorhexidine 2% Cloths 1 Application(s) Topical daily  dextrose 50% Injectable 25 Gram(s) IV Push once  dextrose 50% Injectable 12.5 Gram(s) IV Push once  diphenhydrAMINE Injectable 25 milliGRAM(s) IV Push at bedtime  enoxaparin Injectable 40 milliGRAM(s) SubCutaneous every 24 hours  insulin lispro (ADMELOG) corrective regimen sliding scale   SubCutaneous every 6 hours  lactated ringers Bolus 1000 milliLiter(s) IV Bolus once  Parenteral Nutrition - Adult 1 Each (83 mL/Hr) TPN Continuous <Continuous>    MEDICATIONS  (PRN):      Imaging:

## 2022-06-01 NOTE — PROGRESS NOTE ADULT - NUTRITIONAL ASSESSMENT
This patient has been assessed with a concern for Malnutrition and has been determined to have a diagnosis/diagnoses of Severe protein-calorie malnutrition and Underweight (BMI < 19).    This patient is being managed with:   Parenteral Nutrition - Adult-  Entered: May 31 2022  5:00PM    Diet NPO-  With Ice Chips/Sips of Water  Entered: May 30 2022  6:22AM

## 2022-06-01 NOTE — PROGRESS NOTE ADULT - SUBJECTIVE AND OBJECTIVE BOX
NUTRITION NOTE  [id number] VAUGHN  ===============================    Interval events - Patient was seen and examined at bedside, no acute events overnight. Patient denies chest pain, shortness of breath, nausea or vomiting at this time. PICC line placed and TPN was started on 22, pt is tolerating TPN without any issues. Pt remains NPO with NGT in place and remains on TPN at this time.     ROS: Except as noted above, all other systems reviewed and are negative     Allergies  No Known Allergies    PAST MEDICAL & SURGICAL HISTORY:  Melanoma nose and left cheek  2yrs ago  HTN (hypertension)  Dyslipidemia  Malignant neoplasm of thyroid gland  Vocal cord nodule   H/O arthroscopy of left knee meniscus  1998  History of Whipple procedure pylorus sparing whipple  at [facility]    FAMILY HISTORY:  Family history of breast cancer (Sibling)  Family history of thyroid cancer (Father)  Family history of lung cancer (Mother)    Vital Signs Last 24 Hrs  T(C): 36.8 (2022 09:47), Max: 36.9 (31 May 2022 18:11)  T(F): 98.3 (2022 09:47), Max: 98.5 (31 May 2022 18:11)  HR: 84 (2022 09:47) (71 - 95)  BP: 116/66 (2022 09:47) (109/64 - 120/73)  RR: 16 (2022 09:47) (16 - 18)  SpO2: 100% (2022 09:47) (99% - 100%)    MEDICATIONS  (STANDING):  chlorhexidine 2% Cloths 1 Application(s) Topical daily  dextrose 50% Injectable 25 Gram(s) IV Push once  dextrose 50% Injectable 12.5 Gram(s) IV Push once  diphenhydrAMINE Injectable 25 milliGRAM(s) IV Push at bedtime  enoxaparin Injectable 40 milliGRAM(s) SubCutaneous every 24 hours  fat emulsion (Fish Oil and Plant Based) 20% Infusion 10.4 mL/Hr (10.4 mL/Hr) IV Continuous <Continuous>  insulin lispro (ADMELOG) corrective regimen sliding scale   SubCutaneous every 6 hours  lactated ringers Bolus 1000 milliLiter(s) IV Bolus once  Parenteral Nutrition - Adult 1 Each (83 mL/Hr) TPN Continuous <Continuous>  Parenteral Nutrition - Adult 1 Each (83 mL/Hr) TPN Continuous <Continuous>    I&O's Detail    31 May 2022 07:01  -  2022 07:00  --------------------------------------------------------  IN:    Fat Emulsion (Fish Oil &amp; Plant Based) 20% Infusion: 114.4 mL    Lactated Ringers Bolus: 1000 mL    TPN (Total Parenteral Nutrition): 913 mL  Total IN: 7.4 mL    OUT:    Nasogastric/Oral tube (mL): 3850 mL    Voided (mL): 725 mL  Total OUT: 4575 mL    Total NET: -2547.6 mL    POCT Blood Glucose.: 191 mg/dL (2022 05:33)  POCT Blood Glucose.: 200 mg/dL (2022 00:30)  POCT Blood Glucose.: 249 mg/dL (31 May 2022 18:08)  POCT Blood Glucose.: 304 mg/dL (31 May 2022 11:46)    Daily Weight in k.4 (26 May 2022 04:58)    Drug Dosing Weight  Height (cm): 193 (24 May 2022 16:24)  Weight (kg): 61.4 (26 May 2022 08:43)  BMI (kg/m2): 16.5 (26 May 2022 08:43)  BSA (m2): 1.88 (26 May 2022 08:43)    PHYSICAL EXAM:  General: NAD, resting comfortably in bed  Neuro: Awake and alert  GI/Abd: Soft, NT/ND, NGT in place   Extremities: warm, no edema   PICC Site: C/D/I    Diet: NPO and TPN (started on 22)    LABORATORY                                                              7.4    4.58  )-----------( 291      ( 2022 08:15 )             25.7   06-01    TNP  |  TNP  |  TNP  ----------------------------<  TNP  TNP   |  TNP  |  TNP    Ca    TNP      2022 08:15  Phos  4.5     05-31  Mg     TNP     06-    TPro  5.8<L>  /  Alb  3.1<L>  /  TBili  0.3  /  DBili  x   /  AST  38  /  ALT  35  /  AlkPhos  185<H>  05-    LIVER FUNCTIONS - ( 29 May 2022 11:20 )  Alb: 3.1 g/dL / Pro: 5.8 g/dL / ALK PHOS: 185 U/L / ALT: 35 U/L / AST: 38 U/L / GGT: x           05- Chol -- LDL -- HDL -- Trig 99    COVID-19 PCR: Detected (26 May 2022 14:59)    ASSESSMENT/PLAN:  75 y/o male with PMH of HTN, HLD, pancreatic ductal adenocarcinoma s/p pylorus sparing Whipple 2020 ([facility]) with recurrence now on chemo, 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 Findings consistent with SBO at level of duodenojejunostomy anastomosis. Patient states that he eventually developed worsening nausea, initially with decreased tolerance of solid foods and eventual intolerance to liquids as well. Pt states he has lost 25lbs since onset of symptoms. Nutrition support consult called for initiation of TPN in view of severe malnutrition, prolonged period of poor PO intake and nutritional optimization prior to planned surgical intervention this week. Pt is NPO with NGT in place for decompression.     continue TPN with infusion volume of 2L, TPN will provide 1705 kcal/day; 50 gm of SMOF lipids ordered for today to run over 24 hours    complete labs not resulted, will follow all blood work tomorrow     monitor fingersticks, obtain daily weights - -304, increased to 34 units of insulin in TPN bag     continue parenteral nutrition at this time, will follow up with primary team on plan - OR planning for ex lap, GJ this Fri    1.  Severe protein calorie malnutrition being optimized with TPN: CHO [225] gm.  AA [110] gm. SMOF Lipids [50] gm.  2.  Hyperglycemia managed with: [34] units of regular insulin    3.  Check fluid balance daily.  Strict I/O  [ ] [ ]   4.  Daily BMP, Ionized Calcium, Magnesium and Phosphorous   5.  Triglycerides at initiation of TPN and monthly [ ] [ ]     Nutrition Support [id number]

## 2022-06-01 NOTE — PROGRESS NOTE ADULT - SUBJECTIVE AND OBJECTIVE BOX
Vital Signs Last 24 Hrs  T(C): 36.7 (01 Jun 2022 06:34), Max: 36.9 (31 May 2022 18:11)  T(F): 98.1 (01 Jun 2022 06:34), Max: 98.5 (31 May 2022 18:11)  HR: 71 (01 Jun 2022 06:34) (71 - 95)  BP: 109/64 (01 Jun 2022 06:34) (109/64 - 120/73)  BP(mean): --  RR: 18 (01 Jun 2022 06:34) (18 - 18)  SpO2: 100% (01 Jun 2022 06:34) (99% - 100%)    I&O's Detail    31 May 2022 07:01  -  01 Jun 2022 07:00  --------------------------------------------------------  IN:    Fat Emulsion (Fish Oil &amp; Plant Based) 20% Infusion: 114.4 mL    Lactated Ringers Bolus: 1000 mL    TPN (Total Parenteral Nutrition): 913 mL  Total IN: 2027.4 mL    OUT:    Nasogastric/Oral tube (mL): 3850 mL    Voided (mL): 725 mL  Total OUT: 4575 mL    Total NET: -2547.6 mL                                7.9    5.00  )-----------( 311      ( 01 Jun 2022 06:02 )             27.3       06-01    x   |  x   |  50<H>  ----------------------------<  x   x    |  28  |  1.23    Ca    7.6<L>      01 Jun 2022 06:02  Phos  4.5     05-31  Mg     3.10     06-01            [facility]

## 2022-06-01 NOTE — PROGRESS NOTE ADULT - ASSESSMENT
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   - 5/24/22- EGD performed which found evidence of a pylorus sparing Whipple with duodenojejunostomy.  Multiple areas of stenosis secondary to extrinsic compression involving both the afferent and efferent limbs. Due to multi-focal nature of involved bowel and stenosis secondary to extrinsic compression, enteral stenting not performed as unlikely to result in any significant clinical improvement.  - Continue TPN and optimization for bypass surgery  - Extensive discussion held today with the patient and his wife bedside. Explained that if surgery is successful, he would still have to wait about 4 weeks for recovery time prior to resuming any kind of systemic therapy.   - CEA was 3.4 WNL   - CA 19-9 elevated at 245.     #COVID19 infection  - on isolation, asymptomatic    #Anemia  - No overt blood loss  - Trend Hgb, supportive transfusion for Hgb < 7    Please do not hesitate to page with questions.     Lauryn Roy MD PGY4   [phone]  Hematology-Oncology Fellow

## 2022-06-01 NOTE — PROGRESS NOTE ADULT - ASSESSMENT
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 Findings consistent with SBO at level of DJ anastomosis.     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 Findings consistent with SBO at level of DJ anastomosis.     PLAN  - Operative Fri for ex lap, GJ  - NPO/IVF, TPN  - NGT decompression, 0.5:1 repletion each shift.   - GI consult; unable to stent  - Serial abdominal exams, exam before pain meds  - Trend Cr     D Team Surgery   [id number]

## 2022-06-02 NOTE — CHART NOTE - NSCHARTNOTEFT_GEN_A_CORE
Team Surgery Preop Note    Patient is a 76y old  Male who presents with an SBO at [facility] (02 Jun 2022 12:09)    Diagnosis: SBO  Procedure: ex lap, palliative G-J  Surgeon: Manpreet                          10.6   12.18 )-----------( 369      ( 02 Jun 2022 07:07 )             34.1     06-02    141  |  95<L>  |  50<H>  ----------------------------<  211<H>  3.6   |  34<H>  |  1.35<H>    Ca    9.3      02 Jun 2022 07:07  Phos  4.0     06-02  Mg     2.30     06-02      [0400 6/3] Type & Screen  [0400 6/3] CBC  [0400 6/3] BMP  [0400 6/3] PT/PTT/INR  [5/23] Chest X-ray  [ ] EKG - not indicated  [X] NPO - midnight - TPN  [ ] Consent  [ ] Clearance - not indicated  [X] OR Schedule: 6/3 0730  [ ] Anti-coagulation held  ????  [X] Covid - POSITIVE since 5/26 Team Surgery Preop Note    Patient is a 76y old  Male who presents with an SBO at [facility] (02 Jun 2022 12:09)    Diagnosis: SBO  Procedure: ex lap, palliative G-J  Surgeon: Manpreet                          10.6   12.18 )-----------( 369      ( 02 Jun 2022 07:07 )             34.1     06-02    141  |  95<L>  |  50<H>  ----------------------------<  211<H>  3.6   |  34<H>  |  1.35<H>    Ca    9.3      02 Jun 2022 07:07  Phos  4.0     06-02  Mg     2.30     06-02      [0400 6/3] Type & Screen  [0400 6/3] CBC  [0400 6/3] BMP  [0400 6/3] PT/PTT/INR  [5/23] Chest X-ray  [ ] EKG - not indicated  [X] NPO - midnight - TPN  [X] Consent  [ ] Clearance - not indicated  [X] OR Schedule: 6/3 0730  [ ] Anti-coagulation held - not indicated  [X] Covid - POSITIVE since 5/26

## 2022-06-02 NOTE — PROGRESS NOTE ADULT - NUTRITIONAL ASSESSMENT
This patient has been assessed with a concern for Malnutrition and has been determined to have a diagnosis/diagnoses of Severe protein-calorie malnutrition and Underweight (BMI < 19).    This patient is being managed with:   Parenteral Nutrition - Adult-  Entered: Jun 2 2022  5:00PM    fat emulsion (Fish Oil and Plant Based) 20% Infusion-[Known as SMOFLIPID 20% Infusion]  25 Gram(s) in IV Solution 125 milliLiter(s) infuse at 10.4 mL/Hr  Dose Rate: 10.4 mL/Hr Infuse Over: 12 Hours; Stop After 24 Hours  Administration Instructions: Use 1.2 micron in-line filter  Entered: Jun 2 2022  9:45AM    Diet NPO after Midnight-     NPO Start Date: 02-Jun-2022   NPO Start Time: 23:59  Entered: Jun 2 2022  7:42AM    Parenteral Nutrition - Adult-  Entered: Jun 1 2022  5:00PM    fat emulsion (Fish Oil and Plant Based) 20% Infusion-[Known as SMOFLIPID 20% Infusion]  50 Gram(s) in IV Solution 250 milliLiter(s) infuse at 10.4 mL/Hr  Dose Rate: 10.4 mL/Hr Infuse Over: 24 Hours; Stop After 24 Hours  Administration Instructions: Use 1.2 micron in-line filter  Entered: Jun 1 2022  5:00PM    Diet NPO-  With Ice Chips/Sips of Water  Entered: May 30 2022  6:22AM

## 2022-06-02 NOTE — PHYSICAL THERAPY INITIAL EVALUATION ADULT - PERTINENT HX OF CURRENT PROBLEM, REHAB EVAL
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

## 2022-06-02 NOTE — PROGRESS NOTE ADULT - ASSESSMENT
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 Findings consistent with SBO at level of DJ anastomosis.     PLAN  - Operative Fri for ex lap, GJ  - NPO/IVF, TPN  - NGT decompression, 0.5:1 repletion each shift.   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 Findings consistent with SBO at level of DJ anastomosis.     PLAN  - OR tomorrow for ex lap, GJ.   - NPO/IVF, TPN  - NGT decompression, 0.5:1 repletion each shift.   - GI consult; unable to stent  - Serial abdominal exams, exam before pain meds  - Trend Cr     D Team Surg  [id number]

## 2022-06-02 NOTE — PROGRESS NOTE ADULT - SUBJECTIVE AND OBJECTIVE BOX
NUTRITION NOTE  [id number] VAUGHN  ===============================    Interval events - Patient was seen and examined at bedside, no acute events overnight. Patient denies chest pain, shortness of breath, nausea or vomiting at this time. PICC line placed and TPN was started on 22, pt is tolerating TPN without any issues. Pt remains NPO with NGT in place and remains on TPN at this time.     ROS: Except as noted above, all other systems reviewed and are negative     Allergies  No Known Allergies    PAST MEDICAL & SURGICAL HISTORY:  Melanoma nose and left cheek  2yrs ago  HTN (hypertension)  Dyslipidemia  Malignant neoplasm of thyroid gland  Vocal cord nodule   H/O arthroscopy of left knee meniscus  1998  History of Whipple procedure pylorus sparing whipple  at [facility]    FAMILY HISTORY:  Family history of breast cancer (Sibling)  Family history of thyroid cancer (Father)  Family history of lung cancer (Mother)    Vital Signs Last 24 Hrs  T(C): 37.2 (2022 06:43), Max: 37.2 (2022 06:43)  T(F): 98.9 (2022 06:43), Max: 98.9 (2022 06:43)  HR: 99 (2022 06:43) (71 - 99)  BP: 117/71 (2022 06:43) (115/67 - 127/67)  RR: 18 (2022 06:43) (16 - 18)  SpO2: 98% (2022 06:43) (95% - 100%)    MEDICATIONS  (STANDING):  chlorhexidine 2% Cloths 1 Application(s) Topical daily  dextrose 50% Injectable 25 Gram(s) IV Push once  dextrose 50% Injectable 12.5 Gram(s) IV Push once  diphenhydrAMINE Injectable 25 milliGRAM(s) IV Push at bedtime  enoxaparin Injectable 40 milliGRAM(s) SubCutaneous every 24 hours  fat emulsion (Fish Oil and Plant Based) 20% Infusion 10.4 mL/Hr (10.4 mL/Hr) IV Continuous <Continuous>  fat emulsion (Fish Oil and Plant Based) 20% Infusion 10.4 mL/Hr (10.4 mL/Hr) IV Continuous <Continuous>  insulin lispro (ADMELOG) corrective regimen sliding scale   SubCutaneous every 6 hours  Parenteral Nutrition - Adult 1 Each (83 mL/Hr) TPN Continuous <Continuous>  Parenteral Nutrition - Adult 1 Each (83 mL/Hr) TPN Continuous <Continuous>    I&O's Detail    2022 07:01  -  2022 07:00  --------------------------------------------------------  IN:    Fat Emulsion (Fish Oil &amp; Plant Based) 20% Infusion: 135.2 mL    Lactated Ringers Bolus: 750 mL    TPN (Total Parenteral Nutrition): 1917.3 mL  Total IN: 2802.5 mL    OUT:    Nasogastric/Oral tube (mL): 2650 mL    Voided (mL): 1380 mL  Total OUT: 4030 mL    Total NET: -1227.5 mL      2022 07:01  -  2022 09:42  --------------------------------------------------------  IN:    Lactated Ringers Bolus: 500 mL  Total IN: 500 mL    OUT:  Total OUT: 0 mL    Total NET: 500 mL    POCT Blood Glucose.: 187 mg/dL (2022 05:42)  POCT Blood Glucose.: 152 mg/dL (2022 00:11)  POCT Blood Glucose.: 209 mg/dL (2022 18:29)  POCT Blood Glucose.: 180 mg/dL (2022 12:45)    Daily Weight in k.4 (26 May 2022 04:58)    Drug Dosing Weight  Height (cm): 193 (24 May 2022 16:24)  Weight (kg): 61.4 (26 May 2022 08:43)  BMI (kg/m2): 16.5 (26 May 2022 08:43)  BSA (m2): 1.88 (26 May 2022 08:43)    PHYSICAL EXAM:  General: NAD, resting comfortably in bed  Neuro: Awake and alert  GI/Abd: Soft, NT/ND, NGT in place   Extremities: warm, no edema   PICC Site: C/D/I    Diet: NPO and TPN (started on 22)    LABORATORY                                                     10.6   12.18 )-----------( 369      ( 2022 07:07 )             34.1   06-02    141  |  95<L>  |  50<H>  ----------------------------<  211<H>  3.6   |  34<H>  |  1.35<H>    Ca    9.3      2022 07:07  Phos  4.0     06-02  Mg     2.30     06-02    TPro  5.8<L>  /  Alb  3.1<L>  /  TBili  0.3  /  DBili  x   /  AST  38  /  ALT  35  /  AlkPhos  185<H>  05-    LIVER FUNCTIONS - ( 29 May 2022 11:20 )  Alb: 3.1 g/dL / Pro: 5.8 g/dL / ALK PHOS: 185 U/L / ALT: 35 U/L / AST: 38 U/L / GGT: x           05- Chol -- LDL -- HDL -- Trig 99    COVID-19 PCR: Detected (26 May 2022 14:59)    ASSESSMENT/PLAN:  75 y/o male with PMH of HTN, HLD, pancreatic ductal adenocarcinoma s/p pylorus sparing Whipple 2020 ([facility]) with recurrence now on chemo, 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 Findings consistent with SBO at level of duodenojejunostomy anastomosis. Patient states that he eventually developed worsening nausea, initially with decreased tolerance of solid foods and eventual intolerance to liquids as well. Pt states he has lost 25lbs since onset of symptoms. Nutrition support consult called for initiation of TPN in view of severe malnutrition, prolonged period of poor PO intake and nutritional optimization prior to planned surgical intervention this week. Pt is NPO with NGT in place for decompression.     continue TPN with infusion volume of 2L, TPN will provide 1364 kcal/day; 25 gm of SMOF lipids ordered for today to run over 12 hours    labs reviewed - electrolytes adjusted in TPN bag     monitor fingersticks, obtain daily weights - -209, increased to 40 units of insulin in TPN bag     continue parenteral nutrition at this time, will follow up with primary team on plan - OR planning for ex marcie, [place] this Fri    1.  Severe protein calorie malnutrition being optimized with TPN: CHO [210] gm.  AA [100] gm. SMOF Lipids [25] gm.  2.  Hyperglycemia managed with: [38] units of regular insulin    3.  Check fluid balance daily.  Strict I/O  [ ] [ ]   4.  Daily BMP, Ionized Calcium, Magnesium and Phosphorous   5.  Triglycerides at initiation of TPN and monthly [ ] [ ]     Nutrition Support [id number]  NUTRITION NOTE  [id number] VAUGHN  ===============================    Interval events - Patient was seen and examined at bedside, no acute events overnight. Patient denies chest pain, shortness of breath, nausea or vomiting at this time. PICC line placed and TPN was started on 22, pt is tolerating TPN without any issues. Pt remains NPO with NGT in place and remains on TPN at this time.     ROS: Except as noted above, all other systems reviewed and are negative     Allergies  No Known Allergies    PAST MEDICAL & SURGICAL HISTORY:  Melanoma nose and left cheek  2yrs ago  HTN (hypertension)  Dyslipidemia  Malignant neoplasm of thyroid gland  Vocal cord nodule   H/O arthroscopy of left knee meniscus  1998  History of Whipple procedure pylorus sparing whipple  at [facility]    FAMILY HISTORY:  Family history of breast cancer (Sibling)  Family history of thyroid cancer (Father)  Family history of lung cancer (Mother)    Vital Signs Last 24 Hrs  T(C): 37.2 (2022 06:43), Max: 37.2 (2022 06:43)  T(F): 98.9 (2022 06:43), Max: 98.9 (2022 06:43)  HR: 99 (2022 06:43) (71 - 99)  BP: 117/71 (2022 06:43) (115/67 - 127/67)  RR: 18 (2022 06:43) (16 - 18)  SpO2: 98% (2022 06:43) (95% - 100%)    MEDICATIONS  (STANDING):  chlorhexidine 2% Cloths 1 Application(s) Topical daily  dextrose 50% Injectable 25 Gram(s) IV Push once  dextrose 50% Injectable 12.5 Gram(s) IV Push once  diphenhydrAMINE Injectable 25 milliGRAM(s) IV Push at bedtime  enoxaparin Injectable 40 milliGRAM(s) SubCutaneous every 24 hours  fat emulsion (Fish Oil and Plant Based) 20% Infusion 10.4 mL/Hr (10.4 mL/Hr) IV Continuous <Continuous>  fat emulsion (Fish Oil and Plant Based) 20% Infusion 10.4 mL/Hr (10.4 mL/Hr) IV Continuous <Continuous>  insulin lispro (ADMELOG) corrective regimen sliding scale   SubCutaneous every 6 hours  Parenteral Nutrition - Adult 1 Each (83 mL/Hr) TPN Continuous <Continuous>  Parenteral Nutrition - Adult 1 Each (83 mL/Hr) TPN Continuous <Continuous>    I&O's Detail    2022 07:01  -  2022 07:00  --------------------------------------------------------  IN:    Fat Emulsion (Fish Oil &amp; Plant Based) 20% Infusion: 135.2 mL    Lactated Ringers Bolus: 750 mL    TPN (Total Parenteral Nutrition): 1917.3 mL  Total IN: 2802.5 mL    OUT:    Nasogastric/Oral tube (mL): 2650 mL    Voided (mL): 1380 mL  Total OUT: 4030 mL    Total NET: -1227.5 mL      2022 07:01  -  2022 09:42  --------------------------------------------------------  IN:    Lactated Ringers Bolus: 500 mL  Total IN: 500 mL    OUT:  Total OUT: 0 mL    Total NET: 500 mL    POCT Blood Glucose.: 187 mg/dL (2022 05:42)  POCT Blood Glucose.: 152 mg/dL (2022 00:11)  POCT Blood Glucose.: 209 mg/dL (2022 18:29)  POCT Blood Glucose.: 180 mg/dL (2022 12:45)    Daily Weight in k.4 (26 May 2022 04:58)    Drug Dosing Weight  Height (cm): 193 (24 May 2022 16:24)  Weight (kg): 61.4 (26 May 2022 08:43)  BMI (kg/m2): 16.5 (26 May 2022 08:43)  BSA (m2): 1.88 (26 May 2022 08:43)    PHYSICAL EXAM:  General: NAD, resting comfortably in bed  Neuro: Awake and alert  GI/Abd: Soft, NT/ND, NGT in place   Extremities: warm, no edema   PICC Site: C/D/I    Diet: NPO and TPN (started on 22)    LABORATORY                                                     10.6   12.18 )-----------( 369      ( 2022 07:07 )             34.1   06-02    141  |  95<L>  |  50<H>  ----------------------------<  211<H>  3.6   |  34<H>  |  1.35<H>    Ca    9.3      2022 07:07  Phos  4.0     06-02  Mg     2.30     06-02    TPro  5.8<L>  /  Alb  3.1<L>  /  TBili  0.3  /  DBili  x   /  AST  38  /  ALT  35  /  AlkPhos  185<H>  05-    LIVER FUNCTIONS - ( 29 May 2022 11:20 )  Alb: 3.1 g/dL / Pro: 5.8 g/dL / ALK PHOS: 185 U/L / ALT: 35 U/L / AST: 38 U/L / GGT: x           05- Chol -- LDL -- HDL -- Trig 99    COVID-19 PCR: Detected (26 May 2022 14:59)    ASSESSMENT/PLAN:  77 y/o male with PMH of HTN, HLD, pancreatic ductal adenocarcinoma s/p pylorus sparing Whipple 2020 ([facility]) with recurrence now on chemo, 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 Findings consistent with SBO at level of duodenojejunostomy anastomosis. Patient states that he eventually developed worsening nausea, initially with decreased tolerance of solid foods and eventual intolerance to liquids as well. Pt states he has lost 25lbs since onset of symptoms. Nutrition support consult called for initiation of TPN in view of severe malnutrition, prolonged period of poor PO intake and nutritional optimization prior to planned surgical intervention this week. Pt is NPO with NGT in place for decompression.     continue TPN with infusion volume of 2L, TPN will provide 1364 kcal/day; 25 gm of SMOF lipids ordered for today to run over 12 hours; in view of rising Cr and elevated FS, TPN formula adjusted     labs reviewed - electrolytes adjusted in TPN bag     monitor fingersticks, obtain daily weights - -209, increased to 38 units of insulin in TPN bag     continue parenteral nutrition at this time, will follow up with primary team on plan - OR planning for ex marcie, [place] this Fri    1.  Severe protein calorie malnutrition being optimized with TPN: CHO [210] gm.  AA [100] gm. SMOF Lipids [25] gm.  2.  Hyperglycemia managed with: [38] units of regular insulin    3.  Check fluid balance daily.  Strict I/O  [ ] [ ]   4.  Daily BMP, Ionized Calcium, Magnesium and Phosphorous   5.  Triglycerides at initiation of TPN and monthly [ ] [ ]     Nutrition Support [id number]

## 2022-06-02 NOTE — PROGRESS NOTE ADULT - NUTRITIONAL ASSESSMENT
Severe protein calorie malnutrition in acute illness/ injury; secondary to poor appetite, weight loss >5% in 1 month and/or >7.5% in 3 months, caloric Intake <50% of nutrition needs >= 5 days, temporal wasting, severe loss of muscle mass/atrophy and loss of body fat stores.    Diet, NPO after Midnight:      NPO Start Date: 02-Jun-2022,   NPO Start Time: 23:59 (06-02-22 @ 07:42) [Active]  Diet, NPO:   With Ice Chips/Sips of Water (05-30-22 @ 06:22) [Active]    fat emulsion (Fish Oil and Plant Based) 20% Infusion 10.4 mL/Hr (10.4 mL/Hr) IV Continuous <Continuous>, 06-02-22 @ 17:00, , Stop order after: 24 Hours  Parenteral Nutrition - Adult 1 Each (83 mL/Hr) TPN Continuous <Continuous>, 06-02-22 @ 17:00, , Stop order after: 1 Days    **Greater than 50% of the encounter was spent counseling and/coordination of care on parenteral nutrition. 25 minutes were spent face to face with the patient. Severe protein calorie malnutrition in acute illness/ injury; secondary to poor appetite, weight loss >5% in 1 month and/or >7.5% in 3 months, caloric Intake <50% of nutrition needs >= 5 days, temporal wasting, severe loss of muscle mass/atrophy and loss of body fat stores.    Diet, NPO after Midnight:      NPO Start Date: 02-Jun-2022,   NPO Start Time: 23:59 (06-02-22 @ 07:42) [Active]  Diet, NPO:   With Ice Chips/Sips of Water (05-30-22 @ 06:22) [Active]    fat emulsion (Fish Oil and Plant Based) 20% Infusion 10.4 mL/Hr (10.4 mL/Hr) IV Continuous <Continuous>, 06-02-22 @ 09:45, , Stop order after: 24 Hours  Parenteral Nutrition - Adult 1 Each (83 mL/Hr) TPN Continuous <Continuous>, 06-02-22 @ 17:00, 17:00, Stop order after: 1 Days    **Greater than 50% of the encounter was spent counseling and/coordination of care on parenteral nutrition. 25 minutes were spent face to face with the patient.

## 2022-06-02 NOTE — PROGRESS NOTE ADULT - SUBJECTIVE AND OBJECTIVE BOX
Subjective:   Patient seen at bedside this AM. Reports feeling well, without complaints. Denies chest pain, SOB. Tolerating diet without N/V.     24h Events:   - Overnight, no acute events    Objective:  Vital Signs  T(C): 36.9 (06-01 @ 23:52), Max: 36.9 (06-01 @ 23:52)  HR: 71 (06-01 @ 23:52) (71 - 84)  BP: 127/67 (06-01 @ 23:52) (109/64 - 127/67)  RR: 18 (06-01 @ 23:52) (16 - 18)  SpO2: 99% (06-01 @ 23:52) (95% - 100%)  05-31-22 @ 07:01  -  06-01-22 @ 07:00  --------------------------------------------------------  IN:  Total IN: 0 mL    OUT:    Nasogastric/Oral tube (mL): 3850 mL    Voided (mL): 725 mL  Total OUT: 4575 mL    Total NET: -4575 mL      06-01-22 @ 07:01  -  06-02-22 @ 01:25  --------------------------------------------------------  IN:  Total IN: 0 mL    OUT:    Nasogastric/Oral tube (mL): 1900 mL    Voided (mL): 1150 mL  Total OUT: 3050 mL    Total NET: -3050 mL          PHYSICAL EXAM  GENERAL: NAD, lying in bed comfortably  HEAD:  Atraumatic, Normocephalic. NGT in place  Abdomen: Soft, non distended. Non tender. well healing surgical incision      Labs:                        7.4    4.58  )-----------( 291      ( 01 Jun 2022 08:15 )             25.7   06-01    TNP  |  TNP  |  TNP  ----------------------------<  TNP  TNP   |  TNP  |  TNP    Ca    TNP      01 Jun 2022 08:15  Phos  4.5     05-31  Mg     TNP     06-01      CAPILLARY BLOOD GLUCOSE      POCT Blood Glucose.: 152 mg/dL (02 Jun 2022 00:11)  POCT Blood Glucose.: 209 mg/dL (01 Jun 2022 18:29)  POCT Blood Glucose.: 180 mg/dL (01 Jun 2022 12:45)  POCT Blood Glucose.: 191 mg/dL (01 Jun 2022 05:33)      Medications:   MEDICATIONS  (STANDING):  chlorhexidine 2% Cloths 1 Application(s) Topical daily  dextrose 50% Injectable 25 Gram(s) IV Push once  dextrose 50% Injectable 12.5 Gram(s) IV Push once  diphenhydrAMINE Injectable 25 milliGRAM(s) IV Push at bedtime  enoxaparin Injectable 40 milliGRAM(s) SubCutaneous every 24 hours  fat emulsion (Fish Oil and Plant Based) 20% Infusion 10.4 mL/Hr (10.4 mL/Hr) IV Continuous <Continuous>  insulin lispro (ADMELOG) corrective regimen sliding scale   SubCutaneous every 6 hours  Parenteral Nutrition - Adult 1 Each (83 mL/Hr) TPN Continuous <Continuous>    MEDICATIONS  (PRN):      Imaging:     Subjective:   Patient seen at bedside this AM. Reports feeling well, without complaints. Denies chest pain, SOB, N/V.     24h Events:   - Overnight, no acute events    Objective:  Vital Signs  T(C): 36.9 (06-01 @ 23:52), Max: 36.9 (06-01 @ 23:52)  HR: 71 (06-01 @ 23:52) (71 - 84)  BP: 127/67 (06-01 @ 23:52) (109/64 - 127/67)  RR: 18 (06-01 @ 23:52) (16 - 18)  SpO2: 99% (06-01 @ 23:52) (95% - 100%)  05-31-22 @ 07:01  -  06-01-22 @ 07:00  --------------------------------------------------------  IN:  Total IN: 0 mL    OUT:    Nasogastric/Oral tube (mL): 3850 mL    Voided (mL): 725 mL  Total OUT: 4575 mL    Total NET: -4575 mL      06-01-22 @ 07:01  -  06-02-22 @ 01:25  --------------------------------------------------------  IN:  Total IN: 0 mL    OUT:    Nasogastric/Oral tube (mL): 1900 mL    Voided (mL): 1150 mL  Total OUT: 3050 mL    Total NET: -3050 mL          PHYSICAL EXAM  GENERAL: NAD, lying in bed comfortably  HEAD:  Atraumatic, Normocephalic. NGT in place  Abdomen: Soft, non distended. Non tender. well healing surgical incision      Labs:                        7.4    4.58  )-----------( 291      ( 01 Jun 2022 08:15 )             25.7   06-01    TNP  |  TNP  |  TNP  ----------------------------<  TNP  TNP   |  TNP  |  TNP    Ca    TNP      01 Jun 2022 08:15  Phos  4.5     05-31  Mg     TNP     06-01      CAPILLARY BLOOD GLUCOSE      POCT Blood Glucose.: 152 mg/dL (02 Jun 2022 00:11)  POCT Blood Glucose.: 209 mg/dL (01 Jun 2022 18:29)  POCT Blood Glucose.: 180 mg/dL (01 Jun 2022 12:45)  POCT Blood Glucose.: 191 mg/dL (01 Jun 2022 05:33)      Medications:   MEDICATIONS  (STANDING):  chlorhexidine 2% Cloths 1 Application(s) Topical daily  dextrose 50% Injectable 25 Gram(s) IV Push once  dextrose 50% Injectable 12.5 Gram(s) IV Push once  diphenhydrAMINE Injectable 25 milliGRAM(s) IV Push at bedtime  enoxaparin Injectable 40 milliGRAM(s) SubCutaneous every 24 hours  fat emulsion (Fish Oil and Plant Based) 20% Infusion 10.4 mL/Hr (10.4 mL/Hr) IV Continuous <Continuous>  insulin lispro (ADMELOG) corrective regimen sliding scale   SubCutaneous every 6 hours  Parenteral Nutrition - Adult 1 Each (83 mL/Hr) TPN Continuous <Continuous>    MEDICATIONS  (PRN):      Imaging:

## 2022-06-02 NOTE — PROGRESS NOTE ADULT - SUBJECTIVE AND OBJECTIVE BOX
Vital Signs Last 24 Hrs  T(C): 37.2 (02 Jun 2022 06:43), Max: 37.2 (02 Jun 2022 06:43)  T(F): 98.9 (02 Jun 2022 06:43), Max: 98.9 (02 Jun 2022 06:43)  HR: 99 (02 Jun 2022 06:43) (71 - 99)  BP: 117/71 (02 Jun 2022 06:43) (115/67 - 127/67)  BP(mean): --  RR: 18 (02 Jun 2022 06:43) (17 - 18)  SpO2: 98% (02 Jun 2022 06:43) (95% - 99%)    I&O's Detail    01 Jun 2022 07:01  -  02 Jun 2022 07:00  --------------------------------------------------------  IN:    Fat Emulsion (Fish Oil &amp; Plant Based) 20% Infusion: 135.2 mL    Lactated Ringers Bolus: 750 mL    TPN (Total Parenteral Nutrition): 1917.3 mL  Total IN: 2802.5 mL    OUT:    Nasogastric/Oral tube (mL): 2650 mL    Voided (mL): 1380 mL  Total OUT: 4030 mL    Total NET: -1227.5 mL      02 Jun 2022 07:01  -  02 Jun 2022 12:09  --------------------------------------------------------  IN:    Lactated Ringers Bolus: 500 mL  Total IN: 500 mL    OUT:    Nasogastric/Oral tube (mL): 1500 mL    Voided (mL): 175 mL  Total OUT: 1675 mL    Total NET: -1175 mL                                10.6   12.18 )-----------( 369      ( 02 Jun 2022 07:07 )             34.1       06-02    141  |  95<L>  |  50<H>  ----------------------------<  211<H>  3.6   |  34<H>  |  1.35<H>    Ca    9.3      02 Jun 2022 07:07  Phos  4.0     06-02  Mg     2.30     06-02            PLAN:  Exploratory laparotomy tomorrow with plans for bypass of obstruction and possible G-tube/ feeding jejunostomy

## 2022-06-03 NOTE — PROGRESS NOTE ADULT - NUTRITIONAL ASSESSMENT
Severe protein calorie malnutrition in acute illness/ injury; secondary to poor appetite, weight loss >5% in 1 month and/or >7.5% in 3 months, caloric Intake <50% of nutrition needs >= 5 days, temporal wasting, severe loss of muscle mass/atrophy and loss of body fat stores.    Diet, NPO after Midnight:      NPO Start Date: 02-Jun-2022,   NPO Start Time: 23:59 (06-02-22 @ 07:42) [Active]  Diet, NPO:   With Ice Chips/Sips of Water (05-30-22 @ 06:22) [Active]    Parenteral Nutrition - Adult 1 Each (83 mL/Hr) TPN Continuous <Continuous>, 06-03-22 @ 17:00, , Stop order after: 1 Days    **Greater than 50% of the encounter was spent counseling and/coordination of care on parenteral nutrition. 25 minutes were spent face to face with the patient.

## 2022-06-03 NOTE — PROGRESS NOTE ADULT - ASSESSMENT
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 Findings consistent with SBO at level of DJ anastomosis.     PLAN  - OR today for ex lap, GJ.   - NPO/IVF, TPN  - NGT decompression, 0.5:1 repletion each shift.   - Serial abdominal exams, exam before pain meds  - Trend Cr     D Team [id number]

## 2022-06-03 NOTE — BRIEF OPERATIVE NOTE - OPERATION/FINDINGS
Exploratory laparotomy. Lysis of adhesions. Biopsy of implant. Handsewn junojenunostomy. Gastrojejunostomy - oversewn TIA 80mm stapler line. Combination feeding j-tube and venting g-tube. Primary closure with provena vac placement.

## 2022-06-03 NOTE — CHART NOTE - NSCHARTNOTEFT_GEN_A_CORE
TEAM POST-OPERATIVE NOTE    Patient is s/p ex lap, ZOE, biopsy of implant, handsewn jj, Gj, and gj tube placement. Recovering appropriately.     Vital Signs Last 24 Hrs  T(C): 36.2 (03 Jun 2022 15:00), Max: 37 (02 Jun 2022 16:30)  T(F): 97.1 (03 Jun 2022 15:00), Max: 98.6 (02 Jun 2022 16:30)  HR: 103 (03 Jun 2022 15:00) (73 - 106)  BP: 102/68 (03 Jun 2022 15:00) (102/68 - 137/61)  BP(mean): 77 (03 Jun 2022 15:00) (77 - 90)  RR: 12 (03 Jun 2022 15:00) (12 - 18)  SpO2: 95% (03 Jun 2022 15:00) (95% - 100%)  I&O's Detail    02 Jun 2022 07:01  -  03 Jun 2022 07:00  --------------------------------------------------------  IN:    Fat Emulsion (Fish Oil &amp; Plant Based) 20% Infusion: 114.4 mL    Fat Emulsion (Fish Oil &amp; Plant Based) 20% Infusion: 135.2 mL    Lactated Ringers Bolus: 1500 mL    TPN (Total Parenteral Nutrition): 1992 mL  Total IN: 3741.6 mL    OUT:    Nasogastric/Oral tube (mL): 4300 mL    Oral Fluid: 0 mL    Voided (mL): 1275 mL  Total OUT: 5575 mL    Total NET: -1833.4 mL      03 Jun 2022 07:01  -  03 Jun 2022 16:22  --------------------------------------------------------  IN:    Lactated Ringers: 30 mL    Lactated Ringers Bolus: 1000 mL  Total IN: 1030 mL    OUT:    Gastrostomy Tube (mL): 0 mL    Indwelling Catheter - Urethral (mL): 200 mL    Nasogastric/Oral tube (mL): 0 mL  Total OUT: 200 mL    Total NET: 830 mL        enoxaparin Injectable 40    PAST MEDICAL & SURGICAL HISTORY:  Melanoma  nose &amp; left cheek  2yrs ago      HTN (hypertension)      Dyslipidemia      Malignant neoplasm of thyroid gland      Vocal cord nodule  1998      H/O arthroscopy of left knee  meniscus  12/1998      History of Whipple procedure  pylorus sparing whipple 2022 at [facility]          Physical Exam:  General: Awake, alert & fully oriented, no acute distress   Pulmonary: Respirations unlabored, clear bilaterally  Cardiovascular: s1/s2, no murmur   Abdominal: Soft, NT/ND. Midline prevena vac. GJ tube open to gravity bag  Extremities: WWP    LABS:                        9.2    6.63  )-----------( 304      ( 03 Jun 2022 03:45 )             29.2     06-03    143  |  98  |  47<H>  ----------------------------<  149<H>  3.7   |  36<H>  |  1.22    Ca    9.2      03 Jun 2022 03:45  Phos  4.0     06-03  Mg     1.90     06-03      PT/INR - ( 03 Jun 2022 03:45 )   PT: 12.1 sec;   INR: 1.04 ratio         PTT - ( 03 Jun 2022 03:45 )  PTT:31.0 sec  CAPILLARY BLOOD GLUCOSE      POCT Blood Glucose.: 169 mg/dL (03 Jun 2022 15:31)  POCT Blood Glucose.: 208 mg/dL (03 Jun 2022 12:41)  POCT Blood Glucose.: 201 mg/dL (03 Jun 2022 05:53)  POCT Blood Glucose.: 179 mg/dL (02 Jun 2022 23:15)  POCT Blood Glucose.: 185 mg/dL (02 Jun 2022 18:21)      Radiology and Additional Studies:    Assessment:  The patient is a 76y Male who is now several hours post-op from a     Plan:  - Pain control as needed on dpca  - DVT ppx  - OOB and ambulating as tolerated  - tachycardic in low 100's, bolus 1L LR.  - NPO/TPN  - Open g tube to gravity bag.  - F/u AM labs TEAM POST-OPERATIVE NOTE    Patient is s/p ex lap, ZOE, biopsy of implant, handsewn jj, Gj, and gj tube placement. Recovering appropriately.     Vital Signs Last 24 Hrs  T(C): 36.2 (03 Jun 2022 15:00), Max: 37 (02 Jun 2022 16:30)  T(F): 97.1 (03 Jun 2022 15:00), Max: 98.6 (02 Jun 2022 16:30)  HR: 103 (03 Jun 2022 15:00) (73 - 106)  BP: 102/68 (03 Jun 2022 15:00) (102/68 - 137/61)  BP(mean): 77 (03 Jun 2022 15:00) (77 - 90)  RR: 12 (03 Jun 2022 15:00) (12 - 18)  SpO2: 95% (03 Jun 2022 15:00) (95% - 100%)  I&O's Detail    02 Jun 2022 07:01  -  03 Jun 2022 07:00  --------------------------------------------------------  IN:    Fat Emulsion (Fish Oil &amp; Plant Based) 20% Infusion: 114.4 mL    Fat Emulsion (Fish Oil &amp; Plant Based) 20% Infusion: 135.2 mL    Lactated Ringers Bolus: 1500 mL    TPN (Total Parenteral Nutrition): 1992 mL  Total IN: 3741.6 mL    OUT:    Nasogastric/Oral tube (mL): 4300 mL    Oral Fluid: 0 mL    Voided (mL): 1275 mL  Total OUT: 5575 mL    Total NET: -1833.4 mL      03 Jun 2022 07:01  -  03 Jun 2022 16:22  --------------------------------------------------------  IN:    Lactated Ringers: 30 mL    Lactated Ringers Bolus: 1000 mL  Total IN: 1030 mL    OUT:    Gastrostomy Tube (mL): 0 mL    Indwelling Catheter - Urethral (mL): 200 mL    Nasogastric/Oral tube (mL): 0 mL  Total OUT: 200 mL    Total NET: 830 mL        enoxaparin Injectable 40    PAST MEDICAL & SURGICAL HISTORY:  Melanoma  nose &amp; left cheek  2yrs ago      HTN (hypertension)      Dyslipidemia      Malignant neoplasm of thyroid gland      Vocal cord nodule  1998      H/O arthroscopy of left knee  meniscus  12/1998      History of Whipple procedure  pylorus sparing whipple 2022 at [facility]          Physical Exam:  General: Awake, alert & fully oriented, no acute distress   Pulmonary: Respirations unlabored, clear bilaterally  Cardiovascular: s1/s2, no murmur   Abdominal: Soft, NT/ND. Midline prevena vac. GJ tube open to gravity bag. NGT in place.  Extremities: WWP    LABS:                        9.2    6.63  )-----------( 304      ( 03 Jun 2022 03:45 )             29.2     06-03    143  |  98  |  47<H>  ----------------------------<  149<H>  3.7   |  36<H>  |  1.22    Ca    9.2      03 Jun 2022 03:45  Phos  4.0     06-03  Mg     1.90     06-03      PT/INR - ( 03 Jun 2022 03:45 )   PT: 12.1 sec;   INR: 1.04 ratio         PTT - ( 03 Jun 2022 03:45 )  PTT:31.0 sec  CAPILLARY BLOOD GLUCOSE      POCT Blood Glucose.: 169 mg/dL (03 Jun 2022 15:31)  POCT Blood Glucose.: 208 mg/dL (03 Jun 2022 12:41)  POCT Blood Glucose.: 201 mg/dL (03 Jun 2022 05:53)  POCT Blood Glucose.: 179 mg/dL (02 Jun 2022 23:15)  POCT Blood Glucose.: 185 mg/dL (02 Jun 2022 18:21)      Radiology and Additional Studies:    Assessment:  The patient is a 76y Male who is now several hours post-op from a     Plan:  - Pain control as needed on dpca  - DVT ppx  - OOB and ambulating as tolerated  - tachycardic in low 100's, bolus 1L LR.  - NPO/TPN  - NGT LCWS  - Open g tube to gravity bag.  - F/u AM labs

## 2022-06-03 NOTE — BRIEF OPERATIVE NOTE - NSICDXBRIEFPROCEDURE_GEN_ALL_CORE_FT
PROCEDURES:  Gastrostomy tube 03-Jun-2022 12:38:38  Paulette Montaño  Gastrojejunostomy 03-Jun-2022 12:38:49  Paulette Montaño  Jejunojejunostomy 03-Jun-2022 12:39:22  Paulette Montaño  Jejunostomy, tube 03-Jun-2022 12:39:42  Paulette Montaño  Exploratory laparotomy 03-Jun-2022 12:39:59  Paulette Montaño  Lysis of peritoneal adhesions 03-Jun-2022 12:40:31  Paulette Montaño

## 2022-06-03 NOTE — PROGRESS NOTE ADULT - SUBJECTIVE AND OBJECTIVE BOX
NUTRITION NOTE  [id number] VAUGHN  ===============================    Interval events - Patient was seen and examined at bedside, no acute events overnight. Pt remains NPO with NGT in place and remains on TPN at this time.     Pt is in OR this morning.    ROS: Except as noted above, all other systems reviewed and are negative     Allergies  No Known Allergies    PAST MEDICAL & SURGICAL HISTORY:  Melanoma nose and left cheek  2yrs ago  HTN (hypertension)  Dyslipidemia  Malignant neoplasm of thyroid gland  Vocal cord nodule   H/O arthroscopy of left knee meniscus  1998  History of Whipple procedure pylorus sparing whipple  at [facility]    FAMILY HISTORY:  Family history of breast cancer (Sibling)  Family history of thyroid cancer (Father)  Family history of lung cancer (Mother)    Vital Signs Last 24 Hrs  T(C): 36.3 (2022 07:30), Max: 37 (2022 12:21)  T(F): 97.4 (2022 07:30), Max: 98.6 (2022 12:21)  HR: 84 (2022 07:30) (73 - 84)  BP: 121/74 (2022 07:30) (113/69 - 137/61)  RR: 18 (2022 07:30) (18 - 18)  SpO2: 100% (2022 07:30) (98% - 100%)    MEDICATIONS  (STANDING):  chlorhexidine 2% Cloths 1 Application(s) Topical daily  dextrose 50% Injectable 25 Gram(s) IV Push once  dextrose 50% Injectable 12.5 Gram(s) IV Push once  diphenhydrAMINE Injectable 25 milliGRAM(s) IV Push at bedtime  enoxaparin Injectable 40 milliGRAM(s) SubCutaneous every 24 hours  insulin lispro (ADMELOG) corrective regimen sliding scale   SubCutaneous every 6 hours  Parenteral Nutrition - Adult 1 Each (83 mL/Hr) TPN Continuous <Continuous>  Parenteral Nutrition - Adult 1 Each (83 mL/Hr) TPN Continuous <Continuous>    I&O's Detail    2022 07:01  -  2022 07:00  --------------------------------------------------------  IN:    Fat Emulsion (Fish Oil &amp; Plant Based) 20% Infusion: 114.4 mL    Fat Emulsion (Fish Oil &amp; Plant Based) 20% Infusion: 135.2 mL    Lactated Ringers Bolus: 1500 mL    TPN (Total Parenteral Nutrition): 1992 mL  Total IN: 3741.6 mL    OUT:    Nasogastric/Oral tube (mL): 4300 mL    Oral Fluid: 0 mL    Voided (mL): 1275 mL  Total OUT: 5575 mL    Total NET: -1833.4 mL    POCT Blood Glucose.: 201 mg/dL (2022 05:53)  POCT Blood Glucose.: 179 mg/dL (2022 23:15)  POCT Blood Glucose.: 185 mg/dL (2022 18:21)  POCT Blood Glucose.: 231 mg/dL (2022 12:09)    Daily Height in cm: 193 (2022 07:30)      Daily Weight in k.4 (26 May 2022 04:58)    Drug Dosing Weight  Height (cm): 193 (24 May 2022 16:24)  Weight (kg): 61.4 (26 May 2022 08:43)  BMI (kg/m2): 16.5 (26 May 2022 08:43)  BSA (m2): 1.88 (26 May 2022 08:43)    PHYSICAL EXAM:  General: NAD, resting comfortably in bed  Neuro: Awake and alert  GI/Abd: Soft, NT/ND, NGT in place   Extremities: warm, no edema   PICC Site: C/D/I    Diet: NPO and TPN (started on 22)    LABORATORY                                                             9.2    6.63  )-----------( 304      ( 2022 03:45 )             29.2   06-03    143  |  98  |  47<H>  ----------------------------<  149<H>  3.7   |  36<H>  |  1.22    Ca    9.2      2022 03:45  Phos  4.0     06-03  Mg     1.90     06-03    TPro  5.8<L>  /  Alb  3.1<L>  /  TBili  0.3  /  DBili  x   /  AST  38  /  ALT  35  /  AlkPhos  185<H>      LIVER FUNCTIONS - ( 29 May 2022 11:20 )  Alb: 3.1 g/dL / Pro: 5.8 g/dL / ALK PHOS: 185 U/L / ALT: 35 U/L / AST: 38 U/L / GGT: x            Chol -- LDL -- HDL -- Trig 99    COVID-19 PCR: Detected (26 May 2022 14:59)    ASSESSMENT/PLAN:  75 y/o male with PMH of HTN, HLD, pancreatic ductal adenocarcinoma s/p pylorus sparing Whipple 2020 ([facility]) with recurrence now on chemo, 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 Findings consistent with SBO at level of duodenojejunostomy anastomosis. Patient states that he eventually developed worsening nausea, initially with decreased tolerance of solid foods and eventual intolerance to liquids as well. Pt states he has lost 25lbs since onset of symptoms. Nutrition support consult called for initiation of TPN in view of severe malnutrition, prolonged period of poor PO intake and nutritional optimization prior to planned surgical intervention this week. Pt is NPO with NGT in place for decompression.     continue TPN with infusion volume of 2L, TPN will provide 1114 kcal/day; 25 gm of SMOF lipids given on  to run over 12 hours; in view of rising Cr and elevated FS, TPN formula adjusted     labs reviewed - increased KCl and mag sulfate in TPN bag     monitor fingersticks, obtain daily weights - continue with 38 units of insulin in TPN bag     continue parenteral nutrition at this time, will follow up with primary team on plan - OR today    1.  Severe protein calorie malnutrition being optimized with TPN: CHO [210] gm.  AA [100] gm. SMOF Lipids [0] gm.  2.  Hyperglycemia managed with: [38] units of regular insulin    3.  Check fluid balance daily.  Strict I/O  [ ] [ ]   4.  Daily BMP, Ionized Calcium, Magnesium and Phosphorous   5.  Triglycerides at initiation of TPN and monthly [ ] [ ]     Nutrition Support [id number]

## 2022-06-03 NOTE — PROGRESS NOTE ADULT - SUBJECTIVE AND OBJECTIVE BOX
Subjective:   Patient seen at bedside this AM. Reports feeling well, without complaints. Denies chest pain, SOB. Tolerating diet without N/V.     24h Events:   - Overnight, no acute events    Objective:  Vital Signs  T(C): 36.7 (06-02 @ 23:52), Max: 37.2 (06-02 @ 06:43)  HR: 73 (06-02 @ 23:52) (73 - 99)  BP: 126/73 (06-02 @ 23:52) (113/69 - 126/73)  RR: 18 (06-02 @ 23:52) (18 - 18)  SpO2: 98% (06-02 @ 23:52) (98% - 100%)  06-01-22 @ 07:01  -  06-02-22 @ 07:00  --------------------------------------------------------  IN:  Total IN: 0 mL    OUT:    Nasogastric/Oral tube (mL): 2650 mL    Voided (mL): 1380 mL  Total OUT: 4030 mL    Total NET: -4030 mL      06-02-22 @ 07:01  -  06-03-22 @ 01:53  --------------------------------------------------------  IN:  Total IN: 0 mL    OUT:    Nasogastric/Oral tube (mL): 3500 mL    Voided (mL): 875 mL  Total OUT: 4375 mL    Total NET: -4375 mL        PHYSICAL EXAM  GENERAL: NAD, lying in bed comfortably  HEAD:  Atraumatic, Normocephalic. NGT in place  Abdomen: Soft, non distended. Non tender. well healing surgical incision      Labs:                        10.6   12.18 )-----------( 369      ( 02 Jun 2022 07:07 )             34.1   06-02    141  |  95<L>  |  50<H>  ----------------------------<  211<H>  3.6   |  34<H>  |  1.35<H>    Ca    9.3      02 Jun 2022 07:07  Phos  4.0     06-02  Mg     2.30     06-02      CAPILLARY BLOOD GLUCOSE      POCT Blood Glucose.: 179 mg/dL (02 Jun 2022 23:15)  POCT Blood Glucose.: 185 mg/dL (02 Jun 2022 18:21)  POCT Blood Glucose.: 231 mg/dL (02 Jun 2022 12:09)  POCT Blood Glucose.: 187 mg/dL (02 Jun 2022 05:42)      Medications:   MEDICATIONS  (STANDING):  chlorhexidine 2% Cloths 1 Application(s) Topical daily  dextrose 50% Injectable 25 Gram(s) IV Push once  dextrose 50% Injectable 12.5 Gram(s) IV Push once  diphenhydrAMINE Injectable 25 milliGRAM(s) IV Push at bedtime  enoxaparin Injectable 40 milliGRAM(s) SubCutaneous every 24 hours  fat emulsion (Fish Oil and Plant Based) 20% Infusion 10.4 mL/Hr (10.4 mL/Hr) IV Continuous <Continuous>  insulin lispro (ADMELOG) corrective regimen sliding scale   SubCutaneous every 6 hours  Parenteral Nutrition - Adult 1 Each (83 mL/Hr) TPN Continuous <Continuous>    MEDICATIONS  (PRN):      Imaging:     Subjective:   Patient seen at bedside this AM. Reports feeling well, without complaints. Denies chest pain, SOB. NPO with NGT.     24h Events:   - Overnight, no acute events    Objective:  Vital Signs  T(C): 36.7 (06-02 @ 23:52), Max: 37.2 (06-02 @ 06:43)  HR: 73 (06-02 @ 23:52) (73 - 99)  BP: 126/73 (06-02 @ 23:52) (113/69 - 126/73)  RR: 18 (06-02 @ 23:52) (18 - 18)  SpO2: 98% (06-02 @ 23:52) (98% - 100%)  06-01-22 @ 07:01  -  06-02-22 @ 07:00  --------------------------------------------------------  IN:  Total IN: 0 mL    OUT:    Nasogastric/Oral tube (mL): 2650 mL    Voided (mL): 1380 mL  Total OUT: 4030 mL    Total NET: -4030 mL      06-02-22 @ 07:01  -  06-03-22 @ 01:53  --------------------------------------------------------  IN:  Total IN: 0 mL    OUT:    Nasogastric/Oral tube (mL): 3500 mL    Voided (mL): 875 mL  Total OUT: 4375 mL    Total NET: -4375 mL        PHYSICAL EXAM  GENERAL: NAD, lying in bed comfortably  HEAD:  Atraumatic, Normocephalic. NGT in place  Abdomen: Soft, mildly distended. Non tender.    Labs:                        10.6   12.18 )-----------( 369      ( 02 Jun 2022 07:07 )             34.1   06-02    141  |  95<L>  |  50<H>  ----------------------------<  211<H>  3.6   |  34<H>  |  1.35<H>    Ca    9.3      02 Jun 2022 07:07  Phos  4.0     06-02  Mg     2.30     06-02      CAPILLARY BLOOD GLUCOSE      POCT Blood Glucose.: 179 mg/dL (02 Jun 2022 23:15)  POCT Blood Glucose.: 185 mg/dL (02 Jun 2022 18:21)  POCT Blood Glucose.: 231 mg/dL (02 Jun 2022 12:09)  POCT Blood Glucose.: 187 mg/dL (02 Jun 2022 05:42)      Medications:   MEDICATIONS  (STANDING):  chlorhexidine 2% Cloths 1 Application(s) Topical daily  dextrose 50% Injectable 25 Gram(s) IV Push once  dextrose 50% Injectable 12.5 Gram(s) IV Push once  diphenhydrAMINE Injectable 25 milliGRAM(s) IV Push at bedtime  enoxaparin Injectable 40 milliGRAM(s) SubCutaneous every 24 hours  fat emulsion (Fish Oil and Plant Based) 20% Infusion 10.4 mL/Hr (10.4 mL/Hr) IV Continuous <Continuous>  insulin lispro (ADMELOG) corrective regimen sliding scale   SubCutaneous every 6 hours  Parenteral Nutrition - Adult 1 Each (83 mL/Hr) TPN Continuous <Continuous>    MEDICATIONS  (PRN):      Imaging:     Subjective:   Patient seen at bedside this AM. Reports feeling well, without complaints. Denies chest pain, SOB. NPO with NGT.     24h Events:   - Overnight, no acute events    Objective:  Vital Signs  T(C): 36.7 (06-02 @ 23:52), Max: 37.2 (06-02 @ 06:43)  HR: 73 (06-02 @ 23:52) (73 - 99)  BP: 126/73 (06-02 @ 23:52) (113/69 - 126/73)  RR: 18 (06-02 @ 23:52) (18 - 18)  SpO2: 98% (06-02 @ 23:52) (98% - 100%)  06-01-22 @ 07:01  -  06-02-22 @ 07:00  --------------------------------------------------------  IN:  Total IN: 0 mL    OUT:    Nasogastric/Oral tube (mL): 2650 mL    Voided (mL): 1380 mL  Total OUT: 4030 mL    Total NET: -4030 mL      06-02-22 @ 07:01  -  06-03-22 @ 01:53  --------------------------------------------------------  IN:  Total IN: 0 mL    OUT:    Nasogastric/Oral tube (mL): 3500 mL    Voided (mL): 875 mL  Total OUT: 4375 mL    Total NET: -4375 mL        PHYSICAL EXAM  GENERAL: NAD, lying in bed comfortably  HEAD:  Atraumatic, Normocephalic. NGT in place  Abdomen: Soft, Non distended. Non tender.    Labs:                        10.6   12.18 )-----------( 369      ( 02 Jun 2022 07:07 )             34.1   06-02    141  |  95<L>  |  50<H>  ----------------------------<  211<H>  3.6   |  34<H>  |  1.35<H>    Ca    9.3      02 Jun 2022 07:07  Phos  4.0     06-02  Mg     2.30     06-02      CAPILLARY BLOOD GLUCOSE      POCT Blood Glucose.: 179 mg/dL (02 Jun 2022 23:15)  POCT Blood Glucose.: 185 mg/dL (02 Jun 2022 18:21)  POCT Blood Glucose.: 231 mg/dL (02 Jun 2022 12:09)  POCT Blood Glucose.: 187 mg/dL (02 Jun 2022 05:42)      Medications:   MEDICATIONS  (STANDING):  chlorhexidine 2% Cloths 1 Application(s) Topical daily  dextrose 50% Injectable 25 Gram(s) IV Push once  dextrose 50% Injectable 12.5 Gram(s) IV Push once  diphenhydrAMINE Injectable 25 milliGRAM(s) IV Push at bedtime  enoxaparin Injectable 40 milliGRAM(s) SubCutaneous every 24 hours  fat emulsion (Fish Oil and Plant Based) 20% Infusion 10.4 mL/Hr (10.4 mL/Hr) IV Continuous <Continuous>  insulin lispro (ADMELOG) corrective regimen sliding scale   SubCutaneous every 6 hours  Parenteral Nutrition - Adult 1 Each (83 mL/Hr) TPN Continuous <Continuous>    MEDICATIONS  (PRN):      Imaging:

## 2022-06-03 NOTE — CHART NOTE - NSCHARTNOTEFT_GEN_A_CORE
NUTRITION FOLLOW-UP:    Extensive chart review conducted to obtain nutrition related information.  Pt scheduled for surgery today for exploratory lap for bypass of obstruction and gastrojejunostomy.  Pt continues on parenteral nutrition support.  Adjustments made for elevated glucose and creatinine level with reduction in dextrose and amino acid content.  Insulin adjusted in PN bag.  Pt previously assessed w/severe malnutrition 2/2 weight loss and inadequate kcal/protein intake PTA-ongoing assessment.  Pt continues with [facility] for gastric decompression.  Pt w/4300mL output/24h.      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 Findings consistent with SBO at level of DJ anastomosis.     PLAN  - OR today for ex lap, GJ.   - NPO/IVF, TPN    Noted wt decrease of 4.4kg since admission.  Pt w/o edema at this time.    Weight:  6/3- 61.4kg     Adm - 65.8kg (stated wt)     IBW - 91.7kg  Labs:  H/H 9.2/29.2  BUN 47  Glu 149  -231    Current Diet:  Parenteral Nutrition Support w/210gm dextrose and 100gm amino acids.  SMOF lipids provided yesterday -25gms.  Parenteral Recommendations:  PN now providing 1114kcal.  Estimated kcal needs = 20-25kcal/kg = 1228-1535kcal/d.  Estimated protein needs = 1.5gm/kg = 92gms/d.  When 25gm lipids [id number].  Spoke w/NST-PA plan on providing lipids tomorrow and consider increase in amino acid content to meet kcal/protein needs.      RD to Remain Available:  yes    Additional Recommendations:   1) Monitor weights, labs, BM's, skin integrity, FS, NGT output, tolerance to PN  2) Consider enteral nutrition support, once feasible,  after G-J tube placement  3) Daily weights.

## 2022-06-04 NOTE — PROGRESS NOTE ADULT - NUTRITIONAL ASSESSMENT
Severe protein calorie malnutrition in acute illness/ injury; secondary to poor appetite, weight loss >5% in 1 month and/or >7.5% in 3 months, caloric Intake <50% of nutrition needs >= 5 days, temporal wasting, severe loss of muscle mass/atrophy and loss of body fat stores.    Diet, NPO:   With Ice Chips/Sips of Water (06-04-22 @ 07:31) [Active]    fat emulsion (Fish Oil and Plant Based) 20% Infusion 10.4 mL/Hr (10.4 mL/Hr) IV Continuous <Continuous>, 06-04-22 @ 17:00, 17:00, Stop order after: 24 Hours  Parenteral Nutrition - Adult 1 Each (83 mL/Hr) TPN Continuous <Continuous>, 06-04-22 @ 17:00, , Stop order after: 1 Days    **Greater than 50% of the encounter was spent counseling and/coordination of care on parenteral nutrition. 25 minutes were spent face to face with the patient.

## 2022-06-04 NOTE — PROGRESS NOTE ADULT - ASSESSMENT
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 Findings consistent with SBO at level of DJ anastomosis.     PLAN  - OR today for ex lap, GJ.   - NPO/IVF, TPN  - NGT decompression, 0.5:1 repletion each shift.   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 Findings consistent with SBO at level of DJ anastomosis. s/p junojenunostomy and gastrojejunostomy, and GJ tube on 6/3.     PLAN  - NPO/IVF, TPN  - dc'ed NGT today  - pain control   - Trend Cr     D Team [id number]

## 2022-06-04 NOTE — PROGRESS NOTE ADULT - ATTENDING COMMENTS
75yo man with metastatic recurrence of pancreatic cancer, now with SBO, on TPN pending GJ bypass. All questions answered to patient, wife, and sister who was visiting today. Plan to see in my clinic about 2 weeks post-op. At this point, I am planning to continue palliative chemotherapy at an out-patient on his recovery.  Waqas Cary MD PhD  Oncology Attending
77yo man with metastatic relapse of pancreatic ductal adenocarcinoma who presented with a SBO, which could not be stented by GI due to extensive disease. Surgical team planning to start TPN and then planning ot explore for diagnostic and therapeutic purposes. Responded well to chemotherapy in the past and additional lines of therapy are available to him, should he recover from this hospitalization and preserve ECOG of 2 or better  Waqas Cary MD PhD  Oncology Attending
NGT out  sips  Awaiting GI fxn

## 2022-06-04 NOTE — PROGRESS NOTE ADULT - SUBJECTIVE AND OBJECTIVE BOX
Subjective:   Patient seen at bedside this AM. Reports feeling well, without complaints. Denies chest pain, SOB. Tolerating diet without N/V.     24h Events:   - Overnight, no acute events    Objective:  Vital Signs  T(C): 37.1 (06-04 @ 00:21), Max: 37.1 (06-04 @ 00:21)  HR: 108 (06-04 @ 00:21) (75 - 108)  BP: 115/68 (06-04 @ 00:21) (102/68 - 144/68)  RR: 18 (06-04 @ 00:21) (10 - 20)  SpO2: 99% (06-04 @ 00:21) (95% - 100%)  06-02-22 @ 07:01  -  06-03-22 @ 07:00  --------------------------------------------------------  IN:  Total IN: 0 mL    OUT:    Nasogastric/Oral tube (mL): 4300 mL    Voided (mL): 1275 mL  Total OUT: 5575 mL    Total NET: -5575 mL      06-03-22 @ 07:01  -  06-04-22 @ 01:51  --------------------------------------------------------  IN:  Total IN: 0 mL    OUT:    Gastrostomy Tube (mL): 0 mL    Indwelling Catheter - Urethral (mL): 450 mL    Nasogastric/Oral tube (mL): 0 mL  Total OUT: 450 mL    Total NET: -450 mL        PHYSICAL EXAM  GENERAL: NAD, lying in bed comfortably  HEAD:  Atraumatic, Normocephalic. NGT in place  Abdomen: Soft, Non distended. Non tender.        Labs:                        9.2    6.63  )-----------( 304      ( 03 Jun 2022 03:45 )             29.2   06-03    143  |  98  |  47<H>  ----------------------------<  149<H>  3.7   |  36<H>  |  1.22    Ca    9.2      03 Jun 2022 03:45  Phos  4.0     06-03  Mg     1.90     06-03      CAPILLARY BLOOD GLUCOSE      POCT Blood Glucose.: 147 mg/dL (04 Jun 2022 00:18)  POCT Blood Glucose.: 143 mg/dL (03 Jun 2022 18:01)  POCT Blood Glucose.: 169 mg/dL (03 Jun 2022 15:31)  POCT Blood Glucose.: 208 mg/dL (03 Jun 2022 12:41)  POCT Blood Glucose.: 201 mg/dL (03 Jun 2022 05:53)      Medications:   MEDICATIONS  (STANDING):  acetaminophen   IVPB .. 1000 milliGRAM(s) IV Intermittent every 6 hours  chlorhexidine 2% Cloths 1 Application(s) Topical daily  dextrose 50% Injectable 12.5 Gram(s) IV Push once  dextrose 50% Injectable 25 Gram(s) IV Push once  diphenhydrAMINE Injectable 25 milliGRAM(s) IV Push at bedtime  enoxaparin Injectable 40 milliGRAM(s) SubCutaneous every 24 hours  HYDROmorphone PCA (1 mG/mL) 30 milliLiter(s) PCA Continuous PCA Continuous  insulin lispro (ADMELOG) corrective regimen sliding scale   SubCutaneous every 6 hours  lactated ringers. 1000 milliLiter(s) (30 mL/Hr) IV Continuous <Continuous>  pantoprazole  Injectable 40 milliGRAM(s) IV Push daily  Parenteral Nutrition - Adult 1 Each (83 mL/Hr) TPN Continuous <Continuous>    MEDICATIONS  (PRN):  HYDROmorphone  Injectable 0.5 milliGRAM(s) IV Push every 10 minutes PRN Severe Pain (7 - 10)  HYDROmorphone PCA (1 mG/mL) Rescue Clinician Bolus 0.5 milliGRAM(s) IV Push every 15 minutes PRN for Pain Scale GREATER THAN 6  nalbuphine Injectable 2.5 milliGRAM(s) IV Push every 6 hours PRN Pruritus  naloxone Injectable 0.1 milliGRAM(s) IV Push every 3 minutes PRN For ANY of the following changes in patient status:  A. RR LESS THAN 10 breaths per minute, B. Oxygen saturation LESS THAN 90%, C. Sedation score of 6  ondansetron Injectable 4 milliGRAM(s) IV Push once PRN Nausea and/or Vomiting      Imaging:     Subjective:   Patient seen at bedside this AM. Reports feeling well, without complaints. Denies chest pain, SOB. NPO with NGT.     24h Events:   - Overnight, no acute events    Objective:  Vital Signs  T(C): 37.1 (06-04 @ 00:21), Max: 37.1 (06-04 @ 00:21)  HR: 108 (06-04 @ 00:21) (75 - 108)  BP: 115/68 (06-04 @ 00:21) (102/68 - 144/68)  RR: 18 (06-04 @ 00:21) (10 - 20)  SpO2: 99% (06-04 @ 00:21) (95% - 100%)  06-02-22 @ 07:01  -  06-03-22 @ 07:00  --------------------------------------------------------  IN:  Total IN: 0 mL    OUT:    Nasogastric/Oral tube (mL): 4300 mL    Voided (mL): 1275 mL  Total OUT: 5575 mL    Total NET: -5575 mL      06-03-22 @ 07:01  -  06-04-22 @ 01:51  --------------------------------------------------------  IN:  Total IN: 0 mL    OUT:    Gastrostomy Tube (mL): 0 mL    Indwelling Catheter - Urethral (mL): 450 mL    Nasogastric/Oral tube (mL): 0 mL  Total OUT: 450 mL    Total NET: -450 mL        PHYSICAL EXAM  GENERAL: NAD, lying in bed comfortably  HEAD:  Atraumatic, Normocephalic. NGT in place  Abdomen: Soft, Non distended. Non tender.        Labs:                        9.2    6.63  )-----------( 304      ( 03 Jun 2022 03:45 )             29.2   06-03    143  |  98  |  47<H>  ----------------------------<  149<H>  3.7   |  36<H>  |  1.22    Ca    9.2      03 Jun 2022 03:45  Phos  4.0     06-03  Mg     1.90     06-03      CAPILLARY BLOOD GLUCOSE      POCT Blood Glucose.: 147 mg/dL (04 Jun 2022 00:18)  POCT Blood Glucose.: 143 mg/dL (03 Jun 2022 18:01)  POCT Blood Glucose.: 169 mg/dL (03 Jun 2022 15:31)  POCT Blood Glucose.: 208 mg/dL (03 Jun 2022 12:41)  POCT Blood Glucose.: 201 mg/dL (03 Jun 2022 05:53)      Medications:   MEDICATIONS  (STANDING):  acetaminophen   IVPB .. 1000 milliGRAM(s) IV Intermittent every 6 hours  chlorhexidine 2% Cloths 1 Application(s) Topical daily  dextrose 50% Injectable 12.5 Gram(s) IV Push once  dextrose 50% Injectable 25 Gram(s) IV Push once  diphenhydrAMINE Injectable 25 milliGRAM(s) IV Push at bedtime  enoxaparin Injectable 40 milliGRAM(s) SubCutaneous every 24 hours  HYDROmorphone PCA (1 mG/mL) 30 milliLiter(s) PCA Continuous PCA Continuous  insulin lispro (ADMELOG) corrective regimen sliding scale   SubCutaneous every 6 hours  lactated ringers. 1000 milliLiter(s) (30 mL/Hr) IV Continuous <Continuous>  pantoprazole  Injectable 40 milliGRAM(s) IV Push daily  Parenteral Nutrition - Adult 1 Each (83 mL/Hr) TPN Continuous <Continuous>    MEDICATIONS  (PRN):  HYDROmorphone  Injectable 0.5 milliGRAM(s) IV Push every 10 minutes PRN Severe Pain (7 - 10)  HYDROmorphone PCA (1 mG/mL) Rescue Clinician Bolus 0.5 milliGRAM(s) IV Push every 15 minutes PRN for Pain Scale GREATER THAN 6  nalbuphine Injectable 2.5 milliGRAM(s) IV Push every 6 hours PRN Pruritus  naloxone Injectable 0.1 milliGRAM(s) IV Push every 3 minutes PRN For ANY of the following changes in patient status:  A. RR LESS THAN 10 breaths per minute, B. Oxygen saturation LESS THAN 90%, C. Sedation score of 6  ondansetron Injectable 4 milliGRAM(s) IV Push once PRN Nausea and/or Vomiting      Imaging:

## 2022-06-04 NOTE — PROGRESS NOTE ADULT - SUBJECTIVE AND OBJECTIVE BOX
Anesthesia Pain Management Service    SUBJECTIVE: Patient is doing well with IV PCA and no significant problems reported.    Pain Scale Score	At rest: _6/10__ 	With Activity: ___ 	[X ] Refer to charted pain scores    THERAPY:    [ ] IV PCA Morphine		[ ] 5 mg/mL	[ ] 1 mg/mL  [X ] IV PCA Hydromorphone	[ ] 5 mg/mL	[X ] 1 mg/mL  [ ] IV PCA Fentanyl		[ ] 50 micrograms/mL    Demand dose __0.2_ lockout __6_ (minutes) Continuous Rate _0__ Total: _2.0__  mg used (in past 24 hours)      MEDICATIONS  (STANDING):  acetaminophen   IVPB .. 1000 milliGRAM(s) IV Intermittent every 6 hours  chlorhexidine 2% Cloths 1 Application(s) Topical daily  dextrose 50% Injectable 25 Gram(s) IV Push once  dextrose 50% Injectable 12.5 Gram(s) IV Push once  diphenhydrAMINE Injectable 25 milliGRAM(s) IV Push at bedtime  enoxaparin Injectable 40 milliGRAM(s) SubCutaneous every 24 hours  fat emulsion (Fish Oil and Plant Based) 20% Infusion 10.4 mL/Hr (10.4 mL/Hr) IV Continuous <Continuous>  HYDROmorphone PCA (1 mG/mL) 30 milliLiter(s) PCA Continuous PCA Continuous  insulin lispro (ADMELOG) corrective regimen sliding scale   SubCutaneous every 6 hours  lactated ringers. 1000 milliLiter(s) (30 mL/Hr) IV Continuous <Continuous>  pantoprazole  Injectable 40 milliGRAM(s) IV Push daily  Parenteral Nutrition - Adult 1 Each (83 mL/Hr) TPN Continuous <Continuous>  Parenteral Nutrition - Adult 1 Each (83 mL/Hr) TPN Continuous <Continuous>    MEDICATIONS  (PRN):  HYDROmorphone PCA (1 mG/mL) Rescue Clinician Bolus 0.5 milliGRAM(s) IV Push every 15 minutes PRN for Pain Scale GREATER THAN 6  nalbuphine Injectable 2.5 milliGRAM(s) IV Push every 6 hours PRN Pruritus  naloxone Injectable 0.1 milliGRAM(s) IV Push every 3 minutes PRN For ANY of the following changes in patient status:  A. RR LESS THAN 10 breaths per minute, B. Oxygen saturation LESS THAN 90%, C. Sedation score of 6      OBJECTIVE: Patient sitting up in bed.    Sedation Score:	[ X] Alert	[ ] Drowsy 	[ ] Arousable	[ ] Asleep	[ ] Unresponsive    Side Effects:	[X ] None	[ ] Nausea	[ ] Vomiting	[ ] Pruritus  		[ ] Other:    Vital Signs Last 24 Hrs  T(C): 36.9 (04 Jun 2022 07:16), Max: 37.1 (04 Jun 2022 00:21)  T(F): 98.5 (04 Jun 2022 07:16), Max: 98.7 (04 Jun 2022 00:21)  HR: 98 (04 Jun 2022 07:16) (91 - 108)  BP: 152/65 (04 Jun 2022 07:16) (102/68 - 152/65)  BP(mean): 70 (03 Jun 2022 17:00) (70 - 90)  RR: 18 (04 Jun 2022 06:18) (10 - 20)  SpO2: 98% (04 Jun 2022 07:16) (95% - 100%)    ASSESSMENT/ PLAN    Therapy to  be:	[ X] Continue   [ ] Discontinued   [ ] Change to prn Analgesics    Documentation and Verification of current medications:   [X] Done	[ ] Not done, not elligible    Comments: Discussed patient with primary team, continue IV PCA. Recommend non-opioid adjuvant analgesics to be used when possible and when allowed by primary surgical team.    Progress Note written now but Patient was seen earlier.

## 2022-06-04 NOTE — PROGRESS NOTE ADULT - SUBJECTIVE AND OBJECTIVE BOX
NUTRITION NOTE  [id number] VAUGHN  ===============================    Interval events - Patient was seen and examined at bedside, no acute events overnight. Pt remains NPO with NGT in place and remains on TPN at this time.     ROS: Except as noted above, all other systems reviewed and are negative     Allergies  No Known Allergies    PAST MEDICAL & SURGICAL HISTORY:  Melanoma nose and left cheek  2yrs ago  HTN (hypertension)  Dyslipidemia  Malignant neoplasm of thyroid gland  Vocal cord nodule   H/O arthroscopy of left knee meniscus  1998  History of Whipple procedure pylorus sparing whipple  at [facility]    FAMILY HISTORY:  Family history of breast cancer (Sibling)  Family history of thyroid cancer (Father)  Family history of lung cancer (Mother)    Vital Signs Last 24 Hrs  T(C): 36.9 (2022 07:16), Max: 37.1 (2022 00:21)  T(F): 98.5 (2022 07:16), Max: 98.7 (2022 00:21)  HR: 98 (2022 07:16) (91 - 108)  BP: 152/65 (2022 07:16) (102/68 - 152/65)  BP(mean): 70 (2022 17:00) (70 - 90)  RR: 18 (2022 06:18) (10 - 20)  SpO2: 98% (2022 07:16) (95% - 100%)    MEDICATIONS  (STANDING):  acetaminophen   IVPB .. 1000 milliGRAM(s) IV Intermittent every 6 hours  chlorhexidine 2% Cloths 1 Application(s) Topical daily  dextrose 50% Injectable 25 Gram(s) IV Push once  dextrose 50% Injectable 12.5 Gram(s) IV Push once  diphenhydrAMINE Injectable 25 milliGRAM(s) IV Push at bedtime  enoxaparin Injectable 40 milliGRAM(s) SubCutaneous every 24 hours  fat emulsion (Fish Oil and Plant Based) 20% Infusion 10.4 mL/Hr (10.4 mL/Hr) IV Continuous <Continuous>  HYDROmorphone PCA (1 mG/mL) 30 milliLiter(s) PCA Continuous PCA Continuous  insulin lispro (ADMELOG) corrective regimen sliding scale   SubCutaneous every 6 hours  lactated ringers. 1000 milliLiter(s) (30 mL/Hr) IV Continuous <Continuous>  pantoprazole  Injectable 40 milliGRAM(s) IV Push daily  Parenteral Nutrition - Adult 1 Each (83 mL/Hr) TPN Continuous <Continuous>  Parenteral Nutrition - Adult 1 Each (83 mL/Hr) TPN Continuous <Continuous>    MEDICATIONS  (PRN):  HYDROmorphone PCA (1 mG/mL) Rescue Clinician Bolus 0.5 milliGRAM(s) IV Push every 15 minutes PRN for Pain Scale GREATER THAN 6  nalbuphine Injectable 2.5 milliGRAM(s) IV Push every 6 hours PRN Pruritus  naloxone Injectable 0.1 milliGRAM(s) IV Push every 3 minutes PRN For ANY of the following changes in patient status:  A. RR LESS THAN 10 breaths per minute, B. Oxygen saturation LESS THAN 90%, C. Sedation score of 6    I&O's Detail    2022 07:01  -  2022 07:00  --------------------------------------------------------  IN:    Lactated Ringers: 90 mL    Lactated Ringers Bolus: 1000 mL  Total IN: 1090 mL    OUT:    Gastrostomy Tube (mL): 0 mL    Indwelling Catheter - Urethral (mL): 950 mL    Nasogastric/Oral tube (mL): 0 mL  Total OUT: 950 mL    Total NET: 140 mL      2022 07:01  -  2022 09:48  --------------------------------------------------------  IN:    Lactated Ringers: 60 mL    TPN (Total Parenteral Nutrition): 166 mL  Total IN: 226 mL    OUT:  Total OUT: 0 mL    Total NET: 226 mL    POCT Blood Glucose.: 143 mg/dL (2022 06:06)  POCT Blood Glucose.: 147 mg/dL (2022 00:18)  POCT Blood Glucose.: 143 mg/dL (2022 18:01)  POCT Blood Glucose.: 169 mg/dL (2022 15:31)  POCT Blood Glucose.: 208 mg/dL (2022 12:41)    Daily Height in cm: 193 (2022 07:30)      Daily Weight in k.4 (26 May 2022 04:58)    Drug Dosing Weight  Height (cm): 193 (24 May 2022 16:24)  Weight (kg): 61.4 (26 May 2022 08:43)  BMI (kg/m2): 16.5 (26 May 2022 08:43)  BSA (m2): 1.88 (26 May 2022 08:43)    PHYSICAL EXAM:  General: NAD, resting comfortably in bed  Neuro: Awake and alert  GI/Abd: Soft, appropriately tender    Extremities: warm, no edema   PICC Site: C/D/I    Diet: NPO and TPN (started on 22)    LABORATORY                                                                      8.6    15.83 )-----------( 234      ( 2022 07:15 )             26.8     06-04    140  |  105  |  49<H>  ----------------------------<  135<H>  4.9   |  28  |  1.43<H>    Ca    8.4      2022 06:45  Phos  4.4     06-04  Mg     1.70     06-04    TPro  5.8<L>  /  Alb  3.1<L>  /  TBili  0.3  /  DBili  x   /  AST  38  /  ALT  35  /  AlkPhos  185<H>  05-29    LIVER FUNCTIONS - ( 29 May 2022 11:20 )  Alb: 3.1 g/dL / Pro: 5.8 g/dL / ALK PHOS: 185 U/L / ALT: 35 U/L / AST: 38 U/L / GGT: x           - Chol -- LDL -- HDL -- Trig 99    COVID-19 PCR: Detected (26 May 2022 14:59)    ASSESSMENT/PLAN:  75 y/o male with PMH of HTN, HLD, pancreatic ductal adenocarcinoma s/p pylorus sparing Whipple 2020 ([facility]) with recurrence now on chemo, 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 Findings consistent with SBO at level of duodenojejunostomy anastomosis. Patient states that he eventually developed worsening nausea, initially with decreased tolerance of solid foods and eventual intolerance to liquids as well. Pt states he has lost 25lbs since onset of symptoms. Nutrition support consult called for initiation of TPN in view of severe malnutrition, prolonged period of poor PO intake and nutritional optimization prior to planned surgical intervention this week. Pt is NPO with NGT in place for decompression. Pt is s/p exlap and GJ placement on 6/3.     continue TPN with infusion volume of 2L, TPN will provide 1614 kcal/day; 50 gm of SMOF lipids ordered today to run over 24 hours; in view of rising Cr and elevated FS, TPN formula adjusted     labs reviewed - increased KCl and mag sulfate in TPN bag     monitor fingersticks, obtain daily weights - decreased to 33 units of insulin in TPN bag     continue parenteral nutrition at this time, will follow up with primary team on plan    1.  Severe protein calorie malnutrition being optimized with TPN: CHO [210] gm.  AA [100] gm. SMOF Lipids [50] gm.  2.  Hyperglycemia managed with: [33] units of regular insulin    3.  Check fluid balance daily.  Strict I/O  [ ] [ ]   4.  Daily BMP, Ionized Calcium, Magnesium and Phosphorous   5.  Triglycerides at initiation of TPN and monthly [ ] [ ]     Nutrition Support [id number]

## 2022-06-05 NOTE — PROGRESS NOTE ADULT - SUBJECTIVE AND OBJECTIVE BOX
Anesthesia Pain Management Service    SUBJECTIVE: Patient is doing well with IV PCA and no significant problems reported.    Pain Scale Score	At rest: ___ 	With Activity: ___ 	[X ] Refer to charted pain scores    THERAPY:    [ ] IV PCA Morphine		[ ] 5 mg/mL	[ ] 1 mg/mL  [X ] IV PCA Hydromorphone	[ ] 5 mg/mL	[X ] 1 mg/mL  [ ] IV PCA Fentanyl		[ ] 50 micrograms/mL    Demand dose __0.2_ lockout __6_ (minutes) Continuous Rate _0__ Total: ___  Daily      MEDICATIONS  (STANDING):  chlorhexidine 2% Cloths 1 Application(s) Topical daily  dextrose 50% Injectable 25 Gram(s) IV Push once  dextrose 50% Injectable 12.5 Gram(s) IV Push once  diphenhydrAMINE Injectable 25 milliGRAM(s) IV Push at bedtime  enoxaparin Injectable 40 milliGRAM(s) SubCutaneous every 24 hours  fat emulsion (Fish Oil and Plant Based) 20% Infusion 10.4 mL/Hr (10.4 mL/Hr) IV Continuous <Continuous>  HYDROmorphone PCA (1 mG/mL) 30 milliLiter(s) PCA Continuous PCA Continuous  insulin lispro (ADMELOG) corrective regimen sliding scale   SubCutaneous every 6 hours  lactated ringers. 1000 milliLiter(s) (30 mL/Hr) IV Continuous <Continuous>  magnesium sulfate  IVPB 2 Gram(s) IV Intermittent once  pantoprazole  Injectable 40 milliGRAM(s) IV Push daily  Parenteral Nutrition - Adult 1 Each (83 mL/Hr) TPN Continuous <Continuous>    MEDICATIONS  (PRN):  HYDROmorphone PCA (1 mG/mL) Rescue Clinician Bolus 0.5 milliGRAM(s) IV Push every 15 minutes PRN for Pain Scale GREATER THAN 6  nalbuphine Injectable 2.5 milliGRAM(s) IV Push every 6 hours PRN Pruritus  naloxone Injectable 0.1 milliGRAM(s) IV Push every 3 minutes PRN For ANY of the following changes in patient status:  A. RR LESS THAN 10 breaths per minute, B. Oxygen saturation LESS THAN 90%, C. Sedation score of 6      OBJECTIVE:    Sedation Score:	[ X] Alert	[ ] Drowsy 	[ ] Arousable	[ ] Asleep	[ ] Unresponsive    Side Effects:	[X ] None	[ ] Nausea	[ ] Vomiting	[ ] Pruritus  		[ ] Other:    Vital Signs Last 24 Hrs  T(C): 37.1 (05 Jun 2022 05:26), Max: 37.6 (04 Jun 2022 18:20)  T(F): 98.8 (05 Jun 2022 05:26), Max: 99.6 (04 Jun 2022 18:20)  HR: 103 (05 Jun 2022 05:26) (94 - 104)  BP: 126/66 (05 Jun 2022 05:26) (118/60 - 141/73)  BP(mean): --  RR: 19 (05 Jun 2022 05:26) (18 - 20)  SpO2: 98% (05 Jun 2022 05:26) (98% - 98%)    ASSESSMENT/ PLAN    Therapy to  be:	[ X] Continue   [ ] Discontinued   [ ] Change to prn Analgesics    Documentation and Verification of current medications:   [X] Done	[ ] Not done, not elligible    Comments:

## 2022-06-05 NOTE — PROGRESS NOTE ADULT - SUBJECTIVE AND OBJECTIVE BOX
NUTRITION NOTE  [id number] VAUGHN  ===============================    Interval events - Patient was seen and examined at bedside, no acute events overnight. Trickle tube feeds ordered and pt remains on TPN at this time.     ROS: Except as noted above, all other systems reviewed and are negative     Allergies  No Known Allergies    PAST MEDICAL & SURGICAL HISTORY:  Melanoma nose and left cheek  2yrs ago  HTN (hypertension)  Dyslipidemia  Malignant neoplasm of thyroid gland  Vocal cord nodule   H/O arthroscopy of left knee meniscus  1998  History of Whipple procedure pylorus sparing whipple  at [facility]    FAMILY HISTORY:  Family history of breast cancer (Sibling)  Family history of thyroid cancer (Father)  Family history of lung cancer (Mother)    Vital Signs Last 24 Hrs  T(C): 37.1 (2022 05:26), Max: 37.6 (2022 18:20)  T(F): 98.8 (2022 05:26), Max: 99.6 (2022 18:20)  HR: 103 (2022 05:26) (94 - 104)  BP: 126/66 (2022 05:26) (118/60 - 141/73)  RR: 19 (2022 05:26) (18 - 20)  SpO2: 98% (2022 05:26) (98% - 98%)    MEDICATIONS  (STANDING):  chlorhexidine 2% Cloths 1 Application(s) Topical daily  dextrose 50% Injectable 25 Gram(s) IV Push once  dextrose 50% Injectable 12.5 Gram(s) IV Push once  diphenhydrAMINE Injectable 25 milliGRAM(s) IV Push at bedtime  enoxaparin Injectable 40 milliGRAM(s) SubCutaneous every 24 hours  fat emulsion (Fish Oil and Plant Based) 20% Infusion 10.4 mL/Hr (10.4 mL/Hr) IV Continuous <Continuous>  HYDROmorphone PCA (1 mG/mL) 30 milliLiter(s) PCA Continuous PCA Continuous  insulin lispro (ADMELOG) corrective regimen sliding scale   SubCutaneous every 6 hours  lactated ringers. 1000 milliLiter(s) (30 mL/Hr) IV Continuous <Continuous>  pantoprazole  Injectable 40 milliGRAM(s) IV Push daily  Parenteral Nutrition - Adult 1 Each (83 mL/Hr) TPN Continuous <Continuous>  Parenteral Nutrition - Adult 1 Each (83 mL/Hr) TPN Continuous <Continuous>    MEDICATIONS  (PRN):  HYDROmorphone PCA (1 mG/mL) Rescue Clinician Bolus 0.5 milliGRAM(s) IV Push every 15 minutes PRN for Pain Scale GREATER THAN 6  nalbuphine Injectable 2.5 milliGRAM(s) IV Push every 6 hours PRN Pruritus  naloxone Injectable 0.1 milliGRAM(s) IV Push every 3 minutes PRN For ANY of the following changes in patient status:  A. RR LESS THAN 10 breaths per minute, B. Oxygen saturation LESS THAN 90%, C. Sedation score of 6    I&O's Detail    2022 07:01  -  2022 07:00  --------------------------------------------------------  IN:    Fat Emulsion (Fish Oil &amp; Plant Based) 20% Infusion: 10.4 mL    Lactated Ringers: 540 mL    TPN (Total Parenteral Nutrition): 1494 mL  Total IN: 2044.4 mL    OUT:    Gastrostomy Tube (mL): 5 mL    Voided (mL): 820 mL  Total OUT: 825 mL    Total NET: 1219.4 mL    POCT Blood Glucose.: 146 mg/dL (2022 05:25)  POCT Blood Glucose.: 146 mg/dL (2022 01:20)  POCT Blood Glucose.: 117 mg/dL (2022 18:16)  POCT Blood Glucose.: 190 mg/dL (2022 12:38)    Daily Height in cm: 193 (2022 07:30)      Daily Weight in k.4 (26 May 2022 04:58)    Drug Dosing Weight  Height (cm): 193 (24 May 2022 16:24)  Weight (kg): 61.4 (26 May 2022 08:43)  BMI (kg/m2): 16.5 (26 May 2022 08:43)  BSA (m2): 1.88 (26 May 2022 08:43)    PHYSICAL EXAM:  General: NAD, resting comfortably in bed  Neuro: Awake and alert  GI/Abd: Soft, appropriately tender    Extremities: warm, no edema   PICC Site: C/D/I    Diet: trickle tube feeds and TPN (started on 22)    LABORATORY                                                         7.5    11.17 )-----------( 202      ( 2022 04:55 )             24.5   06-05    138  |  106  |  44<H>  ----------------------------<  141<H>  4.7   |  27  |  1.29    Ca    8.3<L>      2022 04:55  Phos  3.1     06-05  Mg     1.80     06-05    TPro  5.8<L>  /  Alb  3.1<L>  /  TBili  0.3  /  DBili  x   /  AST  38  /  ALT  35  /  AlkPhos  185<H>      LIVER FUNCTIONS - ( 29 May 2022 11:20 )  Alb: 3.1 g/dL / Pro: 5.8 g/dL / ALK PHOS: 185 U/L / ALT: 35 U/L / AST: 38 U/L / GGT: x           05- Chol -- LDL -- HDL -- Trig 99    COVID-19 PCR: Detected (26 May 2022 14:59)    ASSESSMENT/PLAN:  77 y/o male with PMH of HTN, HLD, pancreatic ductal adenocarcinoma s/p pylorus sparing Whipple 2020 ([facility]) with recurrence now on chemo, 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 Findings consistent with SBO at level of duodenojejunostomy anastomosis. Patient states that he eventually developed worsening nausea, initially with decreased tolerance of solid foods and eventual intolerance to liquids as well. Pt states he has lost 25lbs since onset of symptoms. Nutrition support consult called for initiation of TPN in view of severe malnutrition, prolonged period of poor PO intake and nutritional optimization prior to planned surgical intervention this week. Pt is NPO with NGT in place for decompression. Pt is s/p exlap and GJ placement on 6/3.     continue TPN with infusion volume of 2L, TPN will provide 1114 kcal/day; 50 gm of SMOF lipids given on  to run over 24 hours; in view of rising Cr and elevated FS, TPN formula adjusted     labs reviewed - electrolytes adjusted in TPN bag     monitor fingersticks, obtain daily weights - decreased to 25 units of insulin in TPN bag     continue parenteral nutrition at this time, will follow up with primary team on plan - monitor tolerance to tube feeds     1.  Severe protein calorie malnutrition being optimized with TPN: CHO [210] gm.  AA [100] gm. SMOF Lipids [0] gm.  2.  Hyperglycemia managed with: [25] units of regular insulin    3.  Check fluid balance daily.  Strict I/O  [ ] [ ]   4.  Daily BMP, Ionized Calcium, Magnesium and Phosphorous   5.  Triglycerides at initiation of TPN and monthly [ ] [ ]     Nutrition Support [id number]

## 2022-06-05 NOTE — PROGRESS NOTE ADULT - NUTRITIONAL ASSESSMENT
Severe protein calorie malnutrition in acute illness/ injury; secondary to poor appetite, weight loss >5% in 1 month and/or >7.5% in 3 months, caloric Intake <50% of nutrition needs >= 5 days, temporal wasting, severe loss of muscle mass/atrophy and loss of body fat stores.    Diet, NPO with Tube Feed:   Tube Feeding Modality: Jejunostomy  Jevity 1.2 Kt (JEVITY1.2RTH)  Total Volume for 24 Hours (mL): 240  Continuous  Starting Tube Feed Rate {mL per Hour}: 10  Increase Tube Feed Rate by (mL): 0  Until Goal Tube Feed Rate (mL per Hour): 10  Tube Feed Duration (in Hours): 24  Tube Feed Start Time: 05:18 (06-05-22 @ 05:20) [Active]    Parenteral Nutrition - Adult 1 Each (83 mL/Hr) TPN Continuous <Continuous>, 06-05-22 @ 17:00, , Stop order after: 1 Days    **Greater than 50% of the encounter was spent counseling and/coordination of care on parenteral nutrition. 25 minutes were spent face to face with the patient.

## 2022-06-05 NOTE — PROGRESS NOTE ADULT - SUBJECTIVE AND OBJECTIVE BOX
Surgery Progress Note    INTERVAl/SUBJECTIVE: No acute event overnight. Patient seen and examined in am rounds, found to be without acute distress.      Vital Signs Last 24 Hrs  T(C): 37.2 (04 Jun 2022 19:49), Max: 37.6 (04 Jun 2022 18:20)  T(F): 98.9 (04 Jun 2022 19:49), Max: 99.6 (04 Jun 2022 18:20)  HR: 100 (04 Jun 2022 19:49) (94 - 104)  BP: 130/68 (04 Jun 2022 19:49) (118/60 - 152/65)  BP(mean): --  RR: 19 (04 Jun 2022 19:49) (18 - 20)  SpO2: 98% (04 Jun 2022 19:49) (96% - 98%)    Physical Exam:  General: Appears well, NAD  CHEST: breathing comfortably  CV: appears well perfused  Abdomen: soft, nontender, nondistended, no rebound or guarding, GJ tube in place  Extremities: Grossly symmetric    LABS:                        8.6    15.83 )-----------( 234      ( 04 Jun 2022 07:15 )             26.8     06-04    140  |  105  |  49<H>  ----------------------------<  135<H>  4.9   |  28  |  1.43<H>    Ca    8.4      04 Jun 2022 06:45  Phos  4.4     06-04  Mg     1.70     06-04      PT/INR - ( 03 Jun 2022 03:45 )   PT: 12.1 sec;   INR: 1.04 ratio         PTT - ( 03 Jun 2022 03:45 )  PTT:31.0 sec      INs and OUTs:    06-03-22 @ 07:01  -  06-04-22 @ 07:00  --------------------------------------------------------  IN: 1090 mL / OUT: 950 mL / NET: 140 mL    06-04-22 @ 07:01  -  06-05-22 @ 00:56  --------------------------------------------------------  IN: 2044.4 mL / OUT: 625 mL / NET: 1419.4 mL     Surgery Progress Note    INTERVAl/SUBJECTIVE: No acute event overnight. Patient seen and examined in am rounds, found to be without acute distress.      Vital Signs Last 24 Hrs  T(C): 37.2 (04 Jun 2022 19:49), Max: 37.6 (04 Jun 2022 18:20)  T(F): 98.9 (04 Jun 2022 19:49), Max: 99.6 (04 Jun 2022 18:20)  HR: 100 (04 Jun 2022 19:49) (94 - 104)  BP: 130/68 (04 Jun 2022 19:49) (118/60 - 152/65)  BP(mean): --  RR: 19 (04 Jun 2022 19:49) (18 - 20)  SpO2: 98% (04 Jun 2022 19:49) (96% - 98%)    Physical Exam:  General: Appears well, NAD  CHEST: breathing comfortably  CV: appears well perfused  Abdomen: soft, nontender, nondistended, no rebound or guarding, GJ tube in place, provena  Extremities: Grossly symmetric    LABS:                        8.6    15.83 )-----------( 234      ( 04 Jun 2022 07:15 )             26.8     06-04    140  |  105  |  49<H>  ----------------------------<  135<H>  4.9   |  28  |  1.43<H>    Ca    8.4      04 Jun 2022 06:45  Phos  4.4     06-04  Mg     1.70     06-04      PT/INR - ( 03 Jun 2022 03:45 )   PT: 12.1 sec;   INR: 1.04 ratio         PTT - ( 03 Jun 2022 03:45 )  PTT:31.0 sec      INs and OUTs:    06-03-22 @ 07:01  -  06-04-22 @ 07:00  --------------------------------------------------------  IN: 1090 mL / OUT: 950 mL / NET: 140 mL    06-04-22 @ 07:01  -  06-05-22 @ 00:56  --------------------------------------------------------  IN: 2044.4 mL / OUT: 625 mL / NET: 1419.4 mL

## 2022-06-05 NOTE — PROGRESS NOTE ADULT - NUTRITIONAL ASSESSMENT
This patient has been assessed with a concern for Malnutrition and has been determined to have a diagnosis/diagnoses of Severe protein-calorie malnutrition and Underweight (BMI < 19).    This patient is being managed with:   Diet NPO with Tube Feed-  Tube Feeding Modality: Jejunostomy  Jevity 1.2 Kt (JEVITY1.2RTH)  Total Volume for 24 Hours (mL): 240  Continuous  Starting Tube Feed Rate {mL per Hour}: 10  Increase Tube Feed Rate by (mL): 0  Until Goal Tube Feed Rate (mL per Hour): 10  Tube Feed Duration (in Hours): 24  Tube Feed Start Time: 05:18  Entered: Jun 5 2022  5:19AM    Parenteral Nutrition - Adult-  Entered: Jun 4 2022  5:00PM    fat emulsion (Fish Oil and Plant Based) 20% Infusion-[Known as SMOFLIPID 20% Infusion]  50 Gram(s) in IV Solution 250 milliLiter(s) infuse at 10.4 mL/Hr  Dose Rate: 10.4 mL/Hr Infuse Over: 24 Hours; Stop After 24 Hours  Administration Instructions: Use 1.2 micron in-line filter  Entered: Jun 4 2022  5:00PM

## 2022-06-05 NOTE — PROGRESS NOTE ADULT - ASSESSMENT
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 Findings consistent with SBO at level of DJ anastomosis. s/p junojenunostomy and gastrojejunostomy, and GJ tube on 6/3.     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 Findings consistent with SBO at level of DJ anastomosis. s/p junojenunostomy and gastrojejunostomy, and GJ tube on 6/3.     PLAN  - NPO/IVF, TPN  - f/u GI function - no flatus, no BM  - Encourage ambulation  - pain control   - Trend Cr     D team surgery  [id number]

## 2022-06-06 NOTE — PROGRESS NOTE ADULT - NUTRITIONAL ASSESSMENT
This patient has been assessed with a concern for Malnutrition and has been determined to have a diagnosis/diagnoses of Severe protein-calorie malnutrition and Underweight (BMI < 19).    This patient is being managed with:   Diet NPO with Tube Feed-  Tube Feeding Modality: Jejunostomy  Jevity 1.2 Kt (JEVITY1.2RTH)  Total Volume for 24 Hours (mL): 480  Continuous  Starting Tube Feed Rate {mL per Hour}: 20  Increase Tube Feed Rate by (mL): 0  Until Goal Tube Feed Rate (mL per Hour): 20  Tube Feed Duration (in Hours): 24  Tube Feed Start Time: 08:00  Entered: Jun 6 2022  7:55AM    Parenteral Nutrition - Adult-  Entered: Jun 5 2022  5:00PM

## 2022-06-06 NOTE — PROGRESS NOTE ADULT - SUBJECTIVE AND OBJECTIVE BOX
Anesthesia Pain Management Service- Attending Addendum    SUBJECTIVE: Patient's pain control adequate    Therapy:	  [ X] IV PCA	   [ ] Epidural           [ ] s/p Spinal Opoid              [ ] Postpartum infusion	  [ ] Patient controlled regional anesthesia (PCRA)    [ ] prn Analgesics    Allergies    No Known Allergies    Intolerances      MEDICATIONS  (STANDING):  acetaminophen   IVPB .. 1000 milliGRAM(s) IV Intermittent every 6 hours  chlorhexidine 2% Cloths 1 Application(s) Topical daily  dextrose 50% Injectable 25 Gram(s) IV Push once  dextrose 50% Injectable 12.5 Gram(s) IV Push once  diphenhydrAMINE Injectable 25 milliGRAM(s) IV Push at bedtime  enoxaparin Injectable 40 milliGRAM(s) SubCutaneous every 24 hours  insulin lispro (ADMELOG) corrective regimen sliding scale   SubCutaneous every 6 hours  lactated ringers. 1000 milliLiter(s) (30 mL/Hr) IV Continuous <Continuous>  metoclopramide Injectable 10 milliGRAM(s) IV Push three times a day  pantoprazole  Injectable 40 milliGRAM(s) IV Push daily  Parenteral Nutrition - Adult 1 Each (83 mL/Hr) TPN Continuous <Continuous>  Parenteral Nutrition - Adult 1 Each (83 mL/Hr) TPN Continuous <Continuous>    MEDICATIONS  (PRN):  HYDROmorphone  Injectable 0.5 milliGRAM(s) IV Push every 3 hours PRN Severe Pain (7 - 10)  HYDROmorphone  Injectable 0.25 milliGRAM(s) IV Push every 3 hours PRN Moderate Pain (4 - 6)  nalbuphine Injectable 2.5 milliGRAM(s) IV Push every 6 hours PRN Pruritus  naloxone Injectable 0.1 milliGRAM(s) IV Push every 3 minutes PRN For ANY of the following changes in patient status:  A. RR LESS THAN 10 breaths per minute, B. Oxygen saturation LESS THAN 90%, C. Sedation score of 6      OBJECTIVE:   [X] No new signs     [ ] Other:    Side Effects:  [X ] None			[ ] Other:      ASSESSMENT/PLAN  -Discontinue current therapy    [ ] Therapy changed to:    [ ] IV PCA       [ ] Epidural     [ X] prn Analgesics     Comments: Pain management per primary team, APS to sign off    Note entered after patient seen

## 2022-06-06 NOTE — PROGRESS NOTE ADULT - ASSESSMENT
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 Findings consistent with SBO at level of DJ anastomosis. s/p junojenunostomy and gastrojejunostomy, and GJ tube on 6/3.     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 Findings consistent with SBO at level of DJ anastomosis. s/p junojenunostomy and gastrojejunostomy, and GJ tube on 6/3.     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 Findings consistent with SBO at level of DJ anastomosis. s/p junojenunostomy and gastrojejunostomy, and GJ tube on 6/3.     PLAN  - NPO/IVF, TPN  - 1unit PRBC  - f/u GI function - no flatus, no BM  - Encourage ambulation, PT consult  - pain control   - Trend Cr     D team surgery  [id number]

## 2022-06-06 NOTE — PROGRESS NOTE ADULT - NUTRITIONAL ASSESSMENT
Severe protein calorie malnutrition in acute illness/ injury; secondary to poor appetite, weight loss >5% in 1 month and/or >7.5% in 3 months, caloric Intake <50% of nutrition needs >= 5 days, temporal wasting, severe loss of muscle mass/atrophy and loss of body fat stores.    Diet, NPO with Tube Feed:   Tube Feeding Modality: Jejunostomy  Jevity 1.2 Kt (JEVITY1.2RTH)  Total Volume for 24 Hours (mL): 480  Continuous  Starting Tube Feed Rate {mL per Hour}: 20  Increase Tube Feed Rate by (mL): 0  Until Goal Tube Feed Rate (mL per Hour): 20  Tube Feed Duration (in Hours): 24  Tube Feed Start Time: 08:00 (06-06-22 @ 07:55) [Active]    Parenteral Nutrition - Adult 1 Each (83 mL/Hr) TPN Continuous <Continuous>, 06-06-22 @ 17:00, , Stop order after: 1 Days    **Greater than 50% of the encounter was spent counseling and/coordination of care on parenteral nutrition. 25 minutes were spent face to face with the patient.

## 2022-06-06 NOTE — PROGRESS NOTE ADULT - SUBJECTIVE AND OBJECTIVE BOX
Vital Signs Last 24 Hrs  T(C): 37.3 (06 Jun 2022 05:31), Max: 37.3 (06 Jun 2022 05:31)  T(F): 99.2 (06 Jun 2022 05:31), Max: 99.2 (06 Jun 2022 05:31)  HR: 85 (06 Jun 2022 05:31) (85 - 100)  BP: 125/65 (06 Jun 2022 05:31) (125/65 - 145/69)  BP(mean): --  RR: 18 (06 Jun 2022 05:31) (18 - 18)  SpO2: 99% (06 Jun 2022 05:31) (97% - 100%)    I&O's Detail    05 Jun 2022 07:01  -  06 Jun 2022 07:00  --------------------------------------------------------  IN:    Fat Emulsion (Fish Oil &amp; Plant Based) 20% Infusion: 83.2 mL    Jevity 1.2: 180 mL    Lactated Ringers: 720 mL    TPN (Total Parenteral Nutrition): 1328 mL  Total IN: 2311.2 mL    OUT:    Gastrostomy Tube (mL): 50 mL    Voided (mL): 1425 mL  Total OUT: 1475 mL    Total NET: 836.2 mL                                7.0    6.32  )-----------( 203      ( 06 Jun 2022 06:00 )             22.3       06-06    139  |  108<H>  |  38<H>  ----------------------------<  130<H>  4.6   |  24  |  1.09    Ca    8.1<L>      06 Jun 2022 06:00  Phos  3.1     06-06  Mg     2.10     06-06    No nausea or vomiting but no flatus yet            PLAN:  transfuse one unit PRBC's  Start Reglan 10 mg 3 time daily  OOB  Continue tube feedings at 20 cc/hr. until bowel function returns

## 2022-06-06 NOTE — PROGRESS NOTE ADULT - SUBJECTIVE AND OBJECTIVE BOX
NUTRITION NOTE  [id number] VAUGHN  ===============================    Interval events - Patient was seen and examined at bedside, no acute events overnight. Pt is now on Jevity tube feeds at 20 ml/hr and remains on TPN at this time.     ROS: Except as noted above, all other systems reviewed and are negative     Allergies  No Known Allergies    PAST MEDICAL & SURGICAL HISTORY:  Melanoma nose and left cheek  2yrs ago  HTN (hypertension)  Dyslipidemia  Malignant neoplasm of thyroid gland  Vocal cord nodule   H/O arthroscopy of left knee meniscus  1998  History of Whipple procedure pylorus sparing whipple  at [facility]    FAMILY HISTORY:  Family history of breast cancer (Sibling)  Family history of thyroid cancer (Father)  Family history of lung cancer (Mother)    Vital Signs Last 24 Hrs  T(C): 37.3 (2022 05:31), Max: 37.3 (2022 05:31)  T(F): 99.2 (2022 05:31), Max: 99.2 (2022 05:31)  HR: 85 (2022 05:31) (85 - 100)  BP: 125/65 (2022 05:31) (125/65 - 145/69)  RR: 18 (2022 05:31) (18 - 18)  SpO2: 99% (2022 05:31) (97% - 100%)    MEDICATIONS  (STANDING):  acetaminophen   IVPB .. 1000 milliGRAM(s) IV Intermittent every 6 hours  acetaminophen   IVPB .. 1000 milliGRAM(s) IV Intermittent every 6 hours  chlorhexidine 2% Cloths 1 Application(s) Topical daily  dextrose 50% Injectable 25 Gram(s) IV Push once  dextrose 50% Injectable 12.5 Gram(s) IV Push once  diphenhydrAMINE Injectable 25 milliGRAM(s) IV Push at bedtime  enoxaparin Injectable 40 milliGRAM(s) SubCutaneous every 24 hours  HYDROmorphone PCA (1 mG/mL) 30 milliLiter(s) PCA Continuous PCA Continuous  insulin lispro (ADMELOG) corrective regimen sliding scale   SubCutaneous every 6 hours  lactated ringers. 1000 milliLiter(s) (30 mL/Hr) IV Continuous <Continuous>  metoclopramide Injectable 10 milliGRAM(s) IV Push once  metoclopramide Injectable 10 milliGRAM(s) IV Push three times a day  pantoprazole  Injectable 40 milliGRAM(s) IV Push daily  Parenteral Nutrition - Adult 1 Each (83 mL/Hr) TPN Continuous <Continuous>  Parenteral Nutrition - Adult 1 Each (83 mL/Hr) TPN Continuous <Continuous>    MEDICATIONS  (PRN):  HYDROmorphone PCA (1 mG/mL) Rescue Clinician Bolus 0.5 milliGRAM(s) IV Push every 15 minutes PRN for Pain Scale GREATER THAN 6  nalbuphine Injectable 2.5 milliGRAM(s) IV Push every 6 hours PRN Pruritus  naloxone Injectable 0.1 milliGRAM(s) IV Push every 3 minutes PRN For ANY of the following changes in patient status:  A. RR LESS THAN 10 breaths per minute, B. Oxygen saturation LESS THAN 90%, C. Sedation score of 6    I&O's Detail    2022 07:01  -  2022 07:00  --------------------------------------------------------  IN:    Fat Emulsion (Fish Oil &amp; Plant Based) 20% Infusion: 83.2 mL    Jevity 1.2: 180 mL    Lactated Ringers: 720 mL    TPN (Total Parenteral Nutrition): 1328 mL  Total IN: 2311.2 mL    OUT:    Gastrostomy Tube (mL): 50 mL    Voided (mL): 1425 mL  Total OUT: 1475 mL    Total NET: 836.2 mL    POCT Blood Glucose.: 164 mg/dL (2022 05:28)  POCT Blood Glucose.: 160 mg/dL (2022 23:11)  POCT Blood Glucose.: 142 mg/dL (2022 18:39)  POCT Blood Glucose.: 151 mg/dL (2022 11:41)    Daily Height in cm: 193 (2022 07:30)      Daily Weight in k.4 (26 May 2022 04:58)    Drug Dosing Weight  Height (cm): 193 (24 May 2022 16:24)  Weight (kg): 61.4 (26 May 2022 08:43)  BMI (kg/m2): 16.5 (26 May 2022 08:43)  BSA (m2): 1.88 (26 May 2022 08:43)    PHYSICAL EXAM:  General: NAD, resting comfortably in bed  Neuro: Awake and alert  GI/Abd: Soft, GJ tube in place, G tube output bilious  Extremities: warm, no edema   PICC Site: C/D/I    Diet: tube feeds and TPN (started on 22)    LABORATORY                                             7.0    6.32  )-----------( 203      ( 2022 06:00 )             22.3   06-06    139  |  108<H>  |  38<H>  ----------------------------<  130<H>  4.6   |  24  |  1.09    Ca    8.1<L>      2022 06:00  Phos  3.1     06-06  Mg     2.10     06-06    TPro  5.8<L>  /  Alb  3.1<L>  /  TBili  0.3  /  DBili  x   /  AST  38  /  ALT  35  /  AlkPhos  185<H>  05-29    LIVER FUNCTIONS - ( 29 May 2022 11:20 )  Alb: 3.1 g/dL / Pro: 5.8 g/dL / ALK PHOS: 185 U/L / ALT: 35 U/L / AST: 38 U/L / GGT: x           05- Chol -- LDL -- HDL -- Trig 99    COVID-19 PCR: Detected (2022 06:32)  COVID-19 PCR: Detected (26 May 2022 14:59)    ASSESSMENT/PLAN:  77 y/o male with PMH of HTN, HLD, pancreatic ductal adenocarcinoma s/p pylorus sparing Whipple 2020 ([facility]) with recurrence now on chemo, 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 Findings consistent with SBO at level of duodenojejunostomy anastomosis. Patient states that he eventually developed worsening nausea, initially with decreased tolerance of solid foods and eventual intolerance to liquids as well. Pt states he has lost 25lbs since onset of symptoms. Nutrition support consult called for initiation of TPN in view of severe malnutrition, prolonged period of poor PO intake and nutritional optimization prior to planned surgical intervention this week. Pt is NPO with NGT in place for decompression. Pt is s/p exlap and GJ placement on 6/3.     continue TPN with infusion volume of 2L, TPN will provide 938 kcal/day; 50 gm of SMOF lipids given on  to run over 24 hours; in view of rising Cr and elevated FS, TPN formula adjusted     labs reviewed - electrolytes adjusted in TPN bag     monitor fingersticks, obtain daily weights - decreased to 18 units of insulin in TPN bag     continue parenteral nutrition at this time, will follow up with primary team on plan - monitor tolerance to tube feeds     1.  Severe protein calorie malnutrition being optimized with TPN: CHO [170] gm.  AA [90] gm. SMOF Lipids [0] gm.  2.  Hyperglycemia managed with: [18] units of regular insulin    3.  Check fluid balance daily.  Strict I/O  [ ] [ ]   4.  Daily BMP, Ionized Calcium, Magnesium and Phosphorous   5.  Triglycerides at initiation of TPN and monthly [ ] [ ]     Nutrition Support [id number]

## 2022-06-06 NOTE — PROGRESS NOTE ADULT - SUBJECTIVE AND OBJECTIVE BOX
Anesthesia Pain Management Service    SUBJECTIVE: Patient is doing well with IV PCA and no significant problems reported.    Pain Scale Score	At rest: _6/10__ 	With Activity: ___ 	[X ] Refer to charted pain scores    THERAPY:    [ ] IV PCA Morphine		[ ] 5 mg/mL	[ ] 1 mg/mL  [X ] IV PCA Hydromorphone	[ ] 5 mg/mL	[X ] 1 mg/mL  [ ] IV PCA Fentanyl		[ ] 50 micrograms/mL    Demand dose __0.2_ lockout __6_ (minutes) Continuous Rate _0__ Total: __0.8_  mg used (in past 24 hours)      MEDICATIONS  (STANDING):  acetaminophen   IVPB .. 1000 milliGRAM(s) IV Intermittent every 6 hours  chlorhexidine 2% Cloths 1 Application(s) Topical daily  dextrose 50% Injectable 25 Gram(s) IV Push once  dextrose 50% Injectable 12.5 Gram(s) IV Push once  diphenhydrAMINE Injectable 25 milliGRAM(s) IV Push at bedtime  enoxaparin Injectable 40 milliGRAM(s) SubCutaneous every 24 hours  HYDROmorphone PCA (1 mG/mL) 30 milliLiter(s) PCA Continuous PCA Continuous  insulin lispro (ADMELOG) corrective regimen sliding scale   SubCutaneous every 6 hours  lactated ringers. 1000 milliLiter(s) (30 mL/Hr) IV Continuous <Continuous>  pantoprazole  Injectable 40 milliGRAM(s) IV Push daily  Parenteral Nutrition - Adult 1 Each (83 mL/Hr) TPN Continuous <Continuous>    MEDICATIONS  (PRN):  HYDROmorphone PCA (1 mG/mL) Rescue Clinician Bolus 0.5 milliGRAM(s) IV Push every 15 minutes PRN for Pain Scale GREATER THAN 6  nalbuphine Injectable 2.5 milliGRAM(s) IV Push every 6 hours PRN Pruritus  naloxone Injectable 0.1 milliGRAM(s) IV Push every 3 minutes PRN For ANY of the following changes in patient status:  A. RR LESS THAN 10 breaths per minute, B. Oxygen saturation LESS THAN 90%, C. Sedation score of 6      OBJECTIVE: Patient sitting up in bed.    Sedation Score:	[ X] Alert	[ ] Drowsy 	[ ] Arousable	[ ] Asleep	[ ] Unresponsive    Side Effects:	[X ] None	[ ] Nausea	[ ] Vomiting	[ ] Pruritus  		[ ] Other:    Vital Signs Last 24 Hrs  T(C): 37.3 (06 Jun 2022 05:31), Max: 37.3 (06 Jun 2022 05:31)  T(F): 99.2 (06 Jun 2022 05:31), Max: 99.2 (06 Jun 2022 05:31)  HR: 85 (06 Jun 2022 05:31) (85 - 100)  BP: 125/65 (06 Jun 2022 05:31) (125/65 - 146/68)  BP(mean): --  RR: 18 (06 Jun 2022 05:31) (18 - 18)  SpO2: 99% (06 Jun 2022 05:31) (97% - 100%)    ASSESSMENT/ PLAN    Therapy to  be:	[ X] Continue   [ ] Discontinued   [ ] Change to prn Analgesics    Documentation and Verification of current medications:   [X] Done	[ ] Not done, not elligible    Comments: Continue IV PCA. Recommend non-opioid adjuvant analgesics to be used when possible and when allowed by primary surgical team.    Progress Note written now but Patient was seen earlier.

## 2022-06-06 NOTE — PROGRESS NOTE ADULT - SUBJECTIVE AND OBJECTIVE BOX
Subjective:   Patient seen at bedside this AM. Reports feeling well, without complaints. Denies chest pain, SOB. Tolerating diet without N/V.     24h Events:   - Overnight, no acute events    Objective:  Vital Signs  T(C): 37.1 (06-05 @ 23:07), Max: 37.1 (06-05 @ 05:26)  HR: 87 (06-05 @ 23:07) (87 - 103)  BP: 129/60 (06-05 @ 23:07) (126/66 - 146/68)  RR: 18 (06-05 @ 23:07) (18 - 19)  SpO2: 100% (06-05 @ 23:07) (97% - 100%)  06-04-22 @ 07:01  -  06-05-22 @ 07:00  --------------------------------------------------------  IN:  Total IN: 0 mL    OUT:    Gastrostomy Tube (mL): 5 mL    Voided (mL): 820 mL  Total OUT: 825 mL    Total NET: -825 mL      06-05-22 @ 07:01  -  06-06-22 @ 01:08  --------------------------------------------------------  IN:  Total IN: 0 mL    OUT:    Voided (mL): 1125 mL  Total OUT: 1125 mL    Total NET: -1125 mL        Physical Exam:  General: Appears well, NAD  CHEST: breathing comfortably  CV: appears well perfused  Abdomen: soft, nontender, nondistended, no rebound or guarding, GJ tube in place, provena  Extremities: Grossly symmetric    Labs:                        7.5    11.17 )-----------( 202      ( 05 Jun 2022 04:55 )             24.5   06-05    138  |  106  |  44<H>  ----------------------------<  141<H>  4.7   |  27  |  1.29    Ca    8.3<L>      05 Jun 2022 04:55  Phos  3.1     06-05  Mg     1.80     06-05      CAPILLARY BLOOD GLUCOSE      POCT Blood Glucose.: 160 mg/dL (05 Jun 2022 23:11)  POCT Blood Glucose.: 142 mg/dL (05 Jun 2022 18:39)  POCT Blood Glucose.: 151 mg/dL (05 Jun 2022 11:41)  POCT Blood Glucose.: 146 mg/dL (05 Jun 2022 05:25)  POCT Blood Glucose.: 146 mg/dL (05 Jun 2022 01:20)      Medications:   MEDICATIONS  (STANDING):  chlorhexidine 2% Cloths 1 Application(s) Topical daily  dextrose 50% Injectable 12.5 Gram(s) IV Push once  dextrose 50% Injectable 25 Gram(s) IV Push once  diphenhydrAMINE Injectable 25 milliGRAM(s) IV Push at bedtime  enoxaparin Injectable 40 milliGRAM(s) SubCutaneous every 24 hours  HYDROmorphone PCA (1 mG/mL) 30 milliLiter(s) PCA Continuous PCA Continuous  insulin lispro (ADMELOG) corrective regimen sliding scale   SubCutaneous every 6 hours  lactated ringers. 1000 milliLiter(s) (30 mL/Hr) IV Continuous <Continuous>  pantoprazole  Injectable 40 milliGRAM(s) IV Push daily  Parenteral Nutrition - Adult 1 Each (83 mL/Hr) TPN Continuous <Continuous>    MEDICATIONS  (PRN):  HYDROmorphone PCA (1 mG/mL) Rescue Clinician Bolus 0.5 milliGRAM(s) IV Push every 15 minutes PRN for Pain Scale GREATER THAN 6  nalbuphine Injectable 2.5 milliGRAM(s) IV Push every 6 hours PRN Pruritus  naloxone Injectable 0.1 milliGRAM(s) IV Push every 3 minutes PRN For ANY of the following changes in patient status:  A. RR LESS THAN 10 breaths per minute, B. Oxygen saturation LESS THAN 90%, C. Sedation score of 6      Imaging:     Subjective:   Patient seen at bedside this AM. Reports feeling well, without complaints. Denies chest pain, SOB. NPO, GJtube, Jtube for feeding, Gtube to gravity. No gas/BM,    24h Events:   - Overnight, no acute events    Objective:  Vital Signs  T(C): 37.1 (06-05 @ 23:07), Max: 37.1 (06-05 @ 05:26)  HR: 87 (06-05 @ 23:07) (87 - 103)  BP: 129/60 (06-05 @ 23:07) (126/66 - 146/68)  RR: 18 (06-05 @ 23:07) (18 - 19)  SpO2: 100% (06-05 @ 23:07) (97% - 100%)  06-04-22 @ 07:01  -  06-05-22 @ 07:00  --------------------------------------------------------  IN:  Total IN: 0 mL    OUT:    Gastrostomy Tube (mL): 5 mL    Voided (mL): 820 mL  Total OUT: 825 mL    Total NET: -825 mL      06-05-22 @ 07:01  -  06-06-22 @ 01:08  --------------------------------------------------------  IN:  Total IN: 0 mL    OUT:    Voided (mL): 1125 mL  Total OUT: 1125 mL    Total NET: -1125 mL        Physical Exam:  General: Appears well, NAD  CHEST: breathing comfortably  CV: appears well perfused  Abdomen: soft, nontender, nondistended, no rebound or guarding, GJ tube in place, G tube output bilious  Extremities: Grossly symmetric    Labs:                        7.5    11.17 )-----------( 202      ( 05 Jun 2022 04:55 )             24.5   06-05    138  |  106  |  44<H>  ----------------------------<  141<H>  4.7   |  27  |  1.29    Ca    8.3<L>      05 Jun 2022 04:55  Phos  3.1     06-05  Mg     1.80     06-05      CAPILLARY BLOOD GLUCOSE      POCT Blood Glucose.: 160 mg/dL (05 Jun 2022 23:11)  POCT Blood Glucose.: 142 mg/dL (05 Jun 2022 18:39)  POCT Blood Glucose.: 151 mg/dL (05 Jun 2022 11:41)  POCT Blood Glucose.: 146 mg/dL (05 Jun 2022 05:25)  POCT Blood Glucose.: 146 mg/dL (05 Jun 2022 01:20)      Medications:   MEDICATIONS  (STANDING):  chlorhexidine 2% Cloths 1 Application(s) Topical daily  dextrose 50% Injectable 12.5 Gram(s) IV Push once  dextrose 50% Injectable 25 Gram(s) IV Push once  diphenhydrAMINE Injectable 25 milliGRAM(s) IV Push at bedtime  enoxaparin Injectable 40 milliGRAM(s) SubCutaneous every 24 hours  HYDROmorphone PCA (1 mG/mL) 30 milliLiter(s) PCA Continuous PCA Continuous  insulin lispro (ADMELOG) corrective regimen sliding scale   SubCutaneous every 6 hours  lactated ringers. 1000 milliLiter(s) (30 mL/Hr) IV Continuous <Continuous>  pantoprazole  Injectable 40 milliGRAM(s) IV Push daily  Parenteral Nutrition - Adult 1 Each (83 mL/Hr) TPN Continuous <Continuous>    MEDICATIONS  (PRN):  HYDROmorphone PCA (1 mG/mL) Rescue Clinician Bolus 0.5 milliGRAM(s) IV Push every 15 minutes PRN for Pain Scale GREATER THAN 6  nalbuphine Injectable 2.5 milliGRAM(s) IV Push every 6 hours PRN Pruritus  naloxone Injectable 0.1 milliGRAM(s) IV Push every 3 minutes PRN For ANY of the following changes in patient status:  A. RR LESS THAN 10 breaths per minute, B. Oxygen saturation LESS THAN 90%, C. Sedation score of 6      Imaging:

## 2022-06-07 NOTE — PROGRESS NOTE ADULT - SUBJECTIVE AND OBJECTIVE BOX
NUTRITION NOTE  [id number] VAUGHN  ===============================    Interval events - Patient was seen and examined at bedside, no acute events overnight. Pt is now on Jevity tube feeds at 20 ml/hr; TPN volume and calories decreased today.    ROS: Except as noted above, all other systems reviewed and are negative     Allergies  No Known Allergies    PAST MEDICAL & SURGICAL HISTORY:  Melanoma nose and left cheek  2yrs ago  HTN (hypertension)  Dyslipidemia  Malignant neoplasm of thyroid gland  Vocal cord nodule   H/O arthroscopy of left knee meniscus  1998  History of Whipple procedure pylorus sparing whipple  at [facility]    FAMILY HISTORY:  Family history of breast cancer (Sibling)  Family history of thyroid cancer (Father)  Family history of lung cancer (Mother)    Vital Signs Last 24 Hrs  T(C): 36.6 (2022 08:45), Max: 37.1 (2022 16:14)  T(F): 97.9 (2022 08:45), Max: 98.7 (2022 16:14)  HR: 81 (2022 08:45) (75 - 88)  BP: 124/66 (2022 08:45) (121/62 - 136/64)  RR: 18 (2022 08:45) (18 - 18)  SpO2: 99% (2022 08:45) (96% - 100%)    MEDICATIONS  (STANDING):  acetaminophen   IVPB .. 1000 milliGRAM(s) IV Intermittent every 6 hours  chlorhexidine 2% Cloths 1 Application(s) Topical daily  dextrose 50% Injectable 25 Gram(s) IV Push once  dextrose 50% Injectable 12.5 Gram(s) IV Push once  diphenhydrAMINE Injectable 25 milliGRAM(s) IV Push at bedtime  enoxaparin Injectable 40 milliGRAM(s) SubCutaneous every 24 hours  insulin lispro (ADMELOG) corrective regimen sliding scale   SubCutaneous every 6 hours  metoclopramide Injectable 10 milliGRAM(s) IV Push three times a day  pantoprazole  Injectable 40 milliGRAM(s) IV Push daily  Parenteral Nutrition - Adult 1 Each (83 mL/Hr) TPN Continuous <Continuous>  Parenteral Nutrition - Adult 1 Each (42 mL/Hr) TPN Continuous <Continuous>    MEDICATIONS  (PRN):  HYDROmorphone  Injectable 0.5 milliGRAM(s) IV Push every 3 hours PRN Severe Pain (7 - 10)  HYDROmorphone  Injectable 0.25 milliGRAM(s) IV Push every 3 hours PRN Moderate Pain (4 - 6)  nalbuphine Injectable 2.5 milliGRAM(s) IV Push every 6 hours PRN Pruritus  naloxone Injectable 0.1 milliGRAM(s) IV Push every 3 minutes PRN For ANY of the following changes in patient status:  A. RR LESS THAN 10 breaths per minute, B. Oxygen saturation LESS THAN 90%, C. Sedation score of 6    I&O's Detail    2022 07:01  -  2022 07:00  --------------------------------------------------------  IN:    Jevity 1.2: 480 mL    Lactated Ringers: 150 mL    TPN (Total Parenteral Nutrition): 1992 mL  Total IN: 2622 mL    OUT:    Gastrostomy Tube (mL): 100 mL    Voided (mL): 2070 mL  Total OUT: 2170 mL    Total NET: 452 mL    POCT Blood Glucose.: 148 mg/dL (2022 05:14)  POCT Blood Glucose.: 167 mg/dL (2022 01:22)  POCT Blood Glucose.: 170 mg/dL (2022 18:13)  POCT Blood Glucose.: 179 mg/dL (2022 12:36)    Daily Height in cm: 193 (2022 07:30)      Daily Weight in k.4 (26 May 2022 04:58)    Drug Dosing Weight  Height (cm): 193 (24 May 2022 16:24)  Weight (kg): 61.4 (26 May 2022 08:43)  BMI (kg/m2): 16.5 (26 May 2022 08:43)  BSA (m2): 1.88 (26 May 2022 08:43)    PHYSICAL EXAM:  General: NAD, resting comfortably in bed  Neuro: Awake and alert  GI/Abd: Soft, GJ tube in place, G tube output bilious  Extremities: warm, no edema   PICC Site: C/D/I    Diet: tube feeds and TPN (started on 22)    LABORATORY                                                      7.8    4.60  )-----------( 219      ( 2022 05:05 )             24.0   06-07    137  |  105  |  38<H>  ----------------------------<  131<H>  4.1   |  22  |  0.94    Ca    7.9<L>      2022 05:05  Phos  3.1     06-07  Mg     2.00     06-07    TPro  5.8<L>  /  Alb  3.1<L>  /  TBili  0.3  /  DBili  x   /  AST  38  /  ALT  35  /  AlkPhos  185<H>      LIVER FUNCTIONS - ( 29 May 2022 11:20 )  Alb: 3.1 g/dL / Pro: 5.8 g/dL / ALK PHOS: 185 U/L / ALT: 35 U/L / AST: 38 U/L / GGT: x            Chol -- LDL -- HDL -- Trig 99    COVID-19 PCR: Detected (2022 06:32)  COVID-19 PCR: Detected (26 May 2022 14:59)    ASSESSMENT/PLAN:  75 y/o male with PMH of HTN, HLD, pancreatic ductal adenocarcinoma s/p pylorus sparing Whipple 2020 ([facility]) with recurrence now on chemo, 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 Findings consistent with SBO at level of duodenojejunostomy anastomosis. Patient states that he eventually developed worsening nausea, initially with decreased tolerance of solid foods and eventual intolerance to liquids as well. Pt states he has lost 25lbs since onset of symptoms. Nutrition support consult called for initiation of TPN in view of severe malnutrition, prolonged period of poor PO intake and nutritional optimization prior to planned surgical intervention this week. Pt is NPO with NGT in place for decompression. Pt is s/p exlap and GJ placement on 6/3.     TPN infusion volume decreased to 1L, TPN will provide 520 kcal/day    labs reviewed - electrolytes adjusted in TPN bag     monitor fingersticks, obtain daily weights - decreased to 10 units of insulin in TPN bag     continue parenteral nutrition at this time, will follow up with primary team on plan - monitor tolerance to tube feeds, plan to wean off TPN     1.  Severe protein calorie malnutrition being optimized with TPN: CHO [100] gm.  AA [45] gm. SMOF Lipids [0] gm.  2.  Hyperglycemia managed with: [10] units of regular insulin    3.  Check fluid balance daily.  Strict I/O  [ ] [ ]   4.  Daily BMP, Ionized Calcium, Magnesium and Phosphorous   5.  Triglycerides at initiation of TPN and monthly [ ] [ ]     Nutrition Support [id number]

## 2022-06-07 NOTE — PROGRESS NOTE ADULT - ASSESSMENT
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 Findings consistent with SBO at level of DJ anastomosis. s/p junojenunostomy and gastrojejunostomy, and GJ tube on 6/3.     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 Findings consistent with SBO at level of DJ anastomosis. s/p junojenunostomy and gastrojejunostomy, and GJ tube on 6/3.     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 Findings consistent with SBO at level of DJ anastomosis. s/p junojenunostomy and gastrojejunostomy, and GJ tube on 6/3.     PLAN  - Off COVID precautions today per infection control  - NPO with sips/IVF  - TPN - halve today  - j-tube feeds (increased to 20cc/hr)  - Min g-tube output - clamp today  - 6/6 1unit PRBC - f/u H&H  - f/u GI function +flatus +BM  - Encourage ambulation  - PT reconsulted - recommends rehab  - Pain control   - Trend Cr     D team surgery  [id number]

## 2022-06-07 NOTE — PROGRESS NOTE ADULT - NUTRITIONAL ASSESSMENT
Severe protein calorie malnutrition in acute illness/ injury; secondary to poor appetite, weight loss >5% in 1 month and/or >7.5% in 3 months, caloric Intake <50% of nutrition needs >= 5 days, temporal wasting, severe loss of muscle mass/atrophy and loss of body fat stores.    Diet, NPO with Tube Feed:   Tube Feeding Modality: Jejunostomy  Jevity 1.2 Kt (JEVITY1.2RTH)  Total Volume for 24 Hours (mL): 480  Continuous  Starting Tube Feed Rate {mL per Hour}: 20  Increase Tube Feed Rate by (mL): 0  Until Goal Tube Feed Rate (mL per Hour): 20  Tube Feed Duration (in Hours): 24  Tube Feed Start Time: 08:00 (06-06-22 @ 07:55) [Active]    Parenteral Nutrition - Adult 1 Each (42 mL/Hr) TPN Continuous <Continuous>, 06-07-22 @ 17:00, , Stop order after: 1 Days    **Greater than 50% of the encounter was spent counseling and/coordination of care on parenteral nutrition. 25 minutes were spent face to face with the patient.

## 2022-06-07 NOTE — PROGRESS NOTE ADULT - SUBJECTIVE AND OBJECTIVE BOX
Vital Signs Last 24 Hrs  T(C): 36.5 (07 Jun 2022 05:00), Max: 37.2 (06 Jun 2022 08:00)  T(F): 97.7 (07 Jun 2022 05:00), Max: 98.9 (06 Jun 2022 08:00)  HR: 77 (07 Jun 2022 05:00) (75 - 89)  BP: 121/62 (07 Jun 2022 05:00) (121/62 - 136/64)  BP(mean): --  RR: 18 (07 Jun 2022 05:00) (18 - 18)  SpO2: 96% (07 Jun 2022 05:00) (96% - 100%)    I&O's Detail    06 Jun 2022 07:01  -  07 Jun 2022 07:00  --------------------------------------------------------  IN:    Jevity 1.2: 480 mL    Lactated Ringers: 150 mL    TPN (Total Parenteral Nutrition): 1992 mL  Total IN: 2622 mL    OUT:    Gastrostomy Tube (mL): 100 mL    Voided (mL): 2070 mL  Total OUT: 2170 mL    Total NET: 452 mL                                7.8    4.60  )-----------( 219      ( 07 Jun 2022 05:05 )             24.0       06-07    137  |  105  |  38<H>  ----------------------------<  131<H>  4.1   |  22  |  0.94    Ca    7.9<L>      07 Jun 2022 05:05  Phos  3.1     06-07  Mg     2.00     06-07    patient had a BM this AM            PLAN:  Clamp   G-tube port  Ambulate  NPO for today

## 2022-06-07 NOTE — PROGRESS NOTE ADULT - SUBJECTIVE AND OBJECTIVE BOX
Subjective:   Patient seen at bedside this AM. Reports feeling well, without complaints. Denies chest pain, SOB. Tolerating diet without N/V.     24h Events:   - Overnight, no acute events    Objective:  Vital Signs  T(C): 37.1 (06-06 @ 16:14), Max: 37.3 (06-06 @ 05:31)  HR: 79 (06-06 @ 16:14) (75 - 89)  BP: 125/67 (06-06 @ 16:14) (123/69 - 128/64)  RR: 18 (06-06 @ 16:14) (18 - 18)  SpO2: 99% (06-06 @ 16:14) (98% - 100%)  06-05-22 @ 07:01  -  06-06-22 @ 07:00  --------------------------------------------------------  IN:  Total IN: 0 mL    OUT:    Gastrostomy Tube (mL): 50 mL    Voided (mL): 1425 mL  Total OUT: 1475 mL    Total NET: -1475 mL      06-06-22 @ 07:01  -  06-07-22 @ 01:02  --------------------------------------------------------  IN:  Total IN: 0 mL    OUT:    Gastrostomy Tube (mL): 0 mL    Voided (mL): 1570 mL  Total OUT: 1570 mL    Total NET: -1570 mL        Labs:                        7.0    6.32  )-----------( 203      ( 06 Jun 2022 06:00 )             22.3   06-06    139  |  108<H>  |  38<H>  ----------------------------<  130<H>  4.6   |  24  |  1.09    Ca    8.1<L>      06 Jun 2022 06:00  Phos  3.1     06-06  Mg     2.10     06-06      CAPILLARY BLOOD GLUCOSE      POCT Blood Glucose.: 170 mg/dL (06 Jun 2022 18:13)  POCT Blood Glucose.: 179 mg/dL (06 Jun 2022 12:36)  POCT Blood Glucose.: 164 mg/dL (06 Jun 2022 05:28)      Medications:   MEDICATIONS  (STANDING):  acetaminophen   IVPB .. 1000 milliGRAM(s) IV Intermittent every 6 hours  chlorhexidine 2% Cloths 1 Application(s) Topical daily  dextrose 50% Injectable 25 Gram(s) IV Push once  dextrose 50% Injectable 12.5 Gram(s) IV Push once  diphenhydrAMINE Injectable 25 milliGRAM(s) IV Push at bedtime  enoxaparin Injectable 40 milliGRAM(s) SubCutaneous every 24 hours  insulin lispro (ADMELOG) corrective regimen sliding scale   SubCutaneous every 6 hours  metoclopramide Injectable 10 milliGRAM(s) IV Push three times a day  pantoprazole  Injectable 40 milliGRAM(s) IV Push daily  Parenteral Nutrition - Adult 1 Each (83 mL/Hr) TPN Continuous <Continuous>    MEDICATIONS  (PRN):  HYDROmorphone  Injectable 0.5 milliGRAM(s) IV Push every 3 hours PRN Severe Pain (7 - 10)  HYDROmorphone  Injectable 0.25 milliGRAM(s) IV Push every 3 hours PRN Moderate Pain (4 - 6)  nalbuphine Injectable 2.5 milliGRAM(s) IV Push every 6 hours PRN Pruritus  naloxone Injectable 0.1 milliGRAM(s) IV Push every 3 minutes PRN For ANY of the following changes in patient status:  A. RR LESS THAN 10 breaths per minute, B. Oxygen saturation LESS THAN 90%, C. Sedation score of 6      Imaging:     Subjective:   Patient seen at bedside this AM. Reports feeling well. Requests PO liquids. Denies nausea, vomiting, bowel function.    24h Events:   - Overnight, no acute events.    Objective:  Vital Signs  T(C): 37.1 (06-06 @ 16:14), Max: 37.3 (06-06 @ 05:31)  HR: 79 (06-06 @ 16:14) (75 - 89)  BP: 125/67 (06-06 @ 16:14) (123/69 - 128/64)  RR: 18 (06-06 @ 16:14) (18 - 18)  SpO2: 99% (06-06 @ 16:14) (98% - 100%)  06-05-22 @ 07:01  -  06-06-22 @ 07:00  --------------------------------------------------------  IN:  Total IN: 0 mL    OUT:    Gastrostomy Tube (mL): 50 mL    Voided (mL): 1425 mL  Total OUT: 1475 mL    Total NET: -1475 mL      06-06-22 @ 07:01  -  06-07-22 @ 01:02  --------------------------------------------------------  IN:  Total IN: 0 mL    OUT:    Gastrostomy Tube (mL): 0 mL    Voided (mL): 1570 mL  Total OUT: 1570 mL    Total NET: -1570 mL        Labs:                        7.0    6.32  )-----------( 203      ( 06 Jun 2022 06:00 )             22.3   06-06    139  |  108<H>  |  38<H>  ----------------------------<  130<H>  4.6   |  24  |  1.09    Ca    8.1<L>      06 Jun 2022 06:00  Phos  3.1     06-06  Mg     2.10     06-06      CAPILLARY BLOOD GLUCOSE      POCT Blood Glucose.: 170 mg/dL (06 Jun 2022 18:13)  POCT Blood Glucose.: 179 mg/dL (06 Jun 2022 12:36)  POCT Blood Glucose.: 164 mg/dL (06 Jun 2022 05:28)      Medications:   MEDICATIONS  (STANDING):  acetaminophen   IVPB .. 1000 milliGRAM(s) IV Intermittent every 6 hours  chlorhexidine 2% Cloths 1 Application(s) Topical daily  dextrose 50% Injectable 25 Gram(s) IV Push once  dextrose 50% Injectable 12.5 Gram(s) IV Push once  diphenhydrAMINE Injectable 25 milliGRAM(s) IV Push at bedtime  enoxaparin Injectable 40 milliGRAM(s) SubCutaneous every 24 hours  insulin lispro (ADMELOG) corrective regimen sliding scale   SubCutaneous every 6 hours  metoclopramide Injectable 10 milliGRAM(s) IV Push three times a day  pantoprazole  Injectable 40 milliGRAM(s) IV Push daily  Parenteral Nutrition - Adult 1 Each (83 mL/Hr) TPN Continuous <Continuous>    MEDICATIONS  (PRN):  HYDROmorphone  Injectable 0.5 milliGRAM(s) IV Push every 3 hours PRN Severe Pain (7 - 10)  HYDROmorphone  Injectable 0.25 milliGRAM(s) IV Push every 3 hours PRN Moderate Pain (4 - 6)  nalbuphine Injectable 2.5 milliGRAM(s) IV Push every 6 hours PRN Pruritus  naloxone Injectable 0.1 milliGRAM(s) IV Push every 3 minutes PRN For ANY of the following changes in patient status:  A. RR LESS THAN 10 breaths per minute, B. Oxygen saturation LESS THAN 90%, C. Sedation score of 6      Imaging:     Subjective:   Patient seen at bedside this AM. Reports feeling well. Requests PO liquids. Denies nausea, vomiting. Had bowel function in AM.    24h Events:   - Overnight, no acute events.    Objective:  Vital Signs  T(C): 37.1 (06-06 @ 16:14), Max: 37.3 (06-06 @ 05:31)  HR: 79 (06-06 @ 16:14) (75 - 89)  BP: 125/67 (06-06 @ 16:14) (123/69 - 128/64)  RR: 18 (06-06 @ 16:14) (18 - 18)  SpO2: 99% (06-06 @ 16:14) (98% - 100%)  06-05-22 @ 07:01  -  06-06-22 @ 07:00  --------------------------------------------------------  IN:  Total IN: 0 mL    OUT:    Gastrostomy Tube (mL): 50 mL    Voided (mL): 1425 mL  Total OUT: 1475 mL    Total NET: -1475 mL      06-06-22 @ 07:01  -  06-07-22 @ 01:02  --------------------------------------------------------  IN:  Total IN: 0 mL    OUT:    Gastrostomy Tube (mL): 0 mL    Voided (mL): 1570 mL  Total OUT: 1570 mL    Total NET: -1570 mL        Labs:                        7.0    6.32  )-----------( 203      ( 06 Jun 2022 06:00 )             22.3   06-06    139  |  108<H>  |  38<H>  ----------------------------<  130<H>  4.6   |  24  |  1.09    Ca    8.1<L>      06 Jun 2022 06:00  Phos  3.1     06-06  Mg     2.10     06-06      CAPILLARY BLOOD GLUCOSE      POCT Blood Glucose.: 170 mg/dL (06 Jun 2022 18:13)  POCT Blood Glucose.: 179 mg/dL (06 Jun 2022 12:36)  POCT Blood Glucose.: 164 mg/dL (06 Jun 2022 05:28)      Medications:   MEDICATIONS  (STANDING):  acetaminophen   IVPB .. 1000 milliGRAM(s) IV Intermittent every 6 hours  chlorhexidine 2% Cloths 1 Application(s) Topical daily  dextrose 50% Injectable 25 Gram(s) IV Push once  dextrose 50% Injectable 12.5 Gram(s) IV Push once  diphenhydrAMINE Injectable 25 milliGRAM(s) IV Push at bedtime  enoxaparin Injectable 40 milliGRAM(s) SubCutaneous every 24 hours  insulin lispro (ADMELOG) corrective regimen sliding scale   SubCutaneous every 6 hours  metoclopramide Injectable 10 milliGRAM(s) IV Push three times a day  pantoprazole  Injectable 40 milliGRAM(s) IV Push daily  Parenteral Nutrition - Adult 1 Each (83 mL/Hr) TPN Continuous <Continuous>    MEDICATIONS  (PRN):  HYDROmorphone  Injectable 0.5 milliGRAM(s) IV Push every 3 hours PRN Severe Pain (7 - 10)  HYDROmorphone  Injectable 0.25 milliGRAM(s) IV Push every 3 hours PRN Moderate Pain (4 - 6)  nalbuphine Injectable 2.5 milliGRAM(s) IV Push every 6 hours PRN Pruritus  naloxone Injectable 0.1 milliGRAM(s) IV Push every 3 minutes PRN For ANY of the following changes in patient status:  A. RR LESS THAN 10 breaths per minute, B. Oxygen saturation LESS THAN 90%, C. Sedation score of 6      Imaging:

## 2022-06-07 NOTE — PROGRESS NOTE ADULT - NUTRITIONAL ASSESSMENT
This patient has been assessed with a concern for Malnutrition and has been determined to have a diagnosis/diagnoses of Severe protein-calorie malnutrition and Underweight (BMI < 19).    This patient is being managed with:   Parenteral Nutrition - Adult-  Entered: Jun 6 2022  5:00PM    Diet NPO with Tube Feed-  Tube Feeding Modality: Jejunostomy  Jevity 1.2 Kt (JEVITY1.2RTH)  Total Volume for 24 Hours (mL): 480  Continuous  Starting Tube Feed Rate {mL per Hour}: 20  Increase Tube Feed Rate by (mL): 0  Until Goal Tube Feed Rate (mL per Hour): 20  Tube Feed Duration (in Hours): 24  Tube Feed Start Time: 08:00  Entered: Jun 6 2022  7:55AM

## 2022-06-08 NOTE — PROGRESS NOTE ADULT - NS ATTEND AMEND GEN_ALL_CORE FT
I agree with the above history, physical examination, chief complaint/diagnosis, and plan, which I have reviewed and edited where appropriate.  I agree with notes/assessment and detailed interval history of health care providers on my service.  I have seen and examined the patient.  I reviewed the laboratory and available data and agree with the history, physical assessment and plan.  I reviewed and discussed with all consultants, house staff and PA's.  The Nutrition Support Team (NST) discusses on an ongoing basis with the primary team and all consultants, House staff and PA's to have a permanent risk benefit analyses on all decisions and coordinating care.  I was physically present for the key portions of the evaluation and management (E/M) service provided.  75 y/o male s/p pylorus sparing Whipple receiving TPN in view of severe CP malnutrition, prolonged period of poor PO intake    resting comfortably in bed  Neuro: Awake and alert  Lung clear  GI/Abd: Soft, NT/ND,  Extremities: warm  complete labs not resulted,  continue TPN with infusion volume of 2L/ 1705 kcal/day/50 gm of SMOF lipids
I agree with the above history, physical examination, chief complaint/diagnosis, and plan, which I have reviewed and edited where appropriate.  I agree with notes/assessment and detailed interval history of health care providers on my service.  I have seen and examined the patient.  I reviewed the laboratory and available data and agree with the history, physical assessment and plan.  I reviewed and discussed with all consultants, house staff and PA's.  The Nutrition Support Team (NST) discusses on an ongoing basis with the primary team and all consultants, House staff and PA's to have a permanent risk benefit analyses on all decisions and coordinating care.  I was physically present for the key portions of the evaluation and management (E/M) service provided.  75 y/o male s/p pylorus sparing Whipple receiving TPN in view of severe malnutrition, prolonged period of poor PO intake;  s/p exlap and GJ placement   NAD,   Neuro: Awake and alert  GI/Abd: Soft, GJ tube in place, G tube clamped  Extremities: warm,  patient advanced to clear liquid diet.  Plan to d/c parenteral nutrition today
I agree with the above history, physical examination, chief complaint/diagnosis, and plan, which I have reviewed and edited where appropriate.  I agree with notes/assessment and detailed interval history of health care providers on my service.  I have seen and examined the patient.  I reviewed the laboratory and available data and agree with the history, physical assessment and plan.  I reviewed and discussed with all consultants, house staff and PA's.  The Nutrition Support Team (NST) discusses on an ongoing basis with the primary team and all consultants, House staff and PA's to have a permanent risk benefit analyses on all decisions and coordinating care.  I was physically present for the key portions of the evaluation and management (E/M) service provided.  75 y/o male with PMH of HTN, HLD, pancreatic ductal adenocarcinoma s/p pylorus sparing Whipple receiving TPN in view of severe CP malnutrition, prolonged period of poor PO intake.  NAD,   Neuro: Awake and alert  LUNG clear  GI/Abd: Soft, NT/ND,   Extremities: warm,  labs reviewed - electrolytes adjusted   continue TPN with infusion volume of 2L, / 1205 kcal/day/50 gm of SMOF lipids
I agree with the above history, physical examination, chief complaint/diagnosis, and plan, which I have reviewed and edited where appropriate.  I agree with notes/assessment and detailed interval history of health care providers on my service.  I have seen and examined the patient.  I reviewed the laboratory and available data and agree with the history, physical assessment and plan.  I reviewed and discussed with all consultants, house staff and PA's.  The Nutrition Support Team (NST) discusses on an ongoing basis with the primary team and all consultants, House staff and PA's to have a permanent risk benefit analyses on all decisions and coordinating care.  I was physically present for the key portions of the evaluation and management (E/M) service provided.  77 y/o male s/p pylorus sparing Whipple receiving TPN in view of severe CP malnutrition; s/p exlap and GJ placement   NAD  Neuro: Awake and alert  HEART Clear  GI/Abd: Soft, GJ tube in place  Extremities: warm,  labs reviewed - electrolytes adjusted  TPN infusion volume decreased to 1L/520 kcal/day
I agree with the above history, physical examination, chief complaint/diagnosis, and plan, which I have reviewed and edited where appropriate.  I agree with notes/assessment and detailed interval history of health care providers on my service.  I have seen and examined the patient.  I reviewed the laboratory and available data and agree with the history, physical assessment and plan.  I reviewed and discussed with all consultants, house staff and PA's.  The Nutrition Support Team (NST) discusses on an ongoing basis with the primary team and all consultants, House staff and PA's to have a permanent risk benefit analyses on all decisions and coordinating care.  I was physically present for the key portions of the evaluation and management (E/M) service provided.  77 y/o male with PMH of HTN, HLD, pancreatic ductal adenocarcinoma s/p pylorus sparing Whipple receiving TPN in view of severe CP malnutrition and prolonged period of poor PO intake; s/p exlap and GJ placement.   NAD,   Neuro: Awake and alert  LUNG clear  GI/Abd: Soft, GJ tube in place  Extremities: warm  labs reviewed - electrolytes adjusted   continue TPN with infusion volume of 2L/938 kcal/day/50 gm of SMOF lipids
I agree with the above history, physical examination, chief complaint/diagnosis, and plan, which I have reviewed and edited where appropriate.  I agree with notes/assessment and detailed interval history of health care providers on my service.  I have seen and examined the patient.  I reviewed the laboratory and available data and agree with the history, physical assessment and plan.  I reviewed and discussed with all consultants, house staff and PA's.  The Nutrition Support Team (NST) discusses on an ongoing basis with the primary team and all consultants, House staff and PA's to have a permanent risk benefit analyses on all decisions and coordinating care.  I was physically present for the key portions of the evaluation and management (E/M) service provided.  77 y/o male with PMH of HTN, HLD, pancreatic ductal adenocarcinoma s/p pylorus sparing Whipple receiving TPN in view of severe CP malnutrition, and prolonged period of poor PO intake.  resting comfortably in bed  Neuro: Awake and alert  LUNG clear  GI/Abd: Soft, NT/ND, NGT in place   Extremities: warm,   labs reviewed- electrolytes adjusted  continue TPN with infusion volume of 2L/1816 kcal/day/50 gm of SMOF lipids
I agree with the above history, physical examination, chief complaint/diagnosis, and plan, which I have reviewed and edited where appropriate.  I agree with notes/assessment and detailed interval history of health care providers on my service.  I have seen and examined the patient.  I reviewed the laboratory and available data and agree with the history, physical assessment and plan.  I reviewed and discussed with all consultants, house staff and PA's.  The Nutrition Support Team (NST) discusses on an ongoing basis with the primary team and all consultants, House staff and PA's to have a permanent risk benefit analyses on all decisions and coordinating care.  I was physically present for the key portions of the evaluation and management (E/M) service provided.  77 y/o male with pancreatic ductal adenocarcinoma s/p pylorus sparing Whipple and 25lbs weight loss receiving TPN in view of severe CP malnutrition.   comfortable   Neuro: Awake and alert  LUNG clear  GI/Abd: Soft, NT/ND,   Extremities: warm,  labs reviewed - increased NaCl  continue TPN with infusion volume of 2L/ 1816 kcal/day/50 gm of SMOF lipids
I agree with the above history, physical examination, chief complaint/diagnosis, and plan, which I have reviewed and edited where appropriate.  I agree with notes/assessment and detailed interval history of health care providers on my service.  I have seen and examined the patient.  I reviewed the laboratory and available data and agree with the history, physical assessment and plan.  I reviewed and discussed with all consultants, house staff and PA's.  The Nutrition Support Team (NST) discusses on an ongoing basis with the primary team and all consultants, House staff and PA's to have a permanent risk benefit analyses on all decisions and coordinating care.  I was physically present for the key portions of the evaluation and management (E/M) service provided.  75 y/o male with PMH of HTN, HLD, pancreatic ductal adenocarcinoma s/p pylorus sparing Whipple receiving TPN in view of severe CP malnutrition, for nutritional optimization prior to planned surgical intervention.  NAD,   Neuro: Awake and alert  Lung clear  GI/Abd: Soft, NT/ND, NGT in place   Extremities: warm  labs reviewed- electrolytes adjusted  TPN infusion volume increased to 2L/1316 kcal/day/50 gm of SMOF lipids
I agree with the above history, physical examination, chief complaint/diagnosis, and plan, which I have reviewed and edited where appropriate.  I agree with notes/assessment and detailed interval history of health care providers on my service.  I have seen and examined the patient.  I reviewed the laboratory and available data and agree with the history, physical assessment and plan.  I reviewed and discussed with all consultants, house staff and PA's.  The Nutrition Support Team (NST) discusses on an ongoing basis with the primary team and all consultants, House staff and PA's to have a permanent risk benefit analyses on all decisions and coordinating care.  I was physically present for the key portions of the evaluation and management (E/M) service provided.  77 y/o male with PMH of HTN, HLD, pancreatic ductal adenocarcinoma s/p pylorus sparing receiving TPN in view of severe CP malnutrition,   General: NAD,   Neuro: Awake and alert  LUNG clear  GI/Abd: Soft, NT/ND, NGT in place   Extremities: warm  labs reviewed - increased KCl and mag sulfate  continue TPN with infusion volume of 2L/1114 kcal/day/25 gm of SMOF lipids
I agree with the above history, physical examination, chief complaint/diagnosis, and plan, which I have reviewed and edited where appropriate.  I agree with notes/assessment and detailed interval history of health care providers on my service.  I have seen and examined the patient.  I reviewed the laboratory and available data and agree with the history, physical assessment and plan.  I reviewed and discussed with all consultants, house staff and PA's.  The Nutrition Support Team (NST) discusses on an ongoing basis with the primary team and all consultants, House staff and PA's to have a permanent risk benefit analyses on all decisions and coordinating care.  I was physically present for the key portions of the evaluation and management (E/M) service provided.  75 y/o male s/p pylorus sparing Whipple erceiving TPN in view of severe CP malnutrition, prolonged period of poor PO intake.  NAD,   Neuro: Awake and alert  LUNG clear  GI/Abd: Soft, NT/ND, NGT in place   Extremities: warm,  labs reviewed - electrolytes adjusted  continue TPN with infusion volume of 2L,/1364 kcal/day/25 gm of SMOF lipids

## 2022-06-08 NOTE — PROGRESS NOTE ADULT - SUBJECTIVE AND OBJECTIVE BOX
NUTRITION NOTE  [id number] VAUGHN  ===============================    Interval events - Patient was seen and examined at bedside, no acute events overnight. Tube feeds held and pt is now on clear liquid diet. Plan to d/c TPN today.     ROS: Except as noted above, all other systems reviewed and are negative     Allergies  No Known Allergies    PAST MEDICAL & SURGICAL HISTORY:  Melanoma nose and left cheek  2yrs ago  HTN (hypertension)  Dyslipidemia  Malignant neoplasm of thyroid gland  Vocal cord nodule   H/O arthroscopy of left knee meniscus  1998  History of Whipple procedure pylorus sparing whipple  at [facility]    FAMILY HISTORY:  Family history of breast cancer (Sibling)  Family history of thyroid cancer (Father)  Family history of lung cancer (Mother)    Vital Signs Last 24 Hrs  T(C): 36.5 (2022 09:30), Max: 37.2 (2022 21:42)  T(F): 97.7 (2022 09:30), Max: 98.9 (2022 21:42)  HR: 80 (2022 09:30) (76 - 83)  BP: 132/73 (2022 09:30) (125/68 - 144/70)  RR: 18 (2022 09:30) (17 - 18)  SpO2: 100% (2022 09:30) (99% - 100%)    MEDICATIONS  (STANDING):  chlorhexidine 2% Cloths 1 Application(s) Topical daily  dextrose 50% Injectable 25 Gram(s) IV Push once  dextrose 50% Injectable 12.5 Gram(s) IV Push once  diphenhydrAMINE Injectable 25 milliGRAM(s) IV Push at bedtime  enoxaparin Injectable 40 milliGRAM(s) SubCutaneous every 24 hours  insulin lispro (ADMELOG) corrective regimen sliding scale   SubCutaneous every 6 hours  metoclopramide Injectable 10 milliGRAM(s) IV Push three times a day  pantoprazole  Injectable 40 milliGRAM(s) IV Push daily  Parenteral Nutrition - Adult 1 Each (42 mL/Hr) TPN Continuous <Continuous>    MEDICATIONS  (PRN):  HYDROmorphone  Injectable 0.5 milliGRAM(s) IV Push every 3 hours PRN Severe Pain (7 - 10)  HYDROmorphone  Injectable 0.25 milliGRAM(s) IV Push every 3 hours PRN Moderate Pain (4 - 6)  nalbuphine Injectable 2.5 milliGRAM(s) IV Push every 6 hours PRN Pruritus  naloxone Injectable 0.1 milliGRAM(s) IV Push every 3 minutes PRN For ANY of the following changes in patient status:  A. RR LESS THAN 10 breaths per minute, B. Oxygen saturation LESS THAN 90%, C. Sedation score of 6    I&O's Detail    2022 07:01  -  2022 07:00  --------------------------------------------------------  IN:    Jevity 1.2: 460 mL    TPN (Total Parenteral Nutrition): 1335 mL  Total IN: 1795 mL    OUT:    Gastrostomy Tube (mL): 25 mL    Stool (mL): 7 mL    Voided (mL): 1900 mL  Total OUT: 1932 mL    Total NET: -137 mL      2022 07:01  -  2022 10:26  --------------------------------------------------------  IN:  Total IN: 0 mL    OUT:    Voided (mL): 200 mL  Total OUT: 200 mL    Total NET: -200 mL    POCT Blood Glucose.: 149 mg/dL (2022 05:47)  POCT Blood Glucose.: 157 mg/dL (2022 00:10)  POCT Blood Glucose.: 147 mg/dL (2022 21:03)  POCT Blood Glucose.: 146 mg/dL (2022 17:37)  POCT Blood Glucose.: 150 mg/dL (2022 12:09)    Daily Height in cm: 193 (2022 07:30)      Daily Weight in k.4 (26 May 2022 04:58)    Drug Dosing Weight  Height (cm): 193 (24 May 2022 16:24)  Weight (kg): 61.4 (26 May 2022 08:43)  BMI (kg/m2): 16.5 (26 May 2022 08:43)  BSA (m2): 1.88 (26 May 2022 08:43)    PHYSICAL EXAM:  General: NAD, resting comfortably in bed  Neuro: Awake and alert  GI/Abd: Soft, GJ tube in place, G tube clamped  Extremities: warm, no edema   PICC Site: C/D/I    Diet: clear liquids and TPN (started on 22, d/c on 22)    LABORATORY                        7.9    4.98  )-----------( 232      ( 2022 04:46 )             25.8   06-08    140  |  110<H>  |  32<H>  ----------------------------<  141<H>  4.0   |  22  |  0.94    Ca    7.7<L>      2022 04:46  Phos  2.6     06-08  Mg     1.90     06-08    TPro  5.8<L>  /  Alb  3.1<L>  /  TBili  0.3  /  DBili  x   /  AST  38  /  ALT  35  /  AlkPhos  185<H>      LIVER FUNCTIONS - ( 29 May 2022 11:20 )  Alb: 3.1 g/dL / Pro: 5.8 g/dL / ALK PHOS: 185 U/L / ALT: 35 U/L / AST: 38 U/L / GGT: x            Chol -- LDL -- HDL -- Trig 99    COVID-19 PCR: Detected (2022 23:30)  COVID-19 PCR: Detected (2022 06:32)  COVID-19 PCR: Detected (26 May 2022 14:59)    ASSESSMENT/PLAN:  75 y/o male with PMH of HTN, HLD, pancreatic ductal adenocarcinoma s/p pylorus sparing Whipple 2020 ([facility]) with recurrence now on chemo, 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 Findings consistent with SBO at level of duodenojejunostomy anastomosis. Patient states that he eventually developed worsening nausea, initially with decreased tolerance of solid foods and eventual intolerance to liquids as well. Pt states he has lost 25lbs since onset of symptoms. Nutrition support consult called for initiation of TPN in view of severe malnutrition, prolonged period of poor PO intake and nutritional optimization prior to planned surgical intervention this week. Pt is NPO with NGT in place for decompression. Pt is s/p exlap and GJ placement on 6/3.     advanced to clear liquid diet this morning, tube feeds held and G tube clamped     discussed plan with surgery team and RN to d/c parenteral nutrition today      Nutrition Support [id number]

## 2022-06-08 NOTE — PROGRESS NOTE ADULT - NS ATTEND OPT1 GEN_ALL_CORE
I independently performed the documented:

## 2022-06-08 NOTE — CHART NOTE - NSCHARTNOTESELECT_GEN_ALL_CORE
Nutrition Follow Up/Nutrition Services
Pre-IR/Event Note
BRIEF ENDOSCOPY NOTE/Event Note
NUTRITION FOLLOW/UP NOTE/Nutrition Services
Nutrition Follow Up/Nutrition Services
PREOP CHECKLIST
Post-Op Note

## 2022-06-08 NOTE — CHART NOTE - NSCHARTNOTEFT_GEN_A_CORE
Patient last seen by RDN on 6/3, now for malnutrition follow up. Spoke with pt and obtained subjective information from extensive chart review.     Current Diet : Diet, Clear Liquid (06-08-22 @ 08:50)  Parenteral Nutrition: d/ajith 6/8 6/8 - no new wts taken,   6/3- 61.4kg     Adm - 65.8kg (stated wt)     IBW - 91.7kg    Nutrition Interval Events: Pt s/p GJ tube placement 6/3 with trickle feeds initiated on 6/5. Pt developed diarrhea therefore TF were discontinued. Clear Liquid diet ordered today. Nutrition Support Team to d/c TPN today. Consider adding Ensure Clear 3x daily (540 ray and 24 gm protein) to diet to optimize nutrition given that nutrition support will no longer be received.  - 180 mg/dl over the past 24 hrs with Admelog ISS coverage. If diarrhea continues, can change diet to Consistent Carbohydrate to minimize possible dumping syndrome. Peptamen 1.5 can also be trialed for tolerance as supplementary TF. Please consult this service for recommendations. RDN services to remain available as needed.     __________________ Pertinent Medications__________________   MEDICATIONS  (STANDING):  chlorhexidine 2% Cloths 1 Application(s) Topical daily  dextrose 50% Injectable 25 Gram(s) IV Push once  dextrose 50% Injectable 12.5 Gram(s) IV Push once  enoxaparin Injectable 40 milliGRAM(s) SubCutaneous every 24 hours  insulin lispro (ADMELOG) corrective regimen sliding scale   SubCutaneous every 6 hours  metoclopramide Injectable 10 milliGRAM(s) IV Push three times a day  pantoprazole  Injectable 40 milliGRAM(s) IV Push daily  Parenteral Nutrition - Adult 1 Each (42 mL/Hr) TPN Continuous <Continuous>    MEDICATIONS  (PRN):  HYDROmorphone  Injectable 0.25 milliGRAM(s) IV Push every 3 hours PRN Moderate Pain (4 - 6)  nalbuphine Injectable 2.5 milliGRAM(s) IV Push every 6 hours PRN Pruritus  naloxone Injectable 0.1 milliGRAM(s) IV Push every 3 minutes PRN For ANY of the following changes in patient status:  A. RR LESS THAN 10 breaths per minute, B. Oxygen saturation LESS THAN 90%, C. Sedation score of 6      __________________ Pertinent Labs__________________   06-08 Na140 mmol/L Glu 141 mg/dL<H> K+ 4.0 mmol/L Cr  0.94 mg/dL BUN 32 mg/dL<H> 06-08 Phos 2.6 mg/dL 05-26 PAB 11 mg/dL<L> 05-26 Chol --    LDL --    HDL --    Trig 99 mg/dL        Skin: Intact    Estimated Needs:     Estimated kcal needs = 20-25kcal/kg = 1228-1535kcal/d.    Estimated protein needs = 1.5gm/kg = 92gms/d.      Nutrition Diagnosis: Severe malnutrition  [x] ongoing    Goal(s):  1. Patient to meet > 75% estimated energy needs    Recommendations:   1. Ensure Clear 3x daily (540 ray and 24 gm protein).  2. Peptamen 1.5 as supplementary TF if warranted.    Monitoring and Evaluation:   1. Monitor weights, labs, BMs, skin integrity, PO intake.  2. RD services to remain available. Request obtaining new weight to assess trend and determine adequacy of PO intake. Oral supplementation for additional calories and protein.

## 2022-06-08 NOTE — PROGRESS NOTE ADULT - NS ATTEND OPT1A GEN_ALL_CORE
History/Exam/Medical decision making

## 2022-06-08 NOTE — PROGRESS NOTE ADULT - NUTRITIONAL ASSESSMENT
This patient has been assessed with a concern for Malnutrition and has been determined to have a diagnosis/diagnoses of Severe protein-calorie malnutrition and Underweight (BMI < 19).    This patient is being managed with:   Diet Clear Liquid-  Entered: Jun 8 2022  8:50AM    Parenteral Nutrition - Adult-  Entered: Jun 7 2022  5:00PM

## 2022-06-08 NOTE — PROGRESS NOTE ADULT - NUTRITIONAL ASSESSMENT
Severe protein calorie malnutrition in acute illness/ injury; secondary to poor appetite, weight loss >5% in 1 month and/or >7.5% in 3 months, caloric Intake <50% of nutrition needs >= 5 days, temporal wasting, severe loss of muscle mass/atrophy and loss of body fat stores.    Diet, Clear Liquid (06-08-22 @ 08:50) [Active]    **Greater than 50% of the encounter was spent counseling and/coordination of care on parenteral nutrition. 25 minutes were spent face to face with the patient.

## 2022-06-08 NOTE — PROGRESS NOTE ADULT - SUBJECTIVE AND OBJECTIVE BOX
Vital Signs Last 24 Hrs  T(C): 36.9 (08 Jun 2022 04:34), Max: 37.2 (07 Jun 2022 21:42)  T(F): 98.5 (08 Jun 2022 04:34), Max: 98.9 (07 Jun 2022 21:42)  HR: 80 (08 Jun 2022 04:34) (76 - 83)  BP: 125/68 (08 Jun 2022 04:34) (125/68 - 144/70)  BP(mean): --  RR: 17 (08 Jun 2022 04:34) (17 - 18)  SpO2: 100% (08 Jun 2022 04:34) (99% - 100%)    I&O's Detail    07 Jun 2022 07:01  -  08 Jun 2022 07:00  --------------------------------------------------------  IN:    Jevity 1.2: 460 mL    TPN (Total Parenteral Nutrition): 1335 mL  Total IN: 1795 mL    OUT:    Gastrostomy Tube (mL): 25 mL    Stool (mL): 7 mL    Voided (mL): 1900 mL  Total OUT: 1932 mL    Total NET: -137 mL                                7.9    4.98  )-----------( 232      ( 08 Jun 2022 04:46 )             25.8       06-08    140  |  110<H>  |  32<H>  ----------------------------<  141<H>  4.0   |  22  |  0.94    Ca    7.7<L>      08 Jun 2022 04:46  Phos  2.6     06-08  Mg     1.90     06-08    Incision clear  no nausea  having diarrhea with tube feedings            PLAN:  D/C TPN  Clear liquid diet  hold tube feedings at 20 cc/hr

## 2022-06-08 NOTE — PROGRESS NOTE ADULT - ASSESSMENT
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 Findings consistent with SBO at level of DJ anastomosis. s/p junojenunostomy and gastrojejunostomy, and GJ tube on 6/3.     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 Findings consistent with SBO at level of DJ anastomosis. s/p junojenunostomy and gastrojejunostomy, and GJ tube on 6/3.     PLAN  - COVID precautions continued as pt is immunocompromised  - NPO with sips/IVF  - TPN - halved today  - J-tube feeds: 20cc/hr, increased to  - Min g-tube output - clamped this AM  - f/u GI function +flatus +BM  - Encourage ambulation  - Pain control   - Discharge planning: PT - recommends rehab    D team surgery  [id number]

## 2022-06-09 NOTE — DISCHARGE NOTE PROVIDER - NSDCMRMEDTOKEN_GEN_ALL_CORE_FT
atorvastatin 40 mg oral tablet: 1 tab(s) orally once a day  Creon 36,000 units oral delayed release capsule: 1 cap(s) orally 3 times a day  metFORMIN 1000 mg oral tablet:    acetaminophen 325 mg oral tablet: 2 tab(s) orally every 6 hours, As needed, Mild Pain (1 - 3), Moderate Pain (4 - 6)  atorvastatin 40 mg oral tablet: 1 tab(s) orally once a day  Creon 36,000 units oral delayed release capsule: 1 cap(s) orally 3 times a day  metFORMIN 1000 mg oral tablet:

## 2022-06-09 NOTE — DISCHARGE NOTE PROVIDER - CARE PROVIDER_API CALL
Armand Case)  Surgery  [address], [place] [address]  Phone: [phone]  Fax: [phone]  Follow Up Time: 1 week

## 2022-06-09 NOTE — DISCHARGE NOTE PROVIDER - NSDCCONDITION_GEN_ALL_CORE
To get better and follow your care plan as instructed.
Reconstitution Date (Optional): 7/16/21
Stable

## 2022-06-09 NOTE — PROGRESS NOTE ADULT - SUBJECTIVE AND OBJECTIVE BOX
Vital Signs Last 24 Hrs  T(C): 36.5 (09 Jun 2022 04:57), Max: 36.9 (08 Jun 2022 18:05)  T(F): 97.7 (09 Jun 2022 04:57), Max: 98.4 (08 Jun 2022 18:05)  HR: 63 (09 Jun 2022 04:57) (63 - 83)  BP: 123/65 (09 Jun 2022 04:57) (123/65 - 140/78)  BP(mean): --  RR: 19 (09 Jun 2022 04:57) (18 - 20)  SpO2: 97% (09 Jun 2022 04:57) (97% - 100%)    I&O's Detail    08 Jun 2022 07:01  -  09 Jun 2022 07:00  --------------------------------------------------------  IN:    Oral Fluid: 800 mL    TPN (Total Parenteral Nutrition): 378 mL  Total IN: 1178 mL    OUT:    Voided (mL): 750 mL  Total OUT: 750 mL    Total NET: 428 mL                                8.9    5.96  )-----------( 284      ( 09 Jun 2022 04:53 )             28.1       06-09    135  |  104  |  24<H>  ----------------------------<  136<H>  3.8   |  21<L>  |  0.91    Ca    8.2<L>      09 Jun 2022 04:53  Phos  2.4     06-09  Mg     1.70     06-09    Tolerating clear liquid diet            PLAN:  Full liquid diet today  Ambulate

## 2022-06-09 NOTE — PROGRESS NOTE ADULT - SUBJECTIVE AND OBJECTIVE BOX
Subjective:   Patient seen at bedside this AM. Reports feeling well, without complaints. Denies chest pain, SOB. Tolerating diet without N/V.     24h Events:   - Overnight, no acute events    Objective:  Vital Signs  T(C): 36.9 (06-08 @ 23:36), Max: 36.9 (06-08 @ 04:34)  HR: 72 (06-08 @ 23:36) (72 - 83)  BP: 132/73 (06-08 @ 23:36) (125/68 - 140/78)  RR: 18 (06-08 @ 23:36) (17 - 20)  SpO2: 100% (06-08 @ 23:36) (100% - 100%)  06-07-22 @ 07:01  -  06-08-22 @ 07:00  --------------------------------------------------------  IN:  Total IN: 0 mL    OUT:    Gastrostomy Tube (mL): 25 mL    Stool (mL): 7 mL    Voided (mL): 1900 mL  Total OUT: 1932 mL    Total NET: -1932 mL      06-08-22 @ 07:01  -  06-09-22 @ 01:15  --------------------------------------------------------  IN:  Total IN: 0 mL    OUT:    Voided (mL): 750 mL  Total OUT: 750 mL    Total NET: -750 mL          Physical Exam:  General: Appears well, NAD  CHEST: breathing comfortably  CV: appears well perfused  Abdomen: soft, nontender, nondistended, no rebound or guarding, GJ tube in place, G tube output bilious, clamped today, prevena taken down, incision c/d/i  Extremities: Grossly symmetric        Labs:                        7.9    4.98  )-----------( 232      ( 08 Jun 2022 04:46 )             25.8   06-08    140  |  110<H>  |  32<H>  ----------------------------<  141<H>  4.0   |  22  |  0.94    Ca    7.7<L>      08 Jun 2022 04:46  Phos  2.6     06-08  Mg     1.90     06-08      CAPILLARY BLOOD GLUCOSE      POCT Blood Glucose.: 132 mg/dL (08 Jun 2022 23:27)  POCT Blood Glucose.: 143 mg/dL (08 Jun 2022 18:02)  POCT Blood Glucose.: 180 mg/dL (08 Jun 2022 12:15)  POCT Blood Glucose.: 149 mg/dL (08 Jun 2022 05:47)      Medications:   MEDICATIONS  (STANDING):  chlorhexidine 2% Cloths 1 Application(s) Topical daily  dextrose 50% Injectable 25 Gram(s) IV Push once  dextrose 50% Injectable 12.5 Gram(s) IV Push once  enoxaparin Injectable 40 milliGRAM(s) SubCutaneous every 24 hours  insulin lispro (ADMELOG) corrective regimen sliding scale   SubCutaneous every 6 hours  metoclopramide Injectable 10 milliGRAM(s) IV Push three times a day  pantoprazole  Injectable 40 milliGRAM(s) IV Push daily    MEDICATIONS  (PRN):  HYDROmorphone  Injectable 0.25 milliGRAM(s) IV Push every 3 hours PRN Moderate Pain (4 - 6)  nalbuphine Injectable 2.5 milliGRAM(s) IV Push every 6 hours PRN Pruritus  naloxone Injectable 0.1 milliGRAM(s) IV Push every 3 minutes PRN For ANY of the following changes in patient status:  A. RR LESS THAN 10 breaths per minute, B. Oxygen saturation LESS THAN 90%, C. Sedation score of 6      Imaging:     Subjective:   Patient seen at bedside this AM. Reports feeling well, without complaints. Denies chest pain, SOB. Tolerating diet without N/V.     24h Events:   - Overnight, no acute events    Objective:  Vital Signs  T(C): 36.9 (06-08 @ 23:36), Max: 36.9 (06-08 @ 04:34)  HR: 72 (06-08 @ 23:36) (72 - 83)  BP: 132/73 (06-08 @ 23:36) (125/68 - 140/78)  RR: 18 (06-08 @ 23:36) (17 - 20)  SpO2: 100% (06-08 @ 23:36) (100% - 100%)  06-07-22 @ 07:01  -  06-08-22 @ 07:00  --------------------------------------------------------  IN:  Total IN: 0 mL    OUT:    Gastrostomy Tube (mL): 25 mL    Stool (mL): 7 mL    Voided (mL): 1900 mL  Total OUT: 1932 mL    Total NET: -1932 mL      06-08-22 @ 07:01  -  06-09-22 @ 01:15  --------------------------------------------------------  IN:  Total IN: 0 mL    OUT:    Voided (mL): 750 mL  Total OUT: 750 mL    Total NET: -750 mL          Physical Exam:  General: Appears well, NAD  CHEST: breathing comfortably  CV: appears well perfused  Abdomen: soft, nontender, nondistended, no rebound or guarding, GJ tube in place, GJ clamped. Incision c/d/i  Extremities: Grossly symmetric        Labs:                        7.9    4.98  )-----------( 232      ( 08 Jun 2022 04:46 )             25.8   06-08    140  |  110<H>  |  32<H>  ----------------------------<  141<H>  4.0   |  22  |  0.94    Ca    7.7<L>      08 Jun 2022 04:46  Phos  2.6     06-08  Mg     1.90     06-08      CAPILLARY BLOOD GLUCOSE      POCT Blood Glucose.: 132 mg/dL (08 Jun 2022 23:27)  POCT Blood Glucose.: 143 mg/dL (08 Jun 2022 18:02)  POCT Blood Glucose.: 180 mg/dL (08 Jun 2022 12:15)  POCT Blood Glucose.: 149 mg/dL (08 Jun 2022 05:47)      Medications:   MEDICATIONS  (STANDING):  chlorhexidine 2% Cloths 1 Application(s) Topical daily  dextrose 50% Injectable 25 Gram(s) IV Push once  dextrose 50% Injectable 12.5 Gram(s) IV Push once  enoxaparin Injectable 40 milliGRAM(s) SubCutaneous every 24 hours  insulin lispro (ADMELOG) corrective regimen sliding scale   SubCutaneous every 6 hours  metoclopramide Injectable 10 milliGRAM(s) IV Push three times a day  pantoprazole  Injectable 40 milliGRAM(s) IV Push daily    MEDICATIONS  (PRN):  HYDROmorphone  Injectable 0.25 milliGRAM(s) IV Push every 3 hours PRN Moderate Pain (4 - 6)  nalbuphine Injectable 2.5 milliGRAM(s) IV Push every 6 hours PRN Pruritus  naloxone Injectable 0.1 milliGRAM(s) IV Push every 3 minutes PRN For ANY of the following changes in patient status:  A. RR LESS THAN 10 breaths per minute, B. Oxygen saturation LESS THAN 90%, C. Sedation score of 6      Imaging:

## 2022-06-09 NOTE — DISCHARGE NOTE PROVIDER - HOSPITAL COURSE
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 Pt states prior to onset of symptoms he was eating "a ton", however 3 weeks ago started to lose his appetite. He eventually developed worsening nausea, initially with decreased tolerance of solid foods and eventual intolerance to liquids as well. Pt states he has lost 25lbs since onset of symptoms. He states he is still passing flatus, last BM was this AM and normal for him.   In ED pt afebrile and HD stable. Labs with no leucocytosis, normal lactate. Cr 1.32 with most recent 0.9 on 1/2022. CT a/p shows dilated stomach and afferent limb with transition point in region of duodenojejunostomy. Chest findings suggest possible aspiration.  Patient admitted to [facility] surgical oncology service. NGT placed for decompression.  5/24 GI consulted for EGD for possible stenting. Due to multi-focal nature of involved bowel and stenosis secondary to extrinsic compression, enteral stenting not performed as unlikely to result in any significant clinical improvement.  5/25 PICC line placed by IR- for TPN while NPO, medically optimized for surgery.  6/1 Medical oncology team discussion holding systemic chemotherapy, may resume earliest 4 weeks post-op, family and patient understand.  6/3 Patient brought to OR by Dr. Case where he underwent exploratory laparotomy, lysis of adhesion, combined feeding Jejunostomy- venting gastrostomy tube. Patient tolerated procedure well and transferred to surgical floor for recovery.   Diet was advanced to regular as tolerated. G tube clamped, patient and family educated how to clamp and unclamp Gastrostomy tube.   IV pain medications transitioned to oral medication.   Patient currently tolerating regular diet, ambulating, voiding, GI function at baseline, and pain is well controlled.  Per team and attending patient hemodynamically stable for discharge home with home PT and follow up with Dr. Case in one week.   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 Pt states prior to onset of symptoms he was eating "a ton", however 3 weeks ago started to lose his appetite. He eventually developed worsening nausea, initially with decreased tolerance of solid foods and eventual intolerance to liquids as well. Pt states he has lost 25lbs since onset of symptoms. He states he is still passing flatus, last BM was this AM and normal for him.   In ED pt afebrile and HD stable. Labs with no leucocytosis, normal lactate. Cr 1.32 with most recent 0.9 on 1/2022. CT a/p shows dilated stomach and afferent limb with transition point in region of duodenojejunostomy. Chest findings suggest possible aspiration.  Patient admitted to [facility] surgical oncology service. NGT placed for decompression.  5/24 GI consulted for EGD for possible stenting. Due to multi-focal nature of involved bowel and stenosis secondary to extrinsic compression, enteral stenting not performed as unlikely to result in any significant clinical improvement.  5/25 PICC line placed by IR- for TPN while NPO, medically optimized for surgery.  6/1 Medical oncology team discussion holding systemic chemotherapy, may resume earliest 4 weeks post-op, family and patient understand.  6/3 Patient brought to OR by Dr. Case where he underwent exploratory laparotomy, lysis of adhesion, combined feeding Jejunostomy- venting gastrostomy tube. Patient tolerated procedure well and transferred to surgical floor for recovery.   Diet was advanced to regular as tolerated. G tube clamped. J tube feedings were stopped.  IV pain medications transitioned to oral medication.   Patient currently tolerating regular diet, ambulating, voiding, GI function at baseline, and pain is well controlled. PT recommended home PT upon discharged. The patient and family agreed. Per team and attending patient hemodynamically stable for discharge home with home PT and follow up with Dr. Case in one week. J/G-tube will be removed by Dr. Case in the office.

## 2022-06-09 NOTE — DISCHARGE NOTE PROVIDER - NS AS DC PROVIDER CONTACT Y/N MULTI
Progress Notes by Narinder Linn MD at 11/13/18 02:02 PM     Author:  Narinder Linn MD Service:  (none) Author Type:  Physician     Filed:  11/13/18 02:33 PM Encounter Date:  11/13/2018 Status:  Signed     :  Narinder Linn MD (Physician)              SUBJECTIVE:  Katia Christianson is a 50 year old female who presents today for[SS1.1T] follow up of[SS1.2T] myeloma[SS1.3M] doing well.  Denies fevers, chills or nightsweats. No excessive Fatigue . Appetite is good and not losing weight. Denies associated cough, dyspnea, nausea vomiting, abdominal pain , diarrhea or constipation. Denies severe backache or severe pain at any other site.[SS1.2T]     .[SS1.1T]  HPI[SS1.1M]    Her past medical history is significant for:  Patient Active Problem List    Diagnosis    • Nephrolithiasis   • Multiple myeloma in remission   • History of peripheral stem cell transplant       Allergies: Revlimid    Current Outpatient Prescriptions     Medication  Sig   • sulfamethoxazole-trimethoprim (BACTRIM,SEPTRA) 400-80 MG per tablet Take  by mouth.   • penicillin v potassium (VEETID) 500 MG tablet Take 500 mg by mouth.   • ondansetron (ZOFRAN) 8 MG tablet Take 1 Tab by mouth every 8 (eight) hours as needed for Nausea.   • valacyclovir (VALTREX) 500 MG tablet Take 1 Tab by mouth daily.   • Cholecalciferol (VITAMIN D3) 73793 UNITS CAPS TAKE 1 CAPSULE BY MOUTH ONCE A WEEK FOR 8 WEEKS[SS1.1T]         ROS[SS1.1M]    OBJECTIVE:  Ht 5' 1\" (1.549 m)[SS1.1T]     Physical Exam   Neck:[SS1.2T] No JVD[SS1.2M] present.[SS1.2T] No tracheal deviation[SS1.2M] present.   Cardiovascular:[SS1.2T] Normal rate[SS1.2M],[SS1.2T] normal heart sounds[SS1.2M] and[SS1.2T] intact distal pulses[SS1.2M].    Pulmonary/Chest: No[SS1.2T] respiratory distress[SS1.2M]. She has[SS1.2T] no wheezes[SS1.2M]. She has[SS1.2T] no rales[SS1.2M]. She exhibits[SS1.2T] no tenderness[SS1.2M].   Abdominal: She exhibits[SS1.2T] no distension[SS1.2M] and[SS1.2T] no mass[SS1.2M].  There is[SS1.2T] no tenderness[SS1.2M]. There is[SS1.2T] no rebound[SS1.2M] and[SS1.2T] no guarding[SS1.2M].   Musculoskeletal: She exhibits no[SS1.2T] edema[SS1.2M] or[SS1.2T] tenderness[SS1.2M].   Lymphadenopathy:     She has[SS1.2T] no cervical adenopathy[SS1.2M].[SS1.2T]           Lab Results      Component  Value Date/Time    HGB 12.4 11/12/2018 12:01 AM    HGB 13.7 03/06/2018 08:23 AM    MCV 88.0 11/12/2018 12:01 AM    WBC 5.6 11/12/2018 12:01 AM    ANC 4.3 11/12/2018 12:01 AM    ALC 0.9 (L) 11/12/2018 12:01 AM     11/12/2018 12:01 AM    CA 10.1 11/12/2018 12:01 AM    CA 9.8 03/06/2018 08:23 AM    MG 1.9 08/30/2018 11:47 AM    CREAT 0.6 11/12/2018 12:01 AM    BILI 0.7 11/12/2018 12:01 AM    ALB 4.8 11/12/2018 12:01 AM    AST 22 11/12/2018 12:01 AM    ALT 30 11/12/2018 12:01 AM     08/30/2018 11:47 AM    IGM 20 (L) 08/30/2018 11:47 AM    IGA 64 (L) 08/30/2018 11:47 AM    TSH 3.28 11/08/2018 08:55 AM[SS1.4T]        10/26/2018[SS1.1M]  FINAL DIAGNOSIS  Peripheral blood, bone marrow aspirate and biopsy, iliac  crest:  1.   No morphologic or immunophenotypic features of a  plasma cell neoplasm.  Minimal residual disease studies by  flow cytometry are pending.  2.   Normocellular bone marrow with morphologically normal  trilineage hematopoiesis.[SS1.1C]    ASSESSMENT:[SS1.1T]  Myeloma, IgG kappa, Dx 2/21/2018, s/p Auto SCT 7/10/2018: doing well.   She ha[SS1.1C]d[SS1.1M] f/u at [facility][SS1.1C] with Dr Su[SS1.1M] in[SS1.1C] 10/26/[SS1.1M] 2018[SS1.1C] and had a marrow exam done[SS1.1M].[SS1.1C] Was advised maintenance with velcade q 2 weeks[SS1.1M] along with zometa[SS1.2M]  Reviewed notes from [facility] via [facility].[SS1.3M]  She will continue prophylactic antibiotics  as per [facility] recommendations.     She had  Auto transplant on 7/10/2018 at [facility] .     She developed generalized skin rash and revlimid was stopped after C2. Paraprotein much improved.  She is on[SS1.1C] Acyclovir[SS1.2M]for zoster  prophylaxis.   She can stop    Plan was for treatment  treatment with Revlimid, Velcade and decadron.  She has completed chemo ed and signed consent.      Skeletal survey showed T12 compression.  FISH myeloma panel was negative.  Karyotype was normal. She was seen for hematology at [facility] and saw Dr Kelton Su. Beta 2 microglobulin was WNL at 1.8 on 2/27/2018 and 24 HR UPE was negative.     On 2/14/2018 in the hospital CBC And CMP were WNL.  Albumin was WNL. she had a serum protein electrophoresis with immunofixation was performed along with a urine immunofixation.  Serum immunofixation showed a monoclonal paraprotein of the class IgG kappa.  The beta 2 microglobulin was 0.170.  Gamma globulins were elevated to 3.0.  M spike was equal to 2.0.  LDH was within normal limits    Backache, s/p kyphoplasty 3/6/2018: Pain is better controlled.  She has T12 compression fracture .      Above plan was discussed with patient and family and all pertinent questions were answered.       Cancer Timeline    --02/21/2018 Bone Marrow exam  --03/06/2018 T12 kyphoplasty  --03/19/2018 C1D1 of RVd  --04/09/2018 C2D1 of RVd, skin rash with revlimid  --04/30/2018 C3D1 of Vd, no revlimid  --05/21/2018 C4D1 of Vd, no revlimid  --06/11/2018 C5D1 of Vd, no revlimid  --07/10/2018 Auto SCT at [facility][SS1.1C]  --10/26/2018 Bone Marrow MEAGAN[SS1.1M]    Nellie Nayak MD  [address] [facility] [address]  [phone][SS1.1C]    PLAN:[SS1.1T]  Please see Oncology Treatment Orders written by me for further details.[SS1.2T]          Revision History        User Key Date/Time User Provider Type Action    > SS1.3 11/13/18 02:33 PM Narinder Linn MD Physician Sign     SS1.2 11/13/18 02:24 PM Narinder Linn MD Physician      SS1.4 11/13/18 02:03 PM Narinder Linn MD Physician      SS1.1 11/13/18 02:02 PM Narinder Linn MD Physician     C - Copied, M - Manual, T - Template             Yes

## 2022-06-09 NOTE — PROGRESS NOTE ADULT - ASSESSMENT
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 Findings consistent with SBO at level of DJ anastomosis. s/p junojenunostomy and gastrojejunostomy, and GJ tube on 6/3.     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 Findings consistent with SBO at level of DJ anastomosis. s/p junojenunostomy and gastrojejunostomy, and GJ tube on 6/3.     PLAN:  - COVID precautions continued as pt is immunocompromised  - Advance to FLD. Off TPN and TF.  - d/c PICC  - Keep GJ clamped  - f/u GI function +flatus +BM  - Encourage ambulation  - Pain control   - Discharge planning: PT - recommends Home w/ Home PT    D team surgery  [id number]

## 2022-06-09 NOTE — DISCHARGE NOTE PROVIDER - NSDCCPCAREPLAN_GEN_ALL_CORE_FT
PRINCIPAL DISCHARGE DIAGNOSIS  Diagnosis: SBO (small bowel obstruction)  Assessment and Plan of Treatment: WOUND CARE:  Keep incision clean and dry. Do not rub or scrub wound, pat wound dry after shower. G-J tube care as taught by nurse prior to discharge.  BATHING: Please do not submerge wound underwater. You may shower and/or sponge bathe.  ACTIVITY: No heavy lifting or straining. Otherwise, you may return to your usual level of physical activity. If you are taking narcotic pain medication (such as Percocet) DO NOT drive a car, operate machinery or make important decisions.  DIET: Return to your usual diet.  NOTIFY YOUR SURGEON IF: You have any bleeding that does not stop, any pus draining from your wound(s), any fever (over 100.4 F) or chills, persistent nausea/vomiting, persistent diarrhea, or if your pain is not controlled on your discharge pain medications.  FOLLOW-UP: Please follow up with your primary care physician in one week regarding your hospitalization

## 2022-06-09 NOTE — DISCHARGE NOTE PROVIDER - REASON FOR ADMISSION
Elliot Diaz MD, PGY-2  Available on [employer] Teams | Pager: [phone] | [facility]: [id number]  ---------------------------------------------------------------------------------------------  Patient is a 66y old  Female who presents with a chief complaint of COVID (23 Feb 2021 20:35)    SUBJECTIVE / OVERNIGHT EVENTS: Transaminitis with hyperbilirubinemia. RUQ ordered.   ADDITIONAL REVIEW OF SYSTEMS:    MEDICATIONS  (STANDING):  chlorhexidine 0.12% Liquid 15 milliLiter(s) Oral Mucosa every 12 hours  chlorhexidine 4% Liquid 1 Application(s) Topical <User Schedule>  dexAMETHasone  Injectable 2 milliGRAM(s) IV Push daily  dextrose 40% Gel 15 Gram(s) Oral once  dextrose 50% Injectable 25 Gram(s) IV Push once  dextrose 50% Injectable 12.5 Gram(s) IV Push once  dextrose 50% Injectable 25 Gram(s) IV Push once  erythromycin   Ointment 1 Application(s) Both EYES daily  fentaNYL   Infusion..... 4 MICROgram(s)/kG/Hr (10.4 mL/Hr) IV Continuous <Continuous>  glucagon  Injectable 1 milliGRAM(s) IntraMuscular once  heparin  Infusion 1500 Unit(s)/Hr (14 mL/Hr) IV Continuous <Continuous>  insulin lispro (ADMELOG) corrective regimen sliding scale   SubCutaneous every 6 hours  ketamine Infusion. 0.2 mG/kG/Hr (2.6 mL/Hr) IV Continuous <Continuous>  linezolid  IVPB      linezolid  IVPB 600 milliGRAM(s) IV Intermittent every 12 hours  meropenem  IVPB 1000 milliGRAM(s) IV Intermittent every 8 hours  midazolam Infusion 0.02 mG/kG/Hr (2.6 mL/Hr) IV Continuous <Continuous>  norepinephrine Infusion 0.07 MICROgram(s)/kG/Min (8.53 mL/Hr) IV Continuous <Continuous>  pantoprazole  Injectable 40 milliGRAM(s) IV Push daily  petrolatum Ophthalmic Ointment 1 Application(s) Both EYES daily  polyethylene glycol 3350 17 Gram(s) Oral two times a day  propofol Infusion 50 MICROgram(s)/kG/Min (39 mL/Hr) IV Continuous <Continuous>  senna Syrup 10 milliLiter(s) Oral at bedtime    MEDICATIONS  (PRN):  acetaminophen    Suspension .. 650 milliGRAM(s) Oral every 6 hours PRN Temp greater or equal to 38C (100.4F)  sodium chloride 0.9% lock flush 10 milliLiter(s) IV Push every 1 hour PRN Pre/post blood products, medications, blood draw, and to maintain line patency      ICU Vital Signs Last 24 Hrs  T(F): 99.1 (24 Feb 2021 04:00), Max: 101.3 (23 Feb 2021 08:38)  HR: 82 (24 Feb 2021 07:36) (82 - 101)  BP: --  BP(mean): --  ABP: 104/50 (24 Feb 2021 04:00) (99/46 - 133/59)  ABP(mean): 65 (24 Feb 2021 04:00) (61 - 76)  RR: 34 (23 Feb 2021 20:00) (34 - 37)  SpO2: 96% (24 Feb 2021 07:36) (93% - 99%)    Mode: AC/ CMV (Assist Control/ Continuous Mandatory Ventilation)  RR (machine): 34  TV (machine): 420  FiO2: 75  PEEP: 12  ITime: 0.67  MAP: 22  PIP: 34    Physical Exam:   GENERAL: sedated but high peak airway pressures, pulling excess volume  CHEST/LUNG: CTABL; no wheezes  HEART: Regular rate and rhythm; No murmurs, rubs, or gallops  ABDOMEN: Soft, Nontender, Nondistended; Bowel sounds present  EXTREMITIES: 2+ Peripheral Pulses, No clubbing, cyanosis. Improved edema  : hematuria  SKIN: No rashes or lesions    I&O's Summary    23 Feb 2021 07:01  -  24 Feb 2021 07:00  --------------------------------------------------------  IN: 2975.7 mL / OUT: 3751 mL / NET: -775.3 mL      MEDICATIONS  (STANDING):  chlorhexidine 0.12% Liquid 15 milliLiter(s) Oral Mucosa every 12 hours  chlorhexidine 4% Liquid 1 Application(s) Topical <User Schedule>  dexAMETHasone  Injectable 2 milliGRAM(s) IV Push daily  dextrose 40% Gel 15 Gram(s) Oral once  dextrose 50% Injectable 25 Gram(s) IV Push once  dextrose 50% Injectable 12.5 Gram(s) IV Push once  dextrose 50% Injectable 25 Gram(s) IV Push once  erythromycin   Ointment 1 Application(s) Both EYES daily  fentaNYL   Infusion..... 4 MICROgram(s)/kG/Hr (10.4 mL/Hr) IV Continuous <Continuous>  glucagon  Injectable 1 milliGRAM(s) IntraMuscular once  heparin  Infusion 1500 Unit(s)/Hr (14 mL/Hr) IV Continuous <Continuous>  insulin lispro (ADMELOG) corrective regimen sliding scale   SubCutaneous every 6 hours  ketamine Infusion. 0.2 mG/kG/Hr (2.6 mL/Hr) IV Continuous <Continuous>  linezolid  IVPB 600 milliGRAM(s) IV Intermittent every 12 hours  linezolid  IVPB      meropenem  IVPB 1000 milliGRAM(s) IV Intermittent every 8 hours  midazolam Infusion 0.02 mG/kG/Hr (2.6 mL/Hr) IV Continuous <Continuous>  norepinephrine Infusion 0.07 MICROgram(s)/kG/Min (8.53 mL/Hr) IV Continuous <Continuous>  pantoprazole  Injectable 40 milliGRAM(s) IV Push daily  petrolatum Ophthalmic Ointment 1 Application(s) Both EYES daily  polyethylene glycol 3350 17 Gram(s) Oral two times a day  propofol Infusion 50 MICROgram(s)/kG/Min (39 mL/Hr) IV Continuous <Continuous>  senna Syrup 10 milliLiter(s) Oral at bedtime    MEDICATIONS  (PRN):  acetaminophen    Suspension .. 650 milliGRAM(s) Oral every 6 hours PRN Temp greater or equal to 38C (100.4F)  sodium chloride 0.9% lock flush 10 milliLiter(s) IV Push every 1 hour PRN Pre/post blood products, medications, blood draw, and to maintain line patency    Allergies    codeine (Unknown)    Intolerances    LABS:                        6.9    12.88 )-----------( 236      ( 24 Feb 2021 02:14 )             23.9     02-24    140  |  98  |  26<H>  ----------------------------<  90  3.6   |  35<H>  |  0.65    Ca    8.0<L>      24 Feb 2021 02:14  Phos  3.5     02-24  Mg     2.3     02-24    TPro  6.7  /  Alb  2.3<L>  /  TBili  2.9<H>  /  DBili  x   /  AST  164<H>  /  ALT  70<H>  /  AlkPhos  179<H>  02-24    PT/INR - ( 24 Feb 2021 02:14 )   PT: 14.2 sec;   INR: 1.26 ratio         PTT - ( 23 Feb 2021 20:17 )  PTT:72.2 sec  ABG - ( 24 Feb 2021 02:14 )  pH, Arterial: 7.29  pH, Blood: x     /  pCO2: 77    /  pO2: 84    / HCO3: 32    / Base Excess: 9.2   /  SaO2: 96.6        Lactate Trend    CAPILLARY BLOOD GLUCOSE  POCT Blood Glucose.: 95 mg/dL (24 Feb 2021 05:27)  POCT Blood Glucose.: 109 mg/dL (23 Feb 2021 23:13)  POCT Blood Glucose.: 108 mg/dL (23 Feb 2021 16:44)  POCT Blood Glucose.: 123 mg/dL (23 Feb 2021 11:09)      RADIOLOGY & ADDITIONAL TESTS:  Results Reviewed:   Imaging Personally Reviewed:  Electrocardiogram Personally Reviewed: SBO

## 2022-06-09 NOTE — PROGRESS NOTE ADULT - NUTRITIONAL ASSESSMENT
This patient has been assessed with a concern for Malnutrition and has been determined to have a diagnosis/diagnoses of Severe protein-calorie malnutrition and Underweight (BMI < 19).    This patient is being managed with:   Diet Full Liquid-  Entered: Jun 9 2022  8:17AM

## 2022-06-09 NOTE — DISCHARGE NOTE PROVIDER - DISCHARGE DIET
Regular Diet - No restrictions Regular Diet - No restrictions/Consistent Carbohydrate Diabetic Diets/Other Diet Instructions

## 2022-06-10 NOTE — PROGRESS NOTE ADULT - NUTRITIONAL ASSESSMENT
This patient has been assessed with a concern for Malnutrition and has been determined to have a diagnosis/diagnoses of Severe protein-calorie malnutrition and Underweight (BMI < 19).    This patient is being managed with:   Diet Regular-  Supplement Feeding Modality:  Oral  Ensure Enlive Cans or Servings Per Day:  1       Frequency:  Three Times a day  Entered: Sergio 10 2022  8:03AM

## 2022-06-10 NOTE — PROGRESS NOTE ADULT - SUBJECTIVE AND OBJECTIVE BOX
Subjective:   Patient seen at bedside this AM. Reports feeling well, without complaints. Denies chest pain, SOB. Tolerating diet without N/V.     24h Events:   - Overnight, no acute events    Objective:  Vital Signs  T(C): 37.1 (06-09 @ 15:56), Max: 37.1 (06-09 @ 15:56)  HR: 81 (06-09 @ 15:56) (63 - 81)  BP: 110/59 (06-09 @ 15:56) (100/57 - 123/65)  RR: 18 (06-09 @ 15:56) (18 - 19)  SpO2: 100% (06-09 @ 15:56) (97% - 100%)  06-08-22 @ 07:01  -  06-09-22 @ 07:00  --------------------------------------------------------  IN:  Total IN: 0 mL    OUT:    Voided (mL): 750 mL  Total OUT: 750 mL    Total NET: -750 mL      06-09-22 @ 07:01  -  06-10-22 @ 00:53  --------------------------------------------------------  IN:  Total IN: 0 mL    OUT:    Voided (mL): 650 mL  Total OUT: 650 mL    Total NET: -650 mL      Physical Exam:  General: Appears well, NAD  CHEST: breathing comfortably  CV: appears well perfused  Abdomen: soft, nontender, nondistended, no rebound or guarding, GJ tube in place, GJ clamped. Incision c/d/i  Extremities: Grossly symmetric    Labs:                        8.9    5.96  )-----------( 284      ( 09 Jun 2022 04:53 )             28.1   06-09    135  |  104  |  24<H>  ----------------------------<  136<H>  3.8   |  21<L>  |  0.91    Ca    8.2<L>      09 Jun 2022 04:53  Phos  2.4     06-09  Mg     1.70     06-09      CAPILLARY BLOOD GLUCOSE      POCT Blood Glucose.: 130 mg/dL (10 Sergio 2022 00:48)  POCT Blood Glucose.: 132 mg/dL (09 Jun 2022 18:17)  POCT Blood Glucose.: 217 mg/dL (09 Jun 2022 12:19)  POCT Blood Glucose.: 149 mg/dL (09 Jun 2022 05:26)      Medications:   MEDICATIONS  (STANDING):  chlorhexidine 2% Cloths 1 Application(s) Topical daily  dextrose 50% Injectable 12.5 Gram(s) IV Push once  dextrose 50% Injectable 25 Gram(s) IV Push once  enoxaparin Injectable 40 milliGRAM(s) SubCutaneous every 24 hours  insulin lispro (ADMELOG) corrective regimen sliding scale   SubCutaneous every 6 hours  metoclopramide Injectable 10 milliGRAM(s) IV Push three times a day  pantoprazole  Injectable 40 milliGRAM(s) IV Push daily    MEDICATIONS  (PRN):  HYDROmorphone  Injectable 0.25 milliGRAM(s) IV Push every 3 hours PRN Moderate Pain (4 - 6)  nalbuphine Injectable 2.5 milliGRAM(s) IV Push every 6 hours PRN Pruritus  naloxone Injectable 0.1 milliGRAM(s) IV Push every 3 minutes PRN For ANY of the following changes in patient status:  A. RR LESS THAN 10 breaths per minute, B. Oxygen saturation LESS THAN 90%, C. Sedation score of 6      Imaging:

## 2022-06-10 NOTE — PROGRESS NOTE ADULT - SUBJECTIVE AND OBJECTIVE BOX
Vital Signs Last 24 Hrs  T(C): 36.7 (10 Sergio 2022 07:58), Max: 37.1 (09 Jun 2022 15:56)  T(F): 98 (10 Sergio 2022 07:58), Max: 98.7 (09 Jun 2022 15:56)  HR: 92 (10 Sergio 2022 07:58) (71 - 92)  BP: 126/68 (10 Sergio 2022 07:58) (100/62 - 126/68)  BP(mean): --  RR: 18 (10 Sergio 2022 07:58) (18 - 18)  SpO2: 96% (10 Sergio 2022 07:58) (96% - 100%)    I&O's Detail    09 Jun 2022 07:01  -  10 Sergio 2022 07:00  --------------------------------------------------------  IN:    Oral Fluid: 1060 mL  Total IN: 1060 mL    OUT:    Voided (mL): 800 mL  Total OUT: 800 mL    Total NET: 260 mL                                8.9    5.72  )-----------( 259      ( 10 Sergio 2022 06:41 )             27.8       06-10    135  |  105  |  19  ----------------------------<  136<H>  4.0   |  21<L>  |  0.99    Ca    8.2<L>      10 Sergio 2022 06:41  Phos  2.5     06-10  Mg     1.70     06-10  Tolerating regular diet           PLAN:  Discharge home tomorrow with G-Tube clqamped  I will remove G-tube in the office in 2 weeks

## 2022-06-10 NOTE — PROGRESS NOTE ADULT - ASSESSMENT
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 Findings consistent with SBO at level of DJ anastomosis. s/p junojenunostomy and gastrojejunostomy, and GJ tube on 6/3.     PLAN:  - COVID precautions continued as pt is immunocompromised  - Advance to FLD. Off TPN and TF.  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 Findings consistent with SBO at level of DJ anastomosis. s/p junojenunostomy and gastrojejunostomy, and GJ tube on 6/3.     PLAN:  - COVID precautions continued as pt is immunocompromised  - Advance to regular diet  - Resume home Creon  - Keep GJ clamped  - f/u GI function +flatus +BM  - Encourage ambulation  - Pain control   - Discharge home possible tomorrow w/ Home PT    D team surgery  [id number]

## 2022-06-11 NOTE — PROGRESS NOTE ADULT - REASON FOR ADMISSION
SBO
obstruction
SBO

## 2022-06-11 NOTE — PROGRESS NOTE ADULT - ASSESSMENT
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 Findings consistent with SBO at level of DJ anastomosis. s/p junojenunostomy and gastrojejunostomy, and GJ tube on 6/3.     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 Findings consistent with SBO at level of DJ anastomosis. s/p junojenunostomy and gastrojejunostomy, and GJ tube on 6/3.     PLAN:  - COVID precautions continued as pt is immunocompromised  - Diet: regular with ensures  - Keep GJ clamped - to be removed with clinical follow up  - Pain control   - Discharge home: TODAY with home PT    D team surgery  [id number]

## 2022-06-11 NOTE — PROGRESS NOTE ADULT - SUBJECTIVE AND OBJECTIVE BOX
Subjective:   Patient seen at bedside this AM. Reports feeling well, without complaints. Denies chest pain, SOB. Tolerating diet without N/V.     24h Events:   - Overnight, no acute events    Objective:  Vital Signs  T(C): 36.7 (06-10 @ 21:18), Max: 36.8 (06-10 @ 13:31)  HR: 82 (06-10 @ 21:18) (71 - 92)  BP: 128/69 (06-10 @ 21:18) (115/69 - 132/65)  RR: 17 (06-10 @ 21:18) (16 - 18)  SpO2: 100% (06-10 @ 21:18) (96% - 100%)  06-09-22 @ 07:01  -  06-10-22 @ 07:00  --------------------------------------------------------  IN:  Total IN: 0 mL    OUT:    Voided (mL): 800 mL  Total OUT: 800 mL    Total NET: -800 mL      06-10-22 @ 07:01  -  06-11-22 @ 00:47  --------------------------------------------------------  IN:  Total IN: 0 mL    OUT:    Stool (mL): 0 mL    Voided (mL): 700 mL  Total OUT: 700 mL    Total NET: -700 mL          Physical Exam:  General: Appears well, NAD  CHEST: breathing comfortably  CV: appears well perfused  Abdomen: soft, nontender, nondistended, no rebound or guarding, GJ tube in place, GJ clamped. Incision c/d/i  Extremities: Grossly symmetric    Labs:                        8.9    5.72  )-----------( 259      ( 10 Sergio 2022 06:41 )             27.8   06-10    135  |  105  |  19  ----------------------------<  136<H>  4.0   |  21<L>  |  0.99    Ca    8.2<L>      10 Sergio 2022 06:41  Phos  2.5     06-10  Mg     1.70     06-10      CAPILLARY BLOOD GLUCOSE      POCT Blood Glucose.: 140 mg/dL (10 Sergio 2022 21:13)  POCT Blood Glucose.: 147 mg/dL (10 Sergio 2022 18:13)  POCT Blood Glucose.: 187 mg/dL (10 Sergio 2022 12:37)  POCT Blood Glucose.: 136 mg/dL (10 Sergio 2022 06:12)  POCT Blood Glucose.: 130 mg/dL (10 Sergio 2022 00:48)      Medications:   MEDICATIONS  (STANDING):  atorvastatin 40 milliGRAM(s) Oral at bedtime  chlorhexidine 2% Cloths 1 Application(s) Topical daily  dextrose 50% Injectable 25 Gram(s) IV Push once  dextrose 50% Injectable 12.5 Gram(s) IV Push once  enoxaparin Injectable 40 milliGRAM(s) SubCutaneous every 24 hours  insulin lispro (ADMELOG) corrective regimen sliding scale   SubCutaneous Before meals and at bedtime  pancrelipase  (CREON 36,000 Lipase Units) 3 Capsule(s) Oral three times a day with meals  pantoprazole    Tablet 40 milliGRAM(s) Oral before breakfast  potassium phosphate / sodium phosphate Tablet (K-PHOS No. 2) 1 Tablet(s) Oral two times a day    MEDICATIONS  (PRN):  acetaminophen     Tablet .. 650 milliGRAM(s) Oral every 6 hours PRN Mild Pain (1 - 3), Moderate Pain (4 - 6)  nalbuphine Injectable 2.5 milliGRAM(s) IV Push every 6 hours PRN Pruritus  naloxone Injectable 0.1 milliGRAM(s) IV Push every 3 minutes PRN For ANY of the following changes in patient status:  A. RR LESS THAN 10 breaths per minute, B. Oxygen saturation LESS THAN 90%, C. Sedation score of 6  oxyCODONE    IR 5 milliGRAM(s) Oral every 4 hours PRN Severe Pain (7 - 10)      Imaging:

## 2022-06-20 NOTE — CONSULT LETTER
[Dear  ___] : Dear  [unfilled], [Courtesy Letter:] : I had the pleasure of seeing your patient, [unfilled], in my office today. [Please see my note below.] : Please see my note below. [Consult Closing:] : Thank you very much for allowing me to participate in the care of this patient.  If you have any questions, please do not hesitate to contact me. [Sincerely,] : Sincerely, [FreeTextEntry1] : He will be seeing you to restart chemotherapy later this week. [FreeTextEntry3] : Armand Case MD FACS\par Chief of Surgical Oncology\par \par

## 2022-06-20 NOTE — ASSESSMENT
[FreeTextEntry1] : IMP:\par \par 76 year old man with metastatic pancreatic ductal adenocarcinoma, on gem-abraxane\par \par s/p exploratory laparotomy, ZOE, combined feeding jejunostomy -venting gastrostomy tube on 6/3. \par Final path- metastatic adenocarcinoma involving fibroadipose tissue\par \par \par PLAN:\par G-tube removed today\par Chemotherapy with Dr. Cary\par RTO 3 months

## 2022-06-20 NOTE — HISTORY OF PRESENT ILLNESS
[de-identified] : Mr. ALOK VAUGHN is a 76 year old man with metastatic pancreatic ductal adenocarcinoma, who presents today for a post-op visit s/p exploratory laparotomy for gastric outlet obstruction, ZOE, combined feeding jejunostomy -venting gastrostomy tube on 6/3/2022. Final path- metastatic adenocarcinoma involving fibroadipose tissue\par \par Alok presented to [facility] ED w/ 3 weeks of progressively poor PO intake and increased nausea/emesis. \par CT A/P showed dilated stomach and afferent limb with transition point in region of duodenojejunostomy, possible aspiration on chest CT\par \par 6/3/2022 exploratory laparotomy, ZOE, combined feeding jejunostomy -venting gastrostomy tube. \par Diet advanced to regular, G tube clamped & J tube feedings stopped prior to discharge for J/G tube to be removed in office.\par \par PMH of HTN, HLD, L thyroid papillary Ca s/p L thyroidectomy/isthmusectomy/paratracheal node dissection in 2017, melanoma, pancreatic ductal adenocarcinoma s/p pylorus sparing Whipple in 9/2020(@ [facility]) with recurrence, now on chemo. \par \par Today 6/20/2022, he denies any abdominal pain, nausea, vomiting or diarrhea. He reports a good appetite, regular bowel movements and passing flatus.

## 2022-06-20 NOTE — PHYSICAL EXAM
[Normal] : well developed, well nourished, in no acute distress [de-identified] : surgical incision healing well, no evidence of infection ; G-tube in left upper quadrant

## 2022-06-21 NOTE — PHYSICAL EXAM
[Fully active, able to carry on all pre-disease performance without restriction] : Status 0 - Fully active, able to carry on all pre-disease performance without restriction [Normal] : affect appropriate [de-identified] : No visible inc wob [de-identified] : No visible rashes or jaundice seen over video

## 2022-06-21 NOTE — HISTORY OF PRESENT ILLNESS
[AJCC Stage: ____] : AJCC Stage: [unfilled] [de-identified] : 75 y/o with metastatic relapse of pancreatic ductal adenocarcinoma, YESENIA-germline mutant, from what was originally a localized pT2N2 LVI+/PNI+ pancreatic head adenocarcinoma resected Aug 2020; PMHx left thyroid papillary CA s/p left thyroidectomy/isthmusectomy/paratracheal node dissection in 2017, and melanoma, presenting for follow-up with medical oncology\par \par Chronological History of Cancer:\par Jul 2020  initially presented with abdominal discomfort, diarrhea, weight loss, new onset of DM\par 07/23/20  CT A/P suspected pancreas lesion\par 07/24/20  MRI Ab showed 1.2 cm mass at pancreas head. \par 08/03/20  underwent upfront surgery with Whipple procedure [facility] by Dr Calderon. pT2 pN2 LVI+ PNI+, margin negative\par 09/15/20 C1 adjuvant FOLFIRINOX\par 02/16/21 C12 (last cycle) FOLFIRINOX\par 03/03/21 CT CAP showing concern for relapse with mesenteric LN prominence. \par 03/20/21 began long-course RT with capecitabine, completed 5/6/21;\par 06/21/21 CT C/A/ again showed mesenteric lymphadenopathy, slightly increased LN prominence;  20\par Sep 2021  146;  repeat  2 weeks later 10/5/21 248\par 09/28/21 PET scan at [facility] (pending image loading) reported as "s/p whipple without suspicious activity in the surgical bed; hypermetatbolic RLQ lymph node/peritoneal implant most likely metastatic disease; interval improvement of small bowel distention and edema reflecting improvement of post radiation enteritis". \par 10/25/21 RLQ peritoneal implant soft tissue mass biopsy by IR at [facility], path returned as adenocarcinoma\par 11/02/21 C1D1 GnP (gemcitabine + nab-paclitaxel)\par 11/09/21 C1D8 GnP\par 11/16/21 planned but cancelled C1D15 GnP due to neuropenia\par 11/23/21 C2D01 GnP (changed to every other week)\par 12/07/21 C2D15 GnP \par 12/21/21 C3D1 GnP\par 12/28/21 CT scan with likely stable disease (report is comparing with 6/2021 scan. Comparison should be made with the one with Sep or Oct. Will try to upload Sep or Oct scan to [facility]). Ca19-9 is gradually downtrending\par 01/04/22- C3 D15 GnP\par 01/19/22- C4 D1 Gemzar 1000 mg/ m2 and Nab Paclitaxil 125 mg/ m2 \par \par Pt transferred care to [place].\par \par 02/01/22- C5 GnP e in [place] on 02/01/22\par 02/15/22-  C5 GnP \par \par 02/25/22- CT scans- POst op changes from whipples with fat stranding in upper abdomen, Scattered upper abdominal lymph nodes not appearing suspicious, scattered pulmonary nodules .\par 03/01/22-  gem/ Abraxane \par 03/15/22 -gem/ Abraxane \par 03/28/22- gem/ Abraxane \par \par Was getting chemotherapy in [place] and then decided to come back to [place] \par \par 05/23/22- Was admitted to [facility] from 05/23/22 to 06/11/22 for SBO.\par GI was consulted in house for possible stenting but was not done due to multi focal nature of involved bowel and stenosis sec to extrinsic compression. 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 His daughter is also positive for YESENIA gene mutation, but his son does not have the mutation.  \par  [de-identified] : surgical path reported moderately differentiated PDAC, 2.5 cm, involving head of pancreas and invading pancreatic duct, CBD, muscularis propria of duodenum and peripancreatic adipose tissue. Lymphovascular and Perineural invasion present. Margins negative. 6 of 13 LNs + for metastatic carcinoma. Staging was described as pT2 pN2.  [de-identified] : Ca 19-9: 11/11/20- 24 3/5/21- 28 6/25/21- 20 9/21/21- 146 10/5/21- 248.  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

## 2022-06-21 NOTE — ASSESSMENT
[FreeTextEntry1] : 77 y/o with metastatic relapse of pancreatic ductal adenocarcinoma, YESENIA-germline mutant, from what was originally a localized pT2N2 LVI+/PNI+ pancreatic head adenocarcinoma resected Aug 2020; PMHx left thyroid papillary CA s/p left thyroidectomy/isthmusectomy/paratracheal node dissection in 2017, and melanoma, presenting for second opinion of further treatment including clinical trials in [facility]. \par \par He completed 12 cycles of FOLFIRINOX (09/2020-02/2021) and long course RT with capecitabine (3/2021-5/2021) and now has biopsy proven relapse in the peritoneum, not a substantial amount of burden, but we decided to start treatment to prevent complications that could arise from untreated peritoneal carcinomatosis.\par \par We had an in depth discussion about the options available for 2L therapy.  Alternatives include resuming FOLFIRINOX, noting that I cannot explicitly declare failure to this regimen since his relapse appeared to become more prominent after stopping this combination, and that his tumor may well be more sensitive to platinum drugs extrapolating from favorable response to platinum drugs from BRCA mutations. More recent data suggest that YESENIA mutations are prognostic rather than predictive (PMID 64486440), which may suggest that his tumor is more likely to respond to any treatment, not platinum specifically, as is the case for the PARP inhibitors. Another option is to use olaparib, extrapolating from BRCA/PALB2 data although while noting that YESENIA mutations appear less predictive based on limited data (KnowYourTumor/BeyondBRCA studies). Another option would be ipi+nivo off trial, as this is currently being studied in a trial context through [facility].\par \par He ultimately decided to pursue gemcitabine+nab-paclitaxel (GnP) on the basis that he wishes to continue with a standard option and his cancer has not yet been exposed to this therapy. Unfortunately, after just two days of chemo (C1D1 and D8), patient became severly neutropenic. Decision has been made to wait for count recovery and then change to every other week dosing for him, which has been shown to be about as effective and less toxic than 3-weeks on dosing (PMIDC 36462661). \par \par He has continued on every other week gemcitabine + nab-paclitaxel and is tolerating this therapy very well. Besides alopecia, he has very little in terms of side effects, and the every other week dosing is more tolerable to his bone marrow. Will continue therapy. His  remains stable ~200 which may be reflective of extant disease or reflect anatomic disruption of his biliary tract from the surgery. His scan shows minimal to no evidence of remaining disease. He remains on active chemotherapy with [place]/ Abraxane in [place]. \par \par June 2022. He presents following 3-week hospitalization, where he underwent exploratory laparotomy, and confirmation of peritoneal relapse. He is still recovering, with some fatigue, and difficulty gaining weight. While I would like to resume chemotherapy it looks like he still need more time to regain performance status and stamina.\par  \par Plan:\par - continues on Creon\par - referral to nutrition\par - recommend exercise \par - consented for FOLFIRINOX 6/21 but plan for FOLFOX in 2-3 weeks depending on performance recovery\par - may be candidate for [facility] POLAR trial given YESENIA mutation, will further discussion with Dr Al Deleon about this\par - RTC 2 weeks\par \par Waqas Cary MD PhD\par Medical Oncology Attending\par I personally have spent a total of 30 minutes of time on the date of this encounter reviewing test results, documenting findings, coordinating care, and directly consulting with the patient and/or designated family member.\par \par

## 2022-06-24 NOTE — CONSULT NOTE ADULT - SUBJECTIVE AND OBJECTIVE BOX
General Surgery Consult  Consulting surgical team: D TEAM SURGERY  Consulting attending: Dr. 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 Patient saw Dr. Case in the office 6/20 when GJ tube was pulled. Patient's oncologist obtained a staging CT 6/23 which was read 6/24 and found to have moderate amount of free air concerning for possible bowel perf. Patient was told to come into ED today. Patient has been tolerating diet, passing flatus and having BM. Denies fever, chills, chest pain, sob, ab pain, n/v/c/d, dysuria. In ED, afebrile vss, benign ab exam, wbc 3.6, lactate pending, CT as above from 6/23. Surgery consulted.    PAST MEDICAL HISTORY:  Basal cell carcinoma    Melanoma    HTN (hypertension)    Dyslipidemia    Malignant neoplasm of thyroid gland        PAST SURGICAL HISTORY:  Vocal cord nodule    H/O arthroscopy of left knee    History of Whipple procedure        MEDICATIONS:      ALLERGIES:  No Known Allergies      VITALS & I/Os:  Vital Signs Last 24 Hrs  T(C): 36.6 (24 Jun 2022 16:50), Max: 36.6 (24 Jun 2022 16:50)  T(F): 97.9 (24 Jun 2022 16:50), Max: 97.9 (24 Jun 2022 16:50)  HR: 81 (24 Jun 2022 18:11) (81 - 92)  BP: 115/68 (24 Jun 2022 18:11) (106/59 - 115/68)  BP(mean): --  RR: 21 (24 Jun 2022 18:11) (18 - 21)  SpO2: 100% (24 Jun 2022 18:11) (100% - 100%)    I&O's Summary      PHYSICAL EXAM:  General: No acute distress, very comfortable  Respiratory: Nonlabored  Cardiovascular: appears well perfused  Abdominal: Soft, nondistended, nontender. No rebound or guarding. No organomegaly, no palpable mass.  surgical incision healing well  sacral decubitus ulcer flat clean with no signs of infection 3cm  Extremities: Warm    LABS:                        8.8    3.56  )-----------( 279      ( 24 Jun 2022 18:22 )             28.0           Lactate:                  IMAGING:  EXAM: [id number] - CT ABDOMEN AND PELVIS IC - ORDERED BY: LETY OAKLEY    EXAM: [id number] - CT CHEST IC - ORDERED BY: LETY OAKLEY      PROCEDURE DATE: 06/23/2022        INTERPRETATION: CLINICAL INFORMATION: Restaging scan, r/o mets    COMPARISON: 12/28/2021, 5/23/2022.    CONTRAST/COMPLICATIONS:  IV Contrast: Omnipaque 350 (accession [id number]), IV contrast documented in associated exam (accession [id number]) 90 cc administered 10 cc discarded  Oral Contrast: Water  Complications: None reported at time of study completion    PROCEDURE:  CT of the Chest, Abdomen and Pelvis was performed.  Dual phase, arterial and portal venous phase imaging was performed through the upper abdomen.  Sagittal and coronal reformats were performed.    FINDINGS:  CHEST:  LUNGS AND LARGE AIRWAYS: Patent central airways. There is a 7 mm left lower lobe nodule which is unchanged and a new 9 mm nodule in the lingula. Calcified granuloma in the left upper lobe. Tree-in-bud opacities in the left lower lobe and lingula.  PLEURA: No pleural effusion.  VESSELS: Small pulmonary emboli in the right lower lobe segmental artery (304:56)  HEART: Heart size is normal. No pericardial effusion.  MEDIASTINUM AND IVETTE: No lymphadenopathy.  CHEST WALL AND LOWER NECK: Right-sided Mediport with the tip in the SVC. Left thyroid gland is atrophic or surgically absent.    ABDOMEN AND PELVIS:  LIVER: Within normal limits.  BILE DUCTS: Pneumobilia and mild biliary ductal dilatation, similar in appearance to prior.  GALLBLADDER: Cholecystectomy.  SPLEEN: Within normal limits.  PANCREAS: Status post pylorus sparing Whipple. Pancreaticojejunostomy and hepaticojejunostomy are within normal limits. Distal pancreatic atrophy with 1.5 cm cystic lesion in the pancreatic tail.  ADRENALS: Question mild thickening of the right adrenal gland.  KIDNEYS/URETERS: Bilateral renal cysts. No hydronephrosis. Cortical scarring in the interpolar region of the left kidney.    BLADDER: Minimally distended with diffuse wall thickening.  REPRODUCTIVE ORGANS: Prostate within normal limits.    BOWEL: Status post Whipple procedure. No small bowel anastomoses in the right hemiabdomen. No bowel obstruction. Appendix is not visualized.  PERITONEUM/MESENTERY: Moderate abdominal and pelvic ascites with peritoneal nodularity compatible carcinomatosis. Hazy soft tissue surrounds the gastric antrum in the right upper quadrant..  VESSELS: Atherosclerotic changes.  RETROPERITONEUM/LYMPH NODES: Mesenteric adenopathy, similar in appearance to prior.  ABDOMINAL WALL: Postsurgical changes.  BONES: Degenerative changes.    IMPRESSION:  Moderate pneumoperitoneum concerning for bowel perforation.    Small right lower lobe segmental pulmonary embolism.    Critical findings were discussed with Dr. Oakley on 6/24/2022 3:10 PM.    New tree-in-bud opacities in the left lower lobe and lingula which may be infectious in etiology. New 9 mm nodule in the lingula concerning for metastatic disease. Short-term follow-up chest CT is recommended.    Diffuse bladder wall thickening. Correlate with urinalysis for cystitis.    Moderate abdominal and pelvic ascites with peritoneal nodularity compatible with carcinomatosis. Hazy soft tissue surrounds the gastric antrum in the right upper quadrant compatible with metastatic disease.    Additional findings as above.    --- End of Report ---    ROSARIO PANTOJA MD; Attending Radiologist  This document has been electronically signed. Jun 24 2022 3:32PM

## 2022-06-24 NOTE — ED ADULT NURSE NOTE - NSIMPLEMENTINTERV_GEN_ALL_ED
Implemented All Fall with Harm Risk Interventions:  [place] to call system. Call bell, personal items and telephone within reach. Instruct patient to call for assistance. Room bathroom lighting operational. Non-slip footwear when patient is off stretcher. Physically safe environment: no spills, clutter or unnecessary equipment. Stretcher in lowest position, wheels locked, appropriate side rails in place. Provide visual cue, wrist band, yellow gown, etc. Monitor gait and stability. Monitor for mental status changes and reorient to person, place, and time. Review medications for side effects contributing to fall risk. Reinforce activity limits and safety measures with patient and family. Provide visual clues: red socks.

## 2022-06-24 NOTE — ED PROVIDER NOTE - OBJECTIVE STATEMENT
77M hx of htn, hld, L thyroid papillary CA s/p L thyroidectomy/isthmusectomy/paratracheal node dissection in 2017, melanoma,  pancreatic ductal adenocarcinoma s/p pylorus sparing Whipple 9/2020 ([facility]) recent admission for sbo had a G-J tube placed, pulled 6/21, presents to the ED after a CT c/a/p done on 6/23 showed moderate pnuemoperitoneum (CT done by oncologist Dr. Cary for cancer staging). Dr. Cary, per patient's did not know GJ tube was pulled prior. Patient tolerating PO, having BMs, passing gas. Denies pain or any acute complaints. 77M hx of htn, hld, L thyroid papillary CA s/p L thyroidectomy/isthmusectomy/paratracheal node dissection in 2017, melanoma,  pancreatic ductal adenocarcinoma s/p pylorus sparing Whipple 9/2020 ([facility]) recent admission for sbo had a G-J tube placed, pulled 6/21, presents to the ED after a CT c/a/p done on 6/23 showed moderate pnuemoperitoneum (CT done by oncologist Dr. Cary for cancer staging). Dr. Cary, per patient's did not know GJ tube was pulled prior. Patient tolerating PO, having BMs, passing gas. Denies pain or any acute complaints.  Attending - Agree with above.  I evaluated patient myself. 78 y/o M with extensive history as noted above.  [place] records reviewed.  Admitted to [facility] 5/23/22-6/11/22.  s/p surgery on 6/3 with JG tube placed, which was removed by Dr. Case on Monday.  Had staging CTAP done yesterday as ordered by Oncologist Dr. Cary.  Patient called today and told to come to ED as noted pneumoperitoneum on CT.  Patient feels well.  No n/v/d.  Normal flatus.  Eating well.  No abd distention or pain.  No fever.  Recent covid noted. 77M hx of htn, hld, L thyroid papillary CA s/p L thyroidectomy/isthmusectomy/paratracheal node dissection in 2017, melanoma,  pancreatic ductal adenocarcinoma s/p pylorus sparing Whipple 9/2020 ([facility]) recent admission for sbo had a G-J tube placed, pulled 6/21, presents to the ED after a CT c/a/p done on 6/23 showed moderate pnuemoperitoneum (CT done by oncologist Dr. Cary for cancer staging). Dr. Cary, per patient's did not know GJ tube was pulled prior. Patient tolerating PO, having BMs, passing gas. Denies pain or any acute complaints. Patient NOT currently on chemo.  Attending - Agree with above.  I evaluated patient myself. 78 y/o M with extensive history as noted above.  [place] records reviewed.  Admitted to [facility] 5/23/22-6/11/22.  s/p surgery on 6/3 with JG tube placed, which was removed by Dr. Case on Monday.  Had staging CTAP done yesterday as ordered by Oncologist Dr. Cary.  Patient called today and told to come to ED as noted pneumoperitoneum on CT.  Patient feels well.  No n/v/d.  Normal flatus.  Eating well.  No abd distention or pain.  No fever.  Recent covid noted.

## 2022-06-24 NOTE — ED PROVIDER NOTE - PROGRESS NOTE DETAILS
Saw Lynch D.O., PGY3 (Resident)  surg paged Saw Lynch D.O., PGY3 (Resident)  Surg consulted. Will eval. Pending their eval to see if further imaging/labs warranted Saw Lynch D.O., PGY3 (Resident)  Still pending surg eval. Called back. Rec labs with lactate. Discussed with patient. Will order. Saw Lynch D.O., PGY3 (Resident)  discussed with surg. rec abxs, admission. lactate 3.2 wbc <4. Patient not clinically septic (no fever, no other sxs). Admit.

## 2022-06-24 NOTE — ED ADULT NURSE NOTE - OBJECTIVE STATEMENT
Received patient in TR-C c/o abdominal perforation, patient had abnormal scan result. Patient denies abdominal pain, fever, chills, nausea. Patient on cardiac monitor NSR, O2 sat 100% on a room air. Patient is A&OX4, airway patent, breathing unlabored and even, abdomen soft, skin warm, dry. Labs obtained, 20G IV placed on Rt. arm. Side rails up and safety maintained. Fall precaution in place. Family at bedside.

## 2022-06-24 NOTE — ED PROVIDER NOTE - NSICDXPASTSURGICALHX_GEN_ALL_CORE_FT
PAST SURGICAL HISTORY:  H/O arthroscopy of left knee meniscus  12/1998    History of Whipple procedure pylorus sparing whipple 2022 at [facility]    Vocal cord nodule 1998

## 2022-06-24 NOTE — ED PROVIDER NOTE - CLINICAL SUMMARY MEDICAL DECISION MAKING FREE TEXT BOX
77M hx of htn, hld, L thyroid papillary CA s/p L thyroidectomy/isthmusectomy/paratracheal node dissection in 2017, melanoma,  pancreatic ductal adenocarcinoma s/p pylorus sparing Whipple 9/2020 ([facility]) recent admission for sbo had a G-J tube placed, pulled 6/21 here for outpatient CT reading pneumoperitoneum. Benign abdomen. Hmewdynamically stable. Tolerating PO. Intact bowel function. GJ tube site clean, dry, intact. Palpable pneumoperiotneum on abdomen. Given free air likely in settingof recent GJ tube pulled, will discuss with surg. No need for labs, advanced imaging at this time as patient w/o signs of acute GI or infectious path at present. Pain free.

## 2022-06-24 NOTE — ED ADULT NURSE NOTE - NSICDXPASTSURGICALHX_GEN_ALL_CORE_FT
PAST SURGICAL HISTORY:  H/O arthroscopy of left knee meniscus  12/1998    History of Whipple procedure pylorus sparing whipple 2022 at [facility]    Vocal cord nodule 1998

## 2022-06-24 NOTE — CONSULT NOTE ADULT - ASSESSMENT
77M metastatic pancreatic cancer s/p whipple 2020 and ex lap bypass and GJ tube placement 6/3 removed in office 6/20 found to have new free air on outpatient CT 6/23, patient asymptomatic, surgery consulted    Recommendations:  ***    D TEAM SURGERY  [id number]

## 2022-06-24 NOTE — ED PROVIDER NOTE - NS ED ROS FT
CONSTITUTIONAL: No fevers, chills, fatigue, unexpected weight change, decreased appetite  ENT: No ear pain, nasal congestion, runny nose, sore throat  CV: No chest pain  PULM: No cough, shortness of breath  GI: No abd pain, nausea, vomiting, diarrhea, constipation, bloody stools  : No dysuria, polyuria, hematuria  SKIN: No rashes, swelling  MSK: No back pain, flank pain  NEURO: No headache  PSYCH: No SI

## 2022-06-24 NOTE — ED ADULT TRIAGE NOTE - CHIEF COMPLAINT QUOTE
Pt with pancreatic cancer last chemo done middle of may. Pt with Right cw port . Pt had abdominal surgery 3 weeks ago had ct scan this past Wednesday showing increased air in abdomen ,doctor concerned he has a perforation. Pt with no co n/v no co pain.

## 2022-06-24 NOTE — ED PROVIDER NOTE - PHYSICAL EXAMINATION
GENERAL: elderly male, lying in bed, NAD. Vital signs are within normal limits  EYES: Conjunctiva noninjected or pale, sclera anicteric  HENT: NC/AT, moist mucous membranes  NECK: Supple, trachea midline  LUNG: Nonlabored respirations, no wheezes, rales  CV: RRR, Pulses- Radial: 2+ bilateral and equal  ABDOMEN: Nondistended, nontender, no guarding, no rebound. GJ tube site clean/dry/intact. Site closed. Palpable pneumoperitoneum RUQ.   MSK: No visible deformities, nontender extremities, no cva ttp  SKIN: No rashes, bruises  NEURO: AAOx4 (to person, place, time, event), no tremor, steady gait  PSYCH: Normal mood and affect GENERAL: elderly male, lying in bed, NAD. Vital signs are within normal limits  EYES: Conjunctiva noninjected or pale, sclera anicteric  HENT: NC/AT, moist mucous membranes  NECK: Supple, trachea midline  LUNG: Nonlabored respirations, no wheezes, rales  CV: RRR, Pulses- Radial: 2+ bilateral and equal  ABDOMEN: Nondistended, nontender, no guarding, no rebound. GJ tube site clean/dry/intact. Site closed. Palpable pneumoperitoneum RUQ.   MSK: No visible deformities, nontender extremities, no cva ttp  SKIN: No rashes, bruises  NEURO: AAOx4 (to person, place, time, event), no tremor, steady gait  PSYCH: Normal mood and affect  ATTENDING PHYSICAL EXAM  GEN - NAD; well appearing; A+O x3  HEAD - NC/AT; EYES/NOSE - PERRL, EOMI, mucous membranes moist, no discharge; THROAT: Oral cavity and pharynx normal. No inflammation, swelling, exudate, or lesions  NECK: Neck supple, non-tender without lymphadenopathy, no masses, no JVD  PULMONARY - CTA b/l, symmetric breath sounds, no w/r/r  CARDIAC -s1s2, RRR, no M,R,G  ABDOMEN - Surgical incision site C/D/I.  JG tube site intact, healing well, not open.  +NABS, ND, NT, soft, no guarding, no rebound, no masses   BACK - no CVA tenderness, No vertebral or paravertebral tenderness  EXTREMITIES - symmetric pulses, 2+ dp, capillary refill < 2 seconds, no clubbing, no cyanosis, no edema

## 2022-06-24 NOTE — H&P ADULT - NSICDXPASTSURGICALHX_GEN_ALL_CORE_FT
PAST SURGICAL HISTORY:  H/O arthroscopy of left knee meniscus  12/1998    History of Whipple procedure pylorus sparing whipple 2022 at [facility]    S/P laparotomy 6/3/2022 jejenujejunostomy and gastrojejunostomy with GJ tube placement Dr. Case    Vocal cord nodule 1998

## 2022-06-24 NOTE — H&P ADULT - HISTORY OF PRESENT ILLNESS
General Surgery Consult  Consulting surgical team: D TEAM SURGERY  Consulting attending: Dr. 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 Patient saw Dr. Case in the office 6/20 when GJ tube was pulled. Patient's oncologist obtained a staging CT 6/23 which was read 6/24 and found to have moderate amount of free air concerning for possible bowel perf. Patient was told to come into ED today. Patient has been tolerating diet, passing flatus and having BM. Denies fever, chills, chest pain, sob, ab pain, n/v/c/d, dysuria. In ED, afebrile vss, benign ab exam, wbc 3.6, lactate pending, CT as above from 6/23. Surgery consulted.    PAST MEDICAL HISTORY:  Basal cell carcinoma    Melanoma    HTN (hypertension)    Dyslipidemia    Malignant neoplasm of thyroid gland        PAST SURGICAL HISTORY:  Vocal cord nodule    H/O arthroscopy of left knee    History of Whipple procedure        MEDICATIONS:      ALLERGIES:  No Known Allergies      VITALS & I/Os:  Vital Signs Last 24 Hrs  T(C): 36.6 (24 Jun 2022 16:50), Max: 36.6 (24 Jun 2022 16:50)  T(F): 97.9 (24 Jun 2022 16:50), Max: 97.9 (24 Jun 2022 16:50)  HR: 81 (24 Jun 2022 18:11) (81 - 92)  BP: 115/68 (24 Jun 2022 18:11) (106/59 - 115/68)  BP(mean): --  RR: 21 (24 Jun 2022 18:11) (18 - 21)  SpO2: 100% (24 Jun 2022 18:11) (100% - 100%)    I&O's Summary      PHYSICAL EXAM:  General: No acute distress, very comfortable  Respiratory: Nonlabored  Cardiovascular: appears well perfused  Abdominal: Soft, nondistended, nontender. No rebound or guarding. No organomegaly, no palpable mass.  surgical incision healing well  sacral decubitus ulcer flat clean with no signs of infection 3cm  Extremities: Warm    LABS:                        8.8    3.56  )-----------( 279      ( 24 Jun 2022 18:22 )             28.0           Lactate:                  IMAGING:  EXAM: [id number] - CT ABDOMEN AND PELVIS IC - ORDERED BY: LETY OAKLEY    EXAM: [id number] - CT CHEST IC - ORDERED BY: LETY OAKLEY      PROCEDURE DATE: 06/23/2022        INTERPRETATION: CLINICAL INFORMATION: Restaging scan, r/o mets    COMPARISON: 12/28/2021, 5/23/2022.    CONTRAST/COMPLICATIONS:  IV Contrast: Omnipaque 350 (accession [id number]), IV contrast documented in associated exam (accession [id number]) 90 cc administered 10 cc discarded  Oral Contrast: Water  Complications: None reported at time of study completion    PROCEDURE:  CT of the Chest, Abdomen and Pelvis was performed.  Dual phase, arterial and portal venous phase imaging was performed through the upper abdomen.  Sagittal and coronal reformats were performed.    FINDINGS:  CHEST:  LUNGS AND LARGE AIRWAYS: Patent central airways. There is a 7 mm left lower lobe nodule which is unchanged and a new 9 mm nodule in the lingula. Calcified granuloma in the left upper lobe. Tree-in-bud opacities in the left lower lobe and lingula.  PLEURA: No pleural effusion.  VESSELS: Small pulmonary emboli in the right lower lobe segmental artery (304:56)  HEART: Heart size is normal. No pericardial effusion.  MEDIASTINUM AND IVETTE: No lymphadenopathy.  CHEST WALL AND LOWER NECK: Right-sided Mediport with the tip in the SVC. Left thyroid gland is atrophic or surgically absent.    ABDOMEN AND PELVIS:  LIVER: Within normal limits.  BILE DUCTS: Pneumobilia and mild biliary ductal dilatation, similar in appearance to prior.  GALLBLADDER: Cholecystectomy.  SPLEEN: Within normal limits.  PANCREAS: Status post pylorus sparing Whipple. Pancreaticojejunostomy and hepaticojejunostomy are within normal limits. Distal pancreatic atrophy with 1.5 cm cystic lesion in the pancreatic tail.  ADRENALS: Question mild thickening of the right adrenal gland.  KIDNEYS/URETERS: Bilateral renal cysts. No hydronephrosis. Cortical scarring in the interpolar region of the left kidney.    BLADDER: Minimally distended with diffuse wall thickening.  REPRODUCTIVE ORGANS: Prostate within normal limits.    BOWEL: Status post Whipple procedure. No small bowel anastomoses in the right hemiabdomen. No bowel obstruction. Appendix is not visualized.  PERITONEUM/MESENTERY: Moderate abdominal and pelvic ascites with peritoneal nodularity compatible carcinomatosis. Hazy soft tissue surrounds the gastric antrum in the right upper quadrant..  VESSELS: Atherosclerotic changes.  RETROPERITONEUM/LYMPH NODES: Mesenteric adenopathy, similar in appearance to prior.  ABDOMINAL WALL: Postsurgical changes.  BONES: Degenerative changes.    IMPRESSION:  Moderate pneumoperitoneum concerning for bowel perforation.    Small right lower lobe segmental pulmonary embolism.    Critical findings were discussed with Dr. Oakley on 6/24/2022 3:10 PM.    New tree-in-bud opacities in the left lower lobe and lingula which may be infectious in etiology. New 9 mm nodule in the lingula concerning for metastatic disease. Short-term follow-up chest CT is recommended.    Diffuse bladder wall thickening. Correlate with urinalysis for cystitis.    Moderate abdominal and pelvic ascites with peritoneal nodularity compatible with carcinomatosis. Hazy soft tissue surrounds the gastric antrum in the right upper quadrant compatible with metastatic disease.    Additional findings as above.    --- End of Report ---    ROSARIO PANTOJA MD; Attending Radiologist  This document has been electronically signed. Jun 24 2022 3:32PM                                                                                              General Surgery Consult  Consulting surgical team: D TEAM SURGERY  Consulting attending: Dr. 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 Patient saw Dr. Case in the office 6/20 when GJ tube was pulled. Patient's oncologist obtained a staging CT 6/23 which was read 6/24 and found to have moderate amount of free air concerning for possible bowel perf. Patient was told to come into ED today. Patient has been tolerating diet, passing flatus and having BM. Denies fever, chills, chest pain, sob, ab pain, n/v/c/d, dysuria. In ED, afebrile vss, benign ab exam, wbc 3.6, lactate pending, CT as above from 6/23. Surgery consulted.    oncologist Dr. Oakley - chemo had not been started yet after most recent surgery    PAST MEDICAL HISTORY:  Basal cell carcinoma    Melanoma    HTN (hypertension)    Dyslipidemia    Malignant neoplasm of thyroid gland        PAST SURGICAL HISTORY:  Vocal cord nodule    H/O arthroscopy of left knee    History of Whipple procedure        MEDICATIONS:      ALLERGIES:  No Known Allergies      VITALS & I/Os:  Vital Signs Last 24 Hrs  T(C): 36.6 (24 Jun 2022 16:50), Max: 36.6 (24 Jun 2022 16:50)  T(F): 97.9 (24 Jun 2022 16:50), Max: 97.9 (24 Jun 2022 16:50)  HR: 81 (24 Jun 2022 18:11) (81 - 92)  BP: 115/68 (24 Jun 2022 18:11) (106/59 - 115/68)  BP(mean): --  RR: 21 (24 Jun 2022 18:11) (18 - 21)  SpO2: 100% (24 Jun 2022 18:11) (100% - 100%)    I&O's Summary      PHYSICAL EXAM:  General: No acute distress, very comfortable  Respiratory: Nonlabored  Cardiovascular: appears well perfused  Abdominal: Soft, nondistended, nontender. No rebound or guarding. No organomegaly, no palpable mass.  surgical incision healing well  sacral decubitus ulcer flat clean with no signs of infection 3cm  Extremities: Warm    LABS:                        8.8    3.56  )-----------( 279      ( 24 Jun 2022 18:22 )             28.0           Lactate:                  IMAGING:  EXAM: [id number] - CT ABDOMEN AND PELVIS IC - ORDERED BY: LETY OAKLEY    EXAM: [id number] - CT CHEST IC - ORDERED BY: LETY OAKLEY      PROCEDURE DATE: 06/23/2022        INTERPRETATION: CLINICAL INFORMATION: Restaging scan, r/o mets    COMPARISON: 12/28/2021, 5/23/2022.    CONTRAST/COMPLICATIONS:  IV Contrast: Omnipaque 350 (accession [id number]), IV contrast documented in associated exam (accession [id number]) 90 cc administered 10 cc discarded  Oral Contrast: Water  Complications: None reported at time of study completion    PROCEDURE:  CT of the Chest, Abdomen and Pelvis was performed.  Dual phase, arterial and portal venous phase imaging was performed through the upper abdomen.  Sagittal and coronal reformats were performed.    FINDINGS:  CHEST:  LUNGS AND LARGE AIRWAYS: Patent central airways. There is a 7 mm left lower lobe nodule which is unchanged and a new 9 mm nodule in the lingula. Calcified granuloma in the left upper lobe. Tree-in-bud opacities in the left lower lobe and lingula.  PLEURA: No pleural effusion.  VESSELS: Small pulmonary emboli in the right lower lobe segmental artery (304:56)  HEART: Heart size is normal. No pericardial effusion.  MEDIASTINUM AND IVETTE: No lymphadenopathy.  CHEST WALL AND LOWER NECK: Right-sided Mediport with the tip in the SVC. Left thyroid gland is atrophic or surgically absent.    ABDOMEN AND PELVIS:  LIVER: Within normal limits.  BILE DUCTS: Pneumobilia and mild biliary ductal dilatation, similar in appearance to prior.  GALLBLADDER: Cholecystectomy.  SPLEEN: Within normal limits.  PANCREAS: Status post pylorus sparing Whipple. Pancreaticojejunostomy and hepaticojejunostomy are within normal limits. Distal pancreatic atrophy with 1.5 cm cystic lesion in the pancreatic tail.  ADRENALS: Question mild thickening of the right adrenal gland.  KIDNEYS/URETERS: Bilateral renal cysts. No hydronephrosis. Cortical scarring in the interpolar region of the left kidney.    BLADDER: Minimally distended with diffuse wall thickening.  REPRODUCTIVE ORGANS: Prostate within normal limits.    BOWEL: Status post Whipple procedure. No small bowel anastomoses in the right hemiabdomen. No bowel obstruction. Appendix is not visualized.  PERITONEUM/MESENTERY: Moderate abdominal and pelvic ascites with peritoneal nodularity compatible carcinomatosis. Hazy soft tissue surrounds the gastric antrum in the right upper quadrant..  VESSELS: Atherosclerotic changes.  RETROPERITONEUM/LYMPH NODES: Mesenteric adenopathy, similar in appearance to prior.  ABDOMINAL WALL: Postsurgical changes.  BONES: Degenerative changes.    IMPRESSION:  Moderate pneumoperitoneum concerning for bowel perforation.    Small right lower lobe segmental pulmonary embolism.    Critical findings were discussed with Dr. Oakley on 6/24/2022 3:10 PM.    New tree-in-bud opacities in the left lower lobe and lingula which may be infectious in etiology. New 9 mm nodule in the lingula concerning for metastatic disease. Short-term follow-up chest CT is recommended.    Diffuse bladder wall thickening. Correlate with urinalysis for cystitis.    Moderate abdominal and pelvic ascites with peritoneal nodularity compatible with carcinomatosis. Hazy soft tissue surrounds the gastric antrum in the right upper quadrant compatible with metastatic disease.    Additional findings as above.    --- End of Report ---    ROSARIO PANTOJA MD; Attending Radiologist  This document has been electronically signed. Jun 24 2022 3:32PM

## 2022-06-24 NOTE — H&P ADULT - ASSESSMENT
77M metastatic pancreatic cancer s/p whipple 2020 and ex lap bypass and GJ tube placement 6/3 removed in office 6/20 found to have new free air on outpatient CT 6/23, patient asymptomatic    Plan:  -admit to Dr. Case  -NPO/IVF/zosyn  -serial ab exam    Discussed with Dr. Case    D TEAM SURGERY  [id number]   77M metastatic pancreatic cancer s/p whipple 2020 and ex lap bypass and GJ tube placement 6/3 removed in office 6/20 found to have new free air on outpatient CT 6/23, patient asymptomatic    Plan:  -admit to Dr. Case  -NPO/IVF/zosyn  -serial ab exam  -plan for likely [facility] on monday    Discussed with Dr. Case and Dr. Rea    D TEAM SURGERY  [id number]   77M metastatic pancreatic cancer s/p whipple 2020 and ex lap bypass and GJ tube placement 6/3 removed in office 6/20 found to have new free air on outpatient CT 6/23, patient asymptomatic    Plan:  -admit to Dr. Case  -NPO/IVF/zosyn  -serial ab exam  -plan for likely [facility] on monday  -f/u repeat lactate    Discussed with Dr. Case and Dr. Rea    D TEAM SURGERY  [id number]

## 2022-06-25 NOTE — PROGRESS NOTE ADULT - SUBJECTIVE AND OBJECTIVE BOX
SURGERY DAILY PROGRESS NOTE:     SUBJECTIVE/ROS: No acute events overnight. Patient seen and examined bedside on AM rounds.     OBJECTIVE:  Vital Signs Last 24 Hrs  T(C): 36.7 (25 Jun 2022 00:10), Max: 36.7 (25 Jun 2022 00:10)  T(F): 98.1 (25 Jun 2022 00:10), Max: 98.1 (25 Jun 2022 00:10)  HR: 78 (25 Jun 2022 00:10) (69 - 92)  BP: 126/72 (25 Jun 2022 00:10) (106/59 - 135/69)  BP(mean): --  RR: 18 (24 Jun 2022 22:00) (18 - 21)  SpO2: 100% (25 Jun 2022 00:10) (100% - 100%)    PHYSICAL EXAM:  General: No acute distress, very comfortable  Respiratory: Nonlabored  Cardiovascular: appears well perfused  Abdominal: Soft, nondistended, nontender. No rebound or guarding. No organomegaly, no palpable mass.  surgical incision healing well  sacral decubitus ulcer flat clean with no signs of infection 3cm  Extremities: Warm                          8.8    3.56  )-----------( 279      ( 24 Jun 2022 18:22 )             28.0     06-24    135  |  100  |  18  ----------------------------<  109<H>  4.2   |  23  |  1.06    Ca    8.3<L>      24 Jun 2022 18:22    TPro  6.0  /  Alb  3.2<L>  /  TBili  0.2  /  DBili  x   /  AST  18  /  ALT  20  /  AlkPhos  138<H>  06-24   PT/INR - ( 24 Jun 2022 19:49 )   PT: 13.0 sec;   INR: 1.12 ratio         PTT - ( 24 Jun 2022 18:22 )  PTT:33.3 sec  I&O's Detail    24 Jun 2022 07:01  -  25 Jun 2022 01:47  --------------------------------------------------------  IN:    Lactated Ringers: 300 mL  Total IN: 300 mL    OUT:    Oral Fluid: 0 mL    Voided (mL): 0 mL  Total OUT: 0 mL    Total NET: 300 mL

## 2022-06-26 NOTE — DISCHARGE NOTE PROVIDER - DISCHARGE DATE
What Type Of Note Output Would You Prefer (Optional)?: Bullet Format Hpi Title: Evaluation of a Skin Lesion 26-Jun-2022 How Severe Are Your Spot(S)?: mild Have Your Spot(S) Been Treated In The Past?: has not been treated

## 2022-06-26 NOTE — PROGRESS NOTE ADULT - SUBJECTIVE AND OBJECTIVE BOX
Subjective:   Patient seen at bedside this AM. Reports feeling well, without complaints. Denies chest pain, SOB. Tolerating diet without N/V.     24h Events:   - Overnight, no acute events    Objective:  Vital Signs  T(C): 36.4 (06-25 @ 21:13), Max: 36.8 (06-25 @ 09:00)  HR: 67 (06-25 @ 21:13) (67 - 79)  BP: 124/75 (06-25 @ 21:13) (112/73 - 130/75)  RR: 18 (06-25 @ 21:13) (18 - 18)  SpO2: 100% (06-25 @ 21:13) (97% - 100%)  06-24-22 @ 07:01  -  06-25-22 @ 07:00  --------------------------------------------------------  IN:  Total IN: 0 mL    OUT:    Voided (mL): 0 mL  Total OUT: 0 mL    Total NET: 0 mL      PHYSICAL EXAM:  General: No acute distress, very comfortable  Respiratory: Nonlabored  Cardiovascular: appears well perfused  Abdominal: Soft, nondistended, nontender. No rebound or guarding. No organomegaly, no palpable mass.  surgical incision healing well  sacral decubitus ulcer flat clean with no signs of infection 3cm  Extremities: Warm    Labs:                        8.4    2.81  )-----------( 231      ( 25 Jun 2022 02:10 )             26.3   06-25    135  |  100  |  16  ----------------------------<  92  3.8   |  23  |  0.95    Ca    8.4      25 Jun 2022 02:10  Phos  3.2     06-25  Mg     1.40     06-25    TPro  5.8<L>  /  Alb  3.0<L>  /  TBili  0.3  /  DBili  x   /  AST  17  /  ALT  16  /  AlkPhos  131<H>  06-25    CAPILLARY BLOOD GLUCOSE      POCT Blood Glucose.: 151 mg/dL (25 Jun 2022 23:57)  POCT Blood Glucose.: 143 mg/dL (25 Jun 2022 18:02)  POCT Blood Glucose.: 138 mg/dL (25 Jun 2022 12:39)  POCT Blood Glucose.: 92 mg/dL (25 Jun 2022 05:42)  POCT Blood Glucose.: 94 mg/dL (25 Jun 2022 00:16)      Medications:   MEDICATIONS  (STANDING):  dextrose 5% + sodium chloride 0.45% with potassium chloride 20 mEq/L 1000 milliLiter(s) (125 mL/Hr) IV Continuous <Continuous>  dextrose 5%. 1000 milliLiter(s) (50 mL/Hr) IV Continuous <Continuous>  dextrose 5%. 1000 milliLiter(s) (100 mL/Hr) IV Continuous <Continuous>  dextrose 50% Injectable 25 Gram(s) IV Push once  dextrose 50% Injectable 12.5 Gram(s) IV Push once  dextrose 50% Injectable 25 Gram(s) IV Push once  enoxaparin Injectable 40 milliGRAM(s) SubCutaneous every 24 hours  glucagon  Injectable 1 milliGRAM(s) IntraMuscular once  insulin lispro (ADMELOG) corrective regimen sliding scale   SubCutaneous every 6 hours    MEDICATIONS  (PRN):      Imaging:       Subjective:   Patient seen at bedside this AM. Reports feeling well, without complaints. Denies chest pain, SOB.    24h Events:   - Overnight, no acute events    Objective:  Vital Signs  T(C): 36.4 (06-25 @ 21:13), Max: 36.8 (06-25 @ 09:00)  HR: 67 (06-25 @ 21:13) (67 - 79)  BP: 124/75 (06-25 @ 21:13) (112/73 - 130/75)  RR: 18 (06-25 @ 21:13) (18 - 18)  SpO2: 100% (06-25 @ 21:13) (97% - 100%)  06-24-22 @ 07:01  -  06-25-22 @ 07:00  --------------------------------------------------------  IN:  Total IN: 0 mL    OUT:    Voided (mL): 0 mL  Total OUT: 0 mL    Total NET: 0 mL      PHYSICAL EXAM:  General: No acute distress, very comfortable  Respiratory: Nonlabored  Cardiovascular: appears well perfused  Abdominal: Soft, nondistended, nontender. No rebound or guarding. No organomegaly, no palpable mass.  surgical incision healing well  sacral decubitus ulcer flat clean with no signs of infection 3cm  Extremities: Warm    Labs:                        8.4    2.81  )-----------( 231      ( 25 Jun 2022 02:10 )             26.3   06-25    135  |  100  |  16  ----------------------------<  92  3.8   |  23  |  0.95    Ca    8.4      25 Jun 2022 02:10  Phos  3.2     06-25  Mg     1.40     06-25    TPro  5.8<L>  /  Alb  3.0<L>  /  TBili  0.3  /  DBili  x   /  AST  17  /  ALT  16  /  AlkPhos  131<H>  06-25    CAPILLARY BLOOD GLUCOSE      POCT Blood Glucose.: 151 mg/dL (25 Jun 2022 23:57)  POCT Blood Glucose.: 143 mg/dL (25 Jun 2022 18:02)  POCT Blood Glucose.: 138 mg/dL (25 Jun 2022 12:39)  POCT Blood Glucose.: 92 mg/dL (25 Jun 2022 05:42)  POCT Blood Glucose.: 94 mg/dL (25 Jun 2022 00:16)      Medications:   MEDICATIONS  (STANDING):  dextrose 5% + sodium chloride 0.45% with potassium chloride 20 mEq/L 1000 milliLiter(s) (125 mL/Hr) IV Continuous <Continuous>  dextrose 5%. 1000 milliLiter(s) (50 mL/Hr) IV Continuous <Continuous>  dextrose 5%. 1000 milliLiter(s) (100 mL/Hr) IV Continuous <Continuous>  dextrose 50% Injectable 25 Gram(s) IV Push once  dextrose 50% Injectable 12.5 Gram(s) IV Push once  dextrose 50% Injectable 25 Gram(s) IV Push once  enoxaparin Injectable 40 milliGRAM(s) SubCutaneous every 24 hours  glucagon  Injectable 1 milliGRAM(s) IntraMuscular once  insulin lispro (ADMELOG) corrective regimen sliding scale   SubCutaneous every 6 hours    MEDICATIONS  (PRN):      Imaging:

## 2022-06-26 NOTE — DISCHARGE NOTE PROVIDER - CARE PROVIDER_API CALL
Armand Case)  Surgery  [address], [place] [address]  Phone: [phone]  Fax: [phone]  Follow Up Time: 1 week

## 2022-06-26 NOTE — DISCHARGE NOTE NURSING/CASE MANAGEMENT/SOCIAL WORK - NSDCPNINST_GEN_ALL_CORE
Take all medications as prescribed, keep all follow up appointments. If symptoms persist seek medical attention.

## 2022-06-26 NOTE — PATIENT PROFILE ADULT - FALL HARM RISK - UNIVERSAL INTERVENTIONS
Bed in lowest position, wheels locked, appropriate side rails in place/Call bell, personal items and telephone in reach/Instruct patient to call for assistance before getting out of bed or chair/Non-slip footwear when patient is out of bed/[place] to call system/Physically safe environment - no spills, clutter or unnecessary equipment/Purposeful Proactive Rounding/Room/bathroom lighting operational, light cord in reach

## 2022-06-26 NOTE — DISCHARGE NOTE PROVIDER - NSDCFUADDINST_GEN_ALL_CORE_FT
FOLLOW-UP:   1. Please make a follow-up appointment with Dr. Case within 1-2 weeks of discharge.   2. Please follow-up with your primary care physician in 1 week regarding your hospitalization.   3. Please seek medical care if you develop abdominal pain, nausea, vomiting, fever or chills, persistent nausea/vomiting with inability to tolerate food or liquids, persistent diarrhea, or if your pain is not controlled on your discharge pain medications.     ACTIVITY: Please return to your usual level of physical activity. If you are taking narcotic pain medication (such as Percocet or Oxycodone), do NOT drive a car, operate machinery, or make important decisions.

## 2022-06-26 NOTE — DISCHARGE NOTE NURSING/CASE MANAGEMENT/SOCIAL WORK - PATIENT PORTAL LINK FT
You can access the FollowMyHealth Patient Portal offered by [facility] by registering at the following website: [email or web address]. By joining [employer]’s FollowMyHealth portal, you will also be able to view your health information using other applications (apps) compatible with our system.
You can access the FollowMyHealth Patient Portal offered by [facility] by registering at the following website: [email or web address]. By joining [employer]’s FollowMyHealth portal, you will also be able to view your health information using other applications (apps) compatible with our system.

## 2022-06-26 NOTE — DISCHARGE NOTE PROVIDER - NSDCCPCAREPLAN_GEN_ALL_CORE_FT
PRINCIPAL DISCHARGE DIAGNOSIS  Diagnosis: Pneumoperitoneum  Assessment and Plan of Treatment:

## 2022-06-26 NOTE — PROGRESS NOTE ADULT - ASSESSMENT
77M metastatic pancreatic cancer s/p whipple 2020 and ex lap bypass and GJ tube placement 6/3 removed in office 6/20 found to have new free air on outpatient CT 6/23, patient asymptomatic    Plan:  - NPO/IVF/zosyn  - serial ab exam  - Plan for CT scan with PO contrast this am      D TEAM SURGERY  [id number]    Seen and examined with Dr. Rea     77M metastatic pancreatic cancer s/p whipple 2020 and ex lap bypass and GJ tube placement 6/3 removed in office 6/20 found to have new free air on outpatient CT 6/23, patient asymptomatic    Plan:  - NPO/IVF/zosyn  - serial ab exam  - Plan for CT scan with PO contrast this am  - If improved will give regular diet and okay for discharge home.       D TEAM SURGERY  [id number]    Seen and examined with Dr. Rea

## 2022-06-26 NOTE — DISCHARGE NOTE PROVIDER - NSDCFUSCHEDAPPT_GEN_ALL_CORE_FT
[facility]  [facility]  Scheduled Appointment: 07/12/2022    [facility]  [facility]  Scheduled Appointment: 07/12/2022    [facility]  [facility]  Scheduled Appointment: 07/14/2022    [facility]  [facility]  Scheduled Appointment: 07/26/2022    [facility]  [facility]  Scheduled Appointment: 07/26/2022    [facility]  [facility]  Scheduled Appointment: 07/28/2022    [facility]  [facility]  Scheduled Appointment: 08/09/2022    [facility]  [facility]  Scheduled Appointment: 08/09/2022    [facility]  [facility]  Scheduled Appointment: 08/11/2022

## 2022-06-26 NOTE — DISCHARGE NOTE PROVIDER - NSDCMRMEDTOKEN_GEN_ALL_CORE_FT
acetaminophen 325 mg oral tablet: 2 tab(s) orally every 6 hours, As needed, Mild Pain (1 - 3), Moderate Pain (4 - 6)  atorvastatin 40 mg oral tablet: 1 tab(s) orally once a day  Creon 36,000 units oral delayed release capsule: 1 cap(s) orally 3 times a day  metFORMIN 1000 mg oral tablet:

## 2022-06-26 NOTE — DISCHARGE NOTE NURSING/CASE MANAGEMENT/SOCIAL WORK - NSDCPEFALRISK_GEN_ALL_CORE
For information on Fall & Injury Prevention, visit: [email or web address] OR  [email or web address] OR  https://www.cdc.gov/steadi/patient.html
For information on Fall & Injury Prevention, visit: [email or web address] OR  [email or web address]-[email or web address]-injury OR  https://www.cdc.gov/steadi/patient.html

## 2022-07-07 PROBLEM — K66.8 PNEUMOPERITONEUM: Status: ACTIVE | Noted: 2022-01-01

## 2022-07-07 PROBLEM — C73 THYROID MALIGNANT NEOPLASM: Status: ACTIVE | Noted: 2017-06-22

## 2022-07-07 NOTE — PHYSICAL EXAM
[Ambulatory and capable of all self care but unable to carry out any work activities] : Status 2- Ambulatory and capable of all self care but unable to carry out any work activities. Up and about more than 50% of waking hours [Cachectic] : cachectic [Normal] : no peripheral adenopathy appreciated [Ulcers] : no ulcers [Mucositis] : no mucositis [Thrush] : no thrush [Vesicles] : no vesicles [de-identified] : weight loss  [de-identified] : No visible inc wob [de-identified] : POst surgical scar healing, Abdominal cramps  [de-identified] : No visible rashes or jaundice seen over video

## 2022-07-07 NOTE — ASSESSMENT
[FreeTextEntry1] : 77 y/o with metastatic relapse of pancreatic ductal adenocarcinoma, YESENIA-germline mutant, from what was originally a localized pT2N2 LVI+/PNI+ pancreatic head adenocarcinoma resected Aug 2020; PMHx left thyroid papillary CA s/p left thyroidectomy/isthmusectomy/paratracheal node dissection in 2017, and melanoma, presenting for second opinion of further treatment including clinical trials in [facility]. \par \par He completed 12 cycles of FOLFIRINOX (09/2020-02/2021) and long course RT with capecitabine (3/2021-5/2021) and now has biopsy proven relapse in the peritoneum, not a substantial amount of burden, but we decided to start treatment to prevent complications that could arise from untreated peritoneal carcinomatosis.\par \par We had an in depth discussion about the options available for 2L therapy.  Alternatives include resuming FOLFIRINOX, noting that I cannot explicitly declare failure to this regimen since his relapse appeared to become more prominent after stopping this combination, and that his tumor may well be more sensitive to platinum drugs extrapolating from favorable response to platinum drugs from BRCA mutations. More recent data suggest that YESENIA mutations are prognostic rather than predictive (PMID 28617666), which may suggest that his tumor is more likely to respond to any treatment, not platinum specifically, as is the case for the PARP inhibitors. Another option is to use olaparib, extrapolating from BRCA/PALB2 data although while noting that YESENIA mutations appear less predictive based on limited data (KnowYourTumor/BeyondBRCA studies). Another option would be ipi+nivo off trial, as this is currently being studied in a trial context through [facility].\par \par He ultimately decided to pursue gemcitabine+nab-paclitaxel (GnP) on the basis that he wishes to continue with a standard option and his cancer has not yet been exposed to this therapy. Unfortunately, after just two days of chemo (C1D1 and D8), patient became severly neutropenic. Decision has been made to wait for count recovery and then change to every other week dosing for him, which has been shown to be about as effective and less toxic than 3-weeks on dosing (PMIDC [id number]). \par \par He has continued on every other week gemcitabine + nab-paclitaxel and is tolerating this therapy very well. Besides alopecia, he has very little in terms of side effects, and the every other week dosing is more tolerable to his bone marrow. Will continue therapy. His  remains stable ~200 which may be reflective of extant disease or reflect anatomic disruption of his biliary tract from the surgery. His scan shows minimal to no evidence of remaining disease. He remains on active chemotherapy with [place]/ Abraxane in [place]. \par \par June 2022. He presents following 3-week hospitalization, where he underwent exploratory laparotomy, and confirmation of peritoneal relapse. He is still recovering, with some fatigue, and difficulty gaining weight. While I would like to resume chemotherapy it looks like he still need more time to regain performance status and stamina.\par  \par Plan:\par Pancreatic adenocarcinoma with recurrence - s/p adjuvant therapy \par Plan to retreat patient with FOLFOX\par Drug sheet was provided Risks/ benefits and alternatives of chemotherapy were discussed and included but not limited to fatigue,low blood counts, nausea/vomiting, skin reactions, hair thinning, possible blood transfusion requirement,increased risk of infection,neuropathy. Patient agreed to above and informed consent was signed.\par \par Weight Loss:\par - continues on Creon\par - referral to nutrition\par On synthetic Marijuana- referred to [facility] for medical marijuana\par Will offer IVF for hydration in b/w chemo treatments.\par  \par - consented for FOLFIRINOX 6/21 but plan for FOLFOX in 2-3 weeks depending on performance recovery\par - may be candidate for [facility] POLAR trial given YESENIA mutation, DK will further discussion with Dr Al Deleon .\par \par Pulmonary Embolus noted on CT chest : \par Will initiate AC on patient.\par Will start Eliquis \par \par  RTC 2 weeks\par Will f/u in a week after chemo with  IVF \par

## 2022-07-07 NOTE — END OF VISIT
[Time Spent: ___ minutes] : I have spent [unfilled] minutes of time on the encounter. [>50% of the face to face encounter time was spent on counseling and/or coordination of care for ___] : Greater than 50% of the face to face encounter time was spent on counseling and/or coordination of care for [unfilled] No significant past surgical history

## 2022-07-07 NOTE — HISTORY OF PRESENT ILLNESS
[AJCC Stage: ____] : AJCC Stage: [unfilled] [de-identified] : 77 y/o with metastatic relapse of pancreatic ductal adenocarcinoma, YESENIA-germline mutant, from what was originally a localized pT2N2 LVI+/PNI+ pancreatic head adenocarcinoma resected Aug 2020; PMHx left thyroid papillary CA s/p left thyroidectomy/isthmusectomy/paratracheal node dissection in 2017, and melanoma, presenting for follow-up with medical oncology\par \par Chronological History of Cancer:\par Jul 2020  initially presented with abdominal discomfort, diarrhea, weight loss, new onset of DM\par 07/23/20  CT A/P suspected pancreas lesion\par 07/24/20  MRI Ab showed 1.2 cm mass at pancreas head. \par 08/03/20  underwent upfront surgery with Whipple procedure [facility] by Dr Calderon. pT2 pN2 LVI+ PNI+, margin negative\par 09/15/20 C1 adjuvant FOLFIRINOX\par 02/16/21 C12 (last cycle) FOLFIRINOX\par 03/03/21 CT CAP showing concern for relapse with mesenteric LN prominence. \par 03/20/21 began long-course RT with capecitabine, completed 5/6/21;\par 06/21/21 CT C/A/ again showed mesenteric lymphadenopathy, slightly increased LN prominence;  20\par Sep 2021  146;  repeat  2 weeks later 10/5/21 248\par 09/28/21 PET scan at [facility] (pending image loading) reported as "s/p whipple without suspicious activity in the surgical bed; hypermetatbolic RLQ lymph node/peritoneal implant most likely metastatic disease; interval improvement of small bowel distention and edema reflecting improvement of post radiation enteritis". \par 10/25/21 RLQ peritoneal implant soft tissue mass biopsy by IR at [facility], path returned as adenocarcinoma\par 11/02/21 C1D1 GnP (gemcitabine + nab-paclitaxel)\par 11/09/21 C1D8 GnP\par 11/16/21 planned but cancelled C1D15 GnP due to neuropenia\par 11/23/21 C2D01 GnP (changed to every other week)\par 12/07/21 C2D15 GnP \par 12/21/21 C3D1 GnP\par 12/28/21 CT scan with likely stable disease (report is comparing with 6/2021 scan. Comparison should be made with the one with Sep or Oct. Will try to upload Sep or Oct scan to [facility]). Ca19-9 is gradually downtrending\par 01/04/22- C3 D15 GnP\par 01/19/22- C4 D1 Gemzar 1000 mg/ m2 and Nab Paclitaxil 125 mg/ m2 \par \par Pt transferred care to [place].\par \par 02/01/22- C5 GnP e in [place] on 02/01/22\par 02/15/22-  C5 GnP \par \par 02/25/22- CT scans- POst op changes from whipples with fat stranding in upper abdomen, Scattered upper abdominal lymph nodes not appearing suspicious, scattered pulmonary nodules .\par 03/01/22-  gem/ Abraxane \par 03/15/22 -gem/ Abraxane \par 03/28/22- gem/ Abraxane \par \par Was getting chemotherapy in [place] and then decided to come back to [place] \par \par 05/23/22- Was admitted to [facility] from 05/23/22 to 06/11/22 for SBO.\par GI was consulted in house for possible stenting but was not done due to multi focal nature of involved bowel and stenosis sec to extrinsic compression. \par Pt got a PICC line in patient for medical optimization before surgical intervention\par 06/03/22- s/p ex lap, venting gastrostomy tube, surgical pathology of abdominal implant showed adenocarcinoma c/w pancreatobiliary primary\par \par 06/23/22- CT scan- Moderate pneumoperitoneum concerning for bowel perforation.Small right lower lobe segmental pulmonary embolism.\par New tree-in-bud opacities in the left lower lobe and lingula which may be infectious in etiology. New 9 mm nodule in the lingula concerning for metastatic disease. Short-term follow-up chest CT is recommended.Diffuse bladder wall thickening. Correlate with urinalysis for cystitis.\par Moderate abdominal and pelvic ascites with peritoneal nodularity compatible with carcinomatosis. Hazy soft tissue surrounds the gastric antrum in the right upper quadrant compatible with metastatic disease.\par \par Pt was sent to [facility] ER and repeat CT scan done that showed improvement in Pneumoperitoneum.\par \par \par \par  family hx: Mother lung CA, Father thyroid CA, Sister breast CA; Pt also reports YESENIA gene + (heterozygous pathogenic c.1027_1030del (p.[id number] lefs*2), [facility] 1/20/21). His daughter is also positive for YESENIA gene mutation, but his son does not have the mutation.  \par  [de-identified] : surgical path reported moderately differentiated PDAC, 2.5 cm, involving head of pancreas and invading pancreatic duct, CBD, muscularis propria of duodenum and peripancreatic adipose tissue. Lymphovascular and Perineural invasion present. Margins negative. 6 of 13 LNs + for metastatic carcinoma. Staging was described as pT2 pN2.  [de-identified] : Ca 19-9: 11/11/20- 24 3/5/21- 28 6/25/21- 20 9/21/21- 146 10/5/21- 248.  [de-identified] : germline YESENIA gene + (heterozygous pathogenic c.1027_1030del (p.[id number] lefs*2), [facility] 1/20/21\par pMMR:\par Foundation: KRAS G12V, G12D, MLL2 , SF3B1 K700E, TET2 [id number]; low tumor purity [de-identified] : 07/07/22: \par Pt is seen in office today with his wife.\par He is healing well post op but still frail and weak .\par Has been loosing weight- \par Pt states no appetite - Using Dorabinol for appetite \par Pathology of abdominal implant confirms metastatic adenocarcinoma  \par venting tube removed\par He has been experiencing some fatigue- Grade 2 \par difficulty gaining weight- Nutrition referral \par Also sent referral for medical [facility] for f/u with [facility].\par \par This Am, Pts wife had a fall in her driveway and she has been feeling nauseous and dizzy - Will hold off on labs today since they would want to go check in urgent care.\par

## 2022-07-12 PROBLEM — Z71.89 ENCOUNTER FOR EDUCATION ABOUT INFUSION CARE: Status: ACTIVE | Noted: 2022-01-01

## 2022-07-20 PROBLEM — K21.9 GERD (GASTROESOPHAGEAL REFLUX DISEASE): Status: ACTIVE | Noted: 2022-01-01

## 2022-07-20 PROBLEM — I48.92 ATRIAL FLUTTER: Status: ACTIVE | Noted: 2022-01-01

## 2022-07-20 PROBLEM — I26.99 PULMONARY EMBOLISM: Status: ACTIVE | Noted: 2022-01-01

## 2022-07-20 PROBLEM — R63.4 UNINTENTIONAL WEIGHT LOSS: Status: ACTIVE | Noted: 2022-01-01

## 2022-07-20 NOTE — ED PROVIDER NOTE - NS ED ROS FT
ROS   GENERAL: No fever, no chills, no malaise, +fatigue   ENT: No earache, no coryza, no sore throat   NECK: No stiffness, no swollen glands, no dysphagia   RESPIRATORY: No cough, no dyspnea, no pleuritic chest pain   HEART: no chest pain, +resolved palpitations, no edema, no jvd   ABDOMEN: No abdominal pain, no nausea, no vomiting, no diarrhea   : No dysuria, no increase frequency, no urgency, No discharge   MUSCULAR-SKELETAL: No myalgia, no arthralgia   NEUROLOGY: No headache, no vertigo, no paresthesia, no focal deficits, no diplopia   SKIN: No rash, no evidence of trauma  All other ROS are negative

## 2022-07-20 NOTE — PATIENT PROFILE ADULT - NSPROPOAURINARYCATHETER_GEN_A_NUR
Problem: Pain  Goal: #Acceptable pain level achieved/maintained at rest using NRS/Faces  Description: This goal is used for patients who can self-report.  Acceptable means the level is at or below the identified comfort/function goal.  Outcome: Outcome Met, Continue evaluating goal progress toward completion  Goal: # Acceptable pain level achieved/maintained at rest using NRS/Faces without oversedation (opioid naive or PCA/Epidural infusion)  Description: This goal is used if Opioid-naïve or on PCA/Epidural Infusion.  Outcome: Outcome Met, Continue evaluating goal progress toward completion  Goal: # Acceptable pain level achieved/maintained with activity using NRS/Faces  Description: This goal is used for patients who can self-report and are not achieving acceptable pain control during activity.  Outcome: Outcome Met, Continue evaluating goal progress toward completion  Goal: Acceptable pain/comfort level is achieved/maintained at rest (based on Pain Behaviors Scale)  Description: This goal is used for patients who are not able to self-report pain and are assessed for pain using the Pain Behaviors Scale  Outcome: Outcome Met, Continue evaluating goal progress toward completion  Goal: Acceptable pain/comfort level is achieved/maintained at rest based on PAINAID scale (Dementia)  Description: This goal is used for patients who are not able to self-report pain, have dementia, and assessed using the PAINAD scale.  Outcome: Outcome Met, Continue evaluating goal progress toward completion  Goal: Acceptable pain/comfort level is achieved/maintained at rest (based on pediatric behavior tool: NIPS, NPASS, or FLACC)  Description: This goal is used for pediatric patients who are not able to self report pain.  Outcome: Outcome Met, Continue evaluating goal progress toward completion  Goal: # Verbalizes understanding of pain management  Description: Documented in Patient Education Activity  Outcome: Outcome Met, Continue  evaluating goal progress toward completion  Goal: Verbalizes understanding and effective use of Patient Controlled Analgesia (PCA)  Description: Documented in Patient Education Activity  This goal is used for patients with PCA  Outcome: Outcome Met, Continue evaluating goal progress toward completion  Goal: Maximum comfort achieved/maintained at end of life (Hospice)  Outcome: Outcome Met, Continue evaluating goal progress toward completion     Problem: Non-Pressure Injury Wound  Goal: # No deterioration in wound  Outcome: Outcome Met, Continue evaluating goal progress toward completion  Goal: # Verbalizes understanding of wound and wound care  Description: If abnormality is a skin tear, avoid using tape on skin including transparent dressings. Document education using the patient education activity.  Outcome: Outcome Met, Continue evaluating goal progress toward completion  Goal: Participates in wound care activities  Outcome: Outcome Met, Continue evaluating goal progress toward completion  Goal: Wound care provided to promote comfort needs (Hospice)  Outcome: Outcome Met, Continue evaluating goal progress toward completion      no

## 2022-07-20 NOTE — ED ADULT NURSE NOTE - OBJECTIVE STATEMENT
A&OX4 ambulatory self care 77 yr old male presenting to the ed today from [email or web address] for new onset of rapid afib. pt arrives to trb in sinus rhythm with hr of 85. pt endorsing dizziness palpitations and weakness since this morning. right chest wall port accessed at [facility]. labs collected. covid sent. report given to primary rn.

## 2022-07-20 NOTE — ED PROVIDER NOTE - NSFOLLOWUPINSTRUCTIONS_ED_ALL_ED_FT
Your EKG prior to arrival in the ED was suggestive of atrial fibrillation with rapid ventricular response. Your EKG and cardiac monitoring here did not show evidence of this event.    Your lactate was elevated without evidence of infection on your results.    Please follow up with cardiology clinic at your scheduled appointment. You should receive a phone call with your appointment date at time in the next 1-2 days. If you do not receive a phone call please contact the number below to confirm your appointment details.    Please return to the ED for any new or concerning symptoms including but not limited to weakness, palpitations, chest pain, dyspnea.

## 2022-07-20 NOTE — ED PROVIDER NOTE - PROGRESS NOTE DETAILS
Dr. Roman: Discussed results with the patient. Lactate down trending however still elevated. Pt admits to not drinking water. No episodes of a-fib during ED stay. Offered patient admission, pt verbalized understanding of results and has elected for outpatient workup at this time. Hemodynamically stable. Results given to patient and strict return precautions given.

## 2022-07-20 NOTE — ED PROVIDER NOTE - OBJECTIVE STATEMENT
77M w/hx HTN, HLD, pancreatic CA s/p whipple procedure on chemo presents from chemo infusion center when HR was noted in the 130s. He reports this AM feeling weak when he got up out of bed. He presented to infusion center for scheduled infusion, but noted fatigue on exertion making it difficult to walk from the bed to the restroom. On arrival to infusion site his EKG was noted to be irregularly irregular to the 130s and he was sent to the ED for further evaluation. He received 500cc fluids by the infusion site via port access. On arrival to ED, he reports notable improvement of symptoms, with ED ECG regular sinus with HR 80s. He denies prior arrhythmias or hx afib. 77M w/hx HTN, HLD, pancreatic CA s/p whipple procedure on chemo presents from chemo infusion center when HR was noted in the 130s. He reports this AM feeling weak when he got up out of bed. He presented to infusion center for scheduled infusion, but noted fatigue on exertion making it difficult to walk from the bed to the restroom. On arrival to infusion site his EKG was noted to be irregularly irregular to the 130s and he was sent to the ED for further evaluation. He received 500cc fluids by the infusion site via port access. On arrival to ED, he reports notable improvement of symptoms, with ED ECG regular sinus with HR 80s. He denies prior arrhythmias or hx afib. No palliative or provocative factors. No other current complaints at this time.

## 2022-07-20 NOTE — ED PROVIDER NOTE - PHYSICAL EXAMINATION
GEN - NAD; well appearing; A&O x3   HEAD - NC/AT   EYES- PERRL, EOMI  ENT: Airway patent, mmm  PULMONARY - CTA b/l, symmetric breath sounds. No W/R/R.  CARDIAC -s1s2, RRR, 80 BPM, no M,G,R, No JVD  ABDOMEN - +BS, ND, NT, soft, no guarding, no rebound, no masses , no rigidity  : No CVA TTP, no suprapubic TTP  BACK - Normal  spine   EXTREMITIES - FROM, symmetric pulses, capillary refill < 2 seconds, no edema, 5/5 strength in b/l UE and LE  SKIN - mid lower chest scar extending over the abdomen s/p lauraippagustín   NEUROLOGIC - alert, speech clear, no focal deficits  PSYCH -nl mood/affect, nl insight.

## 2022-07-20 NOTE — ED PROVIDER NOTE - CLINICAL SUMMARY MEDICAL DECISION MAKING FREE TEXT BOX
77M w/hx HTN, HLD, pancreatic CA s/p whipple procedure on chemo presents from chemo infusion center when HR was noted in the 130s. He reports this AM feeling weak when he got up out of bed. He presented to infusion center for scheduled infusion, but noted fatigue on exertion making it difficult to walk from the bed to the restroom. On arrival to infusion site his EKG was noted to be irregularly irregular to the 130s and he was sent to the ED for further evaluation. He received 500cc fluids by the infusion site via port access. In ED ECG NSR with HR 80s. He denies prior arrhythmias or hx afib. Possible dehydration (pt reports not drinking much water) vs acs vs new onset a-fib vs viral illness vs pna vs electrolyte abnormality. Obtain cbc, cmp, tni, tele monitoring, rvp, ua, cxr, symptomatic control prn.

## 2022-07-20 NOTE — ED PROVIDER NOTE - PATIENT PORTAL LINK FT
You can access the FollowMyHealth Patient Portal offered by [facility] by registering at the following website: [email or web address]. By joining [employer]’s FollowMyHealth portal, you will also be able to view your health information using other applications (apps) compatible with our system.

## 2022-07-20 NOTE — ED PROVIDER NOTE - ATTENDING CONTRIBUTION TO CARE
I have personally performed a history and physical examination of the patient and discussed management with the fellow as well as the patient.  I reviewed the fellow's note and agree with the documented findings and plan of care.  I have authored and modified critical sections of the Provider Note, including but not limited to HPI, Physical Exam and MDM.    77M w/hx HTN, HLD, pancreatic CA s/p whipple procedure on chemo presents from chemo infusion center when HR was noted in the 130s. He reports this AM feeling weak when he got up out of bed. He presented to infusion center for scheduled infusion, but noted fatigue on exertion making it difficult to walk from the bed to the restroom. On arrival to infusion site his EKG was noted to be irregularly irregular to the 130s and he was sent to the ED for further evaluation. He received 500cc fluids by the infusion site via port access. In ED ECG NSR with HR 80s. He denies prior arrhythmias or hx afib. Possible dehydration (pt reports not drinking much water) vs acs vs new onset a-fib vs viral illness vs pna vs electrolyte abnormality. Obtain cbc, cmp, tni, tele monitoring, rvp, ua, cxr, symptomatic control prn. pt asymptomatic at this time, disposition pending labs.

## 2022-07-20 NOTE — ED PROVIDER NOTE - NSICDXPASTSURGICALHX_GEN_ALL_CORE_FT
PAST SURGICAL HISTORY:  H/O arthroscopy of left knee meniscus  12/1998    History of Whipple procedure pylorus sparing whipple 2022 at [facility]    S/P laparotomy 6/3/2022 jejenujejunostomy and gastrojejunostomy with GJ tube placement Dr. Case    Vocal cord nodule 1998

## 2022-07-20 NOTE — ED ADULT NURSE NOTE - CHIEF COMPLAINT QUOTE
from [facility] for new onset rapid afib with HR of 132. brought directly to [facility]

## 2022-07-21 NOTE — REASON FOR VISIT
[Follow-Up Visit] : a follow-up [Spouse] : spouse [FreeTextEntry2] : Metastatic pancreatic adenocarcinoma

## 2022-07-21 NOTE — HISTORY OF PRESENT ILLNESS
[AJCC Stage: ____] : AJCC Stage: [unfilled] [de-identified] : 75 y/o with metastatic relapse of pancreatic ductal adenocarcinoma, YESENIA-germline mutant, from what was originally a localized pT2N2 LVI+/PNI+ pancreatic head adenocarcinoma resected Aug 2020; PMHx left thyroid papillary CA s/p left thyroidectomy/isthmusectomy/paratracheal node dissection in 2017, and melanoma, presenting for follow-up with medical oncology\par \par Chronological History of Cancer:\par Jul 2020  initially presented with abdominal discomfort, diarrhea, weight loss, new onset of DM\par 07/23/20  CT A/P suspected pancreas lesion\par 07/24/20  MRI Ab showed 1.2 cm mass at pancreas head. \par 08/03/20  underwent upfront surgery with Whipple procedure [facility] by Dr Calderon. pT2 pN2 LVI+ PNI+, margin negative\par 09/15/20 C1 adjuvant FOLFIRINOX\par 02/16/21 C12 (last cycle) FOLFIRINOX\par 03/03/21 CT CAP showing concern for relapse with mesenteric LN prominence. \par 03/20/21 began long-course RT with capecitabine, completed 5/6/21;\par 06/21/21 CT C/A/ again showed mesenteric lymphadenopathy, slightly increased LN prominence;  20\par Sep 2021  146;  repeat  2 weeks later 10/5/21 248\par 09/28/21 PET scan at [facility] (pending image loading) reported as "s/p whipple without suspicious activity in the surgical bed; hypermetatbolic RLQ lymph node/peritoneal implant most likely metastatic disease; interval improvement of small bowel distention and edema reflecting improvement of post radiation enteritis". \par 10/25/21 RLQ peritoneal implant soft tissue mass biopsy by IR at [facility], path returned as adenocarcinoma\par 11/02/21 C1D1 GnP (gemcitabine + nab-paclitaxel)\par 11/09/21 C1D8 GnP\par 11/16/21 planned but cancelled C1D15 GnP due to neuropenia\par 11/23/21 C2D01 GnP (changed to every other week)\par 12/07/21 C2D15 GnP \par 12/21/21 C3D1 GnP\par 12/28/21 CT scan with likely stable disease (report is comparing with 6/2021 scan. Comparison should be made with the one with Sep or Oct. Will try to upload Sep or Oct scan to [facility]). Ca19-9 is gradually downtrending\par 01/04/22- C3 D15 GnP\par 01/19/22- C4 D1 Gemzar 1000 mg/ m2 and Nab Paclitaxil 125 mg/ m2 \par \par Pt transferred care to [place].\par \par 02/01/22- C5 GnP e in [place] on 02/01/22\par 02/15/22-  C5 GnP \par \par 02/25/22- CT scans- POst op changes from whipples with fat stranding in upper abdomen, Scattered upper abdominal lymph nodes not appearing suspicious, scattered pulmonary nodules .\par 03/01/22-  gem/ Abraxane \par 03/15/22 -gem/ Abraxane \par 03/28/22- gem/ Abraxane \par \par Was getting chemotherapy in [place] and then decided to come back to [place] \par \par 05/23/22- Was admitted to [facility] from 05/23/22 to 06/11/22 for SBO.\par GI was consulted in house for possible stenting but was not done due to multi focal nature of involved bowel and stenosis sec to extrinsic compression. \par Pt got a PICC line in patient for medical optimization before surgical intervention\par 06/03/22- s/p ex lap, venting gastrostomy tube, surgical pathology of abdominal implant showed adenocarcinoma c/w pancreatobiliary primary\par \par 06/23/22- CT scan- Moderate pneumoperitoneum concerning for bowel perforation.Small right lower lobe segmental pulmonary embolism.\par New tree-in-bud opacities in the left lower lobe and lingula which may be infectious in etiology. New 9 mm nodule in the lingula concerning for metastatic disease. Short-term follow-up chest CT is recommended.Diffuse bladder wall thickening. Correlate with urinalysis for cystitis.\par Moderate abdominal and pelvic ascites with peritoneal nodularity compatible with carcinomatosis. 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 His daughter is also positive for YESENIA gene mutation, but his son does not have the mutation.  \par  [de-identified] : surgical path reported moderately differentiated PDAC, 2.5 cm, involving head of pancreas and invading pancreatic duct, CBD, muscularis propria of duodenum and peripancreatic adipose tissue. Lymphovascular and Perineural invasion present. Margins negative. 6 of 13 LNs + for metastatic carcinoma. Staging was described as pT2 pN2.  [de-identified] : Ca 19-9: 11/11/20- 24 3/5/21- 28 6/25/21- 20 9/21/21- 146 10/5/21- 248.  [de-identified] : germline YESENIA gene + (heterozygous pathogenic c.1027_1030del (p.[id number] lefs*2), INVITAE 1/20/21\par pMMR:\par Foundation: KRAS G12V, G12D, MLL2 , SF3B1 K700E, TET2 [id number]; low tumor purity [de-identified] : 07/12/22: \par Pt is seen in treatment room  with his wife.\par He is healing well post op but still frail and weak .\par Today he noticed there is some oozing on his surgical wound site- Serosanguious drain-\par Advised to call the Surgeon office and to follow up .\par Pt states no appetite - Using Dorabinol for appetite - Wife concerned that he is not eating much- Will see Palliative team for medical marijuana certification.\par In the meantime, advised to double dose of Dorabinol to help with appetite \par difficulty gaining weight- Nutrition referral \par Also sent referral for medical parris for f/u with [facility].\par Reviwed his recent EKG today \par

## 2022-07-21 NOTE — PHYSICAL EXAM
[Ulcers] : no ulcers [Mucositis] : no mucositis [Thrush] : no thrush [Vesicles] : no vesicles [de-identified] : weight loss  [de-identified] : No visible inc wob [de-identified] : POst surgical scar healing, Abdominal cramps , surgical wound oozing serosangious drain [de-identified] : No visible rashes or jaundice seen over video

## 2022-07-21 NOTE — ASSESSMENT
[FreeTextEntry1] : 77 y/o with metastatic relapse of pancreatic ductal adenocarcinoma, YESENIA-germline mutant, from what was originally a localized pT2N2 LVI+/PNI+ pancreatic head adenocarcinoma resected Aug 2020; PMHx left thyroid papillary CA s/p left thyroidectomy/isthmusectomy/paratracheal node dissection in 2017, and melanoma, presenting for second opinion of further treatment including clinical trials in [facility]. \par \par He completed 12 cycles of FOLFIRINOX (09/2020-02/2021) and long course RT with capecitabine (3/2021-5/2021) and now has biopsy proven relapse in the peritoneum, not a substantial amount of burden, but we decided to start treatment to prevent complications that could arise from untreated peritoneal carcinomatosis.\par \par We had an in depth discussion about the options available for 2L therapy.  Alternatives include resuming FOLFIRINOX, noting that I cannot explicitly declare failure to this regimen since his relapse appeared to become more prominent after stopping this combination, and that his tumor may well be more sensitive to platinum drugs extrapolating from favorable response to platinum drugs from BRCA mutations. More recent data suggest that YESENIA mutations are prognostic rather than predictive (PMID 90156532), which may suggest that his tumor is more likely to respond to any treatment, not platinum specifically, as is the case for the PARP inhibitors. Another option is to use olaparib, extrapolating from BRCA/PALB2 data although while noting that YESENIA mutations appear less predictive based on limited data (KnowYourTumor/BeyondBRCA studies). Another option would be ipi+nivo off trial, as this is currently being studied in a trial context through [facility].\par \par He ultimately decided to pursue gemcitabine+nab-paclitaxel (GnP) on the basis that he wishes to continue with a standard option and his cancer has not yet been exposed to this therapy. Unfortunately, after just two days of chemo (C1D1 and D8), patient became severly neutropenic. Decision has been made to wait for count recovery and then change to every other week dosing for him, which has been shown to be about as effective and less toxic than 3-weeks on dosing (PMIDC 87540941). \par \par He has continued on every other week gemcitabine + nab-paclitaxel and is tolerating this therapy very well. Besides alopecia, he has very little in terms of side effects, and the every other week dosing is more tolerable to his bone marrow. Will continue therapy. His  remains stable ~200 which may be reflective of extant disease or reflect anatomic disruption of his biliary tract from the surgery. His scan shows minimal to no evidence of remaining disease. He remains on active chemotherapy with [place]/ Abraxane in [place]. \par \par June 2022. He presents following 3-week hospitalization, where he underwent exploratory laparotomy, and confirmation of peritoneal relapse. He is still recovering, with some fatigue, and difficulty gaining weight. While I would like to resume chemotherapy it looks like he still need more time to regain performance status and stamina.\par  \par Plan:\par Pancreatic adenocarcinoma with recurrence - s/p adjuvant therapy \par Will start treatment with FOLFOX today - C # 1\par Will be a retraet OXaliplatin \par \par \par Weight Loss:\par - continues on Creon\par - referral to nutrition\par On synthetic Marijuana- referred to [facility] for medical marijuana\par Will offer IVF for hydration in b/w chemo treatments.\par  \par Pulmonary Embolus noted on CT chest : \par Will initiate AC on patient.\par Will start Eliquis \par \par  RTC 2 weeks\par Will f/u in a week after chemo with  IVF \par

## 2022-07-21 NOTE — HISTORY OF PRESENT ILLNESS
[de-identified] : 77 y/o with metastatic relapse of pancreatic ductal adenocarcinoma, YESENIA-germline mutant, from what was originally a localized pT2N2 LVI+/PNI+ pancreatic head adenocarcinoma resected Aug 2020; PMHx left thyroid papillary CA s/p left thyroidectomy/isthmusectomy/paratracheal node dissection in 2017, and melanoma, presenting for follow-up with medical oncology\par \par Chronological History of Cancer:\par Jul 2020  initially presented with abdominal discomfort, diarrhea, weight loss, new onset of DM\par 07/23/20  CT A/P suspected pancreas lesion\par 07/24/20  MRI Ab showed 1.2 cm mass at pancreas head. \par 08/03/20  underwent upfront surgery with Whipple procedure [facility] by Dr Calderon. pT2 pN2 LVI+ PNI+, margin negative\par 09/15/20 C1 adjuvant FOLFIRINOX\par 02/16/21 C12 (last cycle) FOLFIRINOX\par 03/03/21 CT CAP showing concern for relapse with mesenteric LN prominence. \par 03/20/21 began long-course RT with capecitabine, completed 5/6/21;\par 06/21/21 CT C/A/ again showed mesenteric lymphadenopathy, slightly increased LN prominence;  20\par Sep 2021  146;  repeat  2 weeks later 10/5/21 248\par 09/28/21 PET scan at [facility] (pending image loading) reported as "s/p whipple without suspicious activity in the surgical bed; hypermetatbolic RLQ lymph node/peritoneal implant most likely metastatic disease; interval improvement of small bowel distention and edema reflecting improvement of post radiation enteritis". \par 10/25/21 RLQ peritoneal implant soft tissue mass biopsy by IR at [facility], path returned as adenocarcinoma\par 11/02/21 C1D1 GnP (gemcitabine + nab-paclitaxel)\par 11/09/21 C1D8 GnP\par 11/16/21 planned but cancelled C1D15 GnP due to neuropenia\par 11/23/21 C2D01 GnP (changed to every other week)\par 12/07/21 C2D15 GnP \par 12/21/21 C3D1 GnP\par 12/28/21 CT scan with likely stable disease (report is comparing with 6/2021 scan. Comparison should be made with the one with Sep or Oct. Will try to upload Sep or Oct scan to [facility]). Ca19-9 is gradually downtrending\par 01/04/22- C3 D15 GnP\par 01/19/22- C4 D1 Gemzar 1000 mg/ m2 and Nab Paclitaxil 125 mg/ m2 \par \par Pt transferred care to [place].\par \par 02/01/22- C5 GnP e in [place] on 02/01/22\par 02/15/22-  C5 GnP \par \par 02/25/22- CT scans- POst op changes from whipples with fat stranding in upper abdomen, Scattered upper abdominal lymph nodes not appearing suspicious, scattered pulmonary nodules .\par 03/01/22-  gem/ Abraxane \par 03/15/22 -gem/ Abraxane \par 03/28/22- gem/ Abraxane \par \par Was getting chemotherapy in [place] and then decided to come back to [place] \par \par 05/23/22- Was admitted to [facility] from 05/23/22 to 06/11/22 for SBO.\par GI was consulted in house for possible stenting but was not done due to multi focal nature of involved bowel and stenosis sec to extrinsic compression. \par Pt got a PICC line in patient for medical optimization before surgical intervention\par 06/03/22- s/p ex lap, venting gastrostomy tube, surgical pathology of abdominal implant showed adenocarcinoma c/w pancreatobiliary primary\par \par 06/23/22- CT scan- Moderate pneumoperitoneum concerning for bowel perforation.Small right lower lobe segmental pulmonary embolism.\par New tree-in-bud opacities in the left lower lobe and lingula which may be infectious in etiology. New 9 mm nodule in the lingula concerning for metastatic disease. Short-term follow-up chest CT is recommended.Diffuse bladder wall thickening. Correlate with urinalysis for cystitis.\par Moderate abdominal and pelvic ascites with peritoneal nodularity compatible with carcinomatosis. Hazy soft tissue surrounds the gastric antrum in the right upper quadrant compatible with metastatic disease.\par Pt was sent to [facility] ER and repeat CT scan done that showed improvement in Pneumoperitoneum.\par \par 07/12/22: C #1 FOLFOX- 5 FU 2400mg/ m2 /  mg/ m2/ Oxaliplatin-85 mg/ m2 \par \par \par  family hx: Mother lung CA, Father thyroid CA, Sister breast CA; Pt also reports YESENIA gene + (heterozygous pathogenic c.1027_1030del (p.[id number] lefs*2), INVITAE 1/20/21). His daughter is also positive for YESENIA gene mutation, but his son does not have the mutation.  \par  [de-identified] : surgical path reported moderately differentiated PDAC, 2.5 cm, involving head of pancreas and invading pancreatic duct, CBD, muscularis propria of duodenum and peripancreatic adipose tissue. Lymphovascular and Perineural invasion present. Margins negative. 6 of 13 LNs + for metastatic carcinoma. Staging was described as pT2 pN2.  [de-identified] : Ca 19-9: 11/11/20- 24 3/5/21- 28 6/25/21- 20 9/21/21- 146 10/5/21- 248.  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 \par Pt was here for IV hydration - \par Pt had received his last chemo on 7/12/22\par He has not been eating or drinking much fluids - \par today his HR in treatment room was 143/ mt - EKG done showed new onset A flutter / a fib \par Pt states he has been feeling light headed and dizzy an has palpitations\par It could be dehydartion vs 5 FU Cardiac toxicity \par Spoke with patient and son at [facility] and explained that he will need to go to ER for further management.\par

## 2022-07-21 NOTE — PHYSICAL EXAM
[Ambulatory and capable of all self care but unable to carry out any work activities] : Status 2- Ambulatory and capable of all self care but unable to carry out any work activities. Up and about more than 50% of waking hours [Cachectic] : cachectic [Normal] : affect appropriate [Ulcers] : no ulcers [Mucositis] : no mucositis [Thrush] : no thrush [Vesicles] : no vesicles [de-identified] : weight loss  [de-identified] : No visible inc wob [de-identified] : POst surgical scar healing, Abdominal cramps , surgical wound oozing serosangious drain [de-identified] : No visible rashes or jaundice seen over video

## 2022-07-21 NOTE — ASSESSMENT
[FreeTextEntry1] : 77 y/o with metastatic relapse of pancreatic ductal adenocarcinoma, YESENIA-germline mutant, from what was originally a localized pT2N2 LVI+/PNI+ pancreatic head adenocarcinoma resected Aug 2020; PMHx left thyroid papillary CA s/p left thyroidectomy/isthmusectomy/paratracheal node dissection in 2017, and melanoma, presenting for second opinion of further treatment including clinical trials in Pancreas Multidisciplinary [facility]. \par \par He completed 12 cycles of FOLFIRINOX (09/2020-02/2021) and long course RT with capecitabine (3/2021-5/2021) and now has biopsy proven relapse in the peritoneum, not a substantial amount of burden, but we decided to start treatment to prevent complications that could arise from untreated peritoneal carcinomatosis.\par \par We had an in depth discussion about the options available for 2L therapy.  Alternatives include resuming FOLFIRINOX, noting that I cannot explicitly declare failure to this regimen since his relapse appeared to become more prominent after stopping this combination, and that his tumor may well be more sensitive to platinum drugs extrapolating from favorable response to platinum drugs from BRCA mutations. More recent data suggest that YESENIA mutations are prognostic rather than predictive (PMID 17357463), which may suggest that his tumor is more likely to respond to any treatment, not platinum specifically, as is the case for the PARP inhibitors. Another option is to use olaparib, extrapolating from BRCA/PALB2 data although while noting that YESENIA mutations appear less predictive based on limited data (KnowYourTumor/BeyondBRCA studies). Another option would be ipi+nivo off trial, as this is currently being studied in a trial context through [facility].\par \par He ultimately decided to pursue gemcitabine+nab-paclitaxel (GnP) on the basis that he wishes to continue with a standard option and his cancer has not yet been exposed to this therapy. Unfortunately, after just two days of chemo (C1D1 and D8), patient became severly neutropenic. Decision has been made to wait for count recovery and then change to every other week dosing for him, which has been shown to be about as effective and less toxic than 3-weeks on dosing (PMIDC 72270594). \par \par He has continued on every other week gemcitabine + nab-paclitaxel and is tolerating this therapy very well. Besides alopecia, he has very little in terms of side effects, and the every other week dosing is more tolerable to his bone marrow. Will continue therapy. His  remains stable ~200 which may be reflective of extant disease or reflect anatomic disruption of his biliary tract from the surgery. His scan shows minimal to no evidence of remaining disease. He remains on active chemotherapy with [place]/ Abraxane in [place]. \par \par June 2022. He presents following 3-week hospitalization, where he underwent exploratory laparotomy, and confirmation of peritoneal relapse. He is still recovering, with some fatigue, and difficulty gaining weight. While I would like to resume chemotherapy it looks like he still need more time to regain performance status and stamina.\par  \par Plan:\par Pancreatic adenocarcinoma with recurrence - s/p adjuvant therapy \par Started treatment with FOLFOX on 7/12/22\par Will be a retraet OXaliplatin \par \par \par Weight Loss:\par - continues on Creon\par - referral to nutrition\par On synthetic Marijuana- referred to [facility] for medical marijuana\par Will offer IVF for hydration in b/w chemo treatments.\par  \par Pulmonary Embolus noted on CT chest : \par Will initiate AC on patient.\par Will start Eliquis \par \par New Onset A flutter \par Concern for tachycardia in setting of dehydration\par Also concern if there is 5 FU Cardiac toxicity\par Pt resumed 5 FU on 7/12/22\par Pt feels light headeness / dizziness  today \par Has cardiac palpitations as well\par Will send pt to ER for further management \par \par \par Discussed case with Dr Cary \par

## 2022-07-25 PROBLEM — R06.00 DYSPNEA: Status: ACTIVE | Noted: 2022-01-01

## 2022-07-25 PROBLEM — I48.0 PAROXYSMAL ATRIAL FIBRILLATION: Status: ACTIVE | Noted: 2022-01-01

## 2022-07-25 NOTE — DISCUSSION/SUMMARY
[FreeTextEntry1] : Mike recently presented with atrial fibrillation/flutter, in the setting of dehydration.  He has been in sinus rhythm today, with occasional premature atrial contractions.  His blood pressure is at goal, and physical exam is unremarkable.\par \par We had a long discussion regarding the finding of atrial fibrillation, its pathophysiology, and treatment options.  If this was indeed dehydration driven, he will increase his water intake.  He will have an echocardiogram to confirm the absence of structural heart disease, especially while he is on chemotherapy.  We will also have a 2-week monitor to evaluate if he is having PAF.  I would like him to stay on Eliquis for now, possibly indefinitely.\par \par We will speak after the above testing, and arrange follow-up. [EKG obtained to assist in diagnosis and management of assessed problem(s)] : EKG obtained to assist in diagnosis and management of assessed problem(s)

## 2022-07-25 NOTE — HISTORY OF PRESENT ILLNESS
[FreeTextEntry1] : Mike is a 77-year-old male here for initial evaluation.  He has a history of pancreatic ductal adenocarcinoma, and is status post Whipple procedure since 202. He has been on different chemotherapeutic regimens.\par \par Recently in 5/2022, he was admitted to [facility] with an SBO, and subsequently in June 2022 with pneumoperitoneum.\par \par He is now getting chemotherapy, FOLFOX 5 FU, every 2 weeks.  At his last session, he was noted to be in atrial fibrillation/flutter, with a heart rate in the 130s.  Went to the emergency room, though converted to sinus rhythm by the time of his arrival.  He was found to be significantly dehydrated, and improved with intravenous fluids.\par \par The day of his atrial fibrillation, he felt short of breath, palpitations and dizziness.  He has not felt similar symptoms since, though does report fatigue, weakness, and mild dyspnea.\par \par He is currently on Eliquis, because of a possible PE, noted on a CT.  He has no other cardiac issues.  Prior to his oncologic issues, he has been very active.  He denies a history of CAD in his family.

## 2022-07-25 NOTE — PHYSICAL EXAM
[Well Developed] : well developed [No Acute Distress] : no acute distress [Normal Conjunctiva] : normal conjunctiva [Normal Venous Pressure] : normal venous pressure [No Carotid Bruit] : no carotid bruit [Normal S1, S2] : normal S1, S2 [No Murmur] : no murmur [No Rub] : no rub [No Gallop] : no gallop [Clear Lung Fields] : clear lung fields [Good Air Entry] : good air entry [No Respiratory Distress] : no respiratory distress  [Soft] : abdomen soft [Non Tender] : non-tender [No Masses/organomegaly] : no masses/organomegaly [Normal Bowel Sounds] : normal bowel sounds [Normal Gait] : normal gait [No Edema] : no edema [No Cyanosis] : no cyanosis [No Clubbing] : no clubbing [No Varicosities] : no varicosities [No Rash] : no rash [No Skin Lesions] : no skin lesions [Moves all extremities] : moves all extremities [No Focal Deficits] : no focal deficits [Normal Speech] : normal speech [Alert and Oriented] : alert and oriented [Normal memory] : normal memory [de-identified] : thin/cachectic

## 2022-07-26 NOTE — ASSESSMENT
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 \par \par He completed 12 cycles of FOLFIRINOX (09/2020-02/2021) and long course RT with capecitabine (3/2021-5/2021) and now has biopsy proven relapse in the peritoneum, not a substantial amount of burden, but we decided to start treatment to prevent complications that could arise from untreated peritoneal carcinomatosis.\par \par We had an in depth discussion about the options available for 2L therapy.  Alternatives include resuming FOLFIRINOX, noting that I cannot explicitly declare failure to this regimen since his relapse appeared to become more prominent after stopping this combination, and that his tumor may well be more sensitive to platinum drugs extrapolating from favorable response to platinum drugs from BRCA mutations. More recent data suggest that YESENIA mutations are prognostic rather than predictive (PMID 82434436), which may suggest that his tumor is more likely to respond to any treatment, not platinum specifically, as is the case for the PARP inhibitors. Another option is to use olaparib, extrapolating from BRCA/PALB2 data although while noting that YESENIA mutations appear less predictive based on limited data (KnowYourTumor/BeyondBRCA studies). Another option would be ipi+nivo off trial, as this is currently being studied in a trial context through [facility].\par \par He ultimately decided to pursue gemcitabine+nab-paclitaxel (GnP) on the basis that he wishes to continue with a standard option and his cancer has not yet been exposed to this therapy. Unfortunately, after just two days of chemo (C1D1 and D8), patient became severly neutropenic. Decision has been made to wait for count recovery and then change to every other week dosing for him, which has been shown to be about as effective and less toxic than 3-weeks on dosing (PMIDC 99172606). \par \par He has continued on every other week gemcitabine + nab-paclitaxel and is tolerating this therapy very well. Besides alopecia, he has very little in terms of side effects, and the every other week dosing is more tolerable to his bone marrow. Will continue therapy. His  remains stable ~200 which may be reflective of extant disease or reflect anatomic disruption of his biliary tract from the surgery. His scan shows minimal to no evidence of remaining disease. He remains on active chemotherapy with [place]/ Abraxane in [place]. \par \par June 2022. He presents following 3-week hospitalization, where he underwent exploratory laparotomy, and confirmation of peritoneal relapse. He is still recovering, with some fatigue, and difficulty gaining weight. While I would like to resume chemotherapy it looks like he still need more time to regain performance status and stamina.\par  \par 7/26: He is improving somewhat; weight up 4 lbs, has a bit more energy. Had detailed discussion about treatment options at this time - continuing with FOLFOX vs attempting to get PARP/IO off-label as he did not qualify for TAPUR due to lack of measurable disease. Tumor markers rising but not likely reflective of failure of therapy as he has had very little FOLFOX. After discussion, patient prefers to receive chemotherapy for now and will revisit trail discussions after net scan.\par \par Plan:\par Pancreatic adenocarcinoma with recurrence - s/p adjuvant therapy \par - Started treatment with FOLFOX on 7/12/22\par - Will be a retraet OXaliplatin\par - encouraged pt to have increased PO intake, build on stamina and get out of door for physical activities\par - will get Signatera and CA 19-9 for tumor marker monitoring, and basic labs\par \par Weight Loss: improved\par - continues on Creon\par - On synthetic Marijuana- reported increased appetite\par - Will offer IVF for hydration in b/w chemo treatments.\par \par New Onset A flutter: stable\par - s/p ED visit due to A-Fib in 130s\par - received IVF and symptoms improved significantly\par - encourage increased fluid intake as dehydration can be a cause of A-Fib due to cardiac stretching\par \par Abd pain: likely post-surgical\par - pain is positional per patient\par - encouraged used of lidocaine patch for symptomatic control and for better sleep quality. Encouraged melatonin for sleep aid and discourage pt from using xanax or benadryl\par \par Case d/w Dr. Cary.\par \par Sabrina Durán MD PhD\par PGY2 Internal Medicine\par \par \par

## 2022-07-26 NOTE — PHYSICAL EXAM
[Ambulatory and capable of all self care but unable to carry out any work activities] : Status 2- Ambulatory and capable of all self care but unable to carry out any work activities. Up and about more than 50% of waking hours [Cachectic] : cachectic [Normal] : affect appropriate [Ulcers] : no ulcers [Mucositis] : no mucositis [Thrush] : no thrush [Vesicles] : no vesicles [de-identified] : 5lbs wt gain [de-identified] : No visible inc wob [de-identified] : POst surgical scar healing, RLQ and LLQ pain to deep palpation [de-identified] : No visible rashes or jaundice seen over video

## 2022-07-26 NOTE — END OF VISIT
[Time Spent: ___ minutes] : I have spent [unfilled] minutes of time on the encounter. [>50% of the face to face encounter time was spent on counseling and/or coordination of care for ___] : Greater than 50% of the face to face encounter time was spent on counseling and/or coordination of care for [unfilled] [] : Resident [FreeTextEntry3] : Waqas Cary MD PhD\par Medical Oncology Attending\par I personally have spent a total of 45 minutes of time on the date of this encounter reviewing test results, documenting findings, coordinating care, and directly consulting with the patient and/or designated family member.\par I was present with the trainee during the key portions of the history and exam. I agree with the findings and plan as documented above.\par

## 2022-07-26 NOTE — HISTORY OF PRESENT ILLNESS
[AJCC Stage: ____] : AJCC Stage: [unfilled] [de-identified] : 77 y/o with metastatic relapse of pancreatic ductal adenocarcinoma, YESENIA-germline mutant, from what was originally a localized pT2N2 LVI+/PNI+ pancreatic head adenocarcinoma resected Aug 2020; PMHx left thyroid papillary CA s/p left thyroidectomy/isthmusectomy/paratracheal node dissection in 2017, and melanoma, presenting for follow-up with medical oncology\par \par Chronological History of Cancer:\par Jul 2020  initially presented with abdominal discomfort, diarrhea, weight loss, new onset of DM\par 07/23/20  CT A/P suspected pancreas lesion\par 07/24/20  MRI Ab showed 1.2 cm mass at pancreas head. \par 08/03/20  underwent upfront surgery with Whipple procedure [facility] by Dr Calderon. pT2 pN2 LVI+ PNI+, margin negative\par 09/15/20 C1 adjuvant FOLFIRINOX\par 02/16/21 C12 (last cycle) FOLFIRINOX\par 03/03/21 CT CAP showing concern for relapse with mesenteric LN prominence. \par 03/20/21 began long-course RT with capecitabine, completed 5/6/21;\par 06/21/21 CT C/A/ again showed mesenteric lymphadenopathy, slightly increased LN prominence;  20\par Sep 2021  146;  repeat  2 weeks later 10/5/21 248\par 09/28/21 PET scan at [facility] (pending image loading) reported as "s/p whipple without suspicious activity in the surgical bed; hypermetatbolic RLQ lymph node/peritoneal implant most likely metastatic disease; interval improvement of small bowel distention and edema reflecting improvement of post radiation enteritis". \par 10/25/21 RLQ peritoneal implant soft tissue mass biopsy by IR at [facility], path returned as adenocarcinoma\par 11/02/21 C1D1 GnP (gemcitabine + nab-paclitaxel)\par 11/09/21 C1D8 GnP\par 11/16/21 planned but cancelled C1D15 GnP due to neuropenia\par 11/23/21 C2D01 GnP (changed to every other week)\par 12/07/21 C2D15 GnP \par 12/21/21 C3D1 GnP\par 12/28/21 CT scan with likely stable disease (report is comparing with 6/2021 scan. Comparison should be made with the one with Sep or Oct. Will try to upload Sep or Oct scan to [facility]). Ca19-9 is gradually downtrending\par 01/04/22- C3 D15 GnP\par 01/19/22- C4 D1 Gemzar 1000 mg/ m2 and Nab Paclitaxil 125 mg/ m2 \par \par Pt transferred care to [place].\par \par 02/01/22- C5 GnP e in [place] on 02/01/22\par 02/15/22-  C5 GnP \par \par 02/25/22- CT scans- POst op changes from whipples with fat stranding in upper abdomen, Scattered upper abdominal lymph nodes not appearing suspicious, scattered pulmonary nodules .\par 03/01/22-  gem/ Abraxane \par 03/15/22 -gem/ Abraxane \par 03/28/22- gem/ Abraxane \par \par Was getting chemotherapy in [place] and then decided to come back to [place] \par \par 05/23/22- Was admitted to [facility] from 05/23/22 to 06/11/22 for SBO.\par GI was consulted in house for possible stenting but was not done due to multi focal nature of involved bowel and stenosis sec to extrinsic compression. \par Pt got a PICC line in patient for medical optimization before surgical intervention\par 06/03/22- s/p ex lap, venting gastrostomy tube, surgical pathology of abdominal implant showed adenocarcinoma c/w pancreatobiliary primary\par \par 06/23/22- CT scan- Moderate pneumoperitoneum concerning for bowel perforation.Small right lower lobe segmental pulmonary embolism.\par New tree-in-bud opacities in the left lower lobe and lingula which may be infectious in etiology. New 9 mm nodule in the lingula concerning for metastatic disease. Short-term follow-up chest CT is recommended.Diffuse bladder wall thickening. Correlate with urinalysis for cystitis.\par Moderate abdominal and pelvic ascites with peritoneal nodularity compatible with carcinomatosis. Hazy soft tissue surrounds the gastric antrum in the right upper quadrant compatible with metastatic disease.\par Pt was sent to [facility] ER and repeat CT scan done that showed improvement in Pneumoperitoneum.\par \par 07/12/22: C #1 FOLFOX- 5 FU 2400mg/ m2 /  mg/ m2/ Oxaliplatin-85 mg/ m2 \par \par \par  family hx: Mother lung CA, Father thyroid CA, Sister breast CA; Pt also reports YESENIA gene + (heterozygous pathogenic c.[id number] (p.[id number] lefs*2), [facility] 1/20/21). His daughter is also positive for YESENIA gene mutation, but his son does not have the mutation.  \par  [de-identified] : surgical path reported moderately differentiated PDAC, 2.5 cm, involving head of pancreas and invading pancreatic duct, CBD, muscularis propria of duodenum and peripancreatic adipose tissue. Lymphovascular and Perineural invasion present. Margins negative. 6 of 13 LNs + for metastatic carcinoma. Staging was described as pT2 pN2.  [de-identified] : Ca 19-9: 11/11/20- 24 3/5/21- 28 6/25/21- 20 9/21/21- 146 10/5/21- 248.  [de-identified] : germline YESENIA gene + (heterozygous pathogenic c.1027_1030del (p.Mmk2146 lefs*2), [facility] 1/20/21\par pMMR:\par Foundation: KRAS G12V, G12D, MLL2 , SF3B1 K700E, TET2 [id number]; low tumor purity [de-identified] : 07/26/22: \par Pt seen at treatment room w/ his wife. Reported having difficulty sleeping, as he cannot sleep on the R side. He has RLQ abd pain that spontaneously occurs. LLQ pain also occurs occasionally but better than R side. Denied fevers/chills, no abnormal BM, wt has been stable w/ a couple of lbs wt gain which pt attributes to increased appetite w/ medical marijuana use. Pt has been ambulatory but has decreased stamina and w/ some exertional SOB. Pt has not taken pain meds/sleep aids to help w/ his symptoms. Used xanax once last Saturday w/ no help.\par \par Pt went to the ED last Wednesday. He was in A-Fib. He was dehydrated, received IVF and improved and discharged home. Pt later vomited that day. Since then no more vomit.\par

## 2022-07-26 NOTE — REVIEW OF SYSTEMS
[Recent Change In Weight] : ~T recent weight change [SOB on Exertion] : shortness of breath during exertion [Abdominal Pain] : abdominal pain [Negative] : Allergic/Immunologic [Fever] : no fever [Chills] : no chills [Night Sweats] : no night sweats [Fatigue] : no fatigue [Vision Problems] : no vision problems [Dysphagia] : no dysphagia [Hoarseness] : no hoarseness [Shortness Of Breath] : no shortness of breath [Wheezing] : no wheezing [Cough] : no cough [Vomiting] : no vomiting [Constipation] : no constipation [Diarrhea] : no diarrhea [Dysuria] : no dysuria [Incontinence] : no incontinence [Dizziness] : no dizziness [Difficulty Walking] : no difficulty walking [Suicidal] : not suicidal [Depression] : no depression [Easy Bleeding] : no tendency for easy bleeding [Swollen Glands] : no swollen glands [FreeTextEntry2] : increased 5lbs

## 2022-07-29 NOTE — PHYSICAL EXAM
[General Appearance - Alert] : alert [General Appearance - In No Acute Distress] : in no acute distress [Sclera] : the sclera and conjunctiva were normal [Normal Oral Mucosa] : normal oral mucosa [Neck Appearance] : the appearance of the neck was normal [] : no respiratory distress [Auscultation Breath Sounds / Voice Sounds] : lungs were clear to auscultation bilaterally [Heart Rate And Rhythm] : heart rate was normal and rhythm regular [Heart Sounds] : normal S1 and S2 [Edema] : there was no peripheral edema [Bowel Sounds] : normal bowel sounds [Abdomen Soft] : soft [Abdomen Tenderness] : non-tender [Abnormal Walk] : normal gait [Skin Color & Pigmentation] : normal skin color and pigmentation [No Focal Deficits] : no focal deficits [Oriented To Time, Place, And Person] : oriented to person, place, and time [Affect] : the affect was normal [FreeTextEntry1] : thin

## 2022-07-29 NOTE — HISTORY OF PRESENT ILLNESS
[FreeTextEntry1] : 77yoM with metastatic relapse of pancreatic ductal adenocarcinoma presents for initial palliative care visit, referred by Dr Cary.  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 \par \par Patient initially dx with pancreas cancer 7/2020. He underwent upfront surgery with Whipple procedure at [facility] by Dr Calderon and started adjuvant FOLFIRINOX 9/15/2020 - 2/16/21 (12 cycles). 3/03/21 CT CAP showing concern for relapse with mesenteric LN prominence. 10/25/21 RLQ peritoneal implant soft tissue mass biopsy by IR at [facility], path returned as adenocarcinoma. Started Gemcitabine + nab-paclitaxel 11/2/21. 12/28/21 CT scan with likely stable disease. Pt transferred care to [place] 2/2022. Transitioned to [place]/Abraxane 3/1/22. Patient was losing weight and becoming weaker, he returned to [place] 5/2022. Was admitted to [facility] from 05/23/22 to 06/11/22 for SBO.\par GI was consulted in house for possible stenting but was not done due to multi focal nature of involved bowel and stenosis sec to extrinsic compression. Pt got a PICC line in patient for medical optimization before surgical intervention. 6/03/22- s/p ex lap, venting gastrostomy tube, surgical pathology of abdominal implant showed adenocarcinoma c/w pancreatobiliary primary. 6/23/22- CT scan- Moderate pneumoperitoneum concerning for bowel perforation.Small right lower lobe segmental pulmonary embolism.\par New tree-in-bud opacities in the left lower lobe and lingula which may be infectious in etiology. New 9 mm nodule in the lingula concerning for metastatic disease. Short-term follow-up chest CT is recommended.Diffuse bladder wall thickening. Correlate with urinalysis for cystitis.\par Moderate abdominal and pelvic ascites with peritoneal nodularity compatible with carcinomatosis. Hazy soft tissue surrounds the gastric antrum in the right upper quadrant compatible with metastatic disease.\par Started treatment with FOLFOX 7/12/22. \par \par Main reason for which patient is referred is symptom management.\par He was sent to the ED from [facility] yesterday for rapid Afib. He had presented for IVF after having dizziness and a fall at home on a carpeted floor. He denies syncope. He was given IV fluids and discharged from the ED. \par \par Main symptom has been appetite loss. His appetite is typically the best in the morning so he tends to have a big breakfast.  He drinks 1 Boost daily. He has been using dronabinol 2.5mg, recently was instructed to double the dose to 5mg, which he takes before dinner. Has not noticed it to be particularly helpful, doesn't get the "munchies," he states. \par \par ROS:\par +nausea/vomiting - uses PRN ondansetron, compazine. \par +80lb weight loss since the time of his Whipple surgery 8/2020. \par +trouble sleeping at times due to difficulty finding a comfortable position. Laying on his R side is uncomfortable. \par Denies pain, SOB, bowel irregularities, mood is stable/good. \par \par Patient is  and lives with his wife in a split level house in [place], has two adult children. Patient is a retired . He remains independent with all ADL's, enjoys playing golf. He drives himself. Passes time by watching TV. Other than yesterday's fall he has not fallen before. Ambulates independently without assistive device. Is not spiritual/Taoist. \par His hope is to gain weight and get back on the golf course. \par No fears.\par \par I-STOP Ref#:  [id number]

## 2022-07-29 NOTE — ASSESSMENT
[______] : HCP: [unfilled] [FreeTextEntry1] : 77yoM with:\par \par 1. Pancreatic adenocarcinoma - On FOLFOX. Med Onc follow up.\par \par 2. Loss of appetite with weight loss - Medical cannabis certification completed today. Provided cannabis education, overview of state program, and discussed adverse effects in detail. Counseled that vaporized cannabis is not the preferred route of administration due to the fact that both short-term and long-term risks associated with vaporizing oils are not yet fully understood. Recommend starting with 1:1 THC:CBD formulation at low dose of THC (~2mg/dose).  Reinforced to patient importance of the aim to keep the THC dose low (<10mg) given his recent arrhythmia. \par \par 3. Nausea/vomiting - c/w PRN ondansetron. Discussed how medicinal cannabis can be of benefit. \par \par 4. Trouble sleeping - Discussed the potential for medicinal cannabis to be of benefit.\par \par 5. Encounter for palliative care- Emotional support provided\par Explained the role of palliative care in enhancing quality of life in the setting of serious illness.\par Health Care Proxy (HCP) form completed in office today after explanation of the role of a HCP.\par \par Follow up in 2 weeks, call sooner with questions or issues.

## 2022-08-04 PROBLEM — G47.9 TROUBLE IN SLEEPING: Status: ACTIVE | Noted: 2022-01-01

## 2022-08-04 PROBLEM — Z51.5 ENCOUNTER FOR PALLIATIVE CARE: Status: ACTIVE | Noted: 2022-01-01

## 2022-08-04 NOTE — ASSESSMENT
[______] : HCP: [unfilled] [FreeTextEntry1] : 77yoM with:\par \par 1. Pancreatic adenocarcinoma - On FOLFOX. Med Onc follow up.\par \par 2. Abdominal pain - Rx sent for Gabapentin 100mg QHS. Plan to titrate as needed. \par \par 3. Loss of appetite with weight loss - Certified for medical cannabis. Cautioned patient in taking such large doses of THC due to adverse effects he experienced. Instructed patient to lower dosage to find adequate symptom relief with no adverse effects. Reinforced to patient importance of the aim to keep the THC dose low given his recent arrhythmia. Patient will trial 1/2 teaspoon of 20:1 powder. \par \par 4. Nausea/vomiting - c/w PRN prochlorperazine. Discussed how medicinal cannabis can be of benefit. \par \par 5. Trouble sleeping - Discussed the potential for medicinal cannabis to be of benefit.\par \par 6. Encounter for palliative care- Emotional support provided\par Explained the role of palliative care in enhancing quality of life in the setting of serious illness.\par Health Care Proxy (HCP) form completed in office today after explanation of the role of a HCP.\par \par Follow up in 2 weeks, call sooner with questions or issues.

## 2022-08-04 NOTE — HISTORY OF PRESENT ILLNESS
[FreeTextEntry1] : 77yoM with metastatic relapse of pancreatic ductal adenocarcinoma presents for initial palliative care visit, referred by Dr Cary.  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 \par \par Patient initially dx with pancreas cancer 7/2020. He underwent upfront surgery with Whipple procedure at [facility] by Dr Calderon and started adjuvant FOLFIRINOX 9/15/2020 - 2/16/21 (12 cycles). 3/03/21 CT CAP showing concern for relapse with mesenteric LN prominence. 10/25/21 RLQ peritoneal implant soft tissue mass biopsy by IR at [facility], path returned as adenocarcinoma. Started Gemcitabine + nab-paclitaxel 11/2/21. 12/28/21 CT scan with likely stable disease. Pt transferred care to [place] 2/2022. Transitioned to Oliver/Abraxane 3/1/22. Patient was losing weight and becoming weaker, he returned to [place] 5/2022. Was admitted to [facility] from 05/23/22 to 06/11/22 for SBO.\par GI was consulted in house for possible stenting but was not done due to multi focal nature of involved bowel and stenosis sec to extrinsic compression. Pt got a PICC line in patient for medical optimization before surgical intervention. 6/03/22- s/p ex lap, venting gastrostomy tube, surgical pathology of abdominal implant showed adenocarcinoma c/w pancreatobiliary primary. 6/23/22- CT scan- Moderate pneumoperitoneum concerning for bowel perforation.Small right lower lobe segmental pulmonary embolism.\par New tree-in-bud opacities in the left lower lobe and lingula which may be infectious in etiology. New 9 mm nodule in the lingula concerning for metastatic disease. Short-term follow-up chest CT is recommended.Diffuse bladder wall thickening. Correlate with urinalysis for cystitis.\par Moderate abdominal and pelvic ascites with peritoneal nodularity compatible with carcinomatosis. Hazy soft tissue surrounds the gastric antrum in the right upper quadrant compatible with metastatic disease.\par Started treatment with FOLFOX 7/12/22. \par \par Main reason for which patient is referred is symptom management.\par He was sent to the ED from [facility] yesterday for rapid Afib. He had presented for IVF after having dizziness and a fall at home on a carpeted floor. He denies syncope. He was given IV fluids and discharged from the ED. \par \par Main symptom has been appetite loss. His appetite is typically the best in the morning so he tends to have a big breakfast.  He drinks 1 Boost daily. He has been using dronabinol 2.5mg, recently was instructed to double the dose to 5mg, which he takes before dinner. Has not noticed it to be particularly helpful, doesn't get the "munchies," he states. \par \par \par Interval history (8/3/22):\par Patient seen for follow-up in treatment room. Last received chemo last Tuesday, 7/26/22. Presents today with increased fatigue and weakness he relates to not sleeping well at night. Following his Whipple surgery, he experiences pain while laying on his right side which regularly interrupts his sleep. He describes the pain as a sharp nerve-like pain that radiates from abdomen to neck. He rates the pain on average 4/10. The pain is relieved with position change. He has tried sleep aids including melatonin 10mg with no benefit. Experiencing loss of appetite. Breakfast remains his best meal, this morning tolerated eggs and sausage. Supplements with a boost every day. He is certified in medical cannabis, he felt no benefit with 1:1 THC:CBD tincture which he self titrated to 15mg THC. Yesterday he purchased 20:1 THC:CBD powder and he took a dose of 38mg THC. He reports feeling dizzy initially, which resolved after an hour. He felt this dose helped with appetite and "couldn't get enough food.\par  Slept well and feels at baseline today. Intermittent nausea without emesis, finds relief with compazine. Having formed daily bowel movements. Mood is low. \par \par ROS:\par +nausea - uses PRN ondansetron, compazine. \par +80lb weight loss since the time of his Whipple surgery 8/2020. \par +trouble sleeping at times due to difficulty finding a comfortable position. Laying on his R side is uncomfortable. \par +low mood\par Denies pain, SOB, bowel irregularities, emesis. \par \par Patient is  and lives with his wife in a split level house in [place], has two adult children. Patient is a retired . He remains independent with all ADL's, enjoys playing golf. He drives himself. Passes time by watching TV. Other than yesterday's fall he has not fallen before. Ambulates independently without assistive device. Is not spiritual/Zoroastrianism. \par His hope is to gain weight and get back on the golf course. \par No fears.\par \par I-STOP Ref#:  [id number]

## 2022-08-04 NOTE — DATA REVIEWED
[FreeTextEntry1] : CT C/A/P  (06/23/2022)\par FINDINGS:\par CHEST:\par LUNGS AND LARGE AIRWAYS: Patent central airways. There is a 7 mm left lower lobe nodule which is unchanged and a new 9 mm nodule in the lingula. Calcified granuloma in the left upper lobe. Tree-in-bud opacities in the left lower lobe and lingula.\par PLEURA: No pleural effusion.\par VESSELS: Small pulmonary emboli in the right lower lobe segmental artery (304:56)\par HEART: Heart size is normal. No pericardial effusion.\par MEDIASTINUM AND IVETTE: No lymphadenopathy.\par CHEST WALL AND LOWER NECK: Right-sided Mediport with the tip in the SVC. Left thyroid gland is atrophic or surgically absent.\par \par ABDOMEN AND PELVIS:\par LIVER: Within normal limits.\par BILE DUCTS: Pneumobilia and mild biliary ductal dilatation, similar in appearance to prior.\par GALLBLADDER: Cholecystectomy.\par SPLEEN: Within normal limits.\par PANCREAS: Status post pylorus sparing Whipple. Pancreaticojejunostomy and hepaticojejunostomy are within normal limits. Distal pancreatic atrophy with 1.5 cm cystic lesion in the pancreatic tail.\par ADRENALS: Question mild thickening of the right adrenal gland.\par KIDNEYS/URETERS: Bilateral renal cysts. No hydronephrosis. Cortical scarring in the interpolar region of the left kidney.\par \par BLADDER: Minimally distended with diffuse wall thickening.\par REPRODUCTIVE ORGANS: Prostate within normal limits.\par \par BOWEL: Status post Whipple procedure. No small bowel anastomoses in the right hemiabdomen. No bowel obstruction. Appendix is not visualized.\par PERITONEUM/MESENTERY: Moderate abdominal and pelvic ascites with peritoneal nodularity compatible carcinomatosis. Hazy soft tissue surrounds the gastric antrum in the right upper quadrant.\par VESSELS: Atherosclerotic changes.\par RETROPERITONEUM/LYMPH NODES: Mesenteric adenopathy, similar in appearance to prior.\par ABDOMINAL WALL: Postsurgical changes.\par BONES: Degenerative changes.\par \par IMPRESSION:\par Moderate pneumoperitoneum concerning for bowel perforation.\par Small right lower lobe segmental pulmonary embolism.\par Critical findings were discussed with Dr. Cary on 6/24/2022 3:10 PM.\par \par New tree-in-bud opacities in the left lower lobe and lingula which may be infectious in etiology. New 9 mm nodule in the lingula concerning for metastatic disease. Short-term follow-up chest CT is recommended.\par \par Diffuse bladder wall thickening. Correlate with urinalysis for cystitis.\par \par Moderate abdominal and pelvic ascites with peritoneal nodularity compatible with carcinomatosis. Hazy soft tissue surrounds the gastric antrum in the right upper quadrant compatible with metastatic disease.

## 2022-08-09 NOTE — HISTORY OF PRESENT ILLNESS
[AJCC Stage: ____] : AJCC Stage: [unfilled] [de-identified] : 75 y/o with metastatic relapse of pancreatic ductal adenocarcinoma, YESENIA-germline mutant, from what was originally a localized pT2N2 LVI+/PNI+ pancreatic head adenocarcinoma resected Aug 2020; PMHx left thyroid papillary CA s/p left thyroidectomy/isthmusectomy/paratracheal node dissection in 2017, and melanoma, presenting for follow-up with medical oncology\par \par Chronological History of Cancer:\par Jul 2020  initially presented with abdominal discomfort, diarrhea, weight loss, new onset of DM\par 07/23/20  CT A/P suspected pancreas lesion\par 07/24/20  MRI Ab showed 1.2 cm mass at pancreas head. \par 08/03/20  underwent upfront surgery with Whipple procedure [facility] by Dr Calderon. pT2 pN2 LVI+ PNI+, margin negative\par 09/15/20 C1 adjuvant FOLFIRINOX\par 02/16/21 C12 (last cycle) FOLFIRINOX\par 03/03/21 CT CAP showing concern for relapse with mesenteric LN prominence. \par 03/20/21 began long-course RT with capecitabine, completed 5/6/21;\par 06/21/21 CT C/A/ again showed mesenteric lymphadenopathy, slightly increased LN prominence;  20\par Sep 2021  146;  repeat  2 weeks later 10/5/21 248\par 09/28/21 PET scan at [facility] (pending image loading) reported as "s/p whipple without suspicious activity in the surgical bed; hypermetatbolic RLQ lymph node/peritoneal implant most likely metastatic disease; interval improvement of small bowel distention and edema reflecting improvement of post radiation enteritis". \par 10/25/21 RLQ peritoneal implant soft tissue mass biopsy by IR at [facility], path returned as adenocarcinoma\par 11/02/21 C1D1 GnP (gemcitabine + nab-paclitaxel)\par 11/09/21 C1D8 GnP\par 11/16/21 planned but cancelled C1D15 GnP due to neuropenia\par 11/23/21 C2D01 GnP (changed to every other week)\par 12/07/21 C2D15 GnP \par 12/21/21 C3D1 GnP\par 12/28/21 CT scan with likely stable disease (report is comparing with 6/2021 scan. Comparison should be made with the one with Sep or Oct. Will try to upload Sep or Oct scan to [facility]). Ca19-9 is gradually downtrending\par 01/04/22- C3 D15 GnP\par 01/19/22- C4 D1 Gemzar 1000 mg/ m2 and Nab Paclitaxil 125 mg/ m2   Pt transferred care to [place].\par 02/01/22- C5 GnP e in [place] on 02/01/22\par 02/15/22-  C5 GnP \par 02/25/22- CT scans- POst op changes from whipples with fat stranding in upper abdomen, Scattered upper abdominal lymph nodes not appearing suspicious, scattered pulmonary nodules .\par 03/01/22-  gem/ Abraxane \par 03/15/22 -gem/ Abraxane \par 03/28/22- gem/ Abraxane Was getting chemotherapy in [place] and then decided to come back to [place] \par 05/23/22- Was admitted to [facility] from 05/23/22 to 06/11/22 for SBO. GI was consulted in house for possible stenting but was not done due to multi focal nature of involved bowel and stenosis sec to extrinsic compression. 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 His daughter is also positive for YESENIA gene mutation, but his son does not have the mutation.  \par  [de-identified] : surgical path reported moderately differentiated PDAC, 2.5 cm, involving head of pancreas and invading pancreatic duct, CBD, muscularis propria of duodenum and peripancreatic adipose tissue. Lymphovascular and Perineural invasion present. Margins negative. 6 of 13 LNs + for metastatic carcinoma. Staging was described as pT2 pN2.  [de-identified] : Ca 19-9: 11/11/20- 24 3/5/21- 28 6/25/21- 20 9/21/21- 146 10/5/21- 248.  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 He cannot sleep on the R side, pain is dull, achy and radiate to his neck. He also has intermittent cramping in the same area. He has tried gabapentin 100 mg qhs but it hasn't helped. He has no nausea, vomiting or diarrhea. He doesn't have trouble falling as sleep but having trouble staying asleep: wake up couple times per night, partly because of the R abdominal pain. He also endorsed tiredness and mild SOB with climbing stairs. These symptoms have been chronic and have not gotten worse. Since last visit, he hasn't been admitted to hospital. He went to see cardiologist, got Echo and EKG, which he was told the tests were normal. He has lost 1lb since couple weeks ago. His activity has been the same. He denied fever, chills, numbness, tingling. \par

## 2022-08-09 NOTE — REVIEW OF SYSTEMS
[SOB on Exertion] : shortness of breath during exertion [Abdominal Pain] : abdominal pain [Negative] : Constitutional [Fever] : no fever [Chills] : no chills [Night Sweats] : no night sweats [Fatigue] : no fatigue [Recent Change In Weight] : ~T no recent weight change [Vision Problems] : no vision problems [Dysphagia] : no dysphagia [Hoarseness] : no hoarseness [Shortness Of Breath] : no shortness of breath [Wheezing] : no wheezing [Cough] : no cough [Vomiting] : no vomiting [Constipation] : no constipation [Diarrhea] : no diarrhea [Dysuria] : no dysuria [Incontinence] : no incontinence [Dizziness] : no dizziness [Difficulty Walking] : no difficulty walking [Suicidal] : not suicidal [Depression] : no depression [Easy Bleeding] : no tendency for easy bleeding [Swollen Glands] : no swollen glands [de-identified] : Insomnia

## 2022-08-09 NOTE — PHYSICAL EXAM
[Ambulatory and capable of all self care but unable to carry out any work activities] : Status 2- Ambulatory and capable of all self care but unable to carry out any work activities. Up and about more than 50% of waking hours [Normal] : affect appropriate [Cachectic] : cachectic [Ulcers] : no ulcers [Mucositis] : no mucositis [Thrush] : no thrush [Vesicles] : no vesicles [de-identified] : No visible inc wob [de-identified] : POst surgical scar healing. Pain on palpation on RLQ [de-identified] : No visible rashes or jaundice seen over video

## 2022-08-09 NOTE — ASSESSMENT
[FreeTextEntry1] : 77 y/o with metastatic relapse of pancreatic ductal adenocarcinoma, YESENIA-germline mutant, from what was originally a localized pT2N2 LVI+/PNI+ pancreatic head adenocarcinoma resected Aug 2020; PMHx left thyroid papillary CA s/p left thyroidectomy/isthmusectomy/paratracheal node dissection in 2017, and melanoma, presenting for second opinion of further treatment including clinical trials in [facility]. \par \par He completed 12 cycles of FOLFIRINOX (09/2020-02/2021) and long course RT with capecitabine (3/2021-5/2021) and now has biopsy proven relapse in the peritoneum, not a substantial amount of burden, but we decided to start treatment to prevent complications that could arise from untreated peritoneal carcinomatosis.\par \par We had an in depth discussion about the options available for 2L therapy.  Alternatives include resuming FOLFIRINOX, noting that I cannot explicitly declare failure to this regimen since his relapse appeared to become more prominent after stopping this combination, and that his tumor may well be more sensitive to platinum drugs extrapolating from favorable response to platinum drugs from BRCA mutations. More recent data suggest that YESENIA mutations are prognostic rather than predictive (PMID 09343412), which may suggest that his tumor is more likely to respond to any treatment, not platinum specifically, as is the case for the PARP inhibitors. Another option is to use olaparib, extrapolating from BRCA/PALB2 data although while noting that YESENIA mutations appear less predictive based on limited data (KnowYourTumor/BeyondBRCA studies). Another option would be ipi+nivo off trial, as this is currently being studied in a trial context through [facility].\par \par He ultimately decided to pursue gemcitabine+nab-paclitaxel (GnP) on the basis that he wishes to continue with a standard option and his cancer has not yet been exposed to this therapy. Unfortunately, after just two days of chemo (C1D1 and D8), patient became severly neutropenic. Decision has been made to wait for count recovery and then change to every other week dosing for him, which has been shown to be about as effective and less toxic than 3-weeks on dosing (PMIDC [id number]). \par \par He has continued on every other week gemcitabine + nab-paclitaxel and is tolerating this therapy very well. Besides alopecia, he has very little in terms of side effects, and the every other week dosing is more tolerable to his bone marrow. Will continue therapy. His  remains stable ~200 which may be reflective of extant disease or reflect anatomic disruption of his biliary tract from the surgery. His scan shows minimal to no evidence of remaining disease. He remains on active chemotherapy with Wallaceton/ Abraxane in [place]. \par \par June 2022. He presents following 3-week hospitalization, where he underwent exploratory laparotomy, and confirmation of peritoneal relapse. He is still recovering, with some fatigue, and difficulty gaining weight. While I would like to resume chemotherapy it looks like he still need more time to regain performance status and stamina.\par  \par 7/26: He is improving somewhat; weight up 4 lbs, has a bit more energy. Had detailed discussion about treatment options at this time - continuing with FOLFOX vs attempting to get PARP/IO off-label as he did not qualify for TAPUR due to lack of measurable disease. Tumor markers rising but not likely reflective of failure of therapy as he has had very little FOLFOX. After discussion, patient prefers to receive chemotherapy for now and will revisit trail discussions after net scan.\par \par 8/9: Patient continue to have abdominal pain, unclear if it is from tumor progressing. His  has been rising since June 2022. He has finished 2 cycles of FOLFOX-5FU/Oxaplatin so rising  can be due to tumor progressing and failure of the current regimen but it is still too soon to be determined. Physical activity is the same, no weight loss, tolerating the current regimen without significant SEs. \par \par Plan:\par - continue with FOLFOX-5FU/ Oxaplatin with current dose, will trend  accordingly\par - if repeat  continues to rise, will rescan and likely need to change treatment\par - options on progression include possible TAPUR trial (if measurable disease), CHEK2 therapy off-label, SEngine organoid testing at out-of-pocket cost\par - mProbe proteomics sequencing is being performed, pending as of 8/9\par - encouraged pt to have increased PO intake, build on stamina and get out of door for physical activities\par - Signatera pending\par \par \par Weight Loss: improved\par - continues on Creon\par - On synthetic Marijuana- reported increased appetite\par - Will offer IVF for hydration in b/w chemo treatments.\par \par #lethargy:\par Chronic lethargy likely due to malignancy and chemo. Had SOB with climbing stairs though recent check up with cardiology showed no concerning heart problems. No history of lungs problem. Most likely SOB due to chemo side effects. Hgb was at baseline. \par -encourage patient to increase oral intakes\par -If symptoms worsen, may need to take a chemo break or change regimen \par \par New Onset A flutter: stable\par - s/p ED visit due to A-Fib in 130s prior to last visit. Since then, patient has not had another Afib episodes \par - received IVF and symptoms improved significantly\par - encourage increased fluid intake as dehydration can be a cause of A-Fib due to cardiac stretching\par -continue Eliquis 5mg BID (refilled today)\par \par Abd pain: likely post-surgical vs tumor progression\par - pain is positional per patient, cannot sleep on R side \par - encouraged used of lidocaine patch for symptomatic control and for better sleep quality. Encouraged melatonin for sleep aid and discourage pt from using xanax or benadryl (benadryl did not help)\par -increase gabapentin to 300 mg qhs\par -if insomnia does not improve, will consider adding Trazodone next time \par \par Case d/w Dr. Cary.\par \par

## 2022-08-09 NOTE — END OF VISIT
[] : Resident [Time Spent: ___ minutes] : I have spent [unfilled] minutes of time on the encounter. [>50% of the face to face encounter time was spent on counseling and/or coordination of care for ___] : Greater than 50% of the face to face encounter time was spent on counseling and/or coordination of care for [unfilled] [FreeTextEntry3] : Waqas Cary MD PhD\par Medical Oncology Attending\par I personally have spent a total of 45 minutes of time on the date of this encounter reviewing test results, documenting findings, coordinating care, and directly consulting with the patient and/or designated family member.\par I was present with the trainee during the key portions of the history and exam. I agree with the findings and plan as documented above.\par

## 2022-08-16 NOTE — ED ADULT NURSE NOTE - NSIMPLEMENTINTERV_GEN_ALL_ED
Implemented All Fall with Harm Risk Interventions:  Petros to call system. Call bell, personal items and telephone within reach. Instruct patient to call for assistance. Room bathroom lighting operational. Non-slip footwear when patient is off stretcher. Physically safe environment: no spills, clutter or unnecessary equipment. Stretcher in lowest position, wheels locked, appropriate side rails in place. Provide visual cue, wrist band, yellow gown, etc. Monitor gait and stability. Monitor for mental status changes and reorient to person, place, and time. Review medications for side effects contributing to fall risk. Reinforce activity limits and safety measures with patient and family. Provide visual clues: red socks.

## 2022-08-16 NOTE — ED ADULT TRIAGE NOTE - CHIEF COMPLAINT QUOTE
p/t with pancreatic ca, on chemo, ascites, sent by PMD for paracentesis, p/t c/o of abd discomfort and sob

## 2022-08-16 NOTE — ED PROVIDER NOTE - CLINICAL SUMMARY MEDICAL DECISION MAKING FREE TEXT BOX
76 yo M PMHx of pancreatic cancer presents with abd distension for several weeks with sob, le edema. No fevers, chills, abd pain, vitals are WNL. Low concern for SBP. Will get lower extremity dopplers to rule out DVTs in the setting of malignancy. Recent CT on June 29th shows ascites 2/2 carcinomatosis. Will get labs, admit.

## 2022-08-16 NOTE — ED PROVIDER NOTE - OBJECTIVE STATEMENT
78 yo M PMHx of HTN, HLD, pancreatic cancer s/p whipple on chemo at [facility] presents with shortness of breath and abdominal distension for the last several weeks. Patient has had dyspnea on exertion and bloating, early satiety. Had 1 episode of vomiting yesterday, NBNB and 1 episode diarrhea today, but had a normal BM afterwards. Endorses decreased appetite, worsening lower extremity edema. Had an echo earlier this year which was normal. Has diffuse abdominal discomfort due to distension but no pain. No fevers, chills. Sent in by oncologist for therapeutic paracentesis.

## 2022-08-16 NOTE — ED PROVIDER NOTE - PHYSICAL EXAMINATION
GENERAL: no acute distress, non-toxic appearing  HEAD: normocephalic, atraumatic  HEENT: normal conjunctiva, oral mucosa moist, neck supple  CARDIAC: regular rate and rhythm, normal S1 and S2,  no appreciable murmurs  PULM: clear to ascultation bilaterally, no crackles, rales, rhonchi, or wheezing  GI: +abdomen distended, soft, nontender, no guarding or rebound tenderness  NEURO: alert and oriented x 3, normal speech, moving all extremities   MSK: +b/l LE edema, no visible deformities, no peripheral edema, calf tenderness/redness/swelling  SKIN: no visible rashes, dry, well-perfused  PSYCH: appropriate mood and affect

## 2022-08-16 NOTE — ED PROVIDER NOTE - NSICDXPASTSURGICALHX_GEN_ALL_CORE_FT
PAST SURGICAL HISTORY:  H/O arthroscopy of left knee meniscus  12/1998    History of Whipple procedure pylorus sparing whipple 2022 at [facility]    S/P laparotomy 6/3/2022 jejenujejunostomy and gastrojejunostomy with GJ tube placement Dr. Case    Vocal cord nodule 1998

## 2022-08-16 NOTE — ED ADULT NURSE NOTE - CHIEF COMPLAINT
The patient is a 77y Male complaining of  Fusiform Excision Additional Text (Leave Blank If You Do Not Want): The margin was drawn around the clinically apparent lesion.  A fusiform shape was then drawn on the skin incorporating the lesion and margins.  Incisions were then made along these lines to the appropriate tissue plane and the lesion was extirpated.

## 2022-08-16 NOTE — ED PROVIDER NOTE - ATTENDING CONTRIBUTION TO CARE
Brief HPI:  78 yo M PMHx of HTN, HLD, pancreatic cancer s/p whipple on chemo at [facility] presents with shortness of breath and abdominal distension for the last several weeks.      Vitals:   Reviewed    Exam:    GEN:  Non-toxic appearing, non-distressed, speaking full sentences, non-diaphoretic, AAOx3  HEENT:  NCAT, neck supple, EOMI, PERRLA, sclera anicteric, no conjunctival pallor or injection, no stridor, normal voice, no tonsillar exudate, uvula midline  CV:  regular rhythm and rate, s1/s2 audible, no murmurs, rubs or gallops, peripheral pulses 2+ and symmetric  PULM:  non-labored respirations, lungs clear to auscultation bilaterally, no wheezes, crackles or rales  ABD:  distended abdomen, ascites, no rebound, no guarding, negative Qiu's sign, bowel sounds normal, no cvat  MSK:  no gross deformity, non-tender extremities and joints, range of motion grossly normal appearing, b/l lower extremity edema, extremities warm and well perfused   NEURO:  AAOx3, CN II-XII intact, motor 5/5 in upper and lower extremities bilaterally, sensation grossly intact in extremities and trunk  SKIN:  warm, dry, no rash or vesicles     A/P:  78 yo M PMHx of HTN, HLD, pancreatic cancer s/p whipple on chemo at [facility] presents with shortness of breath and abdominal distension for the last several weeks.  Ascites on exam.  Suspect tense ascites as etiology of sob.  Low c/f pe.  Plan for labs, duplex, supportive care.  Anticipate admission.

## 2022-08-17 NOTE — H&P ADULT - HISTORY OF PRESENT ILLNESS
Patient is a 77 year old M hx of HTN, HLD, stage 4 pancreatic ca s/p whipple on chemo plan for immunotherapy pending studies, thyroid ca s/p hemiresection, c/b recent SBO s/p exlap, afib, PE, here with abdominal pain, 1 episode NBNB vomiting, abdominal bloating, and one episode of diarrhea now resolved. He had a CT scan with iv contrast a/p showing large volume ascites no small bowel obstruction. He has sob on exertion as well. Denies fevers, chills, headaches, visual changes, has worsened LE edema, denies cp, palpitations, LOC, skin rashes, dysuria, polyuria, melena, hematochezia.

## 2022-08-17 NOTE — CONSULT NOTE ADULT - PROBLEM SELECTOR RECOMMENDATION 4
- Patient with stage IV pancreatic ca s/p whipple on chemo plan for immunotherapy   - CT showing progression of disease   - Oncology recommendations appreciated  - Patient and family interested in speaking to oncology about eligibility for clinical trial discussed during recent outpatient visit.

## 2022-08-17 NOTE — CONSULT NOTE ADULT - ASSESSMENT
77 year old M hx of HTN, HLD, stage IV pancreatic ca s/p whipple on chemo plan for immunotherapy pending studies, thyroid ca s/p hemiresection, c/b recent SBO s/p exlap, afib, PE, here with abdominal pain, 1 episode vomiting, abdominal bloating, and one episode of diarrhea now resolved. He has been having decreased appetite due to abdominal fullness and also c/o SOB. He had a CT scan with IV contrast A/P on 8/15/22 showing large volume ascites no small bowel obstruction, and progression of disease in peritoneum, brandon hepatis and liver.    -CT scan shows progression of disease on FOLFOX  -IR consulted for paracentesis and 4 liters drained  -Decreased pain post paracentesis and notes greater ability to eat  -Currently receiving albumin infusion  -Recommend discharge today given improvement in symptoms  -He has an appt on 8/18/22 with his oncologist Dr. Cary at [facility] to discuss treatment options including possible clinical trial  -Please call with any questions    Eliana Lo MD  Medical Oncology Attending  [phone]
77 year old M hx of HTN, HLD, stage 4 pancreatic ca s/p whipple on chemo plan for immunotherapy pending studies, thyroid ca s/p hemiresection, c/b recent SBO s/p exlap, afib, PE, here with abdominal pain, 1 episode NBNB vomiting, abdominal bloating, and one episode of diarrhea now resolved.  Palliative Care consulted for evaluation and management of pain/ symptoms

## 2022-08-17 NOTE — CONSULT NOTE ADULT - SUBJECTIVE AND OBJECTIVE BOX
HPI:  Patient is a 77 year old M hx of HTN, HLD, stage 4 pancreatic ca s/p whipple on chemo plan for immunotherapy pending studies, thyroid ca s/p hemiresection, c/b recent SBO s/p exlap, afib, PE, here with abdominal pain, 1 episode NBNB vomiting, abdominal bloating, and one episode of diarrhea now resolved. He had a CT scan with iv contrast a/p showing large volume ascites no small bowel obstruction. He has sob on exertion as well. Denies fevers, chills, headaches, visual changes, has worsened LE edema, denies cp, palpitations, LOC, skin rashes, dysuria, polyuria, melena, hematochezia.    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   6-7/22:  FOLFOX x 2 cycles  8/9: CA 19-9 noted to be rising since June 2022.   8/15/22 CT scan showed large ascites, peritoneal implants and new liver mets as well as disease in brandon hepatis      PAST MEDICAL & SURGICAL HISTORY:  Melanoma  nose &amp; left cheek  2yrs ago      HTN (hypertension)      Dyslipidemia      Malignant neoplasm of thyroid gland      Vocal cord nodule  1998      H/O arthroscopy of left knee  meniscus  12/1998      History of Whipple procedure  pylorus sparing whipple 2022 at [facility]      S/P laparotomy  6/3/2022 jejenujejunostomy and gastrojejunostomy with GJ tube placement Dr. Case          Allergies    No Known Allergies    Intolerances        MEDICATIONS  (STANDING):  chlorhexidine 2% Cloths 1 Application(s) Topical daily  dextrose 5%. 1000 milliLiter(s) (50 mL/Hr) IV Continuous <Continuous>  dextrose 5%. 1000 milliLiter(s) (100 mL/Hr) IV Continuous <Continuous>  dextrose 50% Injectable 25 Gram(s) IV Push once  dextrose 50% Injectable 12.5 Gram(s) IV Push once  dextrose 50% Injectable 25 Gram(s) IV Push once  dronabinol 2.5 milliGRAM(s) Oral every 12 hours  famotidine    Tablet 20 milliGRAM(s) Oral daily  glucagon  Injectable 1 milliGRAM(s) IntraMuscular once  insulin lispro (ADMELOG) corrective regimen sliding scale   SubCutaneous three times a day before meals  pancrelipase  (CREON 36,000 Lipase Units) 3 Capsule(s) Oral three times a day with meals  polyethylene glycol 3350 17 Gram(s) Oral daily    MEDICATIONS  (PRN):  acetaminophen     Tablet .. 650 milliGRAM(s) Oral every 6 hours PRN Temp greater or equal to 38C (100.4F), Mild Pain (1 - 3)  dextrose Oral Gel 15 Gram(s) Oral once PRN Blood Glucose LESS THAN 70 milliGRAM(s)/deciliter  oxyCODONE    IR 5 milliGRAM(s) Oral every 6 hours PRN Moderate Pain (4 - 6)      FAMILY HISTORY:  Family history of breast cancer (Sibling)    Family history of thyroid cancer (Father)    Family history of lung cancer (Mother)        SOCIAL HISTORY: No EtOH, no tobacco    REVIEW OF SYSTEMS:    CONSTITUTIONAL: No weakness, fevers or chills  EYES/ENT: No visual changes;  No vertigo or throat pain   NECK: No pain or stiffness  RESPIRATORY: No cough, wheezing, hemoptysis; No shortness of breath  CARDIOVASCULAR: No chest pain or palpitations  GASTROINTESTINAL: No abdominal or epigastric pain. No nausea, vomiting, or hematemesis; No diarrhea or constipation. No melena or hematochezia.  GENITOURINARY: No dysuria, frequency or hematuria  NEUROLOGICAL: No numbness or weakness  SKIN: No itching, burning, rashes, or lesions   All other review of systems is negative unless indicated above.      PHYSICAL EXAMINATION    Height (cm): 193 (08-17 @ 02:50)  Weight (kg): 64.1 (08-17 @ 02:50)  BMI (kg/m2): 17.2 (08-17 @ 02:50)  BSA (m2): 1.91 (08-17 @ 02:50)    T(F): 97.9 (08-17-22 @ 07:21), Max: 98.3 (08-17-22 @ 01:13)  HR: 91 (08-17-22 @ 07:21)  BP: 107/66 (08-17-22 @ 07:21)  RR: 17 (08-17-22 @ 07:21)  SpO2: 100% (08-17-22 @ 07:21)  Wt(kg): --    GENERAL: NAD, well-developed  HEAD:  Atraumatic, Normocephalic  EYES: EOMI, PERRLA, conjunctiva and sclera clear  NECK: Supple, No JVD  CHEST/LUNG: Clear to auscultation bilaterally; No wheeze  HEART: Regular rate and rhythm; No murmurs, rubs, or gallops  ABDOMEN: Soft, Nontender, Nondistended; Bowel sounds present  EXTREMITIES:  2+ Peripheral Pulses, No clubbing, cyanosis, or edema  NEUROLOGY: non-focal  SKIN: No rashes or lesions      LABS                        7.6    1.95  )-----------( 197      ( 17 Aug 2022 05:50 )             23.3       08-17    134<L>  |  100  |  22  ----------------------------<  120<H>  3.6   |  21<L>  |  0.98    Ca    8.4      17 Aug 2022 05:50  Phos  2.6     08-17  Mg     1.70     08-17    TPro  5.6<L>  /  Alb  3.0<L>  /  TBili  0.2  /  DBili  x   /  AST  21  /  ALT  16  /  AlkPhos  181<H>  08-17      Magnesium, Serum: 1.70 mg/dL (08-17 @ 05:50)  Phosphorus Level, Serum: 2.6 mg/dL (08-17 @ 05:50)      PT/INR - ( 17 Aug 2022 05:50 )   PT: 18.0 sec;   INR: 1.54 ratio         PTT - ( 17 Aug 2022 05:50 )  PTT:31.7 sec    RADIOLOGY    
HPI:  Patient is a 77 year old M hx of HTN, HLD, stage 4 pancreatic ca s/p whipple on chemo plan for immunotherapy pending studies, thyroid ca s/p hemiresection, c/b recent SBO s/p exlap, afib, PE, here with abdominal pain, 1 episode NBNB vomiting, abdominal bloating, and one episode of diarrhea now resolved. He had a CT scan with iv contrast a/p showing large volume ascites no small bowel obstruction. He has sob on exertion as well. Denies fevers, chills, headaches, visual changes, has worsened LE edema, denies cp, palpitations, LOC, skin rashes, dysuria, polyuria, melena, hematochezia.    (17 Aug 2022 01:32)    Interval History:   Patient seen and examined with wife and son Ryan at bedside. Patient reports abdominal pain at home which has improved since paracentesis this AM. Patient reports pain intermittent; at its worst is 6/10; no radiation of pain. No constipation.  Has had dyspnea with exertion at home and has had poor appetite.   Per wife, patient had trialed PO Gabapentin 100mg at home and oncology had increased dose to Gabapentin 300mg QD, which patient did not tolerate due to side effect of vomiting. Patient no longer on Gabapentin. Patient has been on medical cannabis to help with pain and nausea.      PERTINENT PM/SXH:   Basal cell carcinoma  Melanoma  HTN (hypertension)  Dyslipidemia  Malignant neoplasm of thyroid gland  Vocal cord nodule  H/O arthroscopy of left knee  History of Whipple procedure  S/P laparotomy    FAMILY HISTORY:  Family history of breast cancer (Sibling)  Family history of thyroid cancer (Father)  Family history of lung cancer (Mother)    ITEMS NOT CHECKED ARE NOT PRESENT    SOCIAL HISTORY:   Significant other/partner[ x]  Children[ x]  Episcopalian/Spirituality: Pentecostal  Substance hx:  [ ]   Tobacco hx:  [ ]   Alcohol hx: [ ]   Home Opioid hx:  [x ] I-Stop Reference No: [id number]  Living Situation: [x ]Home  [ ]Long term care  [ ]Rehab [ ]Other      ADVANCE DIRECTIVES:    MOLST  [ ]  Living Will  [ ]   DECISION MAKER(s):  [ x] Health Care Proxy(s)  [ ] Surrogate(s)  [ ] Guardian           Name(s): Phone Number(s):  Primary HCP: Wife Micaela Martinez: [phone]  Alternate HCP: Caterina Gonzales: [phone]; [phone]    BASELINE (I)ADL(s) (prior to admission):  [place]: [x ]Total  [ ] Moderate [ ]Dependent    Allergies    No Known Allergies    Intolerances    MEDICATIONS  (STANDING):  chlorhexidine 2% Cloths 1 Application(s) Topical daily  dextrose 5%. 1000 milliLiter(s) (50 mL/Hr) IV Continuous <Continuous>  dextrose 5%. 1000 milliLiter(s) (100 mL/Hr) IV Continuous <Continuous>  dextrose 50% Injectable 25 Gram(s) IV Push once  dextrose 50% Injectable 12.5 Gram(s) IV Push once  dextrose 50% Injectable 25 Gram(s) IV Push once  dronabinol 2.5 milliGRAM(s) Oral every 12 hours  famotidine    Tablet 20 milliGRAM(s) Oral daily  glucagon  Injectable 1 milliGRAM(s) IntraMuscular once  insulin lispro (ADMELOG) corrective regimen sliding scale   SubCutaneous three times a day before meals  pancrelipase  (CREON 36,000 Lipase Units) 3 Capsule(s) Oral three times a day with meals  polyethylene glycol 3350 17 Gram(s) Oral daily    MEDICATIONS  (PRN):  acetaminophen     Tablet .. 650 milliGRAM(s) Oral every 6 hours PRN Temp greater or equal to 38C (100.4F), Mild Pain (1 - 3)  dextrose Oral Gel 15 Gram(s) Oral once PRN Blood Glucose LESS THAN 70 milliGRAM(s)/deciliter  oxyCODONE    IR 5 milliGRAM(s) Oral every 6 hours PRN Moderate Pain (4 - 6)      PRESENT SYMPTOMS: [ ]Unable to self-report due to altered mental status  [ ] CPOT [ ] PAINADs [ ] RDOS  Source if other than patient:  [ ]Family   [ ]Team     Pain: [x ]yes [ ]no  QOL impact -   Location -      abdomen              Aggravating factors - ascites  Quality -  Radiation - none  Timing- intermittent s/p paracentesis   Severity (0-10 scale): 6  Minimal acceptable level (0-10 scale): 2    CPOT:    https://www.sccm.org/getattachment/nbl09g20-2c2e-6u9s-5t8z-5288u8820u8p/Critical-Care-Pain-Observation-Tool-(CPOT)      PAIN AD Score:     http://geriatrictoolkit.[facility]/cog/painad.pdf (press ctrl +  left click to view)    Dyspnea:                           [ ]Mild [ ]Moderate [ ]Severe  RDOS:  0 to 2  minimal or no respiratory distress   3  mild distress  4 to 6 moderate distress  >7 severe distress  https://homecareinformation.net/handouts/hen/Respiratory_Distress_Observation_Scale.pdf (Ctrl +  left click to view)     Anxiety:                             [ ]Mild [ ]Moderate [ ]Severe  Agitation:                          [ ]Mild [ ]Moderate [ ]Severe  Fatigue:                             [ ]Mild [ ]Moderate [ ]Severe  Nausea:                             [ ]Mild [ ]Moderate [ ]Severe  Loss of appetite:              [ ]Mild [x ]Moderate [ ]Severe  Constipation:                   [ ]Mild [ ]Moderate [ ]Severe  Diarrhea:                          [ ]Mild [ ]Moderate [ ]Severe      PCSSQ[Palliative Care Spiritual Screening Question]   Severity (0-10):  Score of 4 or > indicate consideration of Chaplaincy referral.  Chaplaincy Referral: [ ] yes [ ] refused [ ] following    Other Symptoms:  [x ]All other review of systems negative     Palliative Performance Status Version 2:   70      %    http://npcrc.org/files/news/palliative_performance_scale_ppsv2.pdf    PHYSICAL EXAM:  Vital Signs Last 24 Hrs  T(C): 36.7 (17 Aug 2022 15:00), Max: 36.8 (17 Aug 2022 01:13)  T(F): 98.1 (17 Aug 2022 15:00), Max: 98.3 (17 Aug 2022 01:13)  HR: 89 (17 Aug 2022 15:00) (88 - 91)  BP: 127/76 (17 Aug 2022 15:00) (107/66 - 132/77)  BP(mean): --  RR: 17 (17 Aug 2022 15:00) (15 - 18)  SpO2: 98% (17 Aug 2022 15:00) (97% - 100%)    Parameters below as of 17 Aug 2022 15:00  Patient On (Oxygen Delivery Method): room air         I&O's Summary      GENERAL:  [x ]Alert  [x ]Oriented x3   [ ]Lethargic  [ ]Cachexia  [ ]Unarousable  [x ]Verbal  [ ]Non-Verbal  [ x] No Distress  Behavioral:   [ ] Anxiety  [ ] Delirium [ ] Agitation [x ] Calm  [ ] Other  HEENT:  [x ]Normal  [ ] Temporal Wasting  [ ]Dry mouth   [ ]ET Tube/Trach  [ ]Oral lesions  [ ] Mucositis  PULMONARY:   [x ]Clear [ ]Tachypnea  [ ]Audible excessive secretions   [ ]Rhonchi        [ ]Right [ ]Left [ ]Bilateral  [ ]Crackles        [ ]Right [ ]Left [ ]Bilateral  [ ]Wheezing     [ ]Right [ ]Left [ ]Bilateral  [ ]Diminished breath sounds [ ]right [ ]left [ ]bilateral  CARDIOVASCULAR:    [x ]Regular [ ]Irregular [ ]Tachy  [ ]Francis [ ]Murmur [ ]Other  GASTROINTESTINAL:  [x ]Soft  [ ]Distended   [ ]+BS  [x ]Non tender [ ]Tender  [ ]PEG [ ]OGT/ NGT  Last BM: 8/16  GENITOURINARY:  [ x]Normal [ ] Incontinent   [ ]Oliguria/Anuria   [ ]Botello  MUSCULOSKELETAL:   [ x]Normal   [ ]Weakness  [ ]Bed/Wheelchair bound [ ]Edema  [  ] amputation  [  ] contraction  NEUROLOGIC:   [x ]No focal deficits  [ ]Cognitive impairment  [ ]Dysphagia [ ]Dysarthria [ ]Paresis [ ]Other   SKIN: See Nursing Skin Assessment for further details  [x ]Normal    [ ]Rash  [ ]Pressure ulcer(s)       Present on admission [ ]y [ ]n   [  ]  Wound    [  ] hyperpigmentation    CRITICAL CARE:  [ ] Shock Present  [ ]Septic [ ]Cardiogenic [ ]Neurologic [ ]Hypovolemic  [ ]  Vasopressors [ ]  Inotropes   [ ]Respiratory failure present [ ]Mechanical ventilation [ ]Non-invasive ventilatory support [ ]High flow    [ ]Acute  [ ]Chronic [ ]Hypoxic  [ ]Hypercarbic [ ]Other  [ ]Other organ failure     LABS:                        7.6    1.95  )-----------( 197      ( 17 Aug 2022 05:50 )             23.3   08-17    134<L>  |  100  |  22  ----------------------------<  120<H>  3.6   |  21<L>  |  0.98    Ca    8.4      17 Aug 2022 05:50  Phos  2.6     08-17  Mg     1.70     08-17    TPro  5.6<L>  /  Alb  3.0<L>  /  TBili  0.2  /  DBili  x   /  AST  21  /  ALT  16  /  AlkPhos  181<H>  08-17  PT/INR - ( 17 Aug 2022 05:50 )   PT: 18.0 sec;   INR: 1.54 ratio         PTT - ( 17 Aug 2022 05:50 )  PTT:31.7 sec    CAPILLARY BLOOD GLUCOSE      POCT Blood Glucose.: 166 mg/dL (17 Aug 2022 12:45)  POCT Blood Glucose.: 134 mg/dL (17 Aug 2022 09:04)  POCT Blood Glucose.: 115 mg/dL (17 Aug 2022 02:44)      RADIOLOGY & ADDITIONAL STUDIES:  CXRAY   Impression:  No acute findings.    PROTEIN CALORIE MALNUTRITION PRESENT: [ ]mild [ ]moderate [ ]severe [ ]underweight [ ]morbid obesity  https://www.andeal.org/vault/2440/web/files/ONC/Table_Clinical%20Characteristics%20to%20Document%20Malnutrition-White%20JV%20et%20al%202012.pdf    Height (cm): 193 (08-17-22 @ 02:50), 193.09 (08-09-22 @ 09:00), 193 (07-20-22 @ 17:22)  Weight (kg): 64.1 (08-17-22 @ 02:50), 64.5008 (08-09-22 @ 09:00), 61.4 (06-03-22 @ 07:30)  BMI (kg/m2): 17.2 (08-17-22 @ 02:50), 17.3 (08-09-22 @ 09:00), 16.5 (07-20-22 @ 17:22)    [ ]PPSV2 < or = to 30% [ ]significant weight loss  [ ]poor nutritional intake  [ ]anasarca [ ]Artificial Nutrition      REFERRALS:   [ ]Chaplaincy  [ ]Hospice  [ ]Child Life  [ ]Social Work  [ x]Case management [ ]Holistic Therapy     ************************************************************************  PALLIATIVE MEDICINE COORDINATION OF CARE DOCUMENTATION  [x] Inpatient Consult  [ ] Other:  ************************************************************************    HPI reviewed       ------------------------------------------------------------------------    MEDICATION REVIEW:  --- Pls refer to current medicatons in the body of this note  ISTOP REFERENCE: [id number]  Others' Prescriptions  Patient Name: Isma Ortiz Date: 1945  Address: [address] [place] [address]  [place], [place] [address]Jcg: Male  Rx Written	Rx Dispensed	Drug	Quantity	Days Supply	Prescriber Name	Prescriber Autumn #	Payment Method	Dispenser  07/20/2022	08/02/2022	forte powder 9.5mg thc and 0.5mg cbd/1/4 teaspoonful	1	2	Trisha Lozoya)	[id number]	[facility]  07/20/2022	07/22/2022	symmetry (1:1) 2.5mgthc and 2.5 mgcbd/0.5 ml tincture	1	2	Trisha Lozoya)	[id number]	[employer]	[facility]  06/21/2022	06/21/2022	dronabinol 2.5 mg capsule	60	30	Carlos Cary A (MD PHD)	[id number]	Medicare	[facility]  01/10/2022	01/19/2022	dronabinol 2.5 mg capsule	60	30	Ronda Laura	[id number]	Medicare	[facility]  12/07/2021	12/07/2021	dronabinol 2.5 mg capsule	60	30	Carlos Cary A (MD PHD)	[id number]	Medicare	[facility]  11/02/2021	11/03/2021	dronabinol 2.5 mg capsule	60	30	Carlos Cary A (MD PHD)	[id number]	Medicare	[id number]  09/24/2021	09/26/2021	dronabinol 2.5 mg capsule	60	30	Ish Yin	[id number]	Medicare	[facility]  --- PRN usage: The patient HAS used 2 prn doses of PO Oxy IR 5mg and  1 dose of IV Morphine 4mg in the last 24h.  ------------------------------------------------------------------------  COORDINATION OF CARE:  --- Palliative Care consulted for: symptom management   --- Patient assessed:  8/17  --- Patient previously seen by Palliative Care service: NO  ADVANCE CARE PLANNING  --- HCP/ Surrogate: Wife  --- [facility] document found in Alpha: NONE  --- HCP/ Living will/ Other advanced directives in Alpha: HCP paperwork located in outpatient chart. Completed on 7/21/2022  ------------------------------------------------------------------------  CARE PROVIDER DOCUMENTATION:  --- Medicine notes reviewed  --- Heme/onc note reviewed   --- Outpatient palliative notes reviewed   PLAN OF CARE  --- Known admissions in past year: 3  --- Current admit date: 8/17  --- LOS: 1 day  --- LACE score: 13  --- Current dispo plan: TO BE DETERMINED  ------------------------------------------------------------------------  --- Chart reviewed: 30 Minutes [including time used to gather, review and transfer data to this note]    Prolonged services rendered, as part of this patient's care provided by Palliative Medicine, include: i.chart review for provider and ancillary service documentation, ii.pertinent diagnostics including laboratory and imaging studies,iii. medication review including PRN use, iv. admission history including previous palliative care encounters and GOC notes, v.advance care planning documents including HCP and MOLST forms in Alpha. Part of Palliative Medicine extended evaluation and management also involves coordination of care with our IDT, the primary and consulting nydia, and unit CM/SW and Hospice if eligible. Recommendations based on the information gathered and discussed are outline in the AP of our notes.    ************************************************************************

## 2022-08-17 NOTE — CONSULT NOTE ADULT - PROBLEM SELECTOR RECOMMENDATION 3
- CT- Large volume ascites, increased since prior. Peritoneal carcinomatosis is less conspicuous in the omentum, possibly due to increased ascites. However there is new infiltrative soft tissue in the brandon hepatis, pelvic peritoneal thickening and question of serosal disease involving the right/transverse colon, suspicious for metastatic disease.  Few new ill-defined liver lesions are indeterminate. Stable lung nodules as above.  - s/p paracentesis today with 5.2L removed   - management as per primary team

## 2022-08-17 NOTE — CONSULT NOTE ADULT - PROBLEM SELECTOR RECOMMENDATION 9
- Patient no longer on gabapentin due to vomiting upon attempts to increase dose at home  - Patient improved after paracentesis today  - PO Tylenol 650mg q6-8 PRN mild-moderate pain  - PO Oxy IR 5mg q6 PRN moderate-severe pain (hold for hypotension, oversedation, respiratory depression)  - Bowel regimen while on opiates

## 2022-08-17 NOTE — PATIENT PROFILE ADULT - NSPRESCRALCFREQ_GEN_A_NUR

## 2022-08-17 NOTE — PROCEDURE NOTE - ADDITIONAL PROCEDURE DETAILS
Invasive procedure team was consulted for diagnostic/therapeutic paracentesis due to abdominal discomfort. Explained risks (including bleeding, infection, and bowel perforation) and benefits to patient and patient expressed understanding and consented. Ultrasound was utilized and visualized 14 cm Ascitic fluid pocket identified w/ no epigastric vessels visualized. No rash or vessels overlying skin site. Pts plts 197, INR 1.54, patient on eliquis last dose 8/16 8AM. Sterile technique was used and 5 mL of 1% lidocaine was used for local anesthesia. Small incision with scalpel done and 18 Fr Arrow Paracentesis catheter used. 5.2L of clear yellow ascitic fluid drained. Catheter removed and dressed. Pt tolerated procedure well and no complications. Lab Analysis at bedside and communicated to primary team.

## 2022-08-17 NOTE — CONSULT NOTE ADULT - PROBLEM SELECTOR RECOMMENDATION 5
Case reviewed with oncology team.     Thank you for allowing us to participate in your patient's care. Please page [phone] for any questions/concerns.

## 2022-08-17 NOTE — H&P ADULT - PROBLEM SELECTOR PLAN 1
-CT scan a/p iv con w/ this 8/15  -no SBO noted, belly soft pt still passing gas  -no fevers or leukocytosis low concern for SBP  -hold eliquis last dose 8/16 8 am IR vs procedure team for therapeutic tap  -palliative care consult, pt was on chemo now immunotherapy remains full code did not have d/w outpt palliative care

## 2022-08-17 NOTE — H&P ADULT - ASSESSMENT
Patient is a 77 year old M hx of HTN, HLD, stage 4 pancreatic ca s/p whipple on chemo plan for immunotherapy pending studies, thyroid ca s/p hemiresection, c/b recent SBO s/p exlap, afib, PE, here with abdominal pain, 1 episode NBNB vomiting, abdominal bloating, and one episode of diarrhea now resolved.

## 2022-08-17 NOTE — PATIENT PROFILE ADULT - FALL HARM RISK - HARM RISK INTERVENTIONS
Assistance with ambulation/Communicate Risk of Fall with Harm to all staff/Monitor gait and stability/Reinforce activity limits and safety measures with patient and family/Use of alarms - bed, chair and/or voice tab/Visual Cue: Yellow wristband and red socks/Bed in lowest position, wheels locked, appropriate side rails in place/Call bell, personal items and telephone in reach/Instruct patient to call for assistance before getting out of bed or chair/Non-slip footwear when patient is out of bed/[place] to call system/Physically safe environment - no spills, clutter or unnecessary equipment/Purposeful Proactive Rounding/Room/bathroom lighting operational, light cord in reach

## 2022-08-17 NOTE — H&P ADULT - NSHPLABSRESULTS_GEN_ALL_CORE
LABS:                         8.9    2.30  )-----------( 264      ( 16 Aug 2022 20:26 )             27.0     08-16    134<L>  |  99  |  23  ----------------------------<  123<H>  3.9   |  22  |  1.07    Ca    8.9      16 Aug 2022 20:26    TPro  6.6  /  Alb  3.4  /  TBili  0.3  /  DBili  x   /  AST  22  /  ALT  18  /  AlkPhos  168<H>  08-16        Serum Pro-Brain Natriuretic Peptide: 887 pg/mL (08-16 @ 20:26)        RADIOLOGY, EKG & ADDITIONAL TESTS: Reviewed.

## 2022-08-17 NOTE — H&P ADULT - NSICDXPASTSURGICALHX_GEN_ALL_CORE_FT
PAST SURGICAL HISTORY:  H/O arthroscopy of left knee meniscus  12/1998    History of Whipple procedure pylorus sparing whipple 2022 at [facility]    S/P laparotomy 6/3/2022 jejenujejunostomy and gastrojejunostomy with GJ tube placement Dr. Case    Vocal cord nodule 1998

## 2022-08-18 NOTE — PROGRESS NOTE ADULT - PROBLEM SELECTOR PLAN 5
-currently on RA  - Resume Eliquis after paracentesis
-currently on RA  - Resume Eliquis after paracentesis

## 2022-08-18 NOTE — PROGRESS NOTE ADULT - PROBLEM SELECTOR PLAN 8
F-none  E-replete prn  N-regular CC diet  hold eliquis for now  FULL code
F-none  E-replete prn  N-regular CC diet  hold eliquis for now  FULL code

## 2022-08-18 NOTE — DISCHARGE NOTE PROVIDER - CARE PROVIDER_API CALL
Carlos Cary (MD; PhD)  Internal Medicine; Medical Oncology  [address] [place] [address]  Phone: [phone]  Fax: [phone]  Follow Up Time:

## 2022-08-18 NOTE — DISCHARGE NOTE PROVIDER - HOSPITAL COURSE
Patient is a 77 year old M hx of HTN, HLD, stage 4 pancreatic ca s/p whipple on chemo plan for immunotherapy pending studies, thyroid ca s/p hemiresection, c/b recent SBO s/p exlap, afib, PE, here with abdominal pain, 1 episode NBNB vomiting, abdominal bloating, and one episode of diarrhea now resolved.    Hospital Course:    Malignant ascites.   CT scan a/p iv con w/ this 8/15. No SBO noted, belly soft pt still passing gas. No fevers or leukocytosis low concern for SBP. Procedure team consulted for paracentesis 5.2L removed. Sent cytopath, culture, gram stain, cell count. Palliative care consult, pt was on chemo now immunotherapy remains full code did not have d/w outpt palliative care.     Pancreatic carcinoma metastatic to liver.   c/w creon.    Anemia.   Likely AOCD. CBC stable at 8.0/25.5    Chronic atrial fibrillation.   Resumed Eliquis. Is not on beta blockers outpatient.    Pulmonary embolism.   Currently on RA. Resume Eliquis after paracentesis.    GERD (gastroesophageal reflux disease).   Protonix 40 mg daily. Famotidine 20 mg daily.    HLD (hyperlipidemia).   c/w lipitor 40 mg    DM2  Metformin at home. CC diet.    On 8/18/2022 this case was reviewed with Dr. Wilson, the patient is medically stable and optimized for discharge. All medications were reviewed and prescriptions were sent to mutually agreed upon pharmacy.

## 2022-08-18 NOTE — DISCHARGE NOTE PROVIDER - NSDCFUSCHEDAPPT_GEN_ALL_CORE_FT
Carlos Cary  [facility]  [facility]  Scheduled Appointment: 08/18/2022    Armand Case  [facility]  [facility] [address] [facility]  Scheduled Appointment: 10/03/2022

## 2022-08-18 NOTE — DISCHARGE NOTE NURSING/CASE MANAGEMENT/SOCIAL WORK - NSDCPEFALRISK_GEN_ALL_CORE
For information on Fall & Injury Prevention, visit: [email or web address] OR  [email or web address] OR  https://www.cdc.gov/steadi/patient.html

## 2022-08-18 NOTE — PROGRESS NOTE ADULT - PROBLEM SELECTOR PLAN 7
-c/w lipitor 40 mg    #DM2  -hold metformin  -moderate sliding scale  -CC diet
-c/w lipitor 40 mg    #DM2  -hold metformin  -moderate sliding scale  -CC diet

## 2022-08-18 NOTE — DISCHARGE NOTE PROVIDER - NSDCCPCAREPLAN_GEN_ALL_CORE_FT
PRINCIPAL DISCHARGE DIAGNOSIS  Diagnosis: Malignant ascites  Assessment and Plan of Treatment: You had a paracentesis 8/17 where 5.2 liters were removed and tested in the lab. There was no evidence of infection. Please follow up at [facility] for further management.      SECONDARY DISCHARGE DIAGNOSES  Diagnosis: Pancreatic carcinoma metastatic to liver  Assessment and Plan of Treatment: Please continue to take your creon medication as ordered and follow up with [facility] for further management.    Diagnosis: Anemia  Assessment and Plan of Treatment: Follow-up with your outpatient provider for further monitoring of your blood counts. Monitor for signs/symptoms indicating worsening of disease, such as, easy bleeding/bruising, pale skin, fatigue, dizziness, increased heart rate, or chest pain.    Diagnosis: Chronic atrial fibrillation  Assessment and Plan of Treatment: Please take your medications as prescribed.  Continue to take your blood thinner as prescribed and follow with your physician. Follow a low fat diet, reduce caffeine intake, and exercise at least 30 minutes daily.    Diagnosis: Pulmonary embolism  Assessment and Plan of Treatment: You have a history of a blood clot in your lungs. Please continue to take your eliquis as ordered to prevent further blood clots and follow up with your primary care physician for further medical management.    Diagnosis: GERD (gastroesophageal reflux disease)  Assessment and Plan of Treatment: Avoid fatty, fried foods, acidic foods such as tomatoes, lime and chocolate. Continue to take your medications as prescribed, exercise daily and weight loss.    Diagnosis: HLD (hyperlipidemia)  Assessment and Plan of Treatment: FOllow a low fat diet, exercise daily and continue current medications. Follow up with primary care physician and cardiologist for management.    Diagnosis: Type 2 diabetes mellitus  Assessment and Plan of Treatment: Continue your medication regimen and a consistent carbohydrate diet (Meaning eating the same amount of carbohydrates at the same time each day). Monitor blood glucose levels throughout the day before meals and at bedtime. Record blood sugars and bring to outpatient providers appointment in order to be reviewed by your doctor for management modifications. If your sugars are more than 400 or less than 70 you should contact your PCP immediately. Monitor for signs/symptoms of low blood glucose, such as, dizziness, altered mental status, or cool/clammy skin. In addition, monitor for signs/symptoms of high blood glucose, such as, feeling hot, dry, fatigued, or with increased thirst/urination. Make regular podiatry appointments in order to have feet checked for wounds and uncontrolled toe nail growth to prevent infections, as well as, appointments with an ophthalmologist to monitor your vision.     PRINCIPAL DISCHARGE DIAGNOSIS  Diagnosis: Malignant ascites  Assessment and Plan of Treatment: You had a paracentesis 8/17 where 5.2 liters were removed and tested in the lab. There was no evidence of infection. Please follow up at [facility] for further management.      SECONDARY DISCHARGE DIAGNOSES  Diagnosis: Pancreatic carcinoma metastatic to liver  Assessment and Plan of Treatment: Please continue to take your creon medication as ordered and follow up with [facility] for further management.    Diagnosis: Anemia  Assessment and Plan of Treatment: Follow-up with your outpatient provider for further monitoring of your blood counts. Monitor for signs/symptoms indicating worsening of disease, such as, easy bleeding/bruising, pale skin, fatigue, dizziness, increased heart rate, or chest pain.    Diagnosis: Chronic atrial fibrillation  Assessment and Plan of Treatment: Please take your medications as prescribed.  Continue to take your blood thinner as prescribed and follow with your physician. Follow a low fat diet, reduce caffeine intake, and exercise at least 30 minutes daily.    Diagnosis: Pulmonary embolism  Assessment and Plan of Treatment: You have a history of a blood clot in your lungs. Please continue to take your eliquis as ordered to prevent further blood clots and follow up with your primary care physician for further medical management.    Diagnosis: GERD (gastroesophageal reflux disease)  Assessment and Plan of Treatment: Avoid fatty, fried foods, spicy foods, acidic foods such as tomatoes, lime, alcohol, caffeine, carbonated beverages, citrus fruit and juices, tomato based foods, garlic, mint, onions and chocolate. Continue to take your medications as prescribed, exercise daily and weight loss.    Diagnosis: HLD (hyperlipidemia)  Assessment and Plan of Treatment: Follow a low fat diet, exercise daily and continue current medications. Follow up with primary care physician and cardiologist for management.    Diagnosis: Type 2 diabetes mellitus  Assessment and Plan of Treatment: Continue your medication regimen and a consistent carbohydrate diet (Meaning eating the same amount of carbohydrates at the same time each day). Monitor blood glucose levels throughout the day before meals and at bedtime. Record blood sugars and bring to outpatient providers appointment in order to be reviewed by your doctor for management modifications. If your sugars are more than 400 or less than 70 you should contact your PCP immediately. Monitor for signs/symptoms of low blood glucose, such as, dizziness, altered mental status, or cool/clammy skin. In addition, monitor for signs/symptoms of high blood glucose, such as, feeling hot, dry, fatigued, or with increased thirst/urination. Make regular podiatry appointments in order to have feet checked for wounds and uncontrolled toe nail growth to prevent infections, as well as, appointments with an ophthalmologist to monitor your vision.

## 2022-08-18 NOTE — DISCHARGE NOTE PROVIDER - NSDCMRMEDTOKEN_GEN_ALL_CORE_FT
Creon 36,000 units oral delayed release capsule: 3 cap(s) orally 3 times a day  dronabinol 2.5 mg oral capsule: 1 cap(s) orally 2 times a day  Eliquis 5 mg oral tablet: 1 tab(s) orally 2 times a day  famotidine 20 mg oral tablet: 1 tab(s) orally 2 times a day  Lipitor 40 mg oral tablet: 1 tab(s) orally once a day  metFORMIN 1000 mg oral tablet, extended release: 1 tab(s) orally 2 times a day   acetaminophen 325 mg oral tablet: 2 tab(s) orally every 6 hours, As needed, Temp greater or equal to 38C (100.4F), Mild Pain (1 - 3)  Creon 36,000 units oral delayed release capsule: 3 cap(s) orally 3 times a day  dronabinol 2.5 mg oral capsule: 1 cap(s) orally 2 times a day  Eliquis 5 mg oral tablet: 1 tab(s) orally 2 times a day  famotidine 20 mg oral tablet: 1 tab(s) orally 2 times a day  Lipitor 40 mg oral tablet: 1 tab(s) orally once a day  metFORMIN 1000 mg oral tablet, extended release: 1 tab(s) orally 2 times a day  oxyCODONE 5 mg oral tablet: 1 tab(s) orally every 6 hours, As needed, Moderate Pain (4 - 6) MDD:4 tabs per day  polyethylene glycol 3350 oral powder for reconstitution: 17 gram(s) orally once a day

## 2022-08-18 NOTE — PROGRESS NOTE ADULT - SUBJECTIVE AND OBJECTIVE BOX
INTERVAL HPI/OVERNIGHT EVENTS:  Patient seen at bedside.  Accompanied by his wife  Akila yip yesterday with improved symptoms  Minimal abdominal pain with pain meds (oxycodone)  Anticipating discharge today    VITAL SIGNS:  T(F): 98.2 (08-18-22 @ 05:28)  HR: 97 (08-18-22 @ 05:28)  BP: 106/74 (08-18-22 @ 05:28)  RR: 16 (08-18-22 @ 05:28)  SpO2: 100% (08-18-22 @ 05:28)  Wt(kg): --    PHYSICAL EXAM:    GENERAL: NAD, well-developed  HEAD:  Atraumatic, Normocephalic  EYES: EOMI, PERRLA, conjunctiva and sclera clear  NECK: Supple, No JVD  CHEST/LUNG: Clear to auscultation bilaterally; No wheeze  HEART: Regular rate and rhythm; No murmurs, rubs, or gallops  ABDOMEN: Soft, Nontender, Nondistended; Bowel sounds present  EXTREMITIES:  2+ Peripheral Pulses, No clubbing, cyanosis, or edema  NEUROLOGY: non-focal  SKIN: No rashes or lesions      MEDICATIONS  (STANDING):  apixaban 5 milliGRAM(s) Oral every 12 hours  chlorhexidine 2% Cloths 1 Application(s) Topical daily  dextrose 5%. 1000 milliLiter(s) (50 mL/Hr) IV Continuous <Continuous>  dextrose 5%. 1000 milliLiter(s) (100 mL/Hr) IV Continuous <Continuous>  dextrose 50% Injectable 25 Gram(s) IV Push once  dextrose 50% Injectable 12.5 Gram(s) IV Push once  dextrose 50% Injectable 25 Gram(s) IV Push once  dronabinol 2.5 milliGRAM(s) Oral every 12 hours  famotidine    Tablet 20 milliGRAM(s) Oral daily  glucagon  Injectable 1 milliGRAM(s) IntraMuscular once  insulin lispro (ADMELOG) corrective regimen sliding scale   SubCutaneous three times a day before meals  pancrelipase  (CREON 36,000 Lipase Units) 3 Capsule(s) Oral three times a day with meals  polyethylene glycol 3350 17 Gram(s) Oral daily    MEDICATIONS  (PRN):  acetaminophen     Tablet .. 650 milliGRAM(s) Oral every 6 hours PRN Temp greater or equal to 38C (100.4F), Mild Pain (1 - 3)  dextrose Oral Gel 15 Gram(s) Oral once PRN Blood Glucose LESS THAN 70 milliGRAM(s)/deciliter  oxyCODONE    IR 5 milliGRAM(s) Oral every 6 hours PRN Moderate Pain (4 - 6)      Allergies    No Known Allergies    Intolerances        LABS:                        8.0    2.42  )-----------( 186      ( 18 Aug 2022 06:28 )             25.5     08-18    132<L>  |  99  |  20  ----------------------------<  142<H>  3.6   |  22  |  0.94    Ca    8.1<L>      18 Aug 2022 06:28  Phos  2.8     08-18  Mg     1.60     08-18    TPro  5.3<L>  /  Alb  3.0<L>  /  TBili  0.2  /  DBili  x   /  AST  30  /  ALT  22  /  AlkPhos  350<H>  08-18    PT/INR - ( 17 Aug 2022 05:50 )   PT: 18.0 sec;   INR: 1.54 ratio         PTT - ( 17 Aug 2022 05:50 )  PTT:31.7 sec      RADIOLOGY & ADDITIONAL TESTS:  Studies reviewed.  
Patient is a 77y old  Male who presents with a chief complaint of abdominal pain (18 Aug 2022 13:49)      SUBJECTIVE / OVERNIGHT EVENTS: No acute events overnight. Pt has no new complaints Abdominal pain improved. No complications from paracentesis yesterday     ADDITIONAL REVIEW OF SYSTEMS:    MEDICATIONS  (STANDING):  apixaban 5 milliGRAM(s) Oral every 12 hours  chlorhexidine 2% Cloths 1 Application(s) Topical daily  dextrose 5%. 1000 milliLiter(s) (50 mL/Hr) IV Continuous <Continuous>  dextrose 5%. 1000 milliLiter(s) (100 mL/Hr) IV Continuous <Continuous>  dextrose 50% Injectable 25 Gram(s) IV Push once  dextrose 50% Injectable 12.5 Gram(s) IV Push once  dextrose 50% Injectable 25 Gram(s) IV Push once  dronabinol 2.5 milliGRAM(s) Oral every 12 hours  famotidine    Tablet 20 milliGRAM(s) Oral daily  glucagon  Injectable 1 milliGRAM(s) IntraMuscular once  insulin lispro (ADMELOG) corrective regimen sliding scale   SubCutaneous three times a day before meals  pancrelipase  (CREON 36,000 Lipase Units) 3 Capsule(s) Oral three times a day with meals  polyethylene glycol 3350 17 Gram(s) Oral daily    MEDICATIONS  (PRN):  acetaminophen     Tablet .. 650 milliGRAM(s) Oral every 6 hours PRN Temp greater or equal to 38C (100.4F), Mild Pain (1 - 3)  dextrose Oral Gel 15 Gram(s) Oral once PRN Blood Glucose LESS THAN 70 milliGRAM(s)/deciliter  oxyCODONE    IR 5 milliGRAM(s) Oral every 6 hours PRN Moderate Pain (4 - 6)      CAPILLARY BLOOD GLUCOSE      POCT Blood Glucose.: 146 mg/dL (18 Aug 2022 08:31)  POCT Blood Glucose.: 154 mg/dL (17 Aug 2022 21:14)  POCT Blood Glucose.: 112 mg/dL (17 Aug 2022 17:52)    I&O's Summary      PHYSICAL EXAM:  Vital Signs Last 24 Hrs  T(C): 36.8 (18 Aug 2022 05:28), Max: 36.8 (17 Aug 2022 22:03)  T(F): 98.2 (18 Aug 2022 05:28), Max: 98.3 (17 Aug 2022 22:03)  HR: 97 (18 Aug 2022 05:28) (81 - 97)  BP: 106/74 (18 Aug 2022 05:28) (91/61 - 127/76)  BP(mean): --  RR: 16 (18 Aug 2022 05:28) (16 - 17)  SpO2: 100% (18 Aug 2022 05:28) (98% - 100%)    Parameters below as of 18 Aug 2022 05:28  Patient On (Oxygen Delivery Method): room air      GENERAL: NAD, well-developed  HEAD:  Atraumatic, Normocephalic  EYES: EOMI, PERRLA, conjunctiva and sclera clear  NECK: Supple, No JVD  CHEST/LUNG: Clear to auscultation bilaterally; No wheeze  HEART: Regular rate and rhythm; No murmurs, rubs, or gallops  ABDOMEN: Soft, Nontender, Nondistended; Bowel sounds present  EXTREMITIES:  2+ Peripheral Pulses, No clubbing, cyanosis, or edema  PSYCH: AAOx3  NEUROLOGY: non-focal  SKIN: No rashes or lesions    LABS:                        8.0    2.42  )-----------( 186      ( 18 Aug 2022 06:28 )             25.5     08-18    132<L>  |  99  |  20  ----------------------------<  142<H>  3.6   |  22  |  0.94    Ca    8.1<L>      18 Aug 2022 06:28  Phos  2.8     08-18  Mg     1.60     08-18    TPro  5.3<L>  /  Alb  3.0<L>  /  TBili  0.2  /  DBili  x   /  AST  30  /  ALT  22  /  AlkPhos  350<H>  08-18    PT/INR - ( 17 Aug 2022 05:50 )   PT: 18.0 sec;   INR: 1.54 ratio         PTT - ( 17 Aug 2022 05:50 )  PTT:31.7 sec          Culture - Fungal, Body Fluid (collected 17 Aug 2022 17:06)  Source: Peritoneal Peritoneal Fluid  Preliminary Report (18 Aug 2022 10:30):    Testing in progress    Culture - Body Fluid with Gram Stain (collected 17 Aug 2022 17:06)  Source: Peritoneal Peritoneal Fluid  Gram Stain (17 Aug 2022 23:26):    polymorphonuclear leukocytes seen    No organisms seen    by cytocentrifuge        RADIOLOGY & ADDITIONAL TESTS:  Results Reviewed:   Imaging Personally Reviewed:  Electrocardiogram Personally Reviewed:    COORDINATION OF CARE:  Care Discussed with Consultants/Other Providers [Y/N]:  Prior or Outpatient Records Reviewed [Y/N]:  
Patient is a 77y old  Male who presents with a chief complaint of abdominal pain (17 Aug 2022 01:32)      SUBJECTIVE / OVERNIGHT EVENTS: No acute events overnight. Pt has abdominal discomfort, no other complaints     ADDITIONAL REVIEW OF SYSTEMS:    MEDICATIONS  (STANDING):  albumin human 25% IVPB 100 milliLiter(s) IV Intermittent once  albumin human 25% IVPB 50 milliLiter(s) IV Intermittent once  chlorhexidine 2% Cloths 1 Application(s) Topical daily  dextrose 5%. 1000 milliLiter(s) (50 mL/Hr) IV Continuous <Continuous>  dextrose 5%. 1000 milliLiter(s) (100 mL/Hr) IV Continuous <Continuous>  dextrose 50% Injectable 25 Gram(s) IV Push once  dextrose 50% Injectable 12.5 Gram(s) IV Push once  dextrose 50% Injectable 25 Gram(s) IV Push once  dronabinol 2.5 milliGRAM(s) Oral every 12 hours  famotidine    Tablet 20 milliGRAM(s) Oral daily  glucagon  Injectable 1 milliGRAM(s) IntraMuscular once  insulin lispro (ADMELOG) corrective regimen sliding scale   SubCutaneous three times a day before meals  pancrelipase  (CREON 36,000 Lipase Units) 3 Capsule(s) Oral three times a day with meals  polyethylene glycol 3350 17 Gram(s) Oral daily    MEDICATIONS  (PRN):  acetaminophen     Tablet .. 650 milliGRAM(s) Oral every 6 hours PRN Temp greater or equal to 38C (100.4F), Mild Pain (1 - 3)  dextrose Oral Gel 15 Gram(s) Oral once PRN Blood Glucose LESS THAN 70 milliGRAM(s)/deciliter  oxyCODONE    IR 5 milliGRAM(s) Oral every 6 hours PRN Moderate Pain (4 - 6)      CAPILLARY BLOOD GLUCOSE      POCT Blood Glucose.: 166 mg/dL (17 Aug 2022 12:45)  POCT Blood Glucose.: 134 mg/dL (17 Aug 2022 09:04)  POCT Blood Glucose.: 115 mg/dL (17 Aug 2022 02:44)    I&O's Summary      PHYSICAL EXAM:  Vital Signs Last 24 Hrs  T(C): 36.6 (17 Aug 2022 07:21), Max: 36.8 (17 Aug 2022 01:13)  T(F): 97.9 (17 Aug 2022 07:21), Max: 98.3 (17 Aug 2022 01:13)  HR: 91 (17 Aug 2022 07:21) (88 - 91)  BP: 107/66 (17 Aug 2022 07:21) (107/66 - 132/77)  BP(mean): --  RR: 17 (17 Aug 2022 07:21) (15 - 18)  SpO2: 100% (17 Aug 2022 07:21) (97% - 100%)    Parameters below as of 17 Aug 2022 07:21  Patient On (Oxygen Delivery Method): room air      GENERAL: NAD, well-developed  HEAD:  Atraumatic, Normocephalic  EYES: EOMI, PERRLA, conjunctiva and sclera clear  NECK: Supple, No JVD  CHEST/LUNG: Clear to auscultation bilaterally; No wheeze  HEART: Regular rate and rhythm; No murmurs, rubs, or gallops  ABDOMEN: Soft, Nontender, Nondistended; Bowel sounds present  EXTREMITIES:  2+ Peripheral Pulses, No clubbing, cyanosis, or edema  PSYCH: AAOx3  NEUROLOGY: non-focal  SKIN: No rashes or lesions      LABS:                        7.6    1.95  )-----------( 197      ( 17 Aug 2022 05:50 )             23.3     08-17    134<L>  |  100  |  22  ----------------------------<  120<H>  3.6   |  21<L>  |  0.98    Ca    8.4      17 Aug 2022 05:50  Phos  2.6     08-17  Mg     1.70     08-17    TPro  5.6<L>  /  Alb  3.0<L>  /  TBili  0.2  /  DBili  x   /  AST  21  /  ALT  16  /  AlkPhos  181<H>  08-17    PT/INR - ( 17 Aug 2022 05:50 )   PT: 18.0 sec;   INR: 1.54 ratio         PTT - ( 17 Aug 2022 05:50 )  PTT:31.7 sec            RADIOLOGY & ADDITIONAL TESTS:  Results Reviewed:   Imaging Personally Reviewed:  Electrocardiogram Personally Reviewed:    COORDINATION OF CARE:  Care Discussed with Consultants/Other Providers [Y/N]:  Prior or Outpatient Records Reviewed [Y/N]:

## 2022-08-18 NOTE — PROGRESS NOTE ADULT - PROBLEM SELECTOR PLAN 4
-Resume Eliquis tomorrow if no bleeding  -is not on beta blockers outpatient
-Resume Eliquis tomorrow if no bleeding  -is not on beta blockers outpatient

## 2022-08-18 NOTE — PROGRESS NOTE ADULT - PROBLEM SELECTOR PLAN 1
-CT scan a/p iv con w/ this 8/15  -no SBO noted, belly soft pt still passing gas  -no fevers or leukocytosis low concern for SBP  - procedure team consulted for paracentesis 5.2L removed. Will send cytopath, culture, gram stain, cell count  -palliative care consult, pt was on chemo now immunotherapy remains full code did not have d/w outpt palliative care  - Pt adamant about being discharged today after paracentesis, will await prelim results r/o SBP
-CT scan a/p iv con w/ this 8/15  -no SBO noted, belly soft pt still passing gas  -no fevers or leukocytosis low concern for SBP  - procedure team consulted for paracentesis 5.2L removed. Will send cytopath, culture, gram stain, cell count  -palliative care consult, pt was on chemo now immunotherapy remains full code did not have d/w outpt palliative care  - Pt adamant about being discharged today after paracentesis, will await prelim results r/o SBP

## 2022-08-18 NOTE — DISCHARGE NOTE NURSING/CASE MANAGEMENT/SOCIAL WORK - PATIENT PORTAL LINK FT
You can access the FollowMyHealth Patient Portal offered by [facility] by registering at the following website: [email or web address]. By joining [facility]’s FollowMyHealth portal, you will also be able to view your health information using other applications (apps) compatible with our system.

## 2022-08-18 NOTE — PROGRESS NOTE ADULT - ASSESSMENT
Patient is a 77 year old M hx of HTN, HLD, stage 4 pancreatic ca s/p whipple on chemo plan for immunotherapy pending studies, thyroid ca s/p hemiresection, c/b recent SBO s/p exlap, afib, PE, here with abdominal pain, 1 episode NBNB vomiting, abdominal bloating, and one episode of diarrhea now resolved.
77 year old M hx of HTN, HLD, stage IV pancreatic ca s/p whipple on chemo plan for immunotherapy pending studies, thyroid ca s/p hemiresection, c/b recent SBO s/p exlap, afib, PE, here with abdominal pain, 1 episode vomiting, abdominal bloating, and one episode of diarrhea now resolved. He has been having decreased appetite due to abdominal fullness and also c/o SOB. He had a CT scan with IV contrast A/P on 8/15/22 showing large volume ascites no small bowel obstruction, and progression of disease in peritoneum, brandon hepatis and liver.    -CT scan shows progression of disease on FOLFOX  -IR consulted for paracentesis and 5 liters drained  -Decreased pain post paracentesis and notes greater ability to eat  -Currently receiving albumin infusion  -Planned for discharge today given improvement in symptoms  -He has an appt on 8/18/22 with his oncologist Dr. Cary at [facility] to discuss treatment options including possible clinical trial  -Rest of care as per primary team  -Patient to followup with Dr. Cary ([facility]) upon discharge  -C/w Supportive care, pain control, Nutrition, PT, DVT ppx  -Oncology will continue to follow with you      Case d/w Dr. Archana NICKERSON  Oncology Physician Assistant  Giselle VALVERDE/[facility]  Pager [phone]    If before 9am/after 5pm or weekends please page On-call Oncology Fellow  
Patient is a 77 year old M hx of HTN, HLD, stage 4 pancreatic ca s/p whipple on chemo plan for immunotherapy pending studies, thyroid ca s/p hemiresection, c/b recent SBO s/p exlap, afib, PE, here with abdominal pain, 1 episode NBNB vomiting, abdominal bloating, and one episode of diarrhea now resolved.

## 2022-08-18 NOTE — CHART NOTE - NSCHARTNOTEFT_GEN_A_CORE
************************************************************************  PALLIATIVE MEDICINE COORDINATION OF CARE NOTE FOR ALOK VAUGHN  [x] Inpatient Consult  [ ] Other:  ************************************************************************  SUMMARY OF RECOMMENDATIONS:    > Chart reviewed. Discussed during oncology interdisciplinary rounds.   > Patient seen with son at bedside. Patient reports pain controlled with prn oxycodone. Patient plan for discharge today for follow up with Dr. King salas today.  > Discharge Recommendations:   - PO Oxy IR 5mg q6 PRN moderate-severe pain  - Bowel regimen while on opiates  - Pt to follow up Dr. Trisha Smith upon discharge.   > Discussed discharge recommendations with outpatient palliative team    ************************************************************************  CHART REVIEW:    HPI reviewed       ************************************************************************  MEDICATION REVIEW:  --- Pls refer to current medicatons in the body of this note  MEDICATIONS  (STANDING):  apixaban 5 milliGRAM(s) Oral every 12 hours  chlorhexidine 2% Cloths 1 Application(s) Topical daily  dextrose 5%. 1000 milliLiter(s) (50 mL/Hr) IV Continuous <Continuous>  dextrose 5%. 1000 milliLiter(s) (100 mL/Hr) IV Continuous <Continuous>  dextrose 50% Injectable 25 Gram(s) IV Push once  dextrose 50% Injectable 12.5 Gram(s) IV Push once  dextrose 50% Injectable 25 Gram(s) IV Push once  dronabinol 2.5 milliGRAM(s) Oral every 12 hours  famotidine    Tablet 20 milliGRAM(s) Oral daily  glucagon  Injectable 1 milliGRAM(s) IntraMuscular once  insulin lispro (ADMELOG) corrective regimen sliding scale   SubCutaneous three times a day before meals  pancrelipase  (CREON 36,000 Lipase Units) 3 Capsule(s) Oral three times a day with meals  polyethylene glycol 3350 17 Gram(s) Oral daily    MEDICATIONS  (PRN):  acetaminophen     Tablet .. 650 milliGRAM(s) Oral every 6 hours PRN Temp greater or equal to 38C (100.4F), Mild Pain (1 - 3)  dextrose Oral Gel 15 Gram(s) Oral once PRN Blood Glucose LESS THAN 70 milliGRAM(s)/deciliter  oxyCODONE    IR 5 milliGRAM(s) Oral every 6 hours PRN Moderate Pain (4 - 6)  --- PRN usage: In past 24 hours, received 4 prn doses of PO Oxy IR 5mg.   ------------------------------------------------------------------------  COORDINATION OF CARE:  --- Palliative Care consulted for: symptom management   --- Patient assessed: 8/18  CARE PROVIDER DOCUMENTATION:  --- YULISSA/JONATHAN notes reviewed  --- Medicine notes reviewed  --- Oncology notes reviewed   PLAN OF CARE  --- Current dispo plan:  discharge today as per primary team  ------------------------------------------------------------------------  --- Chart reviewed: 30 Minutes [including time used to gather, review and transfer data to this note]    Prolonged services rendered, as part of this patient's care provided by Palliative Medicine, include: i.chart review for provider and ancillary service documentation, ii.pertinent diagnostics including laboratory and imaging studies,iii. medication review including PRN use, iv. admission history including previous palliative care encounters and GOC notes, v.advance care planning documents including HCP and MOLST forms in [facility]. Part of Palliative Medicine extended evaluation and management also involves coordination of care with our IDT, the primary and consulting nydia, and unit CM/SW and Hospice if eligible. Recommendations based on the information gathered and discussed are outline in the AP of our notes.    ************************************************************************  Care coordination of ALOK VAUGHN was communicated with the interdisciplinary team during IDT rounds and with the primary team.
Case discussed with Dr. Lo regarding H+H 7.3/23.1. Patient to remain in the hospital overnight for further observation post paracentesis and repeat CBC in AM with possible blood transfusion before discharge tomorrow. Appointment with Dr. Cary to be rescheduled for next week at [facility]. Patient agreeable to plan and Dr. Wilson made aware.     Jason Fry, [facility]-BC
Imaging reviewed with moderate to large ascites. Please consult procedure team for paracentesis. If procedure team tries and is unsuccessful then can re-consult IR back.
This report was requested by: Jason Fry | Reference #: [id number]    Others' Prescriptions  Patient Name: Isma MartinezBirth Date: 1945  Address: [address] [place] [address]  [facility] [place] [address]: Male  Rx Written	Rx Dispensed	Drug	Quantity	Days Supply	Prescriber Name	Prescriber Autumn #	Payment Method	Dispenser  07/20/2022	08/02/2022	forte powder 9.5mg thc and 0.5mg cbd/1/4 teaspoonful	1	2	Trisha Lozoya)	[id number]	[facility]  07/20/2022	07/22/2022	symmetry (1:1) 2.5mgthc and 2.5 mgcbd/0.5 ml tincture	1	2	Trisha Lozoya)	[id number]	[employer]	[facility]  06/21/2022	06/21/2022	dronabinol 2.5 mg capsule	60	30	Carlos Cary A (MD PHD)	[id number]	Medicare	[facility]  01/10/2022	01/19/2022	dronabinol 2.5 mg capsule	60	30	Ronda Laura	[id number]	Medicare	[facility]  12/07/2021	12/07/2021	dronabinol 2.5 mg capsule	60	30	Carlos Cary A (MD PHD)	[id number]	Medicare	[facility]  11/02/2021	11/03/2021	dronabinol 2.5 mg capsule	60	30	Carlos Cary A (MD PHD)	[id number]	Medicare	[id number]  09/24/2021	09/26/2021	dronabinol 2.5 mg capsule	60	30	Ish Yin	[id number]	Medicare	[facility]

## 2022-08-25 NOTE — ASSESSMENT
[FreeTextEntry1] : 77 y/o with metastatic relapse of pancreatic ductal adenocarcinoma, YESENIA-germline mutant, from what was originally a localized pT2N2 LVI+/PNI+ pancreatic head adenocarcinoma resected Aug 2020; PMHx left thyroid papillary CA s/p left thyroidectomy/isthmusectomy/paratracheal node dissection in 2017, and melanoma, presenting for second opinion of further treatment including clinical trials in [facility]. \par \par He completed 12 cycles of FOLFIRINOX (09/2020-02/2021) and long course RT with capecitabine (3/2021-5/2021) and now has biopsy proven relapse in the peritoneum, not a substantial amount of burden, but we decided to start treatment to prevent complications that could arise from untreated peritoneal carcinomatosis.\par \par We had an in depth discussion about the options available for 2L therapy.  Alternatives include resuming FOLFIRINOX, noting that I cannot explicitly declare failure to this regimen since his relapse appeared to become more prominent after stopping this combination, and that his tumor may well be more sensitive to platinum drugs extrapolating from favorable response to platinum drugs from BRCA mutations. More recent data suggest that YESENIA mutations are prognostic rather than predictive (PMID 25458004), which may suggest that his tumor is more likely to respond to any treatment, not platinum specifically, as is the case for the PARP inhibitors. Another option is to use olaparib, extrapolating from BRCA/PALB2 data although while noting that YESENIA mutations appear less predictive based on limited data (KnowYourTumor/BeyondBRCA studies). Another option would be ipi+nivo off trial, as this is currently being studied in a trial context through [facility].\par \par He ultimately decided to pursue gemcitabine+nab-paclitaxel (GnP) on the basis that he wishes to continue with a standard option and his cancer has not yet been exposed to this therapy. Unfortunately, after just two days of chemo (C1D1 and D8), patient became severly neutropenic. Decision has been made to wait for count recovery and then change to every other week dosing for him, which has been shown to be about as effective and less toxic than 3-weeks on dosing (PMIDC 94285158). \par \par He has continued on every other week gemcitabine + nab-paclitaxel and is tolerating this therapy very well. Besides alopecia, he has very little in terms of side effects, and the every other week dosing is more tolerable to his bone marrow. Will continue therapy. His  remains stable ~200 which may be reflective of extant disease or reflect anatomic disruption of his biliary tract from the surgery. His scan shows minimal to no evidence of remaining disease. He remains on active chemotherapy with [facility]/ Abraxane in [place]. \par \par June 2022. He presents following 3-week hospitalization, where he underwent exploratory laparotomy, and confirmation of peritoneal relapse. He is still recovering, with some fatigue, and difficulty gaining weight. While I would like to resume chemotherapy it looks like he still need more time to regain performance status and stamina.\par  \par 7/26: He is improving somewhat; weight up 4 lbs, has a bit more energy. Had detailed discussion about treatment options at this time - continuing with FOLFOX vs attempting to get PARP/IO off-label as he did not qualify for TAPUR due to lack of measurable disease. Tumor markers rising but not likely reflective of failure of therapy as he has had very little FOLFOX. After discussion, patient prefers to receive chemotherapy for now and will revisit trail discussions after net scan.\par \par 8/9: Patient continue to have abdominal pain, unclear if it is from tumor progressing. His  has been rising since June 2022. He has finished 2 cycles of FOLFOX-5FU/Oxaplatin so rising  can be due to tumor progressing and failure of the current regimen but it is still too soon to be determined. Physical activity is the same, no weight loss, tolerating the current regimen without significant SEs. \par \par 8/25: he is overall declining. As a last-line therapy option, I've sought and got approval for olaparib given his YESENIA mutation and am in the process of getting pembrolizumab through [facility]. He is declining at home off treatment.\par \par Plan:\par - arrange for home fluids\par - today's CBC hemoglobin was high 9, satisfactory to begin olaparib\par - discused that withouth eating or drinking, no chemotherapy is going to bring benefit and encouraged patient to increase intake for olaparib to help\par - repeat cbc in one week\par - oxycontin and oxycodone for pain, [facility]\par - RN to check on patient next week\par \par Waqas Cary MD PhD\par Medical Oncology Attending\par I personally have spent a total of 30 minutes of time on the date of this encounter reviewing test results, documenting findings, coordinating care, and directly consulting with the patient and/or designated family member.\par

## 2022-08-25 NOTE — PHYSICAL EXAM
[Ambulatory and capable of all self care but unable to carry out any work activities] : Status 2- Ambulatory and capable of all self care but unable to carry out any work activities. Up and about more than 50% of waking hours [Cachectic] : cachectic [Ulcers] : no ulcers [Mucositis] : no mucositis [Thrush] : no thrush [Vesicles] : no vesicles [Normal] : affect appropriate [de-identified] : No visible inc wob [de-identified] : POst surgical scar healing. Pain on palpation on RLQ [de-identified] : No visible rashes or jaundice seen over video

## 2022-08-25 NOTE — REVIEW OF SYSTEMS
[Fever] : no fever [Chills] : no chills [Night Sweats] : no night sweats [Fatigue] : no fatigue [Recent Change In Weight] : ~T no recent weight change [Vision Problems] : no vision problems [Dysphagia] : no dysphagia [Hoarseness] : no hoarseness [Shortness Of Breath] : no shortness of breath [Wheezing] : no wheezing [Cough] : no cough [SOB on Exertion] : shortness of breath during exertion [Abdominal Pain] : abdominal pain [Vomiting] : no vomiting [Constipation] : no constipation [Diarrhea] : no diarrhea [Dysuria] : no dysuria [Incontinence] : no incontinence [Dizziness] : no dizziness [Difficulty Walking] : no difficulty walking [Suicidal] : not suicidal [Depression] : no depression [Easy Bleeding] : no tendency for easy bleeding [Swollen Glands] : no swollen glands [Negative] : Allergic/Immunologic [de-identified] : Insomnia

## 2022-08-25 NOTE — HISTORY OF PRESENT ILLNESS
[AJCC Stage: ____] : AJCC Stage: [unfilled] [de-identified] : 77 y/o with metastatic relapse of pancreatic ductal adenocarcinoma, YESENIA-germline mutant, from what was originally a localized pT2N2 LVI+/PNI+ pancreatic head adenocarcinoma resected Aug 2020; PMHx left thyroid papillary CA s/p left thyroidectomy/isthmusectomy/paratracheal node dissection in 2017, and melanoma, presenting for follow-up with medical oncology\par \par Chronological History of Cancer:\par Jul 2020  initially presented with abdominal discomfort, diarrhea, weight loss, new onset of DM\par 07/23/20  CT A/P suspected pancreas lesion\par 07/24/20  MRI Ab showed 1.2 cm mass at pancreas head. \par 08/03/20  underwent upfront surgery with Whipple procedure [facility] by Dr Calderon. pT2 pN2 LVI+ PNI+, margin negative\par 09/15/20 C1 adjuvant FOLFIRINOX\par 02/16/21 C12 (last cycle) FOLFIRINOX\par 03/03/21 CT CAP showing concern for relapse with mesenteric LN prominence. \par 03/20/21 began long-course RT with capecitabine, completed 5/6/21;\par 06/21/21 CT C/A/ again showed mesenteric lymphadenopathy, slightly increased LN prominence;  20\par Sep 2021  146;  repeat  2 weeks later 10/5/21 248\par 09/28/21 PET scan at [facility] (pending image loading) reported as "s/p whipple without suspicious activity in the surgical bed; hypermetatbolic RLQ lymph node/peritoneal implant most likely metastatic disease; interval improvement of small bowel distention and edema reflecting improvement of post radiation enteritis". \par 10/25/21 RLQ peritoneal implant soft tissue mass biopsy by IR at [facility], path returned as adenocarcinoma\par 11/02/21 C1D1 GnP (gemcitabine + nab-paclitaxel)\par 11/09/21 C1D8 GnP\par 11/16/21 planned but cancelled C1D15 GnP due to neuropenia\par 11/23/21 C2D01 GnP (changed to every other week)\par 12/07/21 C2D15 GnP \par 12/21/21 C3D1 GnP\par 12/28/21 CT scan with likely stable disease (report is comparing with 6/2021 scan. Comparison should be made with the one with Sep or Oct. Will try to upload Sep or Oct scan to [facility]). Ca19-9 is gradually downtrending\par 01/04/22- C3 D15 GnP\par 01/19/22- C4 D1 Gemzar 1000 mg/ m2 and Nab Paclitaxil 125 mg/ m2   Pt transferred care to [place].\par 02/01/22- C5 GnP e in [place] on 02/01/22\par 02/15/22-  C5 GnP \par 02/25/22- CT scans- POst op changes from whipples with fat stranding in upper abdomen, Scattered upper abdominal lymph nodes not appearing suspicious, scattered pulmonary nodules .\par 03/01/22-  gem/ Abraxane \par 03/15/22 -gem/ Abraxane \par 03/28/22- gem/ Abraxane Was getting chemotherapy in [place] and then decided to come back to [place] \par 05/23/22- Was admitted to [facility] from 05/23/22 to 06/11/22 for SBO. GI was consulted in house for possible stenting but was not done due to multi focal nature of involved bowel and stenosis sec to extrinsic compression. \par 06/03/22- s/p ex lap, venting gastrostomy tube, surgical pathology of abdominal implant showed adenocarcinoma c/w pancreatobiliary primary\par 06/23/22- CT scan- Moderate abdominal and pelvic ascites with peritoneal nodularity compatible with carcinomatosis\par 07/12/22: C #1 FOLFOX- 5 FU 2400mg/ m2 /  mg/ m2/ Oxaliplatin-85 mg/ m2 \par 07/26/22: C#2 FOLFOX- 5 FU 2400mg/ m2 /  mg/ m2/ Oxaliplatin-85 mg/ m2 \par 08/09/22: C#3 FOLFOX- 5 FU 2400mg/ m2 /  mg/ m2/ Oxaliplatin-85 mg/ m2 \par 08/15/22 CT CAP progression\par \par  family hx: Mother lung CA, Father thyroid CA, Sister breast CA; Pt also reports YESENIA gene + (heterozygous pathogenic c.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 His daughter is also positive for YESENIA gene mutation, but his son does not have the mutation.  \par  [de-identified] : surgical path reported moderately differentiated PDAC, 2.5 cm, involving head of pancreas and invading pancreatic duct, CBD, muscularis propria of duodenum and peripancreatic adipose tissue. Lymphovascular and Perineural invasion present. Margins negative. 6 of 13 LNs + for metastatic carcinoma. Staging was described as pT2 pN2.  [de-identified] : Ca 19-9: 11/11/20- 24 3/5/21- 28 6/25/21- 20 9/21/21- 146 10/5/21- 248.  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

## 2022-08-26 PROBLEM — R63.0 APPETITE LOSS: Status: ACTIVE | Noted: 2022-01-01

## 2022-08-26 PROBLEM — R53.83 FATIGUE DUE TO TREATMENT: Status: ACTIVE | Noted: 2022-01-01

## 2022-08-26 PROBLEM — R11.2 NAUSEA AND VOMITING: Status: ACTIVE | Noted: 2022-01-01

## 2022-08-26 PROBLEM — G89.3 PAIN, CANCER: Status: ACTIVE | Noted: 2022-01-01

## 2022-08-29 NOTE — ED PROVIDER NOTE - NS ED ROS FT
Constitutional: No reported recent fever.  Neurological: No reported acute headache.  Eyes: No reported new vision changes.   Ears, Nose, Mouth, Throat: No reported acute sore throat.  Cardiovascular: No reported current chest pain.  Respiratory: No reported new shortness of breath.  Gastrointestinal: + abdominal pain, nausea, vomiting.  Genitourinary: No reported new urinary problems.  Musculoskeletal: No reported acute extremity pain.  Integumentary (skin and/or breast): No reported new rash.

## 2022-08-29 NOTE — ED PROVIDER NOTE - CARE PLAN
Principal Discharge DX:	Pancreatic cancer  Secondary Diagnosis:	Intractable pain  Secondary Diagnosis:	Nausea and vomiting   1

## 2022-08-29 NOTE — H&P ADULT - ATTENDING COMMENTS
Patient
77M PMH metastatic pancreatic cancer, unknown GI bypass sx due to tumor obstruction surgery 2 months ago, s/p Pancreaticoduodenectomy 2012, thyroid cancer s/p partial thyroidectomy 5 years ago, and T2DM admitted for palliative care for transition to comfort measures only and hospice care placement Plan: apprec palliative recs, cont pain control as per palliative recs, apprec sw/cm collaboration in care, supportive care

## 2022-08-29 NOTE — H&P ADULT - NSICDXPASTSURGICALHX_GEN_ALL_CORE_FT
PAST SURGICAL HISTORY:  H/O Whipple procedure for pancreatic cancer in 2012    S/P partial thyroidectomy in 2017 for thyroid cancer

## 2022-08-29 NOTE — H&P ADULT - NSHPREVIEWOFSYSTEMS_GEN_ALL_CORE
CONSTITUTIONAL: Admits generalized weakness. Denies fever, chills  HEENT: denies blurred vision, sore throat  SKIN: denies new lesions, rash  CARDIOVASCULAR: denies chest pain, chest pressure, palpitations  RESPIRATORY: Admits shortness of breath w/ exertion. Denies sputum production  GASTROINTESTINAL: Admits nausea, vomiting, and abdominal pain. Denies diarrhea  GENITOURINARY: denies dysuria, discharge, hematuria  NEUROLOGICAL: denies numbness, headache, focal weakness  MUSCULOSKELETAL: denies new joint pain, muscle aches  HEMATOLOGIC: denies gross bleeding, bruising  LYMPHATICS: denies extremity swelling  PSYCHIATRIC: denies recent changes in anxiety, depression  ENDOCRINOLOGIC: denies sweating, cold or heat intolerance

## 2022-08-29 NOTE — H&P ADULT - PROBLEM SELECTOR PLAN 2
Chronic  - on metformin at home  - started sliding scale Chronic  - on metformin at home  - f/u A1c  - started low dose sliding scale

## 2022-08-29 NOTE — ED ADULT NURSE NOTE - OBJECTIVE STATEMENT
Pt brought in by family c/o weakness, unable to tolerate PO, n/v for few days. Denies fever, chills. Hx of Pancreatic CA. Safety maintained, call bell within reach. Nursing care ongoing.

## 2022-08-29 NOTE — H&P ADULT - ASSESSMENT
77M PMH metastatic pancreatic cancer, unknown GI bypass sx due to tumor obstruction surgery 2 months ago, s/p Pancreaticoduodenectomy 2012, thyroid cancer s/p partial thyroidectomy 5 years ago, and T2DM admitted for palliative care and possible transfer to hospice. 77M PMH metastatic pancreatic cancer, unknown GI bypass sx due to tumor obstruction surgery 2 months ago, s/p Pancreaticoduodenectomy 2012, thyroid cancer s/p partial thyroidectomy 5 years ago, and T2DM admitted for palliative care for transition to comfort measures only and hospice care placement

## 2022-08-29 NOTE — H&P ADULT - HISTORY OF PRESENT ILLNESS
77M PMH metastatic pancreatic cancer, unknown GI bypass sx due to tumor obstruction surgery 2 months ago, s/p Pancreaticoduodenectomy 2012, thyroid cancer s/p partial thyroidectomy 5 years ago, and T2DM presents for nausea and vomiting with PO x3 days. Starting Saturday morning (3 days ago) patient began having bilious vomit whenever he attempted PO intake. Patient also admits to SOB with exertion, generalized weakness and abdominal pain. Denies headache, dizziness, blurry vision, fever, chills, CP, diarrhea, constipation, hematuria, dysuria, No known sick contacts. Patient has hx of new abdominal mass causing possible obstruction of GI tract which did not respond to chemotherapy. Patient is in the process of setting up home hospice, but his appointment will be in 2 days (8/31). Patient and family feels that patient is too uncomfortabl to wait another 2 days so patient was brought to [facility] ED for palliative care and possible transfer to hospice. Patient denied providing home medications and med rec since patient has not been able to take PO meds since symptoms started and feels they are unnecessary.    ED Course:  Vitals: 97.7F, HR 81, BP 80/59, RR 18, 93% O2 sat RA  No labs performed  No imaging performed  EKG not performed  COVID PCR: pending  Given pepcid 20mg IV, morphine 4mg IV x2, zofran x1, NS bolus 1L x1 77M PMH metastatic pancreatic cancer, unknown GI bypass sx due to tumor obstruction surgery 2 months ago, s/p Pancreaticoduodenectomy 2012, thyroid cancer s/p partial thyroidectomy 5 years ago, and T2DM presents for nausea and vomiting with PO x3 days. Starting Saturday morning (3 days ago) patient began having bilious vomit whenever he attempted PO intake. Patient also admits to SOB with exertion, generalized weakness and abdominal pain. Denies headache, dizziness, blurry vision, fever, chills, CP, diarrhea, constipation, hematuria, dysuria, No known sick contacts. Patient has hx of new abdominal mass causing possible obstruction of GI tract which did not respond to chemotherapy. Patient is in the process of setting up home hospice, but his appointment will be in 2 days (8/31). Patient and family feels that patient is too uncomfortable to wait another 2 days so patient was brought to [facility] ED for palliative care and possible transfer to hospice. Patient denied providing home medications and med rec since patient has not been able to take PO meds since symptoms started and feels they are unnecessary.    ED Course:  Vitals: 97.7F, HR 81, BP 80/59, RR 18, 93% O2 sat RA  COVID PCR: negative  No other labs performed  No imaging performed  EKG not performed  Given pepcid 20mg IV, morphine 4mg IV x2, zofran x1, NS bolus 1L x1 77M PMH metastatic pancreatic cancer, unknown GI bypass sx due to tumor obstruction surgery 2 months ago, s/p Pancreaticoduodenectomy 2012, thyroid cancer s/p partial thyroidectomy 5 years ago, and T2DM presents for nausea and vomiting with PO x3 days. Starting Saturday morning (3 days ago) patient began having bilious vomit whenever he attempted PO intake. Patient also admits to SOB with exertion, generalized weakness and abdominal pain. Denies headache, dizziness, blurry vision, fever, chills, CP, diarrhea, constipation, hematuria, dysuria. Patient has hx of new abdominal mass causing possible obstruction of GI tract which did not respond to chemotherapy. Patient is in the process of setting up home hospice, but his appointment will be in 2 days (8/31). Patient and family feels that patient is too uncomfortable to wait another 2 days so patient was brought to [facility] ED for palliative care and possible transfer to hospice. No known sick contacts. Patient denied providing home medications and med rec since patient has not been able to take PO meds since symptoms started and feels they are unnecessary.    ED Course:  Vitals: 97.7F, HR 81, BP 80/59, RR 18, 93% O2 sat RA  COVID PCR: negative  No other labs performed  No imaging performed  EKG not performed  Given pepcid 20mg IV, morphine 4mg IV x2, zofran x1, NS bolus 1L x1

## 2022-08-29 NOTE — H&P ADULT - PARTICIPANTS
Spoke to patient about goals of care. Patient wises to be DNR/DNI. Patient does not want any feeding tubes placed. Patient is ok with antibiotic use and IV fluid administration./Patient

## 2022-08-29 NOTE — ED PROVIDER NOTE - OBJECTIVE STATEMENT
77-year-old male history of metastatic pancreatic cancer here with family due to weakness and inability to tolerate p.o.  Patient with nausea and vomiting he eats and drinks anything.  Has history of new abdominal mass causing obstruction which did not respond to chemotherapy.  Patient in the process of being set up with home hospice, had appointment Wednesday but family feels patient is too uncomfortable to make it to that appointment.

## 2022-08-29 NOTE — H&P ADULT - PROBLEM SELECTOR PLAN 1
hx of metastatic pancreatic cancer  - continue pepcid, zofran, morphine,   - consulted palliative care, Dr. Teo Cortes, f/u recs hx of metastatic pancreatic cancer  - continue pepcid, zofran, morphine  - possible home hospice care candidate  - consulted palliative care, Dr. Teo Cortes, f/u recs hx of metastatic pancreatic cancer  - pt currently NPO due to nausea/vomiting after PO intake  - continue IV zofran and IV morphine  - started IV decadron  - f/u dysphagia screen  - possible home hospice care candidate  - consulted palliative care, Dr. Teo Cortes, recs appreciated hx of metastatic pancreatic cancer  - pt currently NPO due to nausea/vomiting after PO intake, will hold all PO meds currently due to possible transition to comfort care  - continue IV zofran and IV morphine  - started IV decadron  - f/u dysphagia screen  - possible home hospice care candidate  - consulted palliative care, Dr. Teo Cortes, recs appreciated hx of metastatic pancreatic cancer  - pt currently NPO due to nausea/vomiting after PO intake  - due to nausea/vomiting and current medical condition, will transition to comfort care  - will hold all PO meds and AM labs due to lack of PO intake and possible transition to comfort care  - continue IV zofran and IV morphine  - started IV decadron  - f/u dysphagia screen  - possible home hospice care candidate  - consulted palliative care, Dr. Teo Cortes, recs appreciated hx of metastatic pancreatic cancer  - pt currently NPO due to nausea/vomiting after PO intake  - due to nausea/vomiting and current medical condition, will transition to comfort care  - will hold all PO home meds and AM labs due to lack of PO intake and possible transition to comfort care  - continue IV zofran and IV morphine  - started IV decadron  - f/u dysphagia screen  - possible home hospice care candidate  - consulted palliative care, Dr. Teo Cortes, recs appreciated

## 2022-08-29 NOTE — H&P ADULT - NSHPPOADEEPVENOUSTHROMB_GEN_A_CORE
no impingement syndrome of right shoulder  unspecified disorder of synovium and tendon, right upper arm  complete rotator cuff tear of rupture of right shoulder, not specified as traumatic

## 2022-08-29 NOTE — ED PROVIDER NOTE - DATE/TIME 1

## 2022-08-29 NOTE — ED PROVIDER NOTE - PHYSICAL EXAMINATION
Vital signs as available reviewed.  General:  No acute distress. cachectic.  Head:  Normocephalic, atraumatic.  Eyes:  Conjunctiva pink, no icterus.  Cardiovascular:  Regular rate, no obvious murmur.  Respiratory:  Clear to auscultation, good air entry bilaterally.  Abdomen:  Soft, diffuse tenderness to palpation.  Musculoskeletal:  No obvious deformity.  Neurologic: Alert and oriented, moving all extremities.  Skin:  Warm and dry.

## 2022-08-29 NOTE — H&P ADULT - NSHPPHYSICALEXAM_GEN_ALL_CORE
T(C): 36.5 (08-29-22 @ 15:14), Max: 36.5 (08-29-22 @ 15:14)  HR: 81 (08-29-22 @ 15:14) (81 - 81)  BP: 80/59 (08-29-22 @ 15:14) (80/59 - 80/59)  RR: 18 (08-29-22 @ 15:14) (18 - 18)  SpO2: 93% (08-29-22 @ 15:14) (93% - 93%)    GENERAL: patient in mild distress  EYES: sclera clear, no exudates  ENMT: oropharynx clear without erythema, no exudates, moist mucous membranes  NECK: supple, soft, no thyromegaly noted  LUNGS: diminished breathe sounds B/l, no wheezing, rales, or rhonchi appreciated  HEART: soft S1/S2, regular rate and rhythm, no murmurs noted, no lower extremity edema  GASTROINTESTINAL: generalized abdominal tenderness, nondistended, normoactive bowel sounds  INTEGUMENT: good skin turgor, no lesions noted  MUSCULOSKELETAL: no clubbing or cyanosis, no obvious deformity  NEUROLOGIC: awake, alert, oriented x3, no obvious sensory deficits  HEME/LYMPH: no obvious ecchymosis or petechiae T(C): 36.5 (08-29-22 @ 15:14), Max: 36.5 (08-29-22 @ 15:14)  HR: 81 (08-29-22 @ 15:14) (81 - 81)  BP: 80/59 (08-29-22 @ 15:14) (80/59 - 80/59)  RR: 18 (08-29-22 @ 15:14) (18 - 18)  SpO2: 93% (08-29-22 @ 15:14) (93% - 93%)    GENERAL: patient in mild distress, cachetic  EYES: sclera clear, no exudates  ENMT: oropharynx clear without erythema, no exudates, moist mucous membranes  NECK: supple, soft, no thyromegaly noted  LUNGS: diminished breathe sounds B/l, no wheezing, rales, or rhonchi appreciated  HEART: soft S1/S2, regular rate and rhythm, no murmurs noted, no lower extremity edema  GASTROINTESTINAL: generalized abdominal tenderness, nondistended, normoactive bowel sounds  INTEGUMENT: good skin turgor, no lesions noted  MUSCULOSKELETAL: no clubbing or cyanosis, no obvious deformity  NEUROLOGIC: awake, alert, oriented x3, no obvious sensory deficits  HEME/LYMPH: no obvious ecchymosis or petechiae

## 2022-08-29 NOTE — H&P ADULT - NSICDXFAMILYHX_GEN_ALL_CORE_FT
FAMILY HISTORY:  Father  Still living? Unknown  Family history of bone cancer, Age at diagnosis: Age Unknown    Mother  Still living? Unknown  FHx: lung cancer, Age at diagnosis: Age Unknown

## 2022-08-29 NOTE — ED PROVIDER NOTE - CLINICAL SUMMARY MEDICAL DECISION MAKING FREE TEXT BOX
patient here due to weakness, inability to tolerate PO, patient and family want comfort focused care. will admit for hydration, symptom control, pall care / hospice evaluation.

## 2022-08-29 NOTE — H&P ADULT - NSHPSOCIALHISTORY_GEN_ALL_CORE
ex tobacco user, 12 pack years, has not smoked since 1988  occasional alcohol use  No recreational drugs  COVID vaccine: 4 pfizer doses  MOLST: DNR/DNI   Lives with wife  ADL: performs daily activities with assistance from wife ex tobacco user, 12 pack years, has not smoked since 1988  occasional alcohol use  No recreational drugs  COVID vaccine: 4 pfizer doses  MOLST: DNR/DNI, patient does not want any feeding tubes placed. Patient is ok with antibiotic use and IV fluid administration.  Lives with wife  ADL: performs daily activities with assistance from wife  No diet restrictions

## 2022-08-29 NOTE — H&P ADULT - NSICDXPASTMEDICALHX_GEN_ALL_CORE_FT
PAST MEDICAL HISTORY:  Pancreatic cancer     Skin melanoma     Thyroid cancer s/p partial thyroidectomy 2017    Type 2 diabetes mellitus

## 2022-08-30 NOTE — PROGRESS NOTE ADULT - ASSESSMENT
77M PMH metastatic pancreatic cancer, unknown GI bypass sx due to tumor obstruction surgery 2 months ago, s/p Pancreaticoduodenectomy 2012, thyroid cancer s/p partial thyroidectomy 5 years ago, and T2DM admitted for palliative care for transition to comfort measures only and hospice care placement                1. Pancreatic cancer with mets - advanced stage  2. Severe protein calorie malnutrition  3. 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 Poor oral intake  5. T2DM  6. Acute on chronic pain due to malignancy

## 2022-08-30 NOTE — PROGRESS NOTE ADULT - SUBJECTIVE AND OBJECTIVE BOX
MEDICAL ATTENDING NOTE    Patient is a 77y old  Male who presents with a chief complaint of Palliative care for Metastatic pancreatic cancer (30 Aug 2022 11:29)      INTERVAL HPI/OVERNIGHT EVENTS: c/o reflux associated dyspepsia;    MEDICATIONS  (STANDING):  dexAMETHasone  Injectable 4 milliGRAM(s) IV Push every 12 hours  dextrose 5%. 1000 milliLiter(s) (100 mL/Hr) IV Continuous <Continuous>  dextrose 5%. 1000 milliLiter(s) (50 mL/Hr) IV Continuous <Continuous>  dextrose 50% Injectable 25 Gram(s) IV Push once  dextrose 50% Injectable 12.5 Gram(s) IV Push once  dextrose 50% Injectable 25 Gram(s) IV Push once  glucagon  Injectable 1 milliGRAM(s) IntraMuscular once  heparin   Injectable 5000 Unit(s) SubCutaneous every 12 hours  insulin lispro (ADMELOG) corrective regimen sliding scale   SubCutaneous every 6 hours    MEDICATIONS  (PRN):  dextrose Oral Gel 15 Gram(s) Oral once PRN Blood Glucose LESS THAN 70 milliGRAM(s)/deciliter  morphine  - Injectable 2 milliGRAM(s) IV Push every 1 hour PRN Severe Pain (7 - 10)  morphine  - Injectable 1 milliGRAM(s) IV Push every 1 hour PRN Moderate Pain (4 - 6)  ondansetron Injectable 4 milliGRAM(s) IV Push every 8 hours PRN Nausea and/or Vomiting      __________________________________________________  ----------------------------------------------------------------------------------  REVIEW OF SYSTEMS: nausea++; poor appetite; no chest pain; pain controlled with opioids      Vital Signs Last 24 Hrs  T(C): 36.2 (30 Aug 2022 11:58), Max: 36.4 (30 Aug 2022 06:39)  T(F): 97.1 (30 Aug 2022 11:58), Max: 97.6 (30 Aug 2022 06:39)  HR: 68 (30 Aug 2022 11:58) (68 - 68)  BP: 99/65 (30 Aug 2022 11:58) (90/64 - 99/65)  RR: 18 (30 Aug 2022 11:58) (18 - 18)  SpO2: 86% (30 Aug 2022 11:58) (86% - 100%)    Parameters below as of 30 Aug 2022 11:58  Patient On (Oxygen Delivery Method): room air        _________________  PHYSICAL EXAM:  ---------------------------  No respiratory distress;  cachexia++  LUNGS - no wheezing, bilateral air entry  HEART: S1 S2+   ABDOMEN: Soft, Nontender, non distended, BS+  EXTREMITIES: no cyanosis; no edema, no calf tenderness  NERVOUS SYSTEM:  Awake and alert; oriented x 3;  moves all extremities.    _________________________________________________  LABS:                          Plan of care was discussed with patient and spouse at bedside; all questions and concerns were addressed and care was aligned with patient's wishes.

## 2022-08-30 NOTE — CONSULT NOTE ADULT - ASSESSMENT
78 yo M PMH metastatic pancreatic cancer, unknown GI bypass sx due to tumor obstruction surgery 2 months ago, s/p Pancreaticoduodenectomy 2012, thyroid cancer s/p partial thyroidectomy 5 years ago, and T2DM admitted for palliative care for transition to comfort measures only and hospice care placement    #Pancreatic cancer.   #Nausea/vomiting  - NPO  - comfort care  - zofran prn  - started IV decadron    #cancer related pain  - morphine prn  - started IV decadron    #ACP/GOC  - DNR/DNI, comfort care  - Prognosis: days-weeks (hospice eligible)  - hospice care. SW to start referral (may need inpatient hospice in transition to home hospice depending on symptom control)    Appreciate consult. Discussed with the primary team.    Paxton Benito MD, [facility]     76 yo M PMH metastatic pancreatic cancer, unknown GI bypass sx due to tumor obstruction surgery 2 months ago, s/p Pancreaticoduodenectomy 2012, thyroid cancer s/p partial thyroidectomy 5 years ago, and T2DM admitted for palliative care for transition to comfort measures only and hospice care placement    #Pancreatic cancer    #Nausea/vomiting  - NPO  - comfort care  - zofran prn  - started IV decadron    #cancer related pain  - morphine prn  - started IV decadron    #ACP/GOC  - DNR/DNI, comfort care  - Prognosis: days-weeks (hospice eligible)  - hospice care. SW to start referral (may need inpatient hospice in transition to home hospice depending on symptom control). Patient in agreement with transfer to inpatient hospice.     Appreciate consult. Discussed with the primary team.    Paxton Benito MD, [facility]

## 2022-08-30 NOTE — ED ADULT NURSE REASSESSMENT NOTE - NS ED NURSE REASSESS COMMENT FT1
pt assessed for pain, medicated with releif.  Pt requested medication for acid reflux, spoke with Dr De La Rosa, IV omeprazole give.   Pt repositioned, call bell within reach, no acute distress noted.

## 2022-08-30 NOTE — PROGRESS NOTE ADULT - PROBLEM SELECTOR PLAN 1
Prognosis is poor.  Patient wants hospice care   GOC- comfort  Pain control  Diet as tolerated  Supportive measures  Overall prognosis is poor.

## 2022-08-30 NOTE — PROVIDER CONTACT NOTE (OTHER) - ACTION/TREATMENT ORDERED:
telephone order taken from DR Enrique" place pt on 2L nasal cannula" order noted,pt placed on 2L nasal cannula.

## 2022-08-30 NOTE — CONSULT NOTE ADULT - SUBJECTIVE AND OBJECTIVE BOX
HPI:  77M PMH metastatic pancreatic cancer, unknown GI bypass sx due to tumor obstruction surgery 2 months ago, s/p Pancreaticoduodenectomy 2012, thyroid cancer s/p partial thyroidectomy 5 years ago, and T2DM presents for nausea and vomiting with PO x3 days. Starting Saturday morning (3 days ago) patient began having bilious vomit whenever he attempted PO intake. Patient also admits to SOB with exertion, generalized weakness and abdominal pain. Denies headache, dizziness, blurry vision, fever, chills, CP, diarrhea, constipation, hematuria, dysuria. Patient has hx of new abdominal mass causing possible obstruction of GI tract which did not respond to chemotherapy. Patient is in the process of setting up home hospice, but his appointment will be in 2 days (8/31). Patient and family feels that patient is too uncomfortable to wait another 2 days so patient was brought to [facility] ED for palliative care and possible transfer to hospice. No known sick contacts. Patient denied providing home medications and med rec since patient has not been able to take PO meds since symptoms started and feels they are unnecessary.    ED Course:  Vitals: 97.7F, HR 81, BP 80/59, RR 18, 93% O2 sat RA  COVID PCR: negative  No other labs performed  No imaging performed  EKG not performed  Given pepcid 20mg IV, morphine 4mg IV x2, zofran x1, NS bolus 1L x1 (29 Aug 2022 18:08)    PERTINENT PM/SXH:   Pancreatic cancer    Type 2 diabetes mellitus    Thyroid cancer    Melanoma    Skin melanoma      H/O Whipple procedure    S/P partial thyroidectomy      FAMILY HISTORY:  FHx: lung cancer (Mother)    Family history of bone cancer (Father)      ITEMS NOT CHECKED ARE NOT PRESENT    SOCIAL HISTORY:   Significant other/partner[ ]  Children[ ]  Anabaptism/Spirituality:  Substance hx:  [ ]   Tobacco hx:  [ ]   Alcohol hx: [ ]   Home Opioid hx:  [ ] I-Stop Reference No:  Living Situation: [ ]Home  [ ]Long term care  [ ]Rehab [ ]Other    ADVANCE DIRECTIVES:    DNR  MOLST  [ ]  Living Will  [ ]   DECISION MAKER(s):  [ ] Health Care Proxy(s)  [ ] Surrogate(s)  [ ] Guardian           Name(s): Phone Number(s):    BASELINE (I)ADL(s) (prior to admission):  [place]: [ ]Total  [ ] Moderate [ ]Dependent    Allergies    No Known Allergies    Intolerances    MEDICATIONS  (STANDING):  dexAMETHasone  Injectable 4 milliGRAM(s) IV Push every 12 hours  dextrose 5%. 1000 milliLiter(s) (100 mL/Hr) IV Continuous <Continuous>  dextrose 5%. 1000 milliLiter(s) (50 mL/Hr) IV Continuous <Continuous>  dextrose 50% Injectable 25 Gram(s) IV Push once  dextrose 50% Injectable 12.5 Gram(s) IV Push once  dextrose 50% Injectable 25 Gram(s) IV Push once  glucagon  Injectable 1 milliGRAM(s) IntraMuscular once  heparin   Injectable 5000 Unit(s) SubCutaneous every 12 hours  insulin lispro (ADMELOG) corrective regimen sliding scale   SubCutaneous every 6 hours    MEDICATIONS  (PRN):  dextrose Oral Gel 15 Gram(s) Oral once PRN Blood Glucose LESS THAN 70 milliGRAM(s)/deciliter  morphine  - Injectable 2 milliGRAM(s) IV Push every 1 hour PRN Severe Pain (7 - 10)  morphine  - Injectable 1 milliGRAM(s) IV Push every 1 hour PRN Moderate Pain (4 - 6)  ondansetron Injectable 4 milliGRAM(s) IV Push every 8 hours PRN Nausea and/or Vomiting    PRESENT SYMPTOMS: [ ]Unable to obtain due to poor mentation   Source if other than patient:  [ ]Family   [ ]Team     Pain: [ ]yes [ ]no  QOL impact -   Location -                    Aggravating factors -  Quality -  Radiation -  Timing-  Severity (0-10 scale):  Minimal acceptable level (0-10 scale):     CPOT:    https://www.[facility].org/getattachment/[email or web address]/Critical-Care-Pain-Observation-Tool-(CPOT)      PAIN AD Score:     http://geriatrictoolkit.[place].[facility]/cog/painad.pdf (press ctrl +  left click to view)    Dyspnea:                           [ ]Mild [ ]Moderate [ ]Severe  Anxiety:                             [ ]Mild [ ]Moderate [ ]Severe  Fatigue:                             [ ]Mild [ ]Moderate [ ]Severe  Nausea:                             [ ]Mild [ ]Moderate [ ]Severe  Loss of appetite:              [ ]Mild [ ]Moderate [ ]Severe  Constipation:                    [ ]Mild [ ]Moderate [ ]Severe    Other Symptoms:  [ ]All other review of systems negative     Palliative Performance Status Version 2:         %    http://npcrc.org/files/news/palliative_performance_scale_ppsv2.pdf  PHYSICAL EXAM:  Vital Signs Last 24 Hrs  T(C): 36.4 (30 Aug 2022 06:39), Max: 36.5 (29 Aug 2022 15:14)  T(F): 97.6 (30 Aug 2022 06:39), Max: 97.7 (29 Aug 2022 15:14)  HR: 68 (30 Aug 2022 06:39) (68 - 81)  BP: 90/64 (30 Aug 2022 06:39) (80/59 - 90/64)  BP(mean): --  RR: 18 (30 Aug 2022 06:39) (18 - 18)  SpO2: 100% (30 Aug 2022 06:39) (93% - 100%)    Parameters below as of 30 Aug 2022 06:39  Patient On (Oxygen Delivery Method): room air     I&O's Summary    GENERAL:  [ ]Alert  [ ]Oriented x   [ ]Lethargic  [ ]Cachexia  [ ]Unarousable  [ ]Verbal  [ ]Non-Verbal  Behavioral:   [ ] Anxiety  [ ] Delirium [ ] Agitation [ ] Other  HEENT:  [ ]Normal   [ ]Dry mouth   [ ]ET Tube/Trach  [ ]Oral lesions  PULMONARY:   [ ]Clear [ ]Tachypnea  [ ]Audible excessive secretions   [ ]Rhonchi        [ ]Right [ ]Left [ ]Bilateral  [ ]Crackles        [ ]Right [ ]Left [ ]Bilateral  [ ]Wheezing     [ ]Right [ ]Left [ ]Bilateral  [ ]Diminished breath sounds [ ]right [ ]left [ ]bilateral  CARDIOVASCULAR:    [ ]Regular [ ]Irregular [ ]Tachy  [ ]Francis [ ]Murmur [ ]Other  GASTROINTESTINAL:  [ ]Soft  [ ]Distended   [ ]+BS  [ ]Non tender [ ]Tender  [ ]PEG [ ]OGT/ NGT  Last BM:   GENITOURINARY:  [ ]Normal [ ] Incontinent   [ ]Oliguria/Anuria   [ ][place]  MUSCULOSKELETAL:   [ ]Normal   [ ]Weakness  [ ]Bed/Wheelchair bound [ ]Edema  NEUROLOGIC:   [ ]No focal deficits  [ ]Cognitive impairment  [ ]Dysphagia [ ]Dysarthria [ ]Paresis [ ]Other   SKIN:   [ ]Normal    [ ]Rash  [ ]Pressure ulcer(s)       Present on admission [ ]y [ ]n    CRITICAL CARE:  [ ] Shock Present  [ ]Septic [ ]Cardiogenic [ ]Neurologic [ ]Hypovolemic  [ ]  Vasopressors [ ]  Inotropes   [ ]Respiratory failure present [ ]Mechanical ventilation [ ]Non-invasive ventilatory support [ ]High flow    [ ]Acute  [ ]Chronic [ ]Hypoxic  [ ]Hypercarbic [ ]Other  [ ]Other organ failure     LABS:            RADIOLOGY & ADDITIONAL STUDIES: reviewed    PROTEIN CALORIE MALNUTRITION PRESENT: [ ]mild [ ]moderate [ ]severe [ ]underweight [ ]morbid obesity  https://www.andeal.org/vault/2440/web/files/ONC/Table_Clinical%20Characteristics%20to%20Document%20Malnutrition-White%20JV%20et%20al%202012.pdf      Weight (kg): 59 (08-29-22 @ 15:14)    [ ]PPSV2 < or = to 30% [ ]significant weight loss  [ ]poor nutritional intake  [ ]anasarca      [ ]Artificial Nutrition      REFERRALS:   [ ]Chaplaincy  [ ]Hospice  [ ]Child Life  [ ]Social Work  [ ]Case management [ ]Holistic Therapy     Goals of Care Document:  HPI:  77M PMH metastatic pancreatic cancer, unknown GI bypass sx due to tumor obstruction surgery 2 months ago, s/p Pancreaticoduodenectomy 2012, thyroid cancer s/p partial thyroidectomy 5 years ago, and T2DM presents for nausea and vomiting with PO x3 days. Starting Saturday morning (3 days ago) patient began having bilious vomit whenever he attempted PO intake. Patient also admits to SOB with exertion, generalized weakness and abdominal pain. Denies headache, dizziness, blurry vision, fever, chills, CP, diarrhea, constipation, hematuria, dysuria. Patient has hx of new abdominal mass causing possible obstruction of GI tract which did not respond to chemotherapy. Patient is in the process of setting up home hospice, but his appointment will be in 2 days (8/31). Patient and family feels that patient is too uncomfortable to wait another 2 days so patient was brought to [facility] ED for palliative care and possible transfer to hospice. No known sick contacts. Patient denied providing home medications and med rec since patient has not been able to take PO meds since symptoms started and feels they are unnecessary.    ED Course:  Vitals: 97.7F, HR 81, BP 80/59, RR 18, 93% O2 sat RA  COVID PCR: negative  No other labs performed  No imaging performed  EKG not performed  Given pepcid 20mg IV, morphine 4mg IV x2, zofran x1, NS bolus 1L x1 (29 Aug 2022 18:08)    PERTINENT PM/SXH:   Pancreatic cancer    Type 2 diabetes mellitus    Thyroid cancer    Melanoma    Skin melanoma      H/O Whipple procedure    S/P partial thyroidectomy      FAMILY HISTORY:  FHx: lung cancer (Mother)    Family history of bone cancer (Father)      ITEMS NOT CHECKED ARE NOT PRESENT    SOCIAL HISTORY:   Significant other/partner[ x]  Children[ ]  Taoism/Spirituality:  Substance hx:  [ ]   Tobacco hx:  [ ]   Alcohol hx: [ ]  (x) none  Home Opioid hx:  [ ] I-Stop Reference No:  Living Situation: [ x]Home  [ ]Long term care  [ ]Rehab [ ]Other    ADVANCE DIRECTIVES:    DNR  MOLST  [x ]  Living Will  [ ]   DECISION MAKER(s):  [x ] Health Care Proxy(s)  [ ] Surrogate(s)  [ ] Guardian           Name(s): Phone Number(s):    BASELINE (I)ADL(s) (prior to admission):  [place]: [ ]Total  [ x] Moderate [ ]Dependent    Allergies    No Known Allergies    Intolerances    MEDICATIONS  (STANDING):  dexAMETHasone  Injectable 4 milliGRAM(s) IV Push every 12 hours  dextrose 5%. 1000 milliLiter(s) (100 mL/Hr) IV Continuous <Continuous>  dextrose 5%. 1000 milliLiter(s) (50 mL/Hr) IV Continuous <Continuous>  dextrose 50% Injectable 25 Gram(s) IV Push once  dextrose 50% Injectable 12.5 Gram(s) IV Push once  dextrose 50% Injectable 25 Gram(s) IV Push once  glucagon  Injectable 1 milliGRAM(s) IntraMuscular once  heparin   Injectable 5000 Unit(s) SubCutaneous every 12 hours  insulin lispro (ADMELOG) corrective regimen sliding scale   SubCutaneous every 6 hours    MEDICATIONS  (PRN):  dextrose Oral Gel 15 Gram(s) Oral once PRN Blood Glucose LESS THAN 70 milliGRAM(s)/deciliter  morphine  - Injectable 2 milliGRAM(s) IV Push every 1 hour PRN Severe Pain (7 - 10)  morphine  - Injectable 1 milliGRAM(s) IV Push every 1 hour PRN Moderate Pain (4 - 6)  ondansetron Injectable 4 milliGRAM(s) IV Push every 8 hours PRN Nausea and/or Vomiting    PRESENT SYMPTOMS: [ ]Unable to obtain due to poor mentation   Source if other than patient:  [ ]Family   [ ]Team     Pain: [x ]yes [ ]no  QOL impact - yes  Location -        abdomen            Aggravating factors - movement  Quality - dull  Radiation - no  Timing- intermittent  Severity (0-10 scale): 5/10  Minimal acceptable level (0-10 scale): 3/10    CPOT:    https://www.[email or web address].org/getattachment/[email or web address]/Critical-Care-Pain-Observation-Tool-(CPOT)      PAIN AD Score:     http://geriatrictoolkit.[place].edu/cog/painad.pdf (press ctrl +  left click to view)    Dyspnea:                           [ ]Mild [ ]Moderate [ ]Severe  Anxiety:                             [ ]Mild [ ]Moderate [ ]Severe  Fatigue:                             [ ]Mild [ ]Moderate [ ]Severe  Nausea:                             [ ]Mild [ ]Moderate [ ]Severe  Loss of appetite:              [ ]Mild [x ]Moderate [ ]Severe  Constipation:                    [ ]Mild [ ]Moderate [ ]Severe    Other Symptoms:  [x ]All other review of systems negative     Palliative Performance Status Version 2:         %    http://npcrc.org/files/news/palliative_performance_scale_ppsv2.pdf  PHYSICAL EXAM:  Vital Signs Last 24 Hrs  T(C): 36.4 (30 Aug 2022 06:39), Max: 36.5 (29 Aug 2022 15:14)  T(F): 97.6 (30 Aug 2022 06:39), Max: 97.7 (29 Aug 2022 15:14)  HR: 68 (30 Aug 2022 06:39) (68 - 81)  BP: 90/64 (30 Aug 2022 06:39) (80/59 - 90/64)  BP(mean): --  RR: 18 (30 Aug 2022 06:39) (18 - 18)  SpO2: 100% (30 Aug 2022 06:39) (93% - 100%)    Parameters below as of 30 Aug 2022 06:39  Patient On (Oxygen Delivery Method): room air     I&O's Summary    General: appears of appropriate age, lying in bed, frail, NAD  HEENT: NCAT, anicteric sclerae, EOMI, PERRL, moist mucous membranes  Neck: Supple, nontender, no mass  Neurology: A&Ox3, cr. ns. grossly intact, nonfocal, sensation intact   Respiratory: CTA B/L, No W/R/R  CV: RRR, +S1/S2, no murmurs, rubs or gallops  Abdominal: Soft, NT, ND +BS, no palpable masses  Extremities: No cyanosis, no edema, + peripheral pulses  MSK: no joint erythema or warmth, no joint swelling   Heme: No obvious ecchymosis or petechiae   Skin: warm, dry, normal color  Psych: no agitation, normal affect        LABS: old labs reviewed. No new labs (comfort care)            RADIOLOGY & ADDITIONAL STUDIES: reviewed    PROTEIN CALORIE MALNUTRITION PRESENT: [ ]mild [ ]moderate [ ]severe [ ]underweight [ ]morbid obesity  https://www.andeal.org/vault/2440/web/files/ONC/Table_Clinical%20Characteristics%20to%20Document%20Malnutrition-White%20JV%20et%20al%202012.pdf      Weight (kg): 59 (08-29-22 @ 15:14)    [ ]PPSV2 < or = to 30% [ ]significant weight loss  [ ]poor nutritional intake  [ ]anasarca      [ ]Artificial Nutrition      REFERRALS:   [ ]Chaplaincy  [x ]Hospice  [ ]Child Life  [ ]Social Work  [ ]Case management [ ]Holistic Therapy     Goals of Care Document:

## 2022-08-30 NOTE — PATIENT PROFILE ADULT - FALL HARM RISK - HARM RISK INTERVENTIONS

## 2022-08-30 NOTE — PATIENT PROFILE ADULT - NS PRO AD PATIENT TYPE
Do Not Resuscitate (DNR)/Medical Orders for Life-Sustaining Treatment (MOLST) Health Care Proxy (HCP)/Do Not Resuscitate (DNR)/Medical Orders for Life-Sustaining Treatment (MOLST)

## 2022-08-30 NOTE — ED ADULT NURSE REASSESSMENT NOTE - NS ED NURSE REASSESS COMMENT FT1
Received pt alert pt is waiting for palliative care consult. Pt is resting comfortable on stretcher. Call bell within reach. Freq rounding performed. Safety/Comfort maintained. Will continue to monitor. Pt is awaiting a bed upstairs.

## 2022-08-31 NOTE — DISCHARGE NOTE NURSING/CASE MANAGEMENT/SOCIAL WORK - PATIENT PORTAL LINK FT
You can access the FollowMyHealth Patient Portal offered by [facility] by registering at the following website: [email or web address]. By joining [employer]’s FollowMyHealth portal, you will also be able to view your health information using other applications (apps) compatible with our system.

## 2022-08-31 NOTE — CONSULT NOTE ADULT - PROVIDER SPECIALTY LIST ADULT
[Time Spent: ___ minutes] : I have spent [unfilled] minutes of time on the encounter. [>50% of the face to face encounter time was spent on counseling and/or coordination of care for ___] : Greater than 50% of the face to face encounter time was spent on counseling and/or coordination of care for [unfilled] Wound Care

## 2022-08-31 NOTE — DISCHARGE NOTE PROVIDER - ATTENDING DISCHARGE PHYSICAL EXAMINATION:
MEDICAL ATTENDING DISCHARGE NOTE :    Patient is a 77y old  Male who presents with a chief complaint of Palliative care for Metastatic pancreatic cancer (31 Aug 2022 09:21)      INTERVAL HPI / OVERNIGHT EVENTS: patient with uneventful night; c/o abdominal; pain otherwise offers no new complaints;     ----------------------------------------------------------------------------------  REVIEW OF SYSTEMS: no fever; no SOB; abdominal pain++      Vital Signs Last 24 Hrs  T(C): 36.3 (31 Aug 2022 05:10), Max: 36.3 (31 Aug 2022 05:10)  T(F): 97.4 (31 Aug 2022 05:10), Max: 97.4 (31 Aug 2022 05:10)  HR: 64 (31 Aug 2022 05:10) (64 - 72)  BP: 102/67 (31 Aug 2022 05:10) (102/67 - 121/77)  RR: 18 (31 Aug 2022 05:10) (18 - 18)  SpO2: 92% (31 Aug 2022 05:10) (92% - 93%)    Parameters below as of 31 Aug 2022 05:10  Patient On (Oxygen Delivery Method): nasal cannula        _________________  PHYSICAL EXAM:  ---------------------------  Cachectic; appeared  very uncomfortable with pain earlier today however he improved after IV morphine  LUNGS - no wheezing  HEART: S1 S2+   ABDOMEN: Soft, BS+  EXTREMITIES: no cyanosis; no edema, no calf tenderness      _________________________________________________  77M PMH metastatic pancreatic cancer, unknown GI bypass sx due to tumor obstruction surgery 2 months ago, s/p Pancreaticoduodenectomy 2012, thyroid cancer s/p partial thyroidectomy 5 years ago, and T2DM admitted for progressive symptoms as a result of advanced pancreatic cancer with mets     1. Pancreatic cancer with mets - advanced stage  2. Acute on chronic pain due to malignancy  3. Severe protein calorie malnutrition  4. Poor oral intake as a result of advanced cancer  5. T2DM  6. 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  Patient evaluated by in patient hospice team- based on his severity of his symptoms with pain requiring IV opioids, will benefit from inpatient hospice .  Discussed with family at length- daughter , son, spouse, sister and grandson  at bedside; all their questions and concerns addressed to their satisfaction.  They will go and visit the hospice inn. Anticipate discharge later today if bed available.  Discharge plans was discussed with care team including the nursing staff  and unit discharge planner.

## 2022-08-31 NOTE — DISCHARGE NOTE NURSING/CASE MANAGEMENT/SOCIAL WORK - NSDCPEFALRISK_GEN_ALL_CORE
For information on Fall & Injury Prevention, visit: [email or web address] OR  [email or web address] OR  https://www.cdc.gov/steadi/patient.html

## 2022-08-31 NOTE — CONSULT NOTE ADULT - ASSESSMENT
RN assessed patient to have blanchable erythema sacral region and bilateral buttocks  recommendation:   -cavilon daily  -Traid BID and prn soiling  Patient is on a low air loss mattress  At risk for altered tissue perfusion/YES  Impaired perfusion of peripheral tissue /YES  Continue  Nutrition (as tolerated)  Continue  Offloading   Continue Pericare  Care as per medicine will follow w/ you  Findings and recommendations discussed with MARY rangel   Thank you for this consult  Niurka Castro NP, [facility] [phone]

## 2022-08-31 NOTE — DISCHARGE NOTE PROVIDER - HOSPITAL COURSE
HPI:  77M PMH metastatic pancreatic cancer, unknown GI bypass sx due to tumor obstruction surgery 2 months ago, s/p Pancreaticoduodenectomy 2012, thyroid cancer s/p partial thyroidectomy 5 years ago, and T2DM presents for nausea and vomiting with PO x3 days. Starting Saturday morning (3 days ago) patient began having bilious vomit whenever he attempted PO intake. Patient also admits to SOB with exertion, generalized weakness and abdominal pain. Denies headache, dizziness, blurry vision, fever, chills, CP, diarrhea, constipation, hematuria, dysuria. Patient has hx of new abdominal mass causing possible obstruction of GI tract which did not respond to chemotherapy. Patient is in the process of setting up home hospice, but his appointment will be in 2 days (8/31). Patient and family feels that patient is too uncomfortable to wait another 2 days so patient was brought to \[facility]\"" ED for palliative care and possible transfer to hospice. No known sick contacts. Patient denied providing home medications and med rec since patient has not been able to take PO meds since symptoms started and feels they are unnecessary.    ED Course:  Vitals: 97.7F, HR 81, BP 80/59, RR 18, 93% O2 sat RA  COVID PCR: negative  No other labs performed  No imaging performed  EKG not performed  Given pepcid 20mg IV, morphine 4mg IV x2, zofran x1, NS bolus 1L x1 (29 Aug 2022 18:08)    Patient was hospitalized for progressive symptoms due to pancreatic cancer. Hospital course uncomplicated. Based on goals of care, patient and family opted for hospice care. Discharge to inpatient hospice.   HPI:  77M PMH metastatic pancreatic cancer, unknown GI bypass sx due to tumor obstruction surgery 2 months ago, s/p Pancreaticoduodenectomy 2012, thyroid cancer s/p partial thyroidectomy 5 years ago, and T2DM presents for nausea and vomiting with PO x3 days. Starting Saturday morning (3 days ago) patient began having bilious vomit whenever he attempted PO intake. Patient also admits to SOB with exertion, generalized weakness and abdominal pain. Denies headache, dizziness, blurry vision, fever, chills, CP, diarrhea, constipation, hematuria, dysuria. Patient has hx of new abdominal mass causing possible obstruction of GI tract which did not respond to chemotherapy. Patient is in the process of setting up home hospice, but his appointment will be in 2 days (8/31). Patient and family feels that patient is too uncomfortable to wait another 2 days so patient was brought to [facility] ED for palliative care and possible transfer to hospice. No known sick contacts. Patient denied providing home medications and med rec since patient has not been able to take PO meds since symptoms started and feels they are unnecessary.    ED Course:  Vitals: 97.7F, HR 81, BP 80/59, RR 18, 93% O2 sat RA  COVID PCR: negative  No other labs performed  No imaging performed  EKG not performed  Given pepcid 20mg IV, morphine 4mg IV x2, zofran x1, NS bolus 1L x1 (29 Aug 2022 18:08)    Patient was hospitalized for progressive symptoms due to pancreatic cancer. He was noted with severe malnutrition, acute pain due to cancer and had pressure ulcer that was present on admission. Hospital course uncomplicated. Based on goals of care, patient and family opted for hospice care. Discharge to inpatient hospice.

## 2022-08-31 NOTE — DISCHARGE NOTE PROVIDER - NSDCMRMEDTOKEN_GEN_ALL_CORE_FT
morphine 2 mg/mL injectable solution: 2 milligram(s) injectable every 3 hours, As Needed for acute pain due to cancer

## 2022-08-31 NOTE — DISCHARGE NOTE PROVIDER - NSDCFUSCHEDAPPT_GEN_ALL_CORE_FT
Armand Case  [facility]  SUR[facility] [address]  Scheduled Appointment: 10/03/2022

## 2022-08-31 NOTE — DISCHARGE NOTE PROVIDER - NSDCCPCAREPLAN_GEN_ALL_CORE_FT
PRINCIPAL DISCHARGE DIAGNOSIS  Diagnosis: Pancreatic cancer  Assessment and Plan of Treatment: You wetre hospitalized due to progressive symptoms as a result of advanced metastatic pancreatic cancer. You were tretaed with IV opioids for acute cancer associated pain. Diet was provided as you were able to tolerate. Based on your goals of care wishes, you opted for hospice care .  Continue IV opioids for pain.  Transfer to inpatient hospice

## 2022-08-31 NOTE — CONSULT NOTE ADULT - SUBJECTIVE AND OBJECTIVE BOX
HPI  location, quality, severity, duration, timing, context, modifying factors, associated signs/symptoms  HPI:  77M PMH metastatic pancreatic cancer, unknown GI bypass sx due to tumor obstruction surgery 2 months ago, s/p Pancreaticoduodenectomy 2012, thyroid cancer s/p partial thyroidectomy 5 years ago, and T2DM presents for nausea and vomiting with PO x3 days. Starting Saturday morning (3 days ago) patient began having bilious vomit whenever he attempted PO intake. Patient also admits to SOB with exertion, generalized weakness and abdominal pain. Denies headache, dizziness, blurry vision, fever, chills, CP, diarrhea, constipation, hematuria, dysuria. Patient has hx of new abdominal mass causing possible obstruction of GI tract which did not respond to chemotherapy. Patient is in the process of setting up home hospice, but his appointment will be in 2 days (8/31). Patient and family feels that patient is too uncomfortable to wait another 2 days so patient was brought to [facility] ED for palliative care and possible transfer to hospice. No known sick contacts. Patient denied providing home medications and med rec since patient has not been able to take PO meds since symptoms started and feels they are unnecessary.    ED Course:  Vitals: 97.7F, HR 81, BP 80/59, RR 18, 93% O2 sat RA  COVID PCR: negative  No other labs performed  No imaging performed  EKG not performed  Given pepcid 20mg IV, morphine 4mg IV x2, zofran x1, NS bolus 1L x1 (29 Aug 2022 18:08)      PAST MEDICAL & SURGICAL HISTORY:  Melanoma  nose &amp; left cheek  2yrs ago      HTN (hypertension)      Dyslipidemia      Malignant neoplasm of thyroid gland      Pancreatic cancer      Type 2 diabetes mellitus      Thyroid cancer  s/p partial thyroidectomy 2017      Skin melanoma      Vocal cord nodule  1998      H/O arthroscopy of left knee  meniscus  12/1998      History of Whipple procedure  pylorus sparing whipple 2022 at [facility]      S/P laparotomy  6/3/2022 jejenujejunostomy and gastrojejunostomy with GJ tube placement Dr. Case      H/O Whipple procedure  for pancreatic cancer in 2012      S/P partial thyroidectomy  in 2017 for thyroid cancer          REVIEW OF SYSTEMS      General:	    Skin/Breast:  	  Ophthalmologic:  	  ENMT:	    Respiratory and Thorax:  	  Cardiovascular:	    Gastrointestinal:	    Genitourinary:	    Musculoskeletal:	    Neurological:	    Psychiatric:	    Hematology/Lymphatics:	    Endocrine:	    Allergic/Immunologic:	  >>> <<<    REVIEW OF SYSTEMS  CONSTITUTIONAL: +weakness, no fevers or chills  HEENT: denies blurred visions, sore throat  SKIN: denies new lesions, rash  CARDIOVASCULAR: Denie chest pain, palpitations  RESPIRATORY: denies shortness of breath, sputum production  GASTROINTESTINAL: denies nausea, vomiting, diarrhea, abdominal pain  all other ROS is negative unless indicated above  Unable to obtain ROS  2/2 non-verbal status  Patient is unable to provide any information/ROS  due to baseline mental status      MEDICATIONS  (STANDING):  dexAMETHasone  Injectable 4 milliGRAM(s) IV Push every 12 hours  dextrose 5%. 1000 milliLiter(s) (100 mL/Hr) IV Continuous <Continuous>  dextrose 5%. 1000 milliLiter(s) (50 mL/Hr) IV Continuous <Continuous>  dextrose 50% Injectable 25 Gram(s) IV Push once  dextrose 50% Injectable 12.5 Gram(s) IV Push once  dextrose 50% Injectable 25 Gram(s) IV Push once  glucagon  Injectable 1 milliGRAM(s) IntraMuscular once  heparin   Injectable 5000 Unit(s) SubCutaneous every 12 hours  insulin lispro (ADMELOG) corrective regimen sliding scale   SubCutaneous every 6 hours    MEDICATIONS  (PRN):  dextrose Oral Gel 15 Gram(s) Oral once PRN Blood Glucose LESS THAN 70 milliGRAM(s)/deciliter  morphine  - Injectable 2 milliGRAM(s) IV Push every 1 hour PRN Severe Pain (7 - 10)  morphine  - Injectable 1 milliGRAM(s) IV Push every 1 hour PRN Moderate Pain (4 - 6)  ondansetron Injectable 4 milliGRAM(s) IV Push every 8 hours PRN Nausea and/or Vomiting      Allergies    No Known Allergies    Intolerances        SOCIAL HISTORY:  / single/ ; (+)HHA; Denies smoking, ETOH, drugs    FAMILY HISTORY:  Family history of breast cancer (Sibling)    Family history of thyroid cancer (Father)    Family history of lung cancer (Mother)    FHx: lung cancer (Mother)    Family history of bone cancer (Father)        Vital Signs Last 24 Hrs  T(C): 36.3 (31 Aug 2022 05:10), Max: 36.3 (31 Aug 2022 05:10)  T(F): 97.4 (31 Aug 2022 05:10), Max: 97.4 (31 Aug 2022 05:10)  HR: 64 (31 Aug 2022 05:10) (64 - 72)  BP: 102/67 (31 Aug 2022 05:10) (99/65 - 121/77)  BP(mean): --  RR: 18 (31 Aug 2022 05:10) (18 - 18)  SpO2: 92% (31 Aug 2022 05:10) (86% - 93%)    Parameters below as of 31 Aug 2022 05:10  Patient On (Oxygen Delivery Method): nasal cannula        NAD / gaurded but stable,  A&Ox3/ Alert  cachectic/ MO/ Obese  within defined normal limits  Total Care Sport/ Versa Care P500 bed                    PHYSICAL EXAM:    General: resting comfortably in bed; NAD  ENT: no nasal discharge; hearing intact  Neck: trach in place/vented/Supple  Respiratory: CTA  Cardiac: irregular rhythm; no murmurs  Gastrointestinal: soft, NT/ND; PEG in place, Right lower abdomen brownish discoloration from old hematoma?, fluid is felt in subcutaneous space, non tender, no erythema  Extremities: no clubbing or cyanosis; ++ Lower ext edema : no gross deformities, able to move all four extremities, +1 edema on right foot; erythema extends from dorsal portion of foot up towards anterior tibial region, 2 patches of proximal streaking noted - all areas marked for progression; right side dorsalis pedis + posterior tibial pulses dopplerable; faint palpable bilateral popliteal & LLE pulses; necrotic ulcerations on dorsum of the R 2nd toe and distal R 3rd toe  Dermatologic: skin warm, dry and intact; no rashes, wounds, or scars   Left leg 8x 8cm & 5 x 2cm ; Right leg 10 x 2 cm ulcerations noted. All ulcers with fibrinous material on wound bed, no probe to bone  Lymphatic: no submandibular or cervical LAD  Neurologic: awake, alert,  Can not follow commands, weakened strength & sensation grossly intact/ paraesthesia  Musculoskeletal/Vascular:  ROM  No edema, warm, no calf tenderness, no hair growth  DP/PT  hemosiderin staining  no deformities/ contractures  DP & PT pulses non-palpable bilaterally 2/2 +1 lower extremity pitting edema, Capillary refill 3 seconds, no hair growth, skin temp warm b/l.  Skin:  moist w/ good turgor  frail,  ecchymosis w/o hematoma  serosanguinous drainage, erythema  No odor, erythema, increased warmth, tenderness, induration, fluctuance

## 2022-09-17 LAB
CULTURE RESULTS: SIGNIFICANT CHANGE UP
SPECIMEN SOURCE: SIGNIFICANT CHANGE UP

## 2022-10-03 ENCOUNTER — APPOINTMENT (OUTPATIENT)
Dept: SURGICAL ONCOLOGY | Facility: CLINIC | Age: 77
End: 2022-10-03

## 2022-10-03 DIAGNOSIS — C25.9 MALIGNANT NEOPLASM OF PANCREAS, UNSPECIFIED: ICD-10-CM

## 2022-10-03 NOTE — HISTORY OF PRESENT ILLNESS
[de-identified] : Mr. ALOK VAUGHN is a 77 year old man with metastatic pancreatic ductal adenocarcinoma, presents today for a follow-up visit. \par \par S/P Whipple @ [facility] (w/ Dr. Calderon) 8/2020, pT2 pN2, lymphovascular & perineural invasion identified, margins negative.\par Started adjuvant therapy with Dr. Cary in September of 2020. \par \par Transferred his care to [place] in February of 2022, then returned to [place] in May of 2022. \par \par 5/23/22: Alok presented to [facility] ED w/ 3 weeks of progressively poor PO intake and increased nausea/emesis, SBO\par CT A/P showed dilated stomach and afferent limb with transition point in region of duodenojejunostomy, possible aspiration on chest CT\par \par 6/3/2022 s/p exploratory laparotomy, ZOE, combined feeding jejunostomy -venting gastrostomy tube. Path:\par 1. abdominal implant excision, metastatic adenocarcinoma involving fibroadipose tissue, c/w pancreatobiliary primary\par 2. stomach & small bowel excision, negative for carcinoma \par \par \par PMH of HTN, HLD, L thyroid papillary Ca s/p L thyroidectomy/isthmusectomy/paratracheal node dissection in 2017, melanoma, pancreatic ductal adenocarcinoma s/p pylorus sparing Whipple in 9/2020(@ [facility]) with recurrence,

## 2022-10-03 NOTE — ASSESSMENT
[FreeTextEntry1] : IMP:\par 77 year old man with metastatic pancreatic ductal adenocarcinoma (s/p Colette @ [facility] 8/2020)\par \par 6/3/2022 s/p exploratory laparotomy, ZOE, combined feeding jejunostomy -venting gastrostomy tube. Path:\par abdominal implant excision, metastatic adenocarcinoma involving fibroadipose tissue, c/w pancreatobiliary primary\par \par Currently on Hospice care \par \par PLAN:\par

## 2022-10-03 NOTE — PHYSICAL EXAM
[Normal] : well developed, well nourished, in no acute distress [de-identified] : surgical incision healing well, no evidence of infection ; G-tube in left upper quadrant

## 2022-12-08 NOTE — ED PROVIDER NOTE - NS_BEDUNITTYPES_ED_ALL_ED
,DirectAddress_Unknown,[email or web address] SURGERY

## 2023-02-19 NOTE — PRE-OP CHECKLIST - SPO2 (%)
"Mynor "BONITA Recio was seen and treated in our emergency department on 2/19/2023.  He may return to work on 02/22/2023.       If you have any questions or concerns, please don't hesitate to call.      AIDEN Lopez"
100
100

## 2023-09-03 NOTE — DISCHARGE NOTE NURSING/CASE MANAGEMENT/SOCIAL WORK - PATIENT PORTAL LINK FT
yes
You can access the FollowMyHealth Patient Portal offered by [facility] by registering at the following website: [email or web address]. By joining [employer]’s FollowMyHealth portal, you will also be able to view your health information using other applications (apps) compatible with our system.

## 2023-09-07 NOTE — ED ADULT NURSE NOTE - FINAL NURSING ELECTRONIC SIGNATURE
Medications refilled  Consider using generic Dove body wash  Please take at least 30 minutes a day to do something you enjoy. To try and relieve stress and anxiety, I recommend yoga, stretching, prayer, or meditation. Exercise is a great way to help with this as well.   Follow-up with your pulmonologist as scheduled  We will follow-up in 3 months   30-Aug-2022 11:18

## 2024-03-17 NOTE — ED ADULT NURSE NOTE - OBJECTIVE STATEMENT
Receive pt. in ER room 25 alert and oriented x 4, presenting to the ER with complaints of shortness of breath and distended abdomen. Pt, stated" I have pancreatic cancer and my abdomen is getting big I have some shortness of breath". No respiratory distress, pt. resting comfortably in bed. Place on cardiac monitor, abdomen with distension, labs sent, call bell place within reach.
(2) Forgets Limitations

## 2025-03-11 NOTE — ED PROVIDER NOTE - PROGRESS NOTE DETAILS
Chloe Bella(Attending)
family does not want any invasive procedures inclusive of labs work, CT scans. consult for [facility] placed.

## (undated) DEVICE — GLV 8 PROTEXIS (WHITE)

## (undated) DEVICE — CONTAINER FORMALIN 80ML YELLOW

## (undated) DEVICE — MARKING PEN W RULER

## (undated) DEVICE — BLADE SCALPEL SAFETYLOCK #10

## (undated) DEVICE — DRSG CURITY GAUZE SPONGE 4 X 4" 12-PLY NON-STERILE

## (undated) DEVICE — STAPLER COVIDIEN ENDO GIA STANDARD HANDLE

## (undated) DEVICE — WARMING BLANKET LOWER ADULT

## (undated) DEVICE — LUBRICATING JELLY HR ONE SHOT 3G

## (undated) DEVICE — BIOPSY FORCEP COLD DISP

## (undated) DEVICE — LAP PAD 18 X 18"

## (undated) DEVICE — SALIVA EJECTOR (BLUE)

## (undated) DEVICE — BASIN EMESIS 10IN GRADUATED MAUVE

## (undated) DEVICE — DRSG PREVENA PEEL & PLACE KIT 20CM

## (undated) DEVICE — SUT PDS II 1 48" TP-1

## (undated) DEVICE — SOL IRR POUR H2O 250ML

## (undated) DEVICE — CLAMP BX HOT RAD JAW 3

## (undated) DEVICE — SOL IRR POUR NS 0.9% 500ML

## (undated) DEVICE — GLV 6.5 PROTEXIS (WHITE)

## (undated) DEVICE — GLV 7 PROTEXIS (WHITE)

## (undated) DEVICE — LINE BREATHE SAMPLNG

## (undated) DEVICE — ELCTR ECG CONDUCTIVE ADHESIVE

## (undated) DEVICE — VENODYNE/SCD SLEEVE CALF MEDIUM

## (undated) DEVICE — DENTURE CUP PINK

## (undated) DEVICE — Device

## (undated) DEVICE — PACK MAJOR ABDOMINAL SUPINE

## (undated) DEVICE — DRSG OPSITE 13.75 X 4"

## (undated) DEVICE — DRAPE MAYO STAND 30"

## (undated) DEVICE — GOWN LG

## (undated) DEVICE — CATH IV SAFE BC 22G X 1" (BLUE)

## (undated) DEVICE — STAPLER SKIN VISI-STAT 35 WIDE

## (undated) DEVICE — BIOPSY FORCEP RADIAL JAW 4 STANDARD WITH NEEDLE

## (undated) DEVICE — PREP CHLORAPREP HI-LITE ORANGE 26ML

## (undated) DEVICE — MEDICATION LABELS W MARKER

## (undated) DEVICE — GLV 7.5 PROTEXIS (WHITE)

## (undated) DEVICE — POSITIONER FOAM EGG CRATE ULNAR 2PCS (PINK)

## (undated) DEVICE — SUT SOFSILK 2-0 18" V-20 (POP-OFF)

## (undated) DEVICE — BITE BLOCK ADULT 20 X 27MM (GREEN)

## (undated) DEVICE — SPECIMEN CONTAINER 100ML

## (undated) DEVICE — TUBING IV SET GRAVITY 3Y 100" MACRO

## (undated) DEVICE — VISITEC 4X4

## (undated) DEVICE — FOLEY TRAY 16FR 5CC LTX UMETER CLOSED

## (undated) DEVICE — PACK IV START WITH CHG

## (undated) DEVICE — UNDERPAD LINEN SAVER 17 X 24"

## (undated) DEVICE — LIGASURE IMPACT

## (undated) DEVICE — TUBING MEDI-VAC W MAXIGRIP CONNECTORS 1/4"X6'

## (undated) DEVICE — DRAPE INSTRUMENT POUCH 6.75" X 11"

## (undated) DEVICE — DRSG BANDAID 0.75X3"

## (undated) DEVICE — WARMING BLANKET UPPER ADULT

## (undated) DEVICE — STAPLER COVIDIEN ENDO GIA SHORT HANDLE

## (undated) DEVICE — GLV 8.5 PROTEXIS (WHITE)

## (undated) DEVICE — DRAPE TOWEL BLUE 17" X 24"

## (undated) DEVICE — PACK BASIN SPECIAL PROCEDURE

## (undated) DEVICE — FACESHIELD FULL VISOR

## (undated) DEVICE — GOWN TRIMAX LG

## (undated) DEVICE — SUT SOFSILK 3-0 18" V-20 (POP-OFF)

## (undated) DEVICE — DRSG 2X2

## (undated) DEVICE — ATTACH DISTAL DISP

## (undated) DEVICE — BLADE SCALPEL SAFETYLOCK #15